# Patient Record
Sex: FEMALE | Race: BLACK OR AFRICAN AMERICAN | NOT HISPANIC OR LATINO | Employment: OTHER | URBAN - METROPOLITAN AREA
[De-identification: names, ages, dates, MRNs, and addresses within clinical notes are randomized per-mention and may not be internally consistent; named-entity substitution may affect disease eponyms.]

---

## 2017-01-01 ENCOUNTER — HOSPITAL ENCOUNTER (OUTPATIENT)
Facility: HOSPITAL | Age: 82
Discharge: HOME | End: 2017-01-03
Attending: EMERGENCY MEDICINE | Admitting: INTERNAL MEDICINE
Payer: MEDICARE

## 2017-01-01 DIAGNOSIS — M79.89 LEG SWELLING: ICD-10-CM

## 2017-01-01 DIAGNOSIS — I73.9 PVD (PERIPHERAL VASCULAR DISEASE): ICD-10-CM

## 2017-01-01 DIAGNOSIS — E11.29 CONTROLLED TYPE 2 DIABETES MELLITUS WITH OTHER DIABETIC KIDNEY COMPLICATION, WITHOUT LONG-TERM CURRENT USE OF INSULIN: ICD-10-CM

## 2017-01-01 DIAGNOSIS — R79.89 ELEVATED TROPONIN: ICD-10-CM

## 2017-01-01 DIAGNOSIS — R07.9 ACUTE CHEST PAIN: Primary | ICD-10-CM

## 2017-01-01 DIAGNOSIS — I10 ESSENTIAL HYPERTENSION: ICD-10-CM

## 2017-01-01 DIAGNOSIS — R07.9 CHEST PAIN: ICD-10-CM

## 2017-01-01 LAB
ALBUMIN SERPL BCP-MCNC: 4 G/DL
ALP SERPL-CCNC: 93 U/L
ALT SERPL W/O P-5'-P-CCNC: 42 U/L
ANION GAP SERPL CALC-SCNC: 13 MMOL/L
AST SERPL-CCNC: 46 U/L
BASOPHILS # BLD AUTO: 0.02 K/UL
BASOPHILS NFR BLD: 0.4 %
BILIRUB SERPL-MCNC: 0.9 MG/DL
BILIRUB UR QL STRIP: NEGATIVE
BNP SERPL-MCNC: 308 PG/ML
BUN SERPL-MCNC: 12 MG/DL
CALCIUM SERPL-MCNC: 9.5 MG/DL
CHLORIDE SERPL-SCNC: 104 MMOL/L
CHOLEST/HDLC SERPL: 2.4 {RATIO}
CK MB SERPL-MCNC: 2.4 NG/ML
CK MB SERPL-MCNC: 2.4 NG/ML
CK MB SERPL-RTO: 1.6 %
CK MB SERPL-RTO: 1.9 %
CK SERPL-CCNC: 127 U/L
CK SERPL-CCNC: 127 U/L
CK SERPL-CCNC: 150 U/L
CK SERPL-CCNC: 150 U/L
CLARITY UR: CLEAR
CO2 SERPL-SCNC: 22 MMOL/L
COLOR UR: ABNORMAL
CREAT SERPL-MCNC: 1 MG/DL
DIFFERENTIAL METHOD: ABNORMAL
EOSINOPHIL # BLD AUTO: 0 K/UL
EOSINOPHIL NFR BLD: 0.8 %
ERYTHROCYTE [DISTWIDTH] IN BLOOD BY AUTOMATED COUNT: 13.3 %
EST. GFR  (AFRICAN AMERICAN): 60 ML/MIN/1.73 M^2
EST. GFR  (NON AFRICAN AMERICAN): 52 ML/MIN/1.73 M^2
GLUCOSE SERPL-MCNC: 121 MG/DL
GLUCOSE UR QL STRIP: NEGATIVE
HCT VFR BLD AUTO: 34.6 %
HDL/CHOLESTEROL RATIO: 41.2 %
HDLC SERPL-MCNC: 170 MG/DL
HDLC SERPL-MCNC: 70 MG/DL
HGB BLD-MCNC: 11.1 G/DL
HGB UR QL STRIP: NEGATIVE
INR PPP: 1.1
KETONES UR QL STRIP: NEGATIVE
LDLC SERPL CALC-MCNC: 92.4 MG/DL
LEUKOCYTE ESTERASE UR QL STRIP: ABNORMAL
LYMPHOCYTES # BLD AUTO: 1.4 K/UL
LYMPHOCYTES NFR BLD: 26.1 %
MAGNESIUM SERPL-MCNC: 1.8 MG/DL
MCH RBC QN AUTO: 27.8 PG
MCHC RBC AUTO-ENTMCNC: 32.1 %
MCV RBC AUTO: 87 FL
MICROSCOPIC COMMENT: NORMAL
MONOCYTES # BLD AUTO: 0.7 K/UL
MONOCYTES NFR BLD: 14.1 %
NEUTROPHILS # BLD AUTO: 3.1 K/UL
NEUTROPHILS NFR BLD: 58.6 %
NITRITE UR QL STRIP: NEGATIVE
NONHDLC SERPL-MCNC: 100 MG/DL
PH UR STRIP: 7 [PH] (ref 5–8)
PLATELET # BLD AUTO: 202 K/UL
PMV BLD AUTO: 11 FL
POTASSIUM SERPL-SCNC: 3.9 MMOL/L
PROT SERPL-MCNC: 7.4 G/DL
PROT UR QL STRIP: NEGATIVE
PROTHROMBIN TIME: 11.3 SEC
RBC # BLD AUTO: 4 M/UL
RBC #/AREA URNS HPF: 2 /HPF (ref 0–4)
SODIUM SERPL-SCNC: 139 MMOL/L
SP GR UR STRIP: 1.01 (ref 1–1.03)
TRIGL SERPL-MCNC: 38 MG/DL
TROPONIN I SERPL DL<=0.01 NG/ML-MCNC: 0.12 NG/ML
TROPONIN I SERPL DL<=0.01 NG/ML-MCNC: 0.15 NG/ML
TSH SERPL DL<=0.005 MIU/L-ACNC: 2.51 UIU/ML
URN SPEC COLLECT METH UR: ABNORMAL
UROBILINOGEN UR STRIP-ACNC: NEGATIVE EU/DL
WBC # BLD AUTO: 5.25 K/UL
WBC #/AREA URNS HPF: 2 /HPF (ref 0–5)

## 2017-01-01 PROCEDURE — 25000003 PHARM REV CODE 250: Performed by: NURSE PRACTITIONER

## 2017-01-01 PROCEDURE — 85610 PROTHROMBIN TIME: CPT

## 2017-01-01 PROCEDURE — G0378 HOSPITAL OBSERVATION PER HR: HCPCS

## 2017-01-01 PROCEDURE — 93306 TTE W/DOPPLER COMPLETE: CPT | Mod: 26,,, | Performed by: INTERNAL MEDICINE

## 2017-01-01 PROCEDURE — 63600175 PHARM REV CODE 636 W HCPCS: Performed by: EMERGENCY MEDICINE

## 2017-01-01 PROCEDURE — 80061 LIPID PANEL: CPT

## 2017-01-01 PROCEDURE — 82553 CREATINE MB FRACTION: CPT | Mod: 91

## 2017-01-01 PROCEDURE — 25000003 PHARM REV CODE 250: Performed by: EMERGENCY MEDICINE

## 2017-01-01 PROCEDURE — 80053 COMPREHEN METABOLIC PANEL: CPT

## 2017-01-01 PROCEDURE — 63600175 PHARM REV CODE 636 W HCPCS: Performed by: NURSE PRACTITIONER

## 2017-01-01 PROCEDURE — 93306 TTE W/DOPPLER COMPLETE: CPT

## 2017-01-01 PROCEDURE — 85025 COMPLETE CBC W/AUTO DIFF WBC: CPT

## 2017-01-01 PROCEDURE — 36415 COLL VENOUS BLD VENIPUNCTURE: CPT

## 2017-01-01 PROCEDURE — 93005 ELECTROCARDIOGRAM TRACING: CPT

## 2017-01-01 PROCEDURE — 99285 EMERGENCY DEPT VISIT HI MDM: CPT | Mod: 25

## 2017-01-01 PROCEDURE — 84484 ASSAY OF TROPONIN QUANT: CPT | Mod: 91

## 2017-01-01 PROCEDURE — 84484 ASSAY OF TROPONIN QUANT: CPT

## 2017-01-01 PROCEDURE — 84443 ASSAY THYROID STIM HORMONE: CPT

## 2017-01-01 PROCEDURE — 82553 CREATINE MB FRACTION: CPT

## 2017-01-01 PROCEDURE — 83880 ASSAY OF NATRIURETIC PEPTIDE: CPT

## 2017-01-01 PROCEDURE — 81000 URINALYSIS NONAUTO W/SCOPE: CPT

## 2017-01-01 PROCEDURE — 83735 ASSAY OF MAGNESIUM: CPT

## 2017-01-01 RX ORDER — ASPIRIN 325 MG
325 TABLET, DELAYED RELEASE (ENTERIC COATED) ORAL
Status: COMPLETED | OUTPATIENT
Start: 2017-01-01 | End: 2017-01-01

## 2017-01-01 RX ORDER — ENOXAPARIN SODIUM 100 MG/ML
40 INJECTION SUBCUTANEOUS EVERY 24 HOURS
Status: DISCONTINUED | OUTPATIENT
Start: 2017-01-01 | End: 2017-01-03 | Stop reason: HOSPADM

## 2017-01-01 RX ORDER — BISACODYL 10 MG
10 SUPPOSITORY, RECTAL RECTAL DAILY PRN
Status: DISCONTINUED | OUTPATIENT
Start: 2017-01-01 | End: 2017-01-03 | Stop reason: HOSPADM

## 2017-01-01 RX ORDER — HYDROCODONE BITARTRATE AND ACETAMINOPHEN 5; 325 MG/1; MG/1
1 TABLET ORAL EVERY 6 HOURS PRN
Status: DISCONTINUED | OUTPATIENT
Start: 2017-01-01 | End: 2017-01-03 | Stop reason: HOSPADM

## 2017-01-01 RX ORDER — DULOXETIN HYDROCHLORIDE 30 MG/1
30 CAPSULE, DELAYED RELEASE ORAL 2 TIMES DAILY
Status: DISCONTINUED | OUTPATIENT
Start: 2017-01-01 | End: 2017-01-03 | Stop reason: HOSPADM

## 2017-01-01 RX ORDER — DULOXETIN HYDROCHLORIDE 30 MG/1
30 CAPSULE, DELAYED RELEASE ORAL 2 TIMES DAILY
COMMUNITY
End: 2017-01-12 | Stop reason: ALTCHOICE

## 2017-01-01 RX ORDER — ONDANSETRON 2 MG/ML
4 INJECTION INTRAMUSCULAR; INTRAVENOUS EVERY 12 HOURS PRN
Status: DISCONTINUED | OUTPATIENT
Start: 2017-01-01 | End: 2017-01-03 | Stop reason: HOSPADM

## 2017-01-01 RX ORDER — DICLOFENAC SODIUM 10 MG/G
GEL TOPICAL DAILY
Status: DISCONTINUED | OUTPATIENT
Start: 2017-01-02 | End: 2017-01-03 | Stop reason: HOSPADM

## 2017-01-01 RX ORDER — RAMELTEON 8 MG/1
8 TABLET ORAL NIGHTLY PRN
Status: DISCONTINUED | OUTPATIENT
Start: 2017-01-01 | End: 2017-01-03 | Stop reason: HOSPADM

## 2017-01-01 RX ORDER — LOSARTAN POTASSIUM 25 MG/1
100 TABLET ORAL DAILY
Status: DISCONTINUED | OUTPATIENT
Start: 2017-01-01 | End: 2017-01-03 | Stop reason: HOSPADM

## 2017-01-01 RX ORDER — FUROSEMIDE 10 MG/ML
20 INJECTION INTRAMUSCULAR; INTRAVENOUS ONCE
Status: COMPLETED | OUTPATIENT
Start: 2017-01-01 | End: 2017-01-01

## 2017-01-01 RX ORDER — FAMOTIDINE 10 MG/ML
20 INJECTION INTRAVENOUS DAILY
Status: DISCONTINUED | OUTPATIENT
Start: 2017-01-01 | End: 2017-01-03 | Stop reason: HOSPADM

## 2017-01-01 RX ORDER — NAPROXEN SODIUM 220 MG/1
81 TABLET, FILM COATED ORAL DAILY
Status: DISCONTINUED | OUTPATIENT
Start: 2017-01-02 | End: 2017-01-03 | Stop reason: HOSPADM

## 2017-01-01 RX ADMIN — DULOXETINE 30 MG: 30 CAPSULE, DELAYED RELEASE ORAL at 08:01

## 2017-01-01 RX ADMIN — LOSARTAN POTASSIUM 100 MG: 25 TABLET, FILM COATED ORAL at 02:01

## 2017-01-01 RX ADMIN — ASPIRIN 325 MG: 325 TABLET, DELAYED RELEASE ORAL at 09:01

## 2017-01-01 RX ADMIN — ENOXAPARIN SODIUM 40 MG: 100 INJECTION SUBCUTANEOUS at 02:01

## 2017-01-01 RX ADMIN — FUROSEMIDE 20 MG: 10 INJECTION, SOLUTION INTRAMUSCULAR; INTRAVENOUS at 06:01

## 2017-01-01 RX ADMIN — FAMOTIDINE 20 MG: 10 INJECTION, SOLUTION INTRAVENOUS at 02:01

## 2017-01-01 RX ADMIN — ONDANSETRON 4 MG: 2 INJECTION INTRAMUSCULAR; INTRAVENOUS at 03:01

## 2017-01-01 RX ADMIN — HYDROCODONE BITARTRATE AND ACETAMINOPHEN 1 TABLET: 5; 325 TABLET ORAL at 03:01

## 2017-01-01 NOTE — ED TRIAGE NOTES
Pt arrives to ED via personal transportation with c/o intermittent midsternal chest pain, SOB, dizziness and generalized weakness x couple days. Also reports swelling to bilateral feet. Denies n/v/d or any other symptoms at this time.

## 2017-01-01 NOTE — H&P
"[facility] Medicine  History & Physical    Patient Name: Magaly Nunes  MRN: [id number]  Admission Date: 1/1/2017  Attending Physician: Dejah Kent MD   Primary Care Provider: Chris Bangura MD         Patient information was obtained from patient and ER records.     Subjective:     Principal Problem:Acute chest pain    Chief Complaint:  Chest Pain/Arthritic Pain     HPI: Magaly Nunes is a 84 y.o. female with a history as below who presented to the ED with a CC of constant non-radiating mid-sternal chest pain 7/10 with epigastric discomfort and nausea.  She describes pain as "achy".  She denies fever, chills, diaphoresis, HA, palpitations, emesis, dysuria, dizziness, recent trauma and illness. She tells me on Thursday she started with all over arthritic pain + chest pain; on Friday seen was seen in Urgent Care and was given an injection "unknown" because her legs were swollen; however symptom were unrelieved. Pertinent FH - mother (d) HTN, DM, PM. She denies cardiac disease.  On Admit, /78. EKG is non-ischemic. Initial troponin level 0.119. . CXR is non-revealing.  Admitted to OBS for ACS rule out.  No previous cardiac issues or recent w/u.     Patient followed briefly by Dr. Bertrand with holter monitor and echo (July 2013).  Holter significant for 1 episode of 7 beats of V-Tach; rare PACs and PVCs; and no AV blocks. Echo shows EF 65% + DD    NST performed and is without evidence of myocardial ischemia (Sept 2015).           Past Medical History   Diagnosis Date    Anxiety disorder     Carpal tunnel syndrome     CHF (congestive heart failure)     Chronic allergic rhinitis     Chronic pain 10/22/2012    Constipation - functional 4/10/2013    Depression     Diabetes mellitus type II     Hot flash not due to menopause     HTN (hypertension)     Hypokalemia 11/19/2012    Insomnia 4/10/2013    Knee pain, bilateral 7/24/2013    Personal history of DVT " (deep vein thrombosis)     Spinal stenosis      Dr. Corrales    Spinal stenosis of lumbar region 2/3/2014    Vertigo        Past Surgical History   Procedure Laterality Date    Hysterectomy      Cataract extraction Bilateral     Eye surgery      Fracture surgery      Orif radius & ulna fractures         Review of patient's allergies indicates:   Allergen Reactions    Ace inhibitors      Other reaction(s): Unknown    Aspirin      Other reaction(s): Unknown    Aciphex  [rabeprazole]      Other reaction(s): Stomach upset    Bextra  [valdecoxib]      Other reaction(s): Unknown    Clonidine      Other reaction(s): dry mouth.lip swelling    Cardizem  [diltiazem hcl]      Other reaction(s): Unknown    Mavik  [trandolapril]      Other reaction(s): Unknown    Nsaids (non-steroidal anti-inflammatory drug)      Other reaction(s): black spots on skin    Phenytoin sodium extended      Other reaction(s): Stomach upset    Tramadol      Other reaction(s): stomach pain       No current facility-administered medications on file prior to encounter.      Current Outpatient Prescriptions on File Prior to Encounter   Medication Sig    hydrocodone-acetaminophen 5-325mg (NORCO) 5-325 mg per tablet Take 1 tablet by mouth every 6 (six) hours as needed for Pain.    losartan (COZAAR) 100 MG tablet Take 1 tablet (100 mg total) by mouth once daily.    meclizine (ANTIVERT) 25 mg tablet     omeprazole (PRILOSEC) 20 MG capsule TAKE 2 CAPSULES BY MOUTH EVERY DAY FOR ACID REFLUX AND STOMACH    potassium chloride SA (K-DUR,KLOR-CON) 20 MEQ tablet TAKE 1 TABLET BY MOUTH DAILY    temazepam (RESTORIL) 30 mg capsule TAKE ONE CAPSULE BY MOUTH NIGHTLY AS NEEDED FOR INSOMNIA.    [DISCONTINUED] ALPHA LIPOIC ACID ORAL Take by mouth.    [DISCONTINUED] calcium citrate (CALCITRATE) 200 mg (950 mg) tablet Take 1 tablet by mouth once daily.    [DISCONTINUED] glucosamine-chondroitin 500-400 mg tablet Take 1 tablet by mouth 3 (three) times  daily.    [DISCONTINUED] meloxicam (MOBIC) 15 MG tablet TK 1 T PO  ONCE D    [DISCONTINUED] duloxetine (CYMBALTA) 60 MG capsule Take 1 capsule (60 mg total) by mouth once daily.    [DISCONTINUED] lorazepam (ATIVAN) 0.5 MG tablet 1- 2 every 6hrs during the day for anxiety and hot flashes     Family History     Problem Relation (Age of Onset)    No Known Problems Mother, Father, Sister, Brother, Maternal Aunt, Maternal Uncle, Paternal Aunt, Paternal Uncle, Maternal Grandmother, Maternal Grandfather, Paternal Grandmother, Paternal Grandfather        Social History Main Topics    Smoking status: Never Smoker    Smokeless tobacco: Not on file    Alcohol use No    Drug use: No    Sexual activity: No     Review of Systems   Constitutional: Negative for chills, diaphoresis and fever.   HENT: Negative for congestion, postnasal drip, sinus pressure and sore throat.    Eyes: Negative for visual disturbance.   Respiratory: Positive for shortness of breath (ROTH). Negative for cough, chest tightness and wheezing.    Cardiovascular: Positive for chest pain (non-radiating mid-sternal). Negative for palpitations and leg swelling.   Gastrointestinal: Positive for nausea. Negative for abdominal distention, abdominal pain, blood in stool, constipation, diarrhea and vomiting.   Endocrine: Negative.    Genitourinary: Negative for dysuria.   Musculoskeletal: Positive for arthralgias, back pain and joint swelling.        Baker's cyst bilat knees   Skin: Negative.    Allergic/Immunologic: Negative.    Neurological: Negative for dizziness, weakness, numbness and headaches.   Hematological: Negative.    Psychiatric/Behavioral: Negative.      Objective:     Vital Signs (Most Recent):  Temp: 98.2 °F (36.8 °C) (01/01/17 1301)  Pulse: 84 (01/01/17 1301)  Resp: 18 (01/01/17 1301)  BP: (!) 195/87 (01/01/17 1301)  SpO2: 98 % (01/01/17 1301) Vital Signs (24h Range):  Temp:  [98.2 °F (36.8 °C)-98.3 °F (36.8 °C)] 98.2 °F (36.8 °C)  Pulse:   [73-87] 84  Resp:  [11-21] 18  BP: (141-195)/(67-87) 195/87     Weight: 82 kg (180 lb 12.4 oz)  Body mass index is 31.03 kg/(m^2).    Physical Exam   Constitutional: She is oriented to person, place, and time. She appears well-developed and well-nourished. She is cooperative.  Non-toxic appearance. No distress.   HENT:   Head: Normocephalic and atraumatic.   Eyes: Conjunctivae and lids are normal. Pupils are equal, round, and reactive to light.   Neck: Trachea normal, normal range of motion and full passive range of motion without pain. Neck supple. Normal carotid pulses and no JVD present. No tracheal deviation present. No thyroid mass and no thyromegaly present.   Cardiovascular: Normal rate, regular rhythm, S1 normal, S2 normal and normal pulses.    Murmur heard.  +1-2 BLE R>L   Pulmonary/Chest: Effort normal and breath sounds normal. No stridor.   Abdominal: Soft. Normal appearance and bowel sounds are normal. There is no tenderness.   Musculoskeletal: Normal range of motion.   Neurological: She is alert and oriented to person, place, and time. She has normal strength. No cranial nerve deficit or sensory deficit.   Skin: Skin is warm, dry and intact. She is not diaphoretic. No cyanosis. Nails show no clubbing.   Psychiatric: She has a normal mood and affect. Her speech is normal and behavior is normal. Judgment and thought content normal. Cognition and memory are normal.        Significant Labs:   A1C:   Recent Labs  Lab 08/17/16  1506 12/13/16  1201   HGBA1C 6.2 6.1     CBC:   Recent Labs  Lab 01/01/17  0844   WBC 5.25   HGB 11.1*   HCT 34.6*        CMP:   Recent Labs  Lab 01/01/17  0844      K 3.9      CO2 22*   *   BUN 12   CREATININE 1.0   CALCIUM 9.5   PROT 7.4   ALBUMIN 4.0   BILITOT 0.9   ALKPHOS 93   AST 46*   ALT 42   ANIONGAP 13   EGFRNONAA 52*       Recent Labs  Lab 01/01/17  0844     150   TROPONINI 0.119*   CPKMB 2.4   MB 1.6      Ref. Range 1/1/2017 08:44   BNP  Latest Ref Range: 0 - 99 pg/mL 308 (H)        Ref. Range 1/7/2016 09:56   Cholesterol Latest Ref Range: 120 - 199 mg/dL 183   HDL Latest Ref Range: 40 - 75 mg/dL 68   LDL Cholesterol Latest Ref Range: 63.0 - 159.0 mg/dL 103.6   Total Cholesterol/HDL Ratio Latest Ref Range: 2.0 - 5.0  2.7   Triglycerides Latest Ref Range: 30 - 150 mg/dL 57     Significant Imaging: I have reviewed all pertinent imaging results/findings within the past 24 hours.   Imaging Results         US Lower Extremity Veins Bilateral (Final result) Result time:  01/01/17 10:48:24    Final result by Malvin Castillo MD (01/01/17 10:48:24)    Impression:     No evidence of DVT in the lower extremities.  Probable bilateral popliteal cysts as above.  Further characterize it could be obtained with MRI as clinically warranted.      Electronically signed by: MALVIN CASTILLO MD  Date:     01/01/17  Time:    10:48     Narrative:    Comparison: 2/5/2016.    Findings: Duplex scan of the bilateral lower extremity was performed using B. mode/grayscale imaging and Doppler spectral analysis and color-flow .  The deep veins of the lower extremities demonstrate normal color flow and compressibility.  There ill-defined hypoechoic collections in the bilateral popliteal fossa was measuring 3.4 x 4.1 x 9.4 cm on the left and 2.7 x 5.0 x 8.1 cm on the right.  No definite internal Doppler blood flow demonstrated.  Findings likely represent popliteal cysts however characterization is limited on ultrasound.  Further evaluation can be obtained with MRI as clinically warranted.            X-Ray Chest AP Portable (Final result) Result time:  01/01/17 09:02:01    Final result by Paul Gaming DO (01/01/17 09:02:01)    Impression:        See Above       Electronically signed by: PAUL GAMING DO  Date:     01/01/17  Time:    09:02     Narrative:    Single portable frontal view of the chest    Comparison: 08/10/2015    Results: Course interstitial opacities throughout the  lungs most pronounced in the lower lobes which likely represents scarring and atelectasis. There is no large pleural effusion or pneumothorax. Atherosclerotic aorta. Cardiomediastinal silhouette otherwise limited by technique                Assessment/Plan:     * Acute chest pain  Initial troponin level elevated; however CK-MB wnl; could be 2/2 supply demand +/- ?new HF . . EKG is on-ischemic. CXR without acute cardiopulmomary processes. Continue cardiac monitoring. Continue to trend serial troponins q6 hours X 2. NTG 0.4 mg SL PRN CP. Echocardiogram. Cardiology consult.           Elevated troponin  See above      HTN (hypertension)  Continue current antihypertensives      Knee pain, bilateral  2/2 arthritic pain. US of LE unremarkable. Continue prn oral pain medications + Voltaren gel. Fall percaution      Spinal stenosis of lumbar region  Stable. Continue home PRN pain medication regimen      Depression, major, recurrent, moderate  Continue home anti-depressant      VTE Risk Mitigation         Ordered     Place PHILL hose  Until discontinued      01/01/17 1508     enoxaparin injection 40 mg  Daily     Route:  Subcutaneous        01/01/17 1243     Medium Risk of VTE  Once      01/01/17 1243        CAMRYN Carranza  Department of Hospital Medicine   [facility]

## 2017-01-01 NOTE — ED NOTES
Prior to telemetry transfer pt stable and on cardiac monitor, automatic blood pressure cuff and pulse oximeter. Pt resting in bed, REU, and NAD noted. Bed locked in lowest position. Bed rails up x2. Call light in reach of pt.

## 2017-01-01 NOTE — ASSESSMENT & PLAN NOTE
2/2 arthritic pain. US of LE unremarkable. Continue prn oral pain medications + Voltaren gel. Fall percaution

## 2017-01-01 NOTE — NURSING
1300- patient arrived to floor at 12:30 pm niece with her. Verified home medications. Updated history. No change on telemetry .     1400- after completing assessment . Awaiting for dr lino or domi nurse practioner.     1654- medicated for neck and back patient patient declined to wear scd pumps but would allow me to place the PHILL stockings on both legs knee ignacia. No change on telemetry.

## 2017-01-01 NOTE — ASSESSMENT & PLAN NOTE
Initial troponin level elevated; however CK-MB wnl; could be 2/2 supply demand +/- ?new HF . . EKG is on-ischemic. CXR without acute cardiopulmomary processes. Continue cardiac monitoring. Continue to trend serial troponins q6 hours X 2. NTG 0.4 mg SL PRN CP. Echocardiogram. Cardiology consult.

## 2017-01-01 NOTE — ED NOTES
Notified telemetry monitor jing Araujo that pt is placed on cardiac portable monitor #[id number], per Gayle, pt's rhythm is visible on telemetry monitor.

## 2017-01-01 NOTE — ED NOTES
Patient on cardiac monitor, automatic blood pressure cuff and pulse oximeter. Pt resting in bed, REU, and NAD noted. Bed locked in lowest position. Bed rails up x2. Call light in reach of pt. Will continue to monitor.

## 2017-01-01 NOTE — ED PROVIDER NOTES
"Encounter Date: 1/1/2017    SCRIBE #1 NOTE: I, Dionte Kent, am scribing for, and in the presence of,  Rsoy Birmingham MD. I have scribed the following portions of the note - Other sections scribed: HPI, ROS.       History     Chief Complaint   Patient presents with    Chest Pain     with dizziness since thursday     Review of patient's allergies indicates:   Allergen Reactions    Ace inhibitors      Other reaction(s): Unknown    Aspirin      Other reaction(s): Unknown    Aciphex  [rabeprazole]      Other reaction(s): Stomach upset    Bextra  [valdecoxib]      Other reaction(s): Unknown    Clonidine      Other reaction(s): dry mouth.lip swelling    Cardizem  [diltiazem hcl]      Other reaction(s): Unknown    Mavik  [trandolapril]      Other reaction(s): Unknown    Nsaids (non-steroidal anti-inflammatory drug)      Other reaction(s): black spots on skin    Phenytoin sodium extended      Other reaction(s): Stomach upset    Tramadol      Other reaction(s): stomach pain     HPI Comments: CC: Chest Pain    HPI: 84 year old female with a history of diabetes presents to the ED complaining of chest pain which she states has been intermittent "for a while but got worse last Thursday or Friday" 2-3 days ago. She states she has chronic knee pain and went to urgent care for a steroid shot on Friday 2 days ago. She states her left knee feels better but her right knee is still painful. She also reports having some dizziness today and thinks it is related to her vertigo. She otherwise denies fever, chills, shortness of breath, abdominal pain, nausea, vomiting, and diarrhea. She rates her current pain as moderate 7/10. No prior treatment.    The history is provided by the patient. No  was used.     Past Medical History   Diagnosis Date    Anxiety disorder     Carpal tunnel syndrome     CHF (congestive heart failure)     Chronic allergic rhinitis     Chronic pain 10/22/2012    " Constipation - functional 4/10/2013    Depression     Diabetes mellitus type II     Hot flash not due to menopause     HTN (hypertension)     Hypokalemia 11/19/2012    Insomnia 4/10/2013    Knee pain, bilateral 7/24/2013    Personal history of DVT (deep vein thrombosis)     Spinal stenosis      Dr. Corrales    Spinal stenosis of lumbar region 2/3/2014    Vertigo      Past Medical History Pertinent Negatives   Diagnosis Date Noted    Amblyopia 8/18/2014    Arthritis 8/18/2014    Cataract 8/18/2014    Diabetic retinopathy 8/18/2014    Glaucoma 8/18/2014    Macular degeneration 8/18/2014    Retinal detachment 8/18/2014    Strabismus 8/18/2014    Uveitis 8/18/2014     Past Surgical History   Procedure Laterality Date    Hysterectomy      Cataract extraction Bilateral      Family History   Problem Relation Age of Onset    Cataracts Mother     No Known Problems Father     No Known Problems Sister     No Known Problems Brother     No Known Problems Maternal Aunt     No Known Problems Maternal Uncle     No Known Problems Paternal Aunt     No Known Problems Paternal Uncle     No Known Problems Maternal Grandmother     No Known Problems Maternal Grandfather     No Known Problems Paternal Grandmother     No Known Problems Paternal Grandfather     Amblyopia Neg Hx     Blindness Neg Hx     Cancer Neg Hx     Diabetes Neg Hx     Glaucoma Neg Hx     Hypertension Neg Hx     Macular degeneration Neg Hx     Retinal detachment Neg Hx     Strabismus Neg Hx     Stroke Neg Hx     Thyroid disease Neg Hx      Social History   Substance Use Topics    Smoking status: Never Smoker    Smokeless tobacco: None    Alcohol use No     Review of Systems   Constitutional: Negative for chills and fever.   HENT: Negative for sore throat.    Eyes: Negative for visual disturbance.   Respiratory: Negative for shortness of breath.    Cardiovascular: Positive for chest pain.   Gastrointestinal: Negative for  abdominal pain, diarrhea, nausea and vomiting.   Genitourinary: Negative for dysuria.   Musculoskeletal: Negative for back pain.   Skin: Negative for rash.   Neurological: Positive for dizziness. Negative for weakness and numbness.       Physical Exam   Initial Vitals   BP Pulse Resp Temp SpO2   01/01/17 0816 01/01/17 0816 01/01/17 0816 01/01/17 0816 01/01/17 0816   173/78 87 17 98.3 °F (36.8 °C) 97 %     Physical Exam    Nursing note and vitals reviewed.  Constitutional: She appears well-developed and well-nourished.   HENT:   Head: Normocephalic and atraumatic.   Eyes: Conjunctivae and EOM are normal. Pupils are equal, round, and reactive to light.   Neck: Normal range of motion. Neck supple.   Cardiovascular: Regular rhythm and normal heart sounds. Exam reveals no gallop and no friction rub.    No murmur heard.  Pulmonary/Chest: Breath sounds normal. No respiratory distress. She has no wheezes. She has no rhonchi. She has no rales.   Abdominal: Soft. Normal appearance and bowel sounds are normal. She exhibits no pulsatile midline mass. There is no tenderness. There is no rebound and no guarding.   Musculoskeletal: Normal range of motion. She exhibits edema (BLE edema R>>L).   2+ pulses present bilaterally.    Neurological: She is alert and oriented to person, place, and time. No cranial nerve deficit.   Skin: Skin is warm and dry. No rash noted.   Psychiatric: She has a normal mood and affect. Her behavior is normal. Judgment and thought content normal.         ED Course   Procedures  Labs Reviewed   CBC W/ AUTO DIFFERENTIAL - Abnormal; Notable for the following:        Result Value    Hemoglobin 11.1 (*)     Hematocrit 34.6 (*)     All other components within normal limits   COMPREHENSIVE METABOLIC PANEL - Abnormal; Notable for the following:     CO2 22 (*)     Glucose 121 (*)     AST 46 (*)     eGFR if non  52 (*)     All other components within normal limits   URINALYSIS - Abnormal; Notable for  the following:     Leukocytes, UA Trace (*)     All other components within normal limits   TROPONIN I - Abnormal; Notable for the following:     Troponin I 0.119 (*)     All other components within normal limits   B-TYPE NATRIURETIC PEPTIDE - Abnormal; Notable for the following:      (*)     All other components within normal limits   TSH   PROTIME-INR   MAGNESIUM   CK   CK-MB   URINALYSIS MICROSCOPIC   LIPID PANEL     EKG Readings: (Independently Interpreted)   Sinus rhythm, rate approximately 83.  No ST elevation or depression.  QTc 428.  No evidence of acute ischemia or malignant arrhythmia.          Medical Decision Making:   History:   Old Medical Records: I decided to obtain old medical records.  Old Records Summarized: records from clinic visits and records from previous admission(s).       <> Summary of Records: History of hypertension, controlled type 2 diabetes.  Negative stress test in September 2015.  84 y.o. female with history of hypertension, diabetes, vertigo, presents to the emergency department complaining of midsternal chest pain intermittent, worse since Thursday.  She reports intermittent mild shortness of breath and associated dizziness.  Pain is nonradiating.  She also notes bilateral leg pain that she believes is associated with her arthritis.  Differential diagnosis includes but not limited to acute MI, PE, CHF, pneumonia, DVT, among others.  On exam lungs are clear.  She has bilateral lower extremity edema present, right worse than left.  Bilateral lower extremity ultrasound ordered and show no evidence of acute DVT. EKG independently interpreted by me, is negative for acute ischemia. Chest x-ray independently interpreted by me as negative for acute infiltrates, pneumothorax, effusion, widening mediastinum, or other acute cardiopulmonary process.  Labs ordered.  Troponin is elevated at 0.119.  BNP is 208.  No leukocytosis.  Hemoglobin and hematocrit are stable.  Creatinine is within  normal limits.  TSH is within normal limits.  Due to the patient's chest pain complaint, and elevated troponin, I decided to consult hospital medicine.  Case discussed with nurse practitioner Ms. Ramon.  The patient will be admitted for serial enzymes, chronic monitoring, cardiology consult, 2-D echo.  Aspirin given in the emergency department, which patient tolerated.  She reports an ALLERGY to aspirin, however she states that the ALLERGY is only that she bruises easily when she takes it.  I will continue aspirin while admitted.  Patient informed of admission and agrees.  She has remained stable while in the emergency department.            Scribe Attestation:   Scribe #1: I performed the above scribed service and the documentation accurately describes the services I performed. I attest to the accuracy of the note.    Attending Attestation:           Physician Attestation for Scribe:  Physician Attestation Statement for Scribe #1: I, Rosy Birmingham MD, reviewed documentation, as scribed by Dionte Kent in my presence, and it is both accurate and complete.                 ED Course     Clinical Impression:   The primary encounter diagnosis was Acute chest pain. A diagnosis of Leg swelling was also pertinent to this visit.          Rosy Birmingham MD  01/01/17 1303

## 2017-01-01 NOTE — IP AVS SNAPSHOT
[place] [address] [address] [place] [address]  Phone: [phone]           Patient Discharge Instructions     Our goal is to set you up for success. This packet includes information on your condition, medications, and your home care. It will help you to care for yourself so you don't get sicker and need to go back to the hospital.     Please ask your nurse if you have any questions.        There are many details to remember when preparing to leave the hospital. Here is what you will need to do:    1. Take your medicine. If you are prescribed medications, review your Medication List in the following pages. You may have new medications to  at the pharmacy and others that you'll need to stop taking. Review the instructions for how and when to take your medications. Talk with your doctor or nurses if you are unsure of what to do.     2. Go to your follow-up appointments. Specific follow-up information is listed in the following pages. Your may be contacted by a transition nurse or clinical provider about future appointments. Be sure we have all of the phone numbers to reach you, if needed. Please contact your provider's office if you are unable to make an appointment.     3. Watch for warning signs. Your doctor or nurse will give you detailed warning signs to watch for and when to call for assistance. These instructions may also include educational information about your condition. If you experience any of warning signs to your health, call your doctor.               [facility]  Unless otherwise directed by your provider, please contact [facility], our nurse care line that is available for 24/7 assistance.     [phone] (toll-free)    Registered nurses in the [facility] provide clinical advisement, health education, appointment booking, and other advisory services.                    ** Verify the list of medication(s) below is accurate and up to date.  Carry this with you in case of emergency. If your medications have changed, please notify your healthcare provider.             Medication List      START taking these medications        Additional Info                      carvedilol 12.5 MG tablet   Commonly known as:  COREG   Quantity:  60 tablet   Refills:  11   Dose:  12.5 mg    Last time this was given:  12.5 mg on 1/3/2017  4:26 PM   Instructions:  Take 1 tablet (12.5 mg total) by mouth 2 (two) times daily.     Begin Date    AM    Noon    PM    Bedtime         CONTINUE taking these medications        Additional Info                      duloxetine 30 MG capsule   Commonly known as:  CYMBALTA   Refills:  0   Dose:  30 mg   Indications:  Anxiety with Depression    Last time this was given:  30 mg on 1/3/2017  2:33 PM   Instructions:  Take 30 mg by mouth 2 (two) times daily.     Begin Date    AM    Noon    PM    Bedtime       hydrocodone-acetaminophen 5-325mg 5-325 mg per tablet   Commonly known as:  NORCO   Quantity:  100 tablet   Refills:  0   Dose:  1 tablet    Last time this was given:  1 tablet on 1/2/2017 11:44 AM   Instructions:  Take 1 tablet by mouth every 6 (six) hours as needed for Pain.     Begin Date    AM    Noon    PM    Bedtime       losartan 100 MG tablet   Commonly known as:  COZAAR   Quantity:  90 tablet   Refills:  1   Dose:  100 mg    Last time this was given:  100 mg on 1/3/2017  2:33 PM   Instructions:  Take 1 tablet (100 mg total) by mouth once daily.     Begin Date    AM    Noon    PM    Bedtime       meclizine 25 mg tablet   Commonly known as:  ANTIVERT   Refills:  0      Begin Date    AM    Noon    PM    Bedtime       omeprazole 20 MG capsule   Commonly known as:  PRILOSEC   Quantity:  180 capsule   Refills:  1    Instructions:  TAKE 2 CAPSULES BY MOUTH EVERY DAY FOR ACID REFLUX AND STOMACH     Begin Date    AM    Noon    PM    Bedtime       potassium chloride SA 20 MEQ tablet   Commonly known as:  K-DUR,KLOR-CON   Quantity:  90 tablet    Refills:  1   Comments:  **Patient requests 90 days supply**    Instructions:  TAKE 1 TABLET BY MOUTH DAILY     Begin Date    AM    Noon    PM    Bedtime       temazepam 30 mg capsule   Commonly known as:  RESTORIL   Quantity:  30 capsule   Refills:  5    Instructions:  TAKE ONE CAPSULE BY MOUTH NIGHTLY AS NEEDED FOR INSOMNIA.     Begin Date    AM    Noon    PM    Bedtime            Where to Get Your Medications      These medications were sent to [email or web address] [address] [place] [address] [place] [address] [place] [address]     Phone:  [phone]     carvedilol 12.5 MG tablet                  Please bring to all follow up appointments:    1. A copy of your discharge instructions.  2. All medicines you are currently taking in their original bottles.  3. Identification and insurance card.    Please arrive 15 minutes ahead of scheduled appointment time.    Please call 24 hours in advance if you must reschedule your appointment and/or time.        Your Scheduled Appointments     Jan 18, 2017  2:40 PM CST   Established Patient Visit with Chris Bangura MD   [facility]    [address] [place] [id number] [phone]            Feb 08, 2017 11:15 AM CST   Established Patient Visit with Brinda Alejandro DPM   [facility]    [address] [place] [address] [phone]              Follow-up Information     Follow up with Chris Bangura MD On 1/18/2017.    Specialty:  Internal Medicine    Why:  Wednesday January 18th at 2:40pm    Contact information:    [address] [place] [address] [place] [address] [phone]          Schedule an appointment as soon as possible for a visit with Hammad Humphrey MD.    Specialty:  Cardiology    Contact information:    [address] [address] [phone]          Discharge Instructions     Future Orders    Activity as tolerated     Diet general   "   Questions:    Total calories:      Fat restriction, if any:      Protein restriction, if any:      Na restriction, if any:      Fluid restriction:      Additional restrictions:  Cardiac (Low Na/Chol)      Discharge References/Attachments     CHEST PAIN, NONCARDIAC  (ENGLISH)    ANXIETY DISORDERS, UNDERSTANDING (ENGLISH)    ANXIETY DISORDERS, TREATING, WITH THERAPY   (ENGLISH)    DEPRESSION (ENGLISH)    DEPRESSION AFFECTS YOUR MIND AND BODY (ENGLISH)    ATHEROSCLEROSIS, TAKING ASPIRIN FOR (ENGLISH)        Primary Diagnosis     Your primary diagnosis was:  Acute Chest Pain      Admission Information     Date & Time Provider Department CSN    1/1/2017  8:17 AM Zoey Roman MD [facility] [id number]      Care Providers     Provider Role Specialty Primary office phone    Zoey Roman MD Attending Provider Hospitalist [phone]    Hammad Humphrey MD Consulting Physician  Cardiology [phone]      Your Vitals Were     BP Pulse Temp Resp Height Weight    144/78 (BP Location: Left arm, Patient Position: Lying, BP Method: Automatic) 72 98.1 °F (36.7 °C) (Oral) 20 5' 4" (1.626 m) 84.6 kg (186 lb 8 oz)    SpO2 BMI             95% 32.01 kg/m2         Recent Lab Values        11/24/2014 3/16/2015 6/13/2015 9/10/2015 1/7/2016 3/30/2016 8/17/2016 12/13/2016      9:58 AM  1:45 PM  9:50 AM 11:30 AM  9:56 AM  4:47 PM  3:06 PM 12:01 PM    A1C 6.5 (H) 6.3 (H) 6.8 (H) 6.4 (H) 6.1 6.3 (H) 6.2 6.1    Comment for A1C at  3:06 PM on 8/17/2016:  According to ADA guidelines, hemoglobin A1C <7.0% represents  optimal control in non-pregnant diabetic patients.  Different  metrics may apply to specific populations.   Standards of Medical Care in Diabetes - 2016.  For the purpose of screening for the presence of diabetes:  <5.7%     Consistent with the absence of diabetes  5.7-6.4%  Consistent with increasing risk for diabetes   (prediabetes)  >or=6.5%  Consistent with diabetes  Currently no consensus exists for " use of hemoglobin A1C  for diagnosis of diabetes for children.      Comment for A1C at 12:01 PM on 12/13/2016:  According to ADA guidelines, hemoglobin A1C <7.0% represents  optimal control in non-pregnant diabetic patients.  Different  metrics may apply to specific populations.   Standards of Medical Care in Diabetes - 2016.  For the purpose of screening for the presence of diabetes:  <5.7%     Consistent with the absence of diabetes  5.7-6.4%  Consistent with increasing risk for diabetes   (prediabetes)  >or=6.5%  Consistent with diabetes  Currently no consensus exists for use of hemoglobin A1C  for diagnosis of diabetes for children.        Allergies as of 1/3/2017        Reactions    Ace Inhibitors     Other reaction(s): Unknown    Aspirin     Other reaction(s): Unknown    Aciphex  [Rabeprazole]     Other reaction(s): Stomach upset    Bextra  [Valdecoxib]     Other reaction(s): Unknown    Clonidine     Other reaction(s): dry mouth.lip swelling    Cardizem  [Diltiazem Hcl]     Other reaction(s): Unknown    Mavik  [Trandolapril]     Other reaction(s): Unknown    Nsaids (Non-steroidal Anti-inflammatory Drug)     Other reaction(s): black spots on skin    Phenytoin Sodium Extended     Other reaction(s): Stomach upset    Tramadol     Other reaction(s): stomach pain      Advance Directives     An advance directive is a document which, in the event you are no longer able to make decisions for yourself, tells your healthcare team what kind of treatment you do or do not want to receive, or who you would like to make those decisions for you.  If you do not currently have an advance directive, [facility] encourages you to create one.  For more information call:  [phone] ([phone]),  or log on to [email or web address].        Language Assistance Services     ATTENTION: Language assistance services are available, free of charge. Please call [phone].      ATENCIÓN: Serina john  tiene a rai disposición servicios gratuitos de asistencia lingüística. Reidneto al [phone].     NAKITA Ý: N?u b?n nói Ti?ng Vi?t, có các d?ch v? h? tr? ngôn ng? mi?n phí dành cho b?n. G?i s? [phone].        Diabetes Discharge Instructions                                    [facility] complies with applicable Federal civil rights laws and does not discriminate on the basis of race, color, national origin, age, disability, or sex.

## 2017-01-01 NOTE — IP AVS SNAPSHOT
[address] [place] [address]  Phone: [phone]           I have received a copy of my After Visit Summary and discharge instructions from [facility].    INSTRUCTIONS RECEIVED AND UNDERSTOOD BY:                     Patient/Patient Representative: ________________________________________________________________     Date/Time: ________________________________________________________________                     Instructions Given By: ________________________________________________________________     Date/Time: ________________________________________________________________

## 2017-01-01 NOTE — SUBJECTIVE & OBJECTIVE
Past Medical History   Diagnosis Date    Anxiety disorder     Carpal tunnel syndrome     CHF (congestive heart failure)     Chronic allergic rhinitis     Chronic pain 10/22/2012    Constipation - functional 4/10/2013    Depression     Diabetes mellitus type II     Hot flash not due to menopause     HTN (hypertension)     Hypokalemia 11/19/2012    Insomnia 4/10/2013    Knee pain, bilateral 7/24/2013    Personal history of DVT (deep vein thrombosis)     Spinal stenosis      Dr. Corrales    Spinal stenosis of lumbar region 2/3/2014    Vertigo        Past Surgical History   Procedure Laterality Date    Hysterectomy      Cataract extraction Bilateral     Eye surgery      Fracture surgery      Orif radius & ulna fractures         Review of patient's allergies indicates:   Allergen Reactions    Ace inhibitors      Other reaction(s): Unknown    Aspirin      Other reaction(s): Unknown    Aciphex  [rabeprazole]      Other reaction(s): Stomach upset    Bextra  [valdecoxib]      Other reaction(s): Unknown    Clonidine      Other reaction(s): dry mouth.lip swelling    Cardizem  [diltiazem hcl]      Other reaction(s): Unknown    Mavik  [trandolapril]      Other reaction(s): Unknown    Nsaids (non-steroidal anti-inflammatory drug)      Other reaction(s): black spots on skin    Phenytoin sodium extended      Other reaction(s): Stomach upset    Tramadol      Other reaction(s): stomach pain       No current facility-administered medications on file prior to encounter.      Current Outpatient Prescriptions on File Prior to Encounter   Medication Sig    hydrocodone-acetaminophen 5-325mg (NORCO) 5-325 mg per tablet Take 1 tablet by mouth every 6 (six) hours as needed for Pain.    losartan (COZAAR) 100 MG tablet Take 1 tablet (100 mg total) by mouth once daily.    meclizine (ANTIVERT) 25 mg tablet     omeprazole (PRILOSEC) 20 MG capsule TAKE 2 CAPSULES BY MOUTH EVERY DAY FOR ACID REFLUX AND STOMACH     potassium chloride SA (K-DUR,KLOR-CON) 20 MEQ tablet TAKE 1 TABLET BY MOUTH DAILY    temazepam (RESTORIL) 30 mg capsule TAKE ONE CAPSULE BY MOUTH NIGHTLY AS NEEDED FOR INSOMNIA.    [DISCONTINUED] ALPHA LIPOIC ACID ORAL Take by mouth.    [DISCONTINUED] calcium citrate (CALCITRATE) 200 mg (950 mg) tablet Take 1 tablet by mouth once daily.    [DISCONTINUED] glucosamine-chondroitin 500-400 mg tablet Take 1 tablet by mouth 3 (three) times daily.    [DISCONTINUED] meloxicam (MOBIC) 15 MG tablet TK 1 T PO  ONCE D    [DISCONTINUED] duloxetine (CYMBALTA) 60 MG capsule Take 1 capsule (60 mg total) by mouth once daily.    [DISCONTINUED] lorazepam (ATIVAN) 0.5 MG tablet 1- 2 every 6hrs during the day for anxiety and hot flashes     Family History     Problem Relation (Age of Onset)    No Known Problems Mother, Father, Sister, Brother, Maternal Aunt, Maternal Uncle, Paternal Aunt, Paternal Uncle, Maternal Grandmother, Maternal Grandfather, Paternal Grandmother, Paternal Grandfather        Social History Main Topics    Smoking status: Never Smoker    Smokeless tobacco: Not on file    Alcohol use No    Drug use: No    Sexual activity: No     Review of Systems   Constitutional: Negative for chills, diaphoresis and fever.   HENT: Negative for congestion, postnasal drip, sinus pressure and sore throat.    Eyes: Negative for visual disturbance.   Respiratory: Positive for shortness of breath (ROTH). Negative for cough, chest tightness and wheezing.    Cardiovascular: Positive for chest pain (non-radiating mid-sternal). Negative for palpitations and leg swelling.   Gastrointestinal: Positive for nausea. Negative for abdominal distention, abdominal pain, blood in stool, constipation, diarrhea and vomiting.   Endocrine: Negative.    Genitourinary: Negative for dysuria.   Musculoskeletal: Positive for arthralgias, back pain and joint swelling.        Baker's cyst bilat knees   Skin: Negative.    Allergic/Immunologic: Negative.     Neurological: Negative for dizziness, weakness, numbness and headaches.   Hematological: Negative.    Psychiatric/Behavioral: Negative.      Objective:     Vital Signs (Most Recent):  Temp: 98.2 °F (36.8 °C) (01/01/17 1301)  Pulse: 84 (01/01/17 1301)  Resp: 18 (01/01/17 1301)  BP: (!) 195/87 (01/01/17 1301)  SpO2: 98 % (01/01/17 1301) Vital Signs (24h Range):  Temp:  [98.2 °F (36.8 °C)-98.3 °F (36.8 °C)] 98.2 °F (36.8 °C)  Pulse:  [73-87] 84  Resp:  [11-21] 18  BP: (141-195)/(67-87) 195/87     Weight: 82 kg (180 lb 12.4 oz)  Body mass index is 31.03 kg/(m^2).    Physical Exam   Constitutional: She is oriented to person, place, and time. She appears well-developed and well-nourished. She is cooperative.  Non-toxic appearance. No distress.   HENT:   Head: Normocephalic and atraumatic.   Eyes: Conjunctivae and lids are normal. Pupils are equal, round, and reactive to light.   Neck: Trachea normal, normal range of motion and full passive range of motion without pain. Neck supple. Normal carotid pulses and no JVD present. No tracheal deviation present. No thyroid mass and no thyromegaly present.   Cardiovascular: Normal rate, regular rhythm, S1 normal, S2 normal and normal pulses.    Murmur heard.  +1-2 BLE R>L   Pulmonary/Chest: Effort normal and breath sounds normal. No stridor.   Abdominal: Soft. Normal appearance and bowel sounds are normal. There is no tenderness.   Musculoskeletal: Normal range of motion.   Neurological: She is alert and oriented to person, place, and time. She has normal strength. No cranial nerve deficit or sensory deficit.   Skin: Skin is warm, dry and intact. She is not diaphoretic. No cyanosis. Nails show no clubbing.   Psychiatric: She has a normal mood and affect. Her speech is normal and behavior is normal. Judgment and thought content normal. Cognition and memory are normal.        Significant Labs:   A1C:   Recent Labs  Lab 08/17/16  1506 12/13/16  1201   HGBA1C 6.2 6.1     CBC:    Recent Labs  Lab 01/01/17  0844   WBC 5.25   HGB 11.1*   HCT 34.6*        CMP:   Recent Labs  Lab 01/01/17  0844      K 3.9      CO2 22*   *   BUN 12   CREATININE 1.0   CALCIUM 9.5   PROT 7.4   ALBUMIN 4.0   BILITOT 0.9   ALKPHOS 93   AST 46*   ALT 42   ANIONGAP 13   EGFRNONAA 52*       Recent Labs  Lab 01/01/17  0844     150   TROPONINI 0.119*   CPKMB 2.4   MB 1.6      Ref. Range 1/1/2017 08:44   BNP Latest Ref Range: 0 - 99 pg/mL 308 (H)        Ref. Range 1/7/2016 09:56   Cholesterol Latest Ref Range: 120 - 199 mg/dL 183   HDL Latest Ref Range: 40 - 75 mg/dL 68   LDL Cholesterol Latest Ref Range: 63.0 - 159.0 mg/dL 103.6   Total Cholesterol/HDL Ratio Latest Ref Range: 2.0 - 5.0  2.7   Triglycerides Latest Ref Range: 30 - 150 mg/dL 57     Significant Imaging: I have reviewed all pertinent imaging results/findings within the past 24 hours.   Imaging Results         US Lower Extremity Veins Bilateral (Final result) Result time:  01/01/17 10:48:24    Final result by Malvin Castillo MD (01/01/17 10:48:24)    Impression:     No evidence of DVT in the lower extremities.  Probable bilateral popliteal cysts as above.  Further characterize it could be obtained with MRI as clinically warranted.      Electronically signed by: MALVIN CASTILLO MD  Date:     01/01/17  Time:    10:48     Narrative:    Comparison: 2/5/2016.    Findings: Duplex scan of the bilateral lower extremity was performed using B. mode/grayscale imaging and Doppler spectral analysis and color-flow .  The deep veins of the lower extremities demonstrate normal color flow and compressibility.  There ill-defined hypoechoic collections in the bilateral popliteal fossa was measuring 3.4 x 4.1 x 9.4 cm on the left and 2.7 x 5.0 x 8.1 cm on the right.  No definite internal Doppler blood flow demonstrated.  Findings likely represent popliteal cysts however characterization is limited on ultrasound.  Further evaluation can be  obtained with MRI as clinically warranted.            X-Ray Chest AP Portable (Final result) Result time:  01/01/17 09:02:01    Final result by Paul Gaming DO (01/01/17 09:02:01)    Impression:        See Above       Electronically signed by: PAUL GAMING DO  Date:     01/01/17  Time:    09:02     Narrative:    Single portable frontal view of the chest    Comparison: 08/10/2015    Results: Course interstitial opacities throughout the lungs most pronounced in the lower lobes which likely represents scarring and atelectasis. There is no large pleural effusion or pneumothorax. Atherosclerotic aorta. Cardiomediastinal silhouette otherwise limited by technique

## 2017-01-02 LAB
ALBUMIN SERPL BCP-MCNC: 3.8 G/DL
ALP SERPL-CCNC: 92 U/L
ALT SERPL W/O P-5'-P-CCNC: 35 U/L
ANION GAP SERPL CALC-SCNC: 10 MMOL/L
AST SERPL-CCNC: 35 U/L
BASOPHILS # BLD AUTO: 0.02 K/UL
BASOPHILS NFR BLD: 0.4 %
BILIRUB SERPL-MCNC: 1 MG/DL
BUN SERPL-MCNC: 12 MG/DL
CALCIUM SERPL-MCNC: 9.5 MG/DL
CHLORIDE SERPL-SCNC: 102 MMOL/L
CK MB SERPL-MCNC: 2.1 NG/ML
CK MB SERPL-RTO: 1.7 %
CK SERPL-CCNC: 123 U/L
CK SERPL-CCNC: 123 U/L
CO2 SERPL-SCNC: 27 MMOL/L
CREAT SERPL-MCNC: 1.1 MG/DL
DIASTOLIC DYSFUNCTION: YES
DIFFERENTIAL METHOD: ABNORMAL
EOSINOPHIL # BLD AUTO: 0.1 K/UL
EOSINOPHIL NFR BLD: 1.1 %
ERYTHROCYTE [DISTWIDTH] IN BLOOD BY AUTOMATED COUNT: 13.2 %
EST. GFR  (AFRICAN AMERICAN): 53 ML/MIN/1.73 M^2
EST. GFR  (NON AFRICAN AMERICAN): 46 ML/MIN/1.73 M^2
ESTIMATED PA SYSTOLIC PRESSURE: 72
GLOBAL PERICARDIAL EFFUSION: ABNORMAL
GLUCOSE SERPL-MCNC: 122 MG/DL
HCT VFR BLD AUTO: 35 %
HGB BLD-MCNC: 11 G/DL
LYMPHOCYTES # BLD AUTO: 1.7 K/UL
LYMPHOCYTES NFR BLD: 37.3 %
MCH RBC QN AUTO: 27 PG
MCHC RBC AUTO-ENTMCNC: 31.4 %
MCV RBC AUTO: 86 FL
MITRAL VALVE REGURGITATION: ABNORMAL
MONOCYTES # BLD AUTO: 0.8 K/UL
MONOCYTES NFR BLD: 17.4 %
NEUTROPHILS # BLD AUTO: 2 K/UL
NEUTROPHILS NFR BLD: 43.6 %
PLATELET # BLD AUTO: 197 K/UL
PMV BLD AUTO: 10.8 FL
POTASSIUM SERPL-SCNC: 4.1 MMOL/L
PROT SERPL-MCNC: 7.1 G/DL
RBC # BLD AUTO: 4.08 M/UL
RETIRED EF AND QEF - SEE NOTES: 45 (ref 55–65)
SODIUM SERPL-SCNC: 139 MMOL/L
TRICUSPID VALVE REGURGITATION: ABNORMAL
TROPONIN I SERPL DL<=0.01 NG/ML-MCNC: 0.13 NG/ML
WBC # BLD AUTO: 4.59 K/UL

## 2017-01-02 PROCEDURE — 25000003 PHARM REV CODE 250: Performed by: EMERGENCY MEDICINE

## 2017-01-02 PROCEDURE — 63600175 PHARM REV CODE 636 W HCPCS: Performed by: NURSE PRACTITIONER

## 2017-01-02 PROCEDURE — 99219 PR INITIAL OBSERVATION CARE,LEVL II: CPT | Mod: ,,, | Performed by: INTERNAL MEDICINE

## 2017-01-02 PROCEDURE — 36415 COLL VENOUS BLD VENIPUNCTURE: CPT

## 2017-01-02 PROCEDURE — 85025 COMPLETE CBC W/AUTO DIFF WBC: CPT

## 2017-01-02 PROCEDURE — G0378 HOSPITAL OBSERVATION PER HR: HCPCS

## 2017-01-02 PROCEDURE — 80053 COMPREHEN METABOLIC PANEL: CPT

## 2017-01-02 PROCEDURE — 63600175 PHARM REV CODE 636 W HCPCS: Performed by: EMERGENCY MEDICINE

## 2017-01-02 PROCEDURE — 25000003 PHARM REV CODE 250: Performed by: NURSE PRACTITIONER

## 2017-01-02 RX ORDER — FUROSEMIDE 10 MG/ML
20 INJECTION INTRAMUSCULAR; INTRAVENOUS ONCE
Status: COMPLETED | OUTPATIENT
Start: 2017-01-02 | End: 2017-01-02

## 2017-01-02 RX ADMIN — DICLOFENAC SODIUM: 10 GEL TOPICAL at 09:01

## 2017-01-02 RX ADMIN — FAMOTIDINE 20 MG: 10 INJECTION, SOLUTION INTRAVENOUS at 09:01

## 2017-01-02 RX ADMIN — ENOXAPARIN SODIUM 40 MG: 100 INJECTION SUBCUTANEOUS at 11:01

## 2017-01-02 RX ADMIN — HYDROCODONE BITARTRATE AND ACETAMINOPHEN 1 TABLET: 5; 325 TABLET ORAL at 11:01

## 2017-01-02 RX ADMIN — LOSARTAN POTASSIUM 100 MG: 25 TABLET, FILM COATED ORAL at 09:01

## 2017-01-02 RX ADMIN — DULOXETINE 30 MG: 30 CAPSULE, DELAYED RELEASE ORAL at 09:01

## 2017-01-02 RX ADMIN — RAMELTEON 8 MG: 8 TABLET, FILM COATED ORAL at 08:01

## 2017-01-02 RX ADMIN — HYDROCODONE BITARTRATE AND ACETAMINOPHEN 1 TABLET: 5; 325 TABLET ORAL at 03:01

## 2017-01-02 RX ADMIN — DULOXETINE 30 MG: 30 CAPSULE, DELAYED RELEASE ORAL at 08:01

## 2017-01-02 RX ADMIN — ASPIRIN 81 MG CHEWABLE TABLET 81 MG: 81 TABLET CHEWABLE at 09:01

## 2017-01-02 RX ADMIN — FUROSEMIDE 20 MG: 10 INJECTION, SOLUTION INTRAMUSCULAR; INTRAVENOUS at 03:01

## 2017-01-02 NOTE — NURSING
0650- rounding report received from jorge spivey rn on patient and updated hand off report sheet given too.     0655- assessment completed. Review plan of care.

## 2017-01-02 NOTE — SUBJECTIVE & OBJECTIVE
Interval History: No acute events overnight. Plan of care discussed with patient. Cardiology following with plans for NST in AM. Endorses symptoms improving.     Review of Systems   Constitutional: Negative for chills, diaphoresis and fever.   HENT: Negative for congestion, postnasal drip, sinus pressure and sore throat.    Eyes: Negative for visual disturbance.   Respiratory: Negative for cough, chest tightness, shortness of breath and wheezing.    Cardiovascular: Negative for chest pain (non-radiating mid-sternal), palpitations and leg swelling.   Gastrointestinal: Negative for abdominal distention, abdominal pain, blood in stool, constipation, diarrhea, nausea and vomiting.   Endocrine: Negative.    Genitourinary: Negative for dysuria.   Musculoskeletal: Positive for arthralgias, back pain and joint swelling.        Baker's cyst bilat knees   Skin: Negative.    Allergic/Immunologic: Negative.    Neurological: Negative for dizziness, weakness, numbness and headaches.   Hematological: Negative.    Psychiatric/Behavioral: Negative.      Objective:     Vital Signs (Most Recent):  Temp: 98.1 °F (36.7 °C) (01/02/17 1219)  Pulse: 65 (01/02/17 1219)  Resp: 20 (01/02/17 1219)  BP: (!) 195/82 (01/02/17 1219)  SpO2: 98 % (01/02/17 1219) Vital Signs (24h Range):  Temp:  [98.1 °F (36.7 °C)-98.3 °F (36.8 °C)] 98.1 °F (36.7 °C)  Pulse:  [65-97] 65  Resp:  [18-20] 20  BP: (149-197)/(71-90) 195/82     Weight: 84.9 kg (187 lb 2.7 oz)  Body mass index is 32.13 kg/(m^2).    Intake/Output Summary (Last 24 hours) at 01/02/17 1338  Last data filed at 01/02/17 0939   Gross per 24 hour   Intake             1145 ml   Output                0 ml   Net             1145 ml      Physical Exam   Constitutional: She is oriented to person, place, and time. She appears well-developed and well-nourished. She is cooperative.  Non-toxic appearance. No distress.   HENT:   Head: Normocephalic and atraumatic.   Eyes: Conjunctivae and lids are normal.  Pupils are equal, round, and reactive to light.   Neck: Trachea normal, normal range of motion and full passive range of motion without pain. Neck supple. Normal carotid pulses and no JVD present. No tracheal deviation present. No thyroid mass and no thyromegaly present.   Cardiovascular: Normal rate, regular rhythm, S1 normal, S2 normal and normal pulses.    Murmur heard.  BLE edema improved with IV diuretics   Pulmonary/Chest: Effort normal and breath sounds normal. No stridor.   Abdominal: Soft. Normal appearance and bowel sounds are normal. There is no tenderness.   Musculoskeletal: Normal range of motion.   Neurological: She is alert and oriented to person, place, and time. She has normal strength. No cranial nerve deficit or sensory deficit.   Skin: Skin is warm, dry and intact. She is not diaphoretic. No cyanosis. Nails show no clubbing.   Psychiatric: She has a normal mood and affect. Her speech is normal and behavior is normal. Judgment and thought content normal. Cognition and memory are normal.       Significant Labs:   CBC:   Recent Labs  Lab 01/01/17  0844 01/02/17  0642   WBC 5.25 4.59   HGB 11.1* 11.0*   HCT 34.6* 35.0*    197     CMP:   Recent Labs  Lab 01/01/17  0844 01/02/17  0642    139   K 3.9 4.1    102   CO2 22* 27   * 122*   BUN 12 12   CREATININE 1.0 1.1   CALCIUM 9.5 9.5   PROT 7.4 7.1   ALBUMIN 4.0 3.8   BILITOT 0.9 1.0   ALKPHOS 93 92   AST 46* 35   ALT 42 35   ANIONGAP 13 10   EGFRNONAA 52* 46*       Recent Labs  Lab 01/01/17  0844 01/01/17  1743 01/01/17  2321     150 127  127 123  123   TROPONINI 0.119* 0.153* 0.126*   CPKMB 2.4 2.4 2.1   MB 1.6 1.9 1.7       Significant Imaging: I have reviewed all pertinent imaging results/findings within the past 24 hours.     Imaging Results         US Lower Extremity Veins Bilateral (Final result) Result time:  01/01/17 10:48:24    Final result by Reji Chavez MD (01/01/17 10:48:24)    Impression:     No  evidence of DVT in the lower extremities.  Probable bilateral popliteal cysts as above.  Further characterize it could be obtained with MRI as clinically warranted.      Electronically signed by: MALVIN CASTILLO MD  Date:     01/01/17  Time:    10:48     Narrative:    Comparison: 2/5/2016.    Findings: Duplex scan of the bilateral lower extremity was performed using B. mode/grayscale imaging and Doppler spectral analysis and color-flow .  The deep veins of the lower extremities demonstrate normal color flow and compressibility.  There ill-defined hypoechoic collections in the bilateral popliteal fossa was measuring 3.4 x 4.1 x 9.4 cm on the left and 2.7 x 5.0 x 8.1 cm on the right.  No definite internal Doppler blood flow demonstrated.  Findings likely represent popliteal cysts however characterization is limited on ultrasound.  Further evaluation can be obtained with MRI as clinically warranted.            X-Ray Chest AP Portable (Final result) Result time:  01/01/17 09:02:01    Final result by Paul Gaming DO (01/01/17 09:02:01)    Impression:        See Above       Electronically signed by: PAUL GAMING DO  Date:     01/01/17  Time:    09:02     Narrative:    Single portable frontal view of the chest    Comparison: 08/10/2015    Results: Course interstitial opacities throughout the lungs most pronounced in the lower lobes which likely represents scarring and atelectasis. There is no large pleural effusion or pneumothorax. Atherosclerotic aorta. Cardiomediastinal silhouette otherwise limited by technique

## 2017-01-02 NOTE — ASSESSMENT & PLAN NOTE
Initial troponin level elevated; however CK-MB wnl; could be 2/2 supply demand +/- ?new HF . . EKG is on-ischemic. CXR without acute cardiopulmomary processes. Continue cardiac monitoring. Continue to trend serial troponins q6 hours X 2. NTG 0.4 mg SL PRN CP. Echocardiogram completed and shows EF 45% + DD, elevated PA pressures. Cardiology following with plans for NST in AM. NPO post midnight.

## 2017-01-02 NOTE — ASSESSMENT & PLAN NOTE
Stable. Continue home PRN pain medication regimen. Added Voltaren gel to regimen. Reports pain to bilat knees improved

## 2017-01-02 NOTE — NURSING
1855- rounding report given to trisha spivey rn on patients progress this evening. Awaiting lab work to result for cardiac enzymes and informed patient admit with chest pain. She is on oxygen at 2 liters. Tolerating food well. teds remain on no acute changes on telemetry. No chest pain or epigastric pain  since admit.

## 2017-01-02 NOTE — CONSULTS
"[facility]  Cardiology  Consult Note    Patient Name: Magaly Nunes  MRN: [id number]  Admission Date: 1/1/2017  Hospital Length of Stay: 0 days  Code Status: Full Code   Attending Provider: Dejah Kent MD   Consulting Provider: Hammad Humphrey MD  Primary Care Physician: Chris Bangura MD  Principal Problem:Acute chest pain    Patient information was obtained from patient, past medical records and ER records.     Consults chest pain  Subjective:     Chief Complaint:  cp     HPI: 84 y.o. female with a history as below who presented to the ED with a CC of constant non-radiating mid-sternal chest pain 7/10 with epigastric discomfort and nausea. She describes pain as "achy". She denies fever, chills, diaphoresis, HA, palpitations, emesis, dysuria, dizziness, recent trauma and illness. She tells me on Thursday she started with all over arthritic pain + chest pain; on Friday seen was seen in Urgent Care and was given an injection "unknown" because her legs were swollen; however symptom were unrelieved. Pertinent FH - mother (d) HTN, DM, PM. She denies cardiac disease. On Admit, /78. EKG is non-ischemic. Initial troponin level 0.119. . CXR is non-revealing. Admitted to OBS for ACS rule out. No previous cardiac issues or recent w/u.     Remotely seen by Dr. Bertrand in clinic '13 but hasn't followed.  Had NST ordered by PCP 9-15 wnl.   Echo done yesterday showed change sin EF and pulm htn.      Past Medical History   Diagnosis Date    Anxiety disorder     Carpal tunnel syndrome     CHF (congestive heart failure)     Chronic allergic rhinitis     Chronic pain 10/22/2012    Constipation - functional 4/10/2013    Depression     Diabetes mellitus type II     Hot flash not due to menopause     HTN (hypertension)     Hypokalemia 11/19/2012    Insomnia 4/10/2013    Knee pain, bilateral 7/24/2013    Personal history of DVT (deep vein thrombosis)     Spinal stenosis      Dr." Antoni    Spinal stenosis of lumbar region 2/3/2014    Vertigo        Past Surgical History   Procedure Laterality Date    Hysterectomy      Cataract extraction Bilateral     Eye surgery      Fracture surgery      Orif radius & ulna fractures         Review of patient's allergies indicates:   Allergen Reactions    Ace inhibitors      Other reaction(s): Unknown    Aspirin      Other reaction(s): Unknown    Aciphex  [rabeprazole]      Other reaction(s): Stomach upset    Bextra  [valdecoxib]      Other reaction(s): Unknown    Clonidine      Other reaction(s): dry mouth.lip swelling    Cardizem  [diltiazem hcl]      Other reaction(s): Unknown    Mavik  [trandolapril]      Other reaction(s): Unknown    Nsaids (non-steroidal anti-inflammatory drug)      Other reaction(s): black spots on skin    Phenytoin sodium extended      Other reaction(s): Stomach upset    Tramadol      Other reaction(s): stomach pain       No current facility-administered medications on file prior to encounter.      Current Outpatient Prescriptions on File Prior to Encounter   Medication Sig    hydrocodone-acetaminophen 5-325mg (NORCO) 5-325 mg per tablet Take 1 tablet by mouth every 6 (six) hours as needed for Pain.    losartan (COZAAR) 100 MG tablet Take 1 tablet (100 mg total) by mouth once daily.    meclizine (ANTIVERT) 25 mg tablet     omeprazole (PRILOSEC) 20 MG capsule TAKE 2 CAPSULES BY MOUTH EVERY DAY FOR ACID REFLUX AND STOMACH    potassium chloride SA (K-DUR,KLOR-CON) 20 MEQ tablet TAKE 1 TABLET BY MOUTH DAILY    temazepam (RESTORIL) 30 mg capsule TAKE ONE CAPSULE BY MOUTH NIGHTLY AS NEEDED FOR INSOMNIA.     Family History     Problem Relation (Age of Onset)    No Known Problems Mother, Father, Sister, Brother, Maternal Aunt, Maternal Uncle, Paternal Aunt, Paternal Uncle, Maternal Grandmother, Maternal Grandfather, Paternal Grandmother, Paternal Grandfather        Social History Main Topics    Smoking status: Never  Smoker    Smokeless tobacco: Not on file    Alcohol use No    Drug use: No    Sexual activity: No     Review of Systems   Constitutional: Negative.    HENT: Negative.    Eyes: Negative.    Respiratory: Positive for chest tightness and shortness of breath.    Cardiovascular: Positive for chest pain and leg swelling.   Gastrointestinal: Negative.    Endocrine: Negative.    Genitourinary: Negative.    Musculoskeletal: Negative.    Skin: Negative.    Neurological: Negative.    Psychiatric/Behavioral: Negative.      Objective:     Vital Signs (Most Recent):  Temp: 98.1 °F (36.7 °C) (01/02/17 1219)  Pulse: 65 (01/02/17 1219)  Resp: 20 (01/02/17 1219)  BP: (!) 195/82 (01/02/17 1219)  SpO2: 98 % (01/02/17 1219) Vital Signs (24h Range):  Temp:  [98.1 °F (36.7 °C)-98.3 °F (36.8 °C)] 98.1 °F (36.7 °C)  Pulse:  [65-97] 65  Resp:  [18-20] 20  BP: (149-197)/(71-90) 195/82     Weight: 84.9 kg (187 lb 2.7 oz)  Body mass index is 32.13 kg/(m^2).    SpO2: 98 %  O2 Device (Oxygen Therapy): nasal cannula      Intake/Output Summary (Last 24 hours) at 01/02/17 1231  Last data filed at 01/02/17 0939   Gross per 24 hour   Intake             1145 ml   Output                0 ml   Net             1145 ml       Lines/Drains/Airways     Peripheral Intravenous Line                 Peripheral IV - Single Lumen 01/01/17 0833 Left Antecubital 1 day                Physical Exam   Constitutional: She is oriented to person, place, and time. She appears well-developed and well-nourished. No distress.   HENT:   Head: Normocephalic and atraumatic.   Mouth/Throat: Oropharynx is clear and moist.   Eyes: Conjunctivae and EOM are normal. Pupils are equal, round, and reactive to light.   Neck: Normal range of motion. No JVD present.   Cardiovascular: Normal rate, regular rhythm and normal heart sounds.  Exam reveals no gallop and no friction rub.    No murmur heard.  Pulmonary/Chest: Effort normal. No respiratory distress. She has no wheezes. She has no  rales.   Abdominal: Soft. Bowel sounds are normal. She exhibits no distension. There is no tenderness.   Musculoskeletal: Normal range of motion. She exhibits edema.   Neurological: She is alert and oriented to person, place, and time. She has normal reflexes. No cranial nerve deficit.   Skin: Skin is warm and dry. No rash noted. She is not diaphoretic. No erythema.   Psychiatric: She has a normal mood and affect. Her behavior is normal.       Significant Labs:   CMP   Sodium (mmol/L)   Date/Time Value Status   01/02/2017 06:42  Final   01/01/2017 08:44  Final   08/17/2016 03:06  Final     Potassium (mmol/L)   Date/Time Value Status   01/02/2017 06:42 AM 4.1 Final   01/01/2017 08:44 AM 3.9 Final   08/17/2016 03:06 PM 4.7 Final     Chloride (mmol/L)   Date/Time Value Status   01/02/2017 06:42  Final   01/01/2017 08:44  Final   08/17/2016 03:06  Final     CO2 (mmol/L)   Date/Time Value Status   01/02/2017 06:42 AM 27 Final   01/01/2017 08:44 AM 22 (L) Final   08/17/2016 03:06 PM 26 Final     Glucose (mg/dL)   Date/Time Value Status   01/02/2017 06:42  (H) Final   01/01/2017 08:44  (H) Final   08/17/2016 03:06 PM 78 Final     BUN, Bld (mg/dL)   Date/Time Value Status   01/02/2017 06:42 AM 12 Final   01/01/2017 08:44 AM 12 Final   08/17/2016 03:06 PM 12 Final     Creatinine (mg/dL)   Date/Time Value Status   01/02/2017 06:42 AM 1.1 Final   01/01/2017 08:44 AM 1.0 Final   08/17/2016 03:06 PM 0.9 Final     Calcium (mg/dL)   Date/Time Value Status   01/02/2017 06:42 AM 9.5 Final   01/01/2017 08:44 AM 9.5 Final   08/17/2016 03:06 PM 10.1 Final     Total Protein (g/dL)   Date/Time Value Status   01/02/2017 06:42 AM 7.1 Final   01/01/2017 08:44 AM 7.4 Final   08/17/2016 03:06 PM 7.5 Final     Albumin (g/dL)   Date/Time Value Status   01/02/2017 06:42 AM 3.8 Final   01/01/2017 08:44 AM 4.0 Final   08/17/2016 03:06 PM 4.0 Final     Total Bilirubin (mg/dL)   Date/Time Value Status    01/02/2017 06:42 AM 1.0 Final   01/01/2017 08:44 AM 0.9 Final   08/17/2016 03:06 PM 0.4 Final     Alkaline Phosphatase (U/L)   Date/Time Value Status   01/02/2017 06:42 AM 92 Final   01/01/2017 08:44 AM 93 Final   08/17/2016 03:06 PM 87 Final     AST (U/L)   Date/Time Value Status   01/02/2017 06:42 AM 35 Final   01/01/2017 08:44 AM 46 (H) Final   08/17/2016 03:06 PM 30 Final     ALT (U/L)   Date/Time Value Status   01/02/2017 06:42 AM 35 Final   01/01/2017 08:44 AM 42 Final   08/17/2016 03:06 PM 21 Final     Anion Gap (mmol/L)   Date/Time Value Status   01/02/2017 06:42 AM 10 Final   01/01/2017 08:44 AM 13 Final   08/17/2016 03:06 PM 8 Final     eGFR if African American (mL/min/1.73 m^2)   Date/Time Value Status   01/02/2017 06:42 AM 53 (A) Final   01/01/2017 08:44 AM 60 Final   08/17/2016 03:06 PM >60.0 Final     eGFR if non African American (mL/min/1.73 m^2)   Date/Time Value Status   01/02/2017 06:42 AM 46 (A) Final   01/01/2017 08:44 AM 52 (A) Final   08/17/2016 03:06 PM 59.3 (A) Final   , CBC   WBC (K/uL)   Date/Time Value Status   01/02/2017 06:42 AM 4.59 Final   01/01/2017 08:44 AM 5.25 Final   03/30/2016 04:47 PM 5.29 Final     Hemoglobin (g/dL)   Date/Time Value Status   01/02/2017 06:42 AM 11.0 (L) Final   01/01/2017 08:44 AM 11.1 (L) Final   03/30/2016 04:47 PM 13.2 Final     Hematocrit (%)   Date/Time Value Status   01/02/2017 06:42 AM 35.0 (L) Final     Platelets (K/uL)   Date/Time Value Status   01/02/2017 06:42  Final   01/01/2017 08:44  Final   03/30/2016 04:47  Final   , INR   Coumadin Monitoring INR (no units)   Date/Time Value Status   06/08/2007 10:33 AM 4.3 (H) Final   05/29/2007 10:29 AM 3.6 (H) Final   04/30/2007 08:37 AM 3.0 (H) Final     INR (no units)   Date/Time Value Status   01/01/2017 08:44 AM 1.1 Final   07/24/2015 09:00 AM 1.0 Final   12/31/2012 01:05 PM 1.0 Final   , Lipid Panel   Cholesterol (mg/dL)   Date/Time Value Status   01/01/2017 05:43  Final    01/07/2016 09:56  Final   11/17/2014 11:25  Final     HDL (mg/dL)   Date/Time Value Status   01/01/2017 05:43 PM 70 Final   01/07/2016 09:56 AM 68 Final   11/17/2014 11:25 AM 69 Final     LDL Cholesterol (mg/dL)   Date/Time Value Status   01/01/2017 05:43 PM 92.4 Final   01/07/2016 09:56 .6 Final   11/17/2014 11:25 .8 Final     Triglycerides (mg/dL)   Date/Time Value Status   01/01/2017 05:43 PM 38 Final   01/07/2016 09:56 AM 57 Final   11/17/2014 11:25 AM 86 Final     HDL/Chol Ratio (%)   Date/Time Value Status   01/01/2017 05:43 PM 41.2 Final   01/07/2016 09:56 AM 37.2 Final   11/17/2014 11:25 AM 36.5 Final    and Troponin   Troponin I (ng/mL)   Date/Time Value Status   01/01/2017 11:21 PM 0.126 (H) Final   01/01/2017 05:43 PM 0.153 (H) Final   01/01/2017 08:44 AM 0.119 (H) Final   07/16/2009 02:30 PM <0.012 Final   06/26/2009 11:59 PM <0.012 Final   06/26/2009 03:45 PM <0.012 Final       Significant Imaging: Echocardiogram:   2D echo with color flow doppler:   Results for orders placed or performed during the hospital encounter of 01/01/17   2D echo with color flow doppler   Result Value Ref Range    EF 45 55 - 65    Mitral Valve Regurgitation MILD TO MODERATE     Diastolic Dysfunction Yes (A)     Est. PA Systolic Pressure 72 (A)     Pericardial Effusion NONE     Tricuspid Valve Regurgitation MODERATE TO SEVERE (A)      Assessment and Plan:     Active Diagnoses:    Diagnosis Date Noted POA    PRINCIPAL PROBLEM:  Acute chest pain [R07.9] 01/01/2017 Yes    Elevated troponin [R79.89] 01/01/2017 Yes    Depression, major, recurrent, moderate [F33.1] 09/08/2016 Yes    Spinal stenosis of lumbar region [M48.06] 02/03/2014 Yes    Knee pain, bilateral [M25.561, M25.562] 07/24/2013 Yes    HTN (hypertension) [I10] 11/19/2012 Yes      Problems Resolved During this Admission:    Diagnosis Date Noted Date Resolved POA       VTE Risk Mitigation         Ordered     Place PHILL hose  Until  discontinued      01/01/17 1508     enoxaparin injection 40 mg  Daily     Route:  Subcutaneous        01/01/17 1243     Medium Risk of VTE  Once      01/01/17 1243        #cp/sob-  #acute on chronic systolic and diastolic hf- newly changed.  Plan for repeat ischemic w/u in am  #htn urgency  #pulm htn  #spinal stenosis  #depression    Thank you for your consult. I will follow-up with patient. Please contact us if you have any additional questions.    Hammad Humphrey MD  Cardiology   [facility]

## 2017-01-02 NOTE — ASSESSMENT & PLAN NOTE
2/2 arthritic pain. US of LE unremarkable. Continue prn oral pain medications + Voltaren gel. Fall precautions.

## 2017-01-02 NOTE — PROGRESS NOTES
"[facility] [place]  Progress Note    Patient Name: Magaly Nunes  MRN: [id number]  Patient Class: OP- Observation   Admission Date: 1/1/2017  Length of Stay: 0 days  Expected Discharge Date: 1/2/2017  Attending Physician: Dejah Kent MD  Primary Care Provider: Chris Bangura MD        Subjective:     Principal Problem:Acute chest pain    HPI:  Magaly Nunes is a 84 y.o. female with a history as below who presented to the ED with a CC of constant non-radiating mid-sternal chest pain 7/10 with epigastric discomfort and nausea.  She describes pain as "achy".  She denies fever, chills, diaphoresis, HA, palpitations, emesis, dysuria, dizziness, recent trauma and illness. She tells me on Thursday she started with all over arthritic pain + chest pain; on Friday seen was seen in Urgent Care and was given an injection "unknown" because her legs were swollen; however symptom were unrelieved. Pertinent FH - mother (d) HTN, DM, PM. She denies cardiac disease.  On Admit, /78. EKG is non-ischemic. Initial troponin level 0.119. . CXR is non-revealing.  Admitted to OBS for ACS rule out.  No previous cardiac issues or recent w/u.     Patient followed briefly by Dr. Bertrand with holter monitor and echo (July 2013).  Holter significant for 1 episode of 7 beats of V-Tach; rare PACs and PVCs; and no AV blocks. Echo shows EF 65% + DD    NST performed and is without evidence of myocardial ischemia (Sept 2015).           Hospital Course:  Repeat echocardiogram shows reduced EF 45%, significantly changed from previous echocardiogram (July 2013). Cardiology consulted and evaluated with plans for repeat ischemic work-up.     Interval History: No acute events overnight. Plan of care discussed with patient. Cardiology following with plans for NST in AM. Endorses symptoms improving.     Review of Systems   Constitutional: Negative for chills, diaphoresis and fever.   HENT: Negative for " congestion, postnasal drip, sinus pressure and sore throat.    Eyes: Negative for visual disturbance.   Respiratory: Negative for cough, chest tightness, shortness of breath and wheezing.    Cardiovascular: Negative for chest pain (non-radiating mid-sternal), palpitations and leg swelling.   Gastrointestinal: Negative for abdominal distention, abdominal pain, blood in stool, constipation, diarrhea, nausea and vomiting.   Endocrine: Negative.    Genitourinary: Negative for dysuria.   Musculoskeletal: Positive for arthralgias, back pain and joint swelling.        Baker's cyst bilat knees   Skin: Negative.    Allergic/Immunologic: Negative.    Neurological: Negative for dizziness, weakness, numbness and headaches.   Hematological: Negative.    Psychiatric/Behavioral: Negative.      Objective:     Vital Signs (Most Recent):  Temp: 98.1 °F (36.7 °C) (01/02/17 1219)  Pulse: 65 (01/02/17 1219)  Resp: 20 (01/02/17 1219)  BP: (!) 195/82 (01/02/17 1219)  SpO2: 98 % (01/02/17 1219) Vital Signs (24h Range):  Temp:  [98.1 °F (36.7 °C)-98.3 °F (36.8 °C)] 98.1 °F (36.7 °C)  Pulse:  [65-97] 65  Resp:  [18-20] 20  BP: (149-197)/(71-90) 195/82     Weight: 84.9 kg (187 lb 2.7 oz)  Body mass index is 32.13 kg/(m^2).    Intake/Output Summary (Last 24 hours) at 01/02/17 1338  Last data filed at 01/02/17 0939   Gross per 24 hour   Intake             1145 ml   Output                0 ml   Net             1145 ml      Physical Exam   Constitutional: She is oriented to person, place, and time. She appears well-developed and well-nourished. She is cooperative.  Non-toxic appearance. No distress.   HENT:   Head: Normocephalic and atraumatic.   Eyes: Conjunctivae and lids are normal. Pupils are equal, round, and reactive to light.   Neck: Trachea normal, normal range of motion and full passive range of motion without pain. Neck supple. Normal carotid pulses and no JVD present. No tracheal deviation present. No thyroid mass and no thyromegaly  present.   Cardiovascular: Normal rate, regular rhythm, S1 normal, S2 normal and normal pulses.    Murmur heard.  BLE edema improved with IV diuretics   Pulmonary/Chest: Effort normal and breath sounds normal. No stridor.   Abdominal: Soft. Normal appearance and bowel sounds are normal. There is no tenderness.   Musculoskeletal: Normal range of motion.   Neurological: She is alert and oriented to person, place, and time. She has normal strength. No cranial nerve deficit or sensory deficit.   Skin: Skin is warm, dry and intact. She is not diaphoretic. No cyanosis. Nails show no clubbing.   Psychiatric: She has a normal mood and affect. Her speech is normal and behavior is normal. Judgment and thought content normal. Cognition and memory are normal.       Significant Labs:   CBC:   Recent Labs  Lab 01/01/17  0844 01/02/17  0642   WBC 5.25 4.59   HGB 11.1* 11.0*   HCT 34.6* 35.0*    197     CMP:   Recent Labs  Lab 01/01/17  0844 01/02/17  0642    139   K 3.9 4.1    102   CO2 22* 27   * 122*   BUN 12 12   CREATININE 1.0 1.1   CALCIUM 9.5 9.5   PROT 7.4 7.1   ALBUMIN 4.0 3.8   BILITOT 0.9 1.0   ALKPHOS 93 92   AST 46* 35   ALT 42 35   ANIONGAP 13 10   EGFRNONAA 52* 46*       Recent Labs  Lab 01/01/17  0844 01/01/17  1743 01/01/17  2321     150 127  127 123  123   TROPONINI 0.119* 0.153* 0.126*   CPKMB 2.4 2.4 2.1   MB 1.6 1.9 1.7       Significant Imaging: I have reviewed all pertinent imaging results/findings within the past 24 hours.     Imaging Results         US Lower Extremity Veins Bilateral (Final result) Result time:  01/01/17 10:48:24    Final result by Malvin Castillo MD (01/01/17 10:48:24)    Impression:     No evidence of DVT in the lower extremities.  Probable bilateral popliteal cysts as above.  Further characterize it could be obtained with MRI as clinically warranted.      Electronically signed by: MALVIN CASTILLO MD  Date:     01/01/17  Time:    10:48      Narrative:    Comparison: 2/5/2016.    Findings: Duplex scan of the bilateral lower extremity was performed using B. mode/grayscale imaging and Doppler spectral analysis and color-flow .  The deep veins of the lower extremities demonstrate normal color flow and compressibility.  There ill-defined hypoechoic collections in the bilateral popliteal fossa was measuring 3.4 x 4.1 x 9.4 cm on the left and 2.7 x 5.0 x 8.1 cm on the right.  No definite internal Doppler blood flow demonstrated.  Findings likely represent popliteal cysts however characterization is limited on ultrasound.  Further evaluation can be obtained with MRI as clinically warranted.            X-Ray Chest AP Portable (Final result) Result time:  01/01/17 09:02:01    Final result by Paul Gaming DO (01/01/17 09:02:01)    Impression:        See Above       Electronically signed by: PAUL GAMING DO  Date:     01/01/17  Time:    09:02     Narrative:    Single portable frontal view of the chest    Comparison: 08/10/2015    Results: Course interstitial opacities throughout the lungs most pronounced in the lower lobes which likely represents scarring and atelectasis. There is no large pleural effusion or pneumothorax. Atherosclerotic aorta. Cardiomediastinal silhouette otherwise limited by technique                Assessment/Plan:      * Acute chest pain  Initial troponin level elevated; however CK-MB wnl; could be 2/2 supply demand +/- ?new HF . . EKG is on-ischemic. CXR without acute cardiopulmomary processes. Continue cardiac monitoring. Continue to trend serial troponins q6 hours X 2. NTG 0.4 mg SL PRN CP. Echocardiogram completed and shows EF 45% + DD, elevated PA pressures. Cardiology following with plans for NST in AM. NPO post midnight.        Elevated troponin  See above      HTN (hypertension)  Continue current antihypertensives      Knee pain, bilateral  2/2 arthritic pain. US of LE unremarkable. Continue prn oral pain medications +  Voltaren gel. Fall precautions.       Spinal stenosis of lumbar region  Stable. Continue home PRN pain medication regimen.     Depression, major, recurrent, moderate  Continue home anti-depressant      VTE Risk Mitigation         Ordered     Place PHILL hose  Until discontinued      01/01/17 1508     enoxaparin injection 40 mg  Daily     Route:  Subcutaneous        01/01/17 1243     Medium Risk of VTE  Once      01/01/17 1243          CAMRYN Carranza  Department of Hospital Medicine   [facility]

## 2017-01-02 NOTE — PLAN OF CARE
01/02/17 1208   Discharge Assessment   Assessment Type Discharge Planning Assessment   Confirmed/corrected address and phone number on facesheet? Yes   Assessment information obtained from? Patient   Communicated expected length of stay with patient/caregiver no   Type of Healthcare Directive Received Durable power of  for health care   If Healthcare Directive is received, is it scanned into Epic? no (comment)   Prior to hospitilization cognitive status: Alert/Oriented   Prior to hospitalization functional status: Independent   Current cognitive status: Alert/Oriented   Current Functional Status: Independent   Arrived From home or self-care   Lives With alone   Able to Return to Prior Arrangements yes   Is patient able to care for self after discharge? Yes   How many people do you have in your home that can help with your care after discharge? 1   Who are your caregiver(s) and their phone number(s)? Deborah Cedenork-relative [phone]   Patient's perception of discharge disposition home or selfcare   Readmission Within The Last 30 Days no previous admission in last 30 days   Patient currently being followed by outpatient case management? No   Patient currently receives home health services? No   Does the patient currently use HME? Yes   Name and contact number for HME provider: Unknown   Patient currently receives private duty nursing? No   Patient currently receives any other outside agency services? No   Equipment Currently Used at Home cane, straight   Do you have any problems affording any of your prescribed medications? No   Is the patient taking medications as prescribed? yes   Do you have any financial concerns preventing you from receiving the healthcare you need? No   Does the patient have transportation to healthcare appointments? Yes   Transportation Available family or friend will provide   On Dialysis? No   Does the patient receive services at the Coumadin Clinic? No   Are there any open cases? No    Discharge Plan A Home with family   Discharge Plan B Other  (TBD)   Patient/Family In Agreement With Plan yes     [address]  [place] [address]   [phone]

## 2017-01-03 VITALS
HEIGHT: 64 IN | TEMPERATURE: 98 F | OXYGEN SATURATION: 95 % | DIASTOLIC BLOOD PRESSURE: 78 MMHG | WEIGHT: 186.5 LBS | HEART RATE: 72 BPM | BODY MASS INDEX: 31.84 KG/M2 | SYSTOLIC BLOOD PRESSURE: 144 MMHG | RESPIRATION RATE: 20 BRPM

## 2017-01-03 PROBLEM — R07.9 ACUTE CHEST PAIN: Status: RESOLVED | Noted: 2017-01-01 | Resolved: 2017-01-03

## 2017-01-03 LAB
ALBUMIN SERPL BCP-MCNC: 3.5 G/DL
ALP SERPL-CCNC: 87 U/L
ALT SERPL W/O P-5'-P-CCNC: 30 U/L
ANION GAP SERPL CALC-SCNC: 7 MMOL/L
AST SERPL-CCNC: 27 U/L
BASOPHILS # BLD AUTO: 0.02 K/UL
BASOPHILS NFR BLD: 0.4 %
BILIRUB SERPL-MCNC: 0.6 MG/DL
BNP SERPL-MCNC: 264 PG/ML
BUN SERPL-MCNC: 13 MG/DL
CALCIUM SERPL-MCNC: 9.4 MG/DL
CHLORIDE SERPL-SCNC: 104 MMOL/L
CO2 SERPL-SCNC: 30 MMOL/L
CREAT SERPL-MCNC: 1 MG/DL
DIASTOLIC DYSFUNCTION: NO
DIFFERENTIAL METHOD: ABNORMAL
EOSINOPHIL # BLD AUTO: 0 K/UL
EOSINOPHIL NFR BLD: 0.9 %
ERYTHROCYTE [DISTWIDTH] IN BLOOD BY AUTOMATED COUNT: 12.9 %
EST. GFR  (AFRICAN AMERICAN): 60 ML/MIN/1.73 M^2
EST. GFR  (NON AFRICAN AMERICAN): 52 ML/MIN/1.73 M^2
GLUCOSE SERPL-MCNC: 114 MG/DL
HCT VFR BLD AUTO: 34 %
HGB BLD-MCNC: 10.8 G/DL
LYMPHOCYTES # BLD AUTO: 1.6 K/UL
LYMPHOCYTES NFR BLD: 34.6 %
MCH RBC QN AUTO: 27.2 PG
MCHC RBC AUTO-ENTMCNC: 31.8 %
MCV RBC AUTO: 86 FL
MONOCYTES # BLD AUTO: 0.8 K/UL
MONOCYTES NFR BLD: 16.5 %
NEUTROPHILS # BLD AUTO: 2.2 K/UL
NEUTROPHILS NFR BLD: 47.6 %
PLATELET # BLD AUTO: 213 K/UL
PMV BLD AUTO: 11 FL
POTASSIUM SERPL-SCNC: 3.9 MMOL/L
PROT SERPL-MCNC: 6.6 G/DL
RBC # BLD AUTO: 3.97 M/UL
SODIUM SERPL-SCNC: 141 MMOL/L
WBC # BLD AUTO: 4.68 K/UL

## 2017-01-03 PROCEDURE — 85025 COMPLETE CBC W/AUTO DIFF WBC: CPT

## 2017-01-03 PROCEDURE — 78452 HT MUSCLE IMAGE SPECT MULT: CPT | Mod: 26,,, | Performed by: INTERNAL MEDICINE

## 2017-01-03 PROCEDURE — 63600175 PHARM REV CODE 636 W HCPCS

## 2017-01-03 PROCEDURE — 93016 CV STRESS TEST SUPVJ ONLY: CPT | Mod: ,,, | Performed by: INTERNAL MEDICINE

## 2017-01-03 PROCEDURE — 63600175 PHARM REV CODE 636 W HCPCS: Performed by: EMERGENCY MEDICINE

## 2017-01-03 PROCEDURE — 99213 OFFICE O/P EST LOW 20 MIN: CPT | Mod: ,,, | Performed by: INTERNAL MEDICINE

## 2017-01-03 PROCEDURE — 94761 N-INVAS EAR/PLS OXIMETRY MLT: CPT

## 2017-01-03 PROCEDURE — 80053 COMPREHEN METABOLIC PANEL: CPT

## 2017-01-03 PROCEDURE — 93017 CV STRESS TEST TRACING ONLY: CPT

## 2017-01-03 PROCEDURE — 83880 ASSAY OF NATRIURETIC PEPTIDE: CPT

## 2017-01-03 PROCEDURE — G0378 HOSPITAL OBSERVATION PER HR: HCPCS

## 2017-01-03 PROCEDURE — 25000003 PHARM REV CODE 250: Performed by: NURSE PRACTITIONER

## 2017-01-03 PROCEDURE — 25000003 PHARM REV CODE 250: Performed by: EMERGENCY MEDICINE

## 2017-01-03 PROCEDURE — 63600175 PHARM REV CODE 636 W HCPCS: Performed by: INTERNAL MEDICINE

## 2017-01-03 PROCEDURE — 25000003 PHARM REV CODE 250: Performed by: INTERNAL MEDICINE

## 2017-01-03 PROCEDURE — 93018 CV STRESS TEST I&R ONLY: CPT | Mod: ,,, | Performed by: INTERNAL MEDICINE

## 2017-01-03 RX ORDER — CARVEDILOL 12.5 MG/1
12.5 TABLET ORAL 2 TIMES DAILY
Qty: 60 TABLET | Refills: 11 | Status: SHIPPED | OUTPATIENT
Start: 2017-01-03 | End: 2017-12-28

## 2017-01-03 RX ORDER — HALOPERIDOL 5 MG/ML
2 INJECTION INTRAMUSCULAR ONCE
Status: COMPLETED | OUTPATIENT
Start: 2017-01-03 | End: 2017-01-03

## 2017-01-03 RX ORDER — REGADENOSON 0.08 MG/ML
INJECTION, SOLUTION INTRAVENOUS
Status: DISCONTINUED
Start: 2017-01-03 | End: 2017-01-03 | Stop reason: HOSPADM

## 2017-01-03 RX ORDER — CARVEDILOL 6.25 MG/1
12.5 TABLET ORAL 2 TIMES DAILY
Status: DISCONTINUED | OUTPATIENT
Start: 2017-01-03 | End: 2017-01-03 | Stop reason: HOSPADM

## 2017-01-03 RX ADMIN — DULOXETINE 30 MG: 30 CAPSULE, DELAYED RELEASE ORAL at 02:01

## 2017-01-03 RX ADMIN — ASPIRIN 81 MG CHEWABLE TABLET 81 MG: 81 TABLET CHEWABLE at 08:01

## 2017-01-03 RX ADMIN — DICLOFENAC SODIUM: 10 GEL TOPICAL at 08:01

## 2017-01-03 RX ADMIN — ENOXAPARIN SODIUM 40 MG: 100 INJECTION SUBCUTANEOUS at 02:01

## 2017-01-03 RX ADMIN — LOSARTAN POTASSIUM 100 MG: 25 TABLET, FILM COATED ORAL at 02:01

## 2017-01-03 RX ADMIN — CARVEDILOL 12.5 MG: 6.25 TABLET, FILM COATED ORAL at 04:01

## 2017-01-03 RX ADMIN — FAMOTIDINE 20 MG: 10 INJECTION, SOLUTION INTRAVENOUS at 08:01

## 2017-01-03 RX ADMIN — HALOPERIDOL LACTATE 2 MG: 5 INJECTION, SOLUTION INTRAMUSCULAR at 03:01

## 2017-01-03 NOTE — NURSING
0900- patient ate all of breakfast meal . She is eating and drinking well. No change on monitor. Continue to monitor.     1130- patient visiting with family and watching tv show too. Denies pain or needs when asked no change on monitor.     1215- patient eating lunch at this time. No acute changes.

## 2017-01-03 NOTE — NURSING
1756- patient reports new iv site no sore or causing discomfort. Denies chest pain or any discomfort. Medicated once thus far for general discomfort with good relief effective. Appetite good. Continue to monitor. No tele changes.

## 2017-01-03 NOTE — DISCHARGE SUMMARY
"[facility]  Discharge Summary      Patient Name: Magaly Nunes  MRN: [id number]  Admission Date: 1/1/2017  Hospital Length of Stay: 0 days  Discharge Date and Time: 1/3/2017 5:49 PM  Attending Physician: Zoey Roman MD   Discharging Provider: Viki Wade PA-C  Primary Care Provider: Chris Bangura MD      HPI:   Magaly Nuens is a 84 y.o. female with a history as below who presented to the ED with a CC of constant non-radiating mid-sternal chest pain 7/10 with epigastric discomfort and nausea.  She describes pain as "achy".  She denies fever, chills, diaphoresis, HA, palpitations, emesis, dysuria, dizziness, recent trauma and illness. She tells me on Thursday she started with all over arthritic pain + chest pain; on Friday seen was seen in Urgent Care and was given an injection "unknown" because her legs were swollen; however symptom were unrelieved. Pertinent FH - mother (d) HTN, DM, PM. She denies cardiac disease.  On Admit, /78. EKG is non-ischemic. Initial troponin level 0.119. . CXR is non-revealing.  Admitted to OBS for ACS rule out.  No previous cardiac issues or recent w/u.     Patient followed briefly by Dr. Bertrand with holter monitor and echo (July 2013).  Holter significant for 1 episode of 7 beats of V-Tach; rare PACs and PVCs; and no AV blocks. Echo shows EF 65% + DD    NST performed and is without evidence of myocardial ischemia (Sept 2015).           * No surgery found *      Indwelling Lines/Drains at time of discharge:   Lines/Drains/Airways          No matching active lines, drains, or airways        Hospital Course:   EKG is non-ischemic. Initial troponin level 0.119. . CXR is non-revealing.  Admitted to OBS for ACS rule out. Nitro/supplemental O2 PRN. Blood pressure elevated on admit and plan to monitor and adjust pending echo. Repeat echocardiogram showed reduced EF 45%, significantly changed from previous " echocardiogram (July 2013). BB added to med rx. Cardiology consulted with recs for NST. Patient had NST without issue revealing normal myocardial perfusion. She remained stable throughout stay and no further episodes of CP. Discussed findings and plan with patient. She will discharge to home with prescriptions for new medication and follow up with cardiology in a few weeks.      Consults:   Consults         Status Ordering Provider     Inpatient consult to Cardiology  Once     Provider:  Hammad Humphrey MD    Completed CARRIERCOURTNEY          Significant Diagnostic Studies: Labs:   CMP   Recent Labs  Lab 01/02/17  0642 01/03/17  0647    141   K 4.1 3.9    104   CO2 27 30*   * 114*   BUN 12 13   CREATININE 1.1 1.0   CALCIUM 9.5 9.4   PROT 7.1 6.6   ALBUMIN 3.8 3.5   BILITOT 1.0 0.6   ALKPHOS 92 87   AST 35 27   ALT 35 30   ANIONGAP 10 7*   ESTGFRAFRICA 53* 60   EGFRNONAA 46* 52*   , CBC   Recent Labs  Lab 01/02/17  0642 01/03/17  0647   WBC 4.59 4.68   HGB 11.0* 10.8*   HCT 35.0* 34.0*    213    and Troponin   Recent Labs  Lab 01/01/17  2321   TROPONINI 0.126*       Cardiac Graphics: Echocardiogram:   2D echo with color flow doppler:   Results for orders placed or performed during the hospital encounter of 01/01/17   2D echo with color flow doppler   Result Value Ref Range    EF 45 55 - 65    Mitral Valve Regurgitation MILD TO MODERATE     Diastolic Dysfunction Yes (A)     Est. PA Systolic Pressure 72 (A)     Pericardial Effusion NONE     Tricuspid Valve Regurgitation MODERATE TO SEVERE (A)        Pending Diagnostic Studies:     Procedure Component Value Units Date/Time    NM Myocardial Perfusion Spect Multi Pharmacologic [[id number]] Resulted:  01/03/17 0952    Order Status:  Sent Lab Status:  In process Updated:  01/03/17 1242        Final Active Diagnoses:    Diagnosis Date Noted POA    Elevated troponin [R79.89] 01/01/2017 Yes    Depression, major, recurrent, moderate [F33.1]  09/08/2016 Yes    Spinal stenosis of lumbar region [M48.06] 02/03/2014 Yes    Knee pain, bilateral [M25.561, M25.562] 07/24/2013 Yes    HTN (hypertension) [I10] 11/19/2012 Yes      Problems Resolved During this Admission:    Diagnosis Date Noted Date Resolved POA    PRINCIPAL PROBLEM:  Acute chest pain [R07.9] 01/01/2017 01/03/2017 Yes      NM Pharm Stress: Impression: NORMAL MYOCARDIAL PERFUSION  1. The perfusion scan is free of evidence for myocardial ischemia or injury.   2. Resting wall motion is physiologic.   3. There is resting LV dysfunction with a reduced ejection fraction of 38 %.   4. The ventricular volumes are normal at rest and stress.   5. The extracardiac distribution of radioactivity is normal.   6. When compared to the previous study from 09/08/2015, no significant defects but LV function now decreased with TID now present.     Discharged Condition: stable    Disposition: Home or Self Care    Follow Up:  Follow-up Information     Follow up with Chris Bangura MD On 1/18/2017.    Specialty:  Internal Medicine    Why:  Wednesday January 18th at 2:40pm    Contact information:    [address] [facility] [address] [phone]          Schedule an appointment as soon as possible for a visit with Hammad Humphrey MD.    Specialty:  Cardiology    Contact information:    [address] [place] [address] [phone]          Patient Instructions:     Diet general   Order Specific Question Answer Comments   Additional restrictions: Cardiac (Low Na/Chol)      Activity as tolerated       Medications:  Reconciled Home Medications:   Current Discharge Medication List      START taking these medications    Details   carvedilol (COREG) 12.5 MG tablet Take 1 tablet (12.5 mg total) by mouth 2 (two) times daily.  Qty: 60 tablet, Refills: 11         CONTINUE these medications which have NOT CHANGED    Details   duloxetine (CYMBALTA) 30 MG capsule Take 30 mg by mouth 2 (two) times daily.        hydrocodone-acetaminophen 5-325mg (NORCO) 5-325 mg per tablet Take 1 tablet by mouth every 6 (six) hours as needed for Pain.  Qty: 100 tablet, Refills: 0      losartan (COZAAR) 100 MG tablet Take 1 tablet (100 mg total) by mouth once daily.  Qty: 90 tablet, Refills: 1    Associated Diagnoses: Essential hypertension      meclizine (ANTIVERT) 25 mg tablet     Associated Diagnoses: BPPV (benign paroxysmal positional vertigo), unspecified laterality      omeprazole (PRILOSEC) 20 MG capsule TAKE 2 CAPSULES BY MOUTH EVERY DAY FOR ACID REFLUX AND STOMACH  Qty: 180 capsule, Refills: 1      potassium chloride SA (K-DUR,KLOR-CON) 20 MEQ tablet TAKE 1 TABLET BY MOUTH DAILY  Qty: 90 tablet, Refills: 1    Comments: **Patient requests 90 days supply**      temazepam (RESTORIL) 30 mg capsule TAKE ONE CAPSULE BY MOUTH NIGHTLY AS NEEDED FOR INSOMNIA.  Qty: 30 capsule, Refills: 5           Time spent on the discharge of patient: less than thirty minutes    Viki Wade PA-C  Department of Hospital Medicine  [facility]

## 2017-01-03 NOTE — NURSING
1430- no acute changes patient reports the voltaren gel really works well on her knees. No change on tele.     1645- patient denies needs or discomforts when asked no acute changes continue to monitor on tele.

## 2017-01-03 NOTE — PROGRESS NOTES
Attempted to meet with pt to complete d/c planning assessment not able to do so at this time pt off unit for testing.  TN to follow up at a later time.

## 2017-01-03 NOTE — PROGRESS NOTES
[facility]  Cardiology  Progress Note    Patient Name: Magaly Nunes  MRN: [id number]  Admission Date: 1/1/2017  Hospital Length of Stay: 0 days  Code Status: Full Code   Attending Physician: Zoey Roman MD   Primary Care Physician: Chris Bangura MD  Expected Discharge Date: 1/2/2017  Principal Problem:Acute chest pain    Subjective:     Hospital Course: CHF    Interval History: SOb resolved and no cp    Review of Systems   All other systems reviewed and are negative.    Objective:     Vital Signs (Most Recent):  Temp: 98 °F (36.7 °C) (01/03/17 0800)  Pulse: 65 (Simultaneous filing. User may not have seen previous data.) (01/03/17 0800)  Resp: 18 (01/03/17 0800)  BP: (!) 179/82 (01/03/17 0800)  SpO2: 95 % (01/03/17 0800) Vital Signs (24h Range):  Temp:  [98 °F (36.7 °C)-98.2 °F (36.8 °C)] 98 °F (36.7 °C)  Pulse:  [65-75] 65  Resp:  [18] 18  BP: (145-191)/(65-88) 179/82     Weight: 84.6 kg (186 lb 8 oz)  Body mass index is 32.01 kg/(m^2).    SpO2: 95 %  O2 Device (Oxygen Therapy): room air      Intake/Output Summary (Last 24 hours) at 01/03/17 1607  Last data filed at 01/03/17 0400   Gross per 24 hour   Intake              966 ml   Output              800 ml   Net              166 ml       Lines/Drains/Airways     Peripheral Intravenous Line                 Peripheral IV - Single Lumen 01/02/17 1545 Left;Anterior Forearm 1 day                Physical Exam   Constitutional: She appears well-developed and well-nourished.   Cardiovascular: Normal rate and regular rhythm.    Pulmonary/Chest: Effort normal and breath sounds normal. No respiratory distress.   Musculoskeletal: She exhibits no edema.       Significant Labs:   BMP:   Glucose (mg/dL)   Date/Time Value Status   01/03/2017 06:47  (H) Final   01/02/2017 06:42  (H) Final   01/01/2017 08:44  (H) Final     Sodium (mmol/L)   Date/Time Value Status   01/03/2017 06:47  Final   01/02/2017 06:42  Final    01/01/2017 08:44  Final     Potassium (mmol/L)   Date/Time Value Status   01/03/2017 06:47 AM 3.9 Final   01/02/2017 06:42 AM 4.1 Final   01/01/2017 08:44 AM 3.9 Final     Chloride (mmol/L)   Date/Time Value Status   01/03/2017 06:47  Final   01/02/2017 06:42  Final   01/01/2017 08:44  Final     CO2 (mmol/L)   Date/Time Value Status   01/03/2017 06:47 AM 30 (H) Final   01/02/2017 06:42 AM 27 Final   01/01/2017 08:44 AM 22 (L) Final     BUN, Bld (mg/dL)   Date/Time Value Status   01/03/2017 06:47 AM 13 Final   01/02/2017 06:42 AM 12 Final   01/01/2017 08:44 AM 12 Final     Creatinine (mg/dL)   Date/Time Value Status   01/03/2017 06:47 AM 1.0 Final   01/02/2017 06:42 AM 1.1 Final   01/01/2017 08:44 AM 1.0 Final     Calcium (mg/dL)   Date/Time Value Status   01/03/2017 06:47 AM 9.4 Final   01/02/2017 06:42 AM 9.5 Final   01/01/2017 08:44 AM 9.5 Final     Magnesium (mg/dL)   Date/Time Value Status   01/01/2017 08:44 AM 1.8 Final   08/06/2015 07:40 AM 1.9 Final   07/24/2015 09:00 AM 1.8 Final    and CBC   WBC (K/uL)   Date/Time Value Status   01/03/2017 06:47 AM 4.68 Final   01/02/2017 06:42 AM 4.59 Final   01/01/2017 08:44 AM 5.25 Final     Hemoglobin (g/dL)   Date/Time Value Status   01/03/2017 06:47 AM 10.8 (L) Final   01/02/2017 06:42 AM 11.0 (L) Final   01/01/2017 08:44 AM 11.1 (L) Final     Hematocrit (%)   Date/Time Value Status   01/03/2017 06:47 AM 34.0 (L) Final     Platelets (K/uL)   Date/Time Value Status   01/03/2017 06:47  Final   01/02/2017 06:42  Final   01/01/2017 08:44  Final       Significant Imaging: Echocardiogram:   2D echo with color flow doppler:   Results for orders placed or performed during the hospital encounter of 01/01/17   2D echo with color flow doppler   Result Value Ref Range    EF 45 55 - 65    Mitral Valve Regurgitation MILD TO MODERATE     Diastolic Dysfunction Yes (A)     Est. PA Systolic Pressure 72 (A)     Pericardial Effusion NONE      Tricuspid Valve Regurgitation MODERATE TO SEVERE (A)     and Stress Test:   Impression: NORMAL MYOCARDIAL PERFUSION  1. The perfusion scan is free of evidence for myocardial ischemia or injury.   2. Resting wall motion is physiologic.   3. There is resting LV dysfunction with a reduced ejection fraction of 38 %.   4. The ventricular volumes are normal at rest and stress.   5. The extracardiac distribution of radioactivity is normal.   6. When compared to the previous study from 09/08/2015, no significant defects but LV function now decreased with TID now present.      Assessment and Plan:     Brief HPI: 84 y.o. female with a history as below who presented to the ED with a CC of constant non-radiating mid-sternal chest pain     Active Diagnoses:    Diagnosis Date Noted POA    PRINCIPAL PROBLEM:  Acute chest pain [R07.9] 01/01/2017 Yes    Elevated troponin [R79.89] 01/01/2017 Yes    Depression, major, recurrent, moderate [F33.1] 09/08/2016 Yes    Spinal stenosis of lumbar region [M48.06] 02/03/2014 Yes    Knee pain, bilateral [M25.561, M25.562] 07/24/2013 Yes    HTN (hypertension) [I10] 11/19/2012 Yes      Problems Resolved During this Admission:    Diagnosis Date Noted Date Resolved POA       VTE Risk Mitigation         Ordered     Place PHILL hose  Until discontinued      01/01/17 1508     enoxaparin injection 40 mg  Daily     Route:  Subcutaneous        01/01/17 1243     Medium Risk of VTE  Once      01/01/17 1243        #cp/sob-  #acute on chronic systolic and diastolic hf- newly changed.   #htn urgency  #pulm htn  #spinal stenosis  #depression    Discussed with primary likely interval NICM development --> add coreg     Anticipated Disposition: Home or Self Care    Discharge Needs: Med Availability    Hammad Humphrey MD  Cardiology  [facility] [place]

## 2017-01-03 NOTE — PROGRESS NOTES
Call placed to [facility] to schedule PCP, spoke to Dhiraj to schedule post hospital discharge follow up appointment.  Appointment scheduled for January 18th at 2:40pm.  Information entered into follow up tab to print on AVS at time of discharge.

## 2017-01-03 NOTE — NURSING
Pt sitting up in the chair AAOx3 talking on the telephone.  Pt Denies pain/sob/  Plan of care discussed with pt.  Advised pt she will be NPO after midnight for a stress test in the morning.  Advised pt to call for assistance.  Call light in reach.  No distress noted.

## 2017-01-03 NOTE — NURSING
1855- report given to ely on patients progress and updated hand off report sheet given. No acute changes patient is aware she cannot eat after midnight.

## 2017-01-03 NOTE — PLAN OF CARE
Problem: Fall Risk (Adult)  Goal: Absence of Falls  Patient will demonstrate the desired outcomes by discharge/transition of care.   Outcome: Ongoing (interventions implemented as appropriate)    01/02/17 2245   Fall Risk (Adult)   Absence of Falls making progress toward outcome         Problem: Patient Care Overview  Goal: Plan of Care Review  Outcome: Ongoing (interventions implemented as appropriate)    01/02/17 2245   Coping/Psychosocial   Plan Of Care Reviewed With patient         Problem: Pain, Acute (Adult)  Goal: Acceptable Pain Control/Comfort Level  Patient will demonstrate the desired outcomes by discharge/transition of care.  Outcome: Ongoing (interventions implemented as appropriate)    01/02/17 2245   Pain, Acute (Adult)   Acceptable Pain Control/Comfort Level making progress toward outcome

## 2017-01-09 RX ORDER — OMEPRAZOLE 20 MG/1
CAPSULE, DELAYED RELEASE ORAL
Qty: 180 CAPSULE | Refills: 0 | OUTPATIENT
Start: 2017-01-09

## 2017-01-10 RX ORDER — OMEPRAZOLE 20 MG/1
CAPSULE, DELAYED RELEASE ORAL
Qty: 180 CAPSULE | Refills: 1 | Status: SHIPPED | OUTPATIENT
Start: 2017-01-10 | End: 2017-08-10 | Stop reason: SDUPTHER

## 2017-01-12 ENCOUNTER — OFFICE VISIT (OUTPATIENT)
Dept: CARDIOLOGY | Facility: CLINIC | Age: 82
End: 2017-01-12
Payer: MEDICARE

## 2017-01-12 ENCOUNTER — OFFICE VISIT (OUTPATIENT)
Dept: FAMILY MEDICINE | Facility: CLINIC | Age: 82
End: 2017-01-12
Payer: MEDICARE

## 2017-01-12 ENCOUNTER — LAB VISIT (OUTPATIENT)
Dept: LAB | Facility: HOSPITAL | Age: 82
End: 2017-01-12
Payer: MEDICARE

## 2017-01-12 VITALS
DIASTOLIC BLOOD PRESSURE: 76 MMHG | HEART RATE: 80 BPM | HEIGHT: 64 IN | TEMPERATURE: 98 F | BODY MASS INDEX: 32.5 KG/M2 | SYSTOLIC BLOOD PRESSURE: 128 MMHG | OXYGEN SATURATION: 96 % | WEIGHT: 190.38 LBS

## 2017-01-12 VITALS
DIASTOLIC BLOOD PRESSURE: 65 MMHG | HEIGHT: 64 IN | SYSTOLIC BLOOD PRESSURE: 144 MMHG | OXYGEN SATURATION: 97 % | HEART RATE: 66 BPM | BODY MASS INDEX: 32.44 KG/M2 | WEIGHT: 190 LBS

## 2017-01-12 DIAGNOSIS — R06.02 SOB (SHORTNESS OF BREATH): Primary | ICD-10-CM

## 2017-01-12 DIAGNOSIS — R06.02 SOB (SHORTNESS OF BREATH): ICD-10-CM

## 2017-01-12 DIAGNOSIS — I70.0 AORTIC ATHEROSCLEROSIS: ICD-10-CM

## 2017-01-12 DIAGNOSIS — E78.5 DYSLIPIDEMIA: ICD-10-CM

## 2017-01-12 DIAGNOSIS — I10 ESSENTIAL HYPERTENSION: ICD-10-CM

## 2017-01-12 DIAGNOSIS — I50.42 SYSTOLIC AND DIASTOLIC CHF, CHRONIC: Primary | ICD-10-CM

## 2017-01-12 DIAGNOSIS — I73.9 PVD (PERIPHERAL VASCULAR DISEASE): ICD-10-CM

## 2017-01-12 LAB — BNP SERPL-MCNC: 339 PG/ML

## 2017-01-12 PROCEDURE — 99499 UNLISTED E&M SERVICE: CPT | Mod: S$GLB,,, | Performed by: NURSE PRACTITIONER

## 2017-01-12 PROCEDURE — 3078F DIAST BP <80 MM HG: CPT | Mod: S$GLB,,, | Performed by: INTERNAL MEDICINE

## 2017-01-12 PROCEDURE — 99214 OFFICE O/P EST MOD 30 MIN: CPT | Mod: S$GLB,,, | Performed by: NURSE PRACTITIONER

## 2017-01-12 PROCEDURE — 99499 UNLISTED E&M SERVICE: CPT | Mod: S$GLB,,, | Performed by: INTERNAL MEDICINE

## 2017-01-12 PROCEDURE — 1157F ADVNC CARE PLAN IN RCRD: CPT | Mod: S$GLB,,, | Performed by: NURSE PRACTITIONER

## 2017-01-12 PROCEDURE — 3074F SYST BP LT 130 MM HG: CPT | Mod: S$GLB,,, | Performed by: INTERNAL MEDICINE

## 2017-01-12 PROCEDURE — 93005 ELECTROCARDIOGRAM TRACING: CPT | Mod: S$GLB,,, | Performed by: NURSE PRACTITIONER

## 2017-01-12 PROCEDURE — 1160F RVW MEDS BY RX/DR IN RCRD: CPT | Mod: S$GLB,,, | Performed by: NURSE PRACTITIONER

## 2017-01-12 PROCEDURE — 99214 OFFICE O/P EST MOD 30 MIN: CPT | Mod: S$GLB,,, | Performed by: INTERNAL MEDICINE

## 2017-01-12 PROCEDURE — 1157F ADVNC CARE PLAN IN RCRD: CPT | Mod: S$GLB,,, | Performed by: INTERNAL MEDICINE

## 2017-01-12 PROCEDURE — 3074F SYST BP LT 130 MM HG: CPT | Mod: S$GLB,,, | Performed by: NURSE PRACTITIONER

## 2017-01-12 PROCEDURE — 99999 PR PBB SHADOW E&M-EST. PATIENT-LVL III: CPT | Mod: PBBFAC,,, | Performed by: INTERNAL MEDICINE

## 2017-01-12 PROCEDURE — 1159F MED LIST DOCD IN RCRD: CPT | Mod: S$GLB,,, | Performed by: NURSE PRACTITIONER

## 2017-01-12 PROCEDURE — 3078F DIAST BP <80 MM HG: CPT | Mod: S$GLB,,, | Performed by: NURSE PRACTITIONER

## 2017-01-12 PROCEDURE — 93010 ELECTROCARDIOGRAM REPORT: CPT | Mod: S$GLB,,, | Performed by: INTERNAL MEDICINE

## 2017-01-12 PROCEDURE — 1126F AMNT PAIN NOTED NONE PRSNT: CPT | Mod: S$GLB,,, | Performed by: INTERNAL MEDICINE

## 2017-01-12 PROCEDURE — 99999 PR PBB SHADOW E&M-EST. PATIENT-LVL V: CPT | Mod: PBBFAC,,, | Performed by: NURSE PRACTITIONER

## 2017-01-12 PROCEDURE — 1159F MED LIST DOCD IN RCRD: CPT | Mod: S$GLB,,, | Performed by: INTERNAL MEDICINE

## 2017-01-12 PROCEDURE — 1160F RVW MEDS BY RX/DR IN RCRD: CPT | Mod: S$GLB,,, | Performed by: INTERNAL MEDICINE

## 2017-01-12 RX ORDER — FUROSEMIDE 40 MG/1
40 TABLET ORAL DAILY
Qty: 30 TABLET | Refills: 11 | Status: SHIPPED | OUTPATIENT
Start: 2017-01-12 | End: 2018-02-26 | Stop reason: SDUPTHER

## 2017-01-12 NOTE — PROGRESS NOTES
Subjective:    Patient ID:  Magaly Nunes is a 84 y.o. female who presents for follow-up of No chief complaint on file.      HPI  Patient was added on to clinic today for follow-up of recent hospitalization for newly diagnosed cardiomyopathy.  She previously in years past and had known documented normal LV systolic function.  She's had long-standing high blood pressure which is been uncontrolled.  She presented with volume overload and was diuresed adequately.  She is a newly mildly reduced ejection fraction and underwent ischemic workup which showed no significant ischemia.  She was placed on carvedilol and blood pressure today is adequate.  On discharge she was not placed on a maintenance diuretic on in her list.  She says she went home and took some salty greens amongst other things.  She says she's noticed progressive shortness of breath similar to her presentation to the hospital again.  She denies any worsening chest pain or palpitations.  She does have PND and orthopnea but no significant lower extremity edema.  She denies any dizziness, presyncope or syncope.    Review of Systems   Constitution: Negative.   HENT: Negative.    Eyes: Negative.    Cardiovascular: Positive for dyspnea on exertion. Negative for chest pain, irregular heartbeat, leg swelling, near-syncope, orthopnea, palpitations, paroxysmal nocturnal dyspnea and syncope.   Respiratory: Positive for shortness of breath.    Skin: Negative.    Musculoskeletal: Negative.    Gastrointestinal: Negative for abdominal pain, constipation and diarrhea.   Genitourinary: Negative for dysuria.   Neurological: Negative.    Psychiatric/Behavioral: Negative.         Objective:    Physical Exam   Constitutional: She is oriented to person, place, and time. She appears well-developed and well-nourished.   HENT:   Head: Normocephalic and atraumatic.   Eyes: Conjunctivae and EOM are normal. Pupils are equal, round, and reactive to light.   Neck: Normal range of  motion. Neck supple. No thyromegaly present.   Cardiovascular: Normal rate and regular rhythm.    No murmur heard.  Pulmonary/Chest: Effort normal and breath sounds normal. No respiratory distress.   Abdominal: Soft. Bowel sounds are normal.   Musculoskeletal: She exhibits no edema.   Neurological: She is alert and oriented to person, place, and time.   Skin: Skin is warm and dry.   Psychiatric: She has a normal mood and affect. Her behavior is normal.       echo:  CONCLUSIONS     1 - Mildly depressed left ventricular systolic function (EF 45-50%).     2 - Severe left atrial enlargement.     3 - Left ventricular diastolic dysfunction.     4 - Normal right ventricular systolic function .     5 - Pulmonary hypertension. The estimated PA systolic pressure is 72 mmHg.     6 - Mild to moderate mitral regurgitation.     7 - Intermediate central venous pressure.     NST:  Impression: NORMAL MYOCARDIAL PERFUSION  1. The perfusion scan is free of evidence for myocardial ischemia or injury.   2. Resting wall motion is physiologic.   3. There is resting LV dysfunction with a reduced ejection fraction of 38 %.   4. The ventricular volumes are normal at rest and stress.   5. The extracardiac distribution of radioactivity is normal.   6. When compared to the previous study from 09/08/2015, no significant defects but LV function now decreased with TID now present.      Assessment:       1. Systolic and diastolic CHF, chronic    2. Essential hypertension    3. Aortic atherosclerosis    4. PVD (peripheral vascular disease)    5. Dyslipidemia         Plan:       -Continue maximize medical therapy for systolic heart failure  -Add maintenance diuretic Lasix 40 daily  -Salt restriction  -Exercise as tolerated    Return to clinic in one month

## 2017-01-12 NOTE — PROGRESS NOTES
Subjective:       Patient ID: Magaly Nunes is a 84 y.o. female.    Chief Complaint: Follow-up and Shortness of Breath    HPI Comments: 84-year-old female presents to the clinic today with complaint of shortness of breath since November 2016.  She states the shortness of breath is getting worse.  She states she gets short of breath walking short distances.  She denies any chest pain but does have some heart palpitations at times.  She also has chronic swelling to both lower extremities.  She was hospitalized at [facility] from 1/1/2017 to 1/3/2017.  She presented to the emergency room with midsternal chest pain 7 out of 10 with epigastric discomfort and nausea.  She describes the pain as achy.  She denies any fever chills diaphoresis, headaches, palpitations, vomiting, dysuria, dizziness, recent trauma or illness.  She states that on Thursday she had pain all over that she describes as arthritic pain and then the chest pain started on Friday.  On admit her EKG is nonischemic.  Her initial troponin level was 0.119.  Her BNP was 308.  Her chest x-ray was nonrevealing.  She was admitted to observation for acute coronary syndrome rule out.  Blood pressure was elevated on admit and plan to monitor and adjust pending echo.  Repeat echocardiogram showed a reduced ejection fraction of 45% significantly changed from previous echocardiogram in July 2013.  Beta blocker was added to medical regimen.  Cardiology consulted with recommendations for NST.  Patient had a NST without issues revealing normal myocardial perfusion.  She remained stable throughout the stay in no further episodes of chest pain.  She was discharged home with Coreg and to follow-up with cardiology.  She has not seen cardiology. She denies any fever or chills. She denies any wheezing.     Past Medical History   Diagnosis Date    Anxiety disorder     Carpal tunnel syndrome     CHF (congestive heart failure)     Chronic allergic  rhinitis     Chronic pain 10/22/2012    Constipation - functional 4/10/2013    Depression     Diabetes mellitus type II     Hot flash not due to menopause     HTN (hypertension)     Hypokalemia 11/19/2012    Insomnia 4/10/2013    Knee pain, bilateral 7/24/2013    Personal history of DVT (deep vein thrombosis)     Spinal stenosis      Dr. Corrales    Spinal stenosis of lumbar region 2/3/2014    Vertigo      Past Surgical History   Procedure Laterality Date    Hysterectomy      Cataract extraction Bilateral     Eye surgery      Fracture surgery      Orif radius & ulna fractures        reports that she has never smoked. She does not have any smokeless tobacco history on file. She reports that she does not drink alcohol or use illicit drugs.  Review of Systems   Constitutional: Negative for chills and fever.   HENT: Negative for congestion, ear discharge, ear pain, postnasal drip, rhinorrhea, sinus pressure, sneezing and sore throat.    Eyes: Negative for pain, discharge and itching.   Respiratory: Positive for shortness of breath. Negative for cough and wheezing.    Cardiovascular: Positive for palpitations and leg swelling. Negative for chest pain.   Gastrointestinal: Negative for abdominal pain, diarrhea, nausea and vomiting.   Musculoskeletal: Negative for gait problem.   Neurological: Negative for dizziness, light-headedness and headaches.       Objective:      Physical Exam   Constitutional: She is oriented to person, place, and time. She appears well-developed and well-nourished. No distress.   HENT:   Head: Normocephalic and atraumatic.   Right Ear: External ear normal.   Left Ear: External ear normal.   Nose: Nose normal.   Mouth/Throat: Oropharynx is clear and moist. No oropharyngeal exudate.   Eyes: Conjunctivae and EOM are normal. Pupils are equal, round, and reactive to light. Right eye exhibits no discharge. Left eye exhibits no discharge. No scleral icterus.   Neck: Normal range of motion.  No JVD present.   Cardiovascular: Normal rate, regular rhythm and normal heart sounds.  Exam reveals no gallop and no friction rub.    No murmur heard.  Pulmonary/Chest: Effort normal and breath sounds normal. No respiratory distress. She has no wheezes. She has no rales.   Abdominal: Soft. Bowel sounds are normal. There is no tenderness. There is no rebound and no guarding.   Musculoskeletal: Normal range of motion. She exhibits edema.   Plus one pedal edema noted    Lymphadenopathy:     She has no cervical adenopathy.   Neurological: She is alert and oriented to person, place, and time.   Skin: Skin is warm and dry. She is not diaphoretic.   Psychiatric: She has a normal mood and affect.       Assessment:       1. SOB (shortness of breath)    2. Aortic atherosclerosis    3. Essential hypertension        Plan:         SOB (shortness of breath)  -     EKG 12-lead  -     X-Ray Chest PA And Lateral; Future; Expected date: 1/12/17  -     Brain natriuretic peptide; Future; Expected date: 1/12/17  -     Cancel: Ambulatory referral to Cardiology  -     Cancel: Ambulatory referral to Cardiology  -     Ambulatory referral to Cardiology  EKG normal sinus rhythm     Aortic atherosclerosis  - continue blood pressure and monitor cholesterol     Essential hypertension  - The current medical regimen is effective;  continue present plan and medications.        To see Cardiology today for further evaluation

## 2017-01-12 NOTE — LETTER
January 12, 2017      Anabela Varela, FNP-C  [address]  [place] [address]           [facility]  [address] [place] [address]  [place] [address]  Phone: [phone]          Patient: Magaly Nunes   MR Number: [id number]   YOB: 1932   Date of Visit: 1/12/2017       Dear Anabela Varela:    Thank you for referring Magaly Nunes to me for evaluation. Attached you will find relevant portions of my assessment and plan of care.    If you have questions, please do not hesitate to call me. I look forward to following Magaly Nunes along with you.    Sincerely,    Hammad Humphrey MD    Enclosure  CC:  No Recipients    If you would like to receive this communication electronically, please contact [email or web address] or [phone] to request more information on [employer] access.    For providers and/or their staff who would like to refer a patient to [facility], please contact us through our one-stop-shop provider referral line, [facility], at [phone].    If you feel you have received this communication in error or would no longer like to receive these types of communications, please e-mail [email or web address]

## 2017-01-12 NOTE — MR AVS SNAPSHOT
[facility]  [address]  [place] [address]  Phone: [phone]                  Magaly Nunes   2017 3:15 PM   Office Visit    Description:  Female : 1932   Provider:  Hammad Humphrey MD   Department:  [facility]           Reason for Visit     Establish Care           Diagnoses this Visit        Comments    Systolic and diastolic CHF, chronic    -  Primary     Essential hypertension         Aortic atherosclerosis         PVD (peripheral vascular disease)         Dyslipidemia                To Do List           Future Appointments        Provider Department Dept Phone    2017 3:30 PM LAB, [facility] [phone]    2017 2:40 PM Chris Bangura MD [facility] [phone]    2017 11:15 AM Brinda Alejandro DPM [facility] Podiatry [phone]      Goals (5 Years of Data)     None      [facility]     [facility] -  Assistance  Registered nurses in the [facility] provide clinical advisement, health education, appointment booking, and other advisory services.  Call for this free service at [phone].             Medications           Message regarding Medications     Verify the changes and/or additions to your medication regime listed below are the same as discussed with your clinician today.  If any of these changes or additions are incorrect, please notify your healthcare provider.             Verify that the below list of medications is an accurate representation of the medications you are currently taking.  If none reported, the list may be blank. If incorrect, please contact your healthcare provider. Carry this list with you in case of emergency.           Current Medications     carvedilol (COREG) 12.5 MG tablet Take 1 tablet (12.5 mg total) by mouth 2 (two) times daily.    losartan (COZAAR) 100 MG tablet Take 1 tablet (100 mg total) by mouth once daily.     "meclizine (ANTIVERT) 25 mg tablet     omeprazole (PRILOSEC) 20 MG capsule TAKE 2 CAPSULES BY MOUTH EVERY DAY FOR ACID REFLUX AND STOMACH    potassium chloride SA (K-DUR,KLOR-CON) 20 MEQ tablet TAKE 1 TABLET BY MOUTH DAILY    temazepam (RESTORIL) 30 mg capsule TAKE ONE CAPSULE BY MOUTH NIGHTLY AS NEEDED FOR INSOMNIA.           Clinical Reference Information           Vital Signs - Last Recorded  Most recent update: 1/12/2017  3:21 PM by Loreta Rossi MA    BP Pulse Ht Wt SpO2 BMI    (!) 144/65 (BP Location: Right arm, Patient Position: Sitting, BP Method: Automatic) 66 5' 4" (1.626 m) 86.2 kg (190 lb) 97% 32.61 kg/m2      Blood Pressure          Most Recent Value    BP  (!)  144/65      Allergies as of 1/12/2017     Ace Inhibitors    Aspirin    Aciphex  [Rabeprazole]    Bextra  [Valdecoxib]    Clonidine    Cardizem  [Diltiazem Hcl]    Mavik  [Trandolapril]    Nsaids (Non-steroidal Anti-inflammatory Drug)    Phenytoin Sodium Extended    Tramadol      Immunizations Administered on Date of Encounter - 1/12/2017     None      "

## 2017-01-12 NOTE — MR AVS SNAPSHOT
[facility]  [address]  [place] [address]  Phone: [phone]  Fax: [phone]                  Magaly Nunes   2017 1:40 PM   Office Visit    Description:  Female : 1932   Provider:  IVETTE Stephenson   Department:  [facility]           Reason for Visit     Follow-up     Shortness of Breath           Diagnoses this Visit        Comments    SOB (shortness of breath)    -  Primary     Aortic atherosclerosis         Essential hypertension                To Do List           Future Appointments        Provider Department Dept Phone    2017 3:15 PM Hammad Humphrey MD [facility] Cardiology [phone]    2017 2:40 PM Chris Bangura MD [facility] [phone]    2017 11:15 AM Brinda Alejandro DPM [facility] Podiatry [phone]      Goals (5 Years of Data)     None      [facility]     [facility] On Call Nurse [facility] - 24/7 Assistance  Registered nurses in the [facility] On Call Center provide clinical advisement, health education, appointment booking, and other advisory services.  Call for this free service at [phone].             Medications           Message regarding Medications     Verify the changes and/or additions to your medication regime listed below are the same as discussed with your clinician today.  If any of these changes or additions are incorrect, please notify your healthcare provider.        STOP taking these medications     duloxetine (CYMBALTA) 30 MG capsule Take 30 mg by mouth 2 (two) times daily.     hydrocodone-acetaminophen 5-325mg (NORCO) 5-325 mg per tablet Take 1 tablet by mouth every 6 (six) hours as needed for Pain.           Verify that the below list of medications is an accurate representation of the medications you are currently taking.  If none reported, the list may be blank. If incorrect, please contact your healthcare provider. Carry this list with you in case of  "emergency.           Current Medications     carvedilol (COREG) 12.5 MG tablet Take 1 tablet (12.5 mg total) by mouth 2 (two) times daily.    losartan (COZAAR) 100 MG tablet Take 1 tablet (100 mg total) by mouth once daily.    meclizine (ANTIVERT) 25 mg tablet     omeprazole (PRILOSEC) 20 MG capsule TAKE 2 CAPSULES BY MOUTH EVERY DAY FOR ACID REFLUX AND STOMACH    potassium chloride SA (K-DUR,KLOR-CON) 20 MEQ tablet TAKE 1 TABLET BY MOUTH DAILY    temazepam (RESTORIL) 30 mg capsule TAKE ONE CAPSULE BY MOUTH NIGHTLY AS NEEDED FOR INSOMNIA.           Clinical Reference Information           Vital Signs - Last Recorded  Most recent update: 1/12/2017  2:10 PM by Donaldo Lang MA    BP Pulse Temp Ht Wt SpO2    128/76 80 98.3 °F (36.8 °C) (Oral) 5' 4" (1.626 m) 86.4 kg (190 lb 5.9 oz) 96%    BMI                32.68 kg/m2          Blood Pressure          Most Recent Value    BP  128/76      Allergies as of 1/12/2017     Ace Inhibitors    Aspirin    Aciphex  [Rabeprazole]    Bextra  [Valdecoxib]    Clonidine    Cardizem  [Diltiazem Hcl]    Mavik  [Trandolapril]    Nsaids (Non-steroidal Anti-inflammatory Drug)    Phenytoin Sodium Extended    Tramadol      Immunizations Administered on Date of Encounter - 1/12/2017     None      Orders Placed During Today's Visit      Normal Orders This Visit    Ambulatory referral to Cardiology     EKG 12-lead     Future Labs/Procedures Expected by Expires    Brain natriuretic peptide  1/12/2017 3/13/2018    X-Ray Chest PA And Lateral  1/12/2017 1/12/2018      "

## 2017-01-13 ENCOUNTER — TELEPHONE (OUTPATIENT)
Dept: FAMILY MEDICINE | Facility: CLINIC | Age: 82
End: 2017-01-13

## 2017-01-13 NOTE — TELEPHONE ENCOUNTER
I spoke with the patient and explained that her BNP was slightly elevated and the Lasix that Dr. Humphrey prescribed her yesterday should help. Patient verbalized understanding of above..

## 2017-02-01 ENCOUNTER — OFFICE VISIT (OUTPATIENT)
Dept: FAMILY MEDICINE | Facility: CLINIC | Age: 82
End: 2017-02-01
Payer: MEDICARE

## 2017-02-01 VITALS
WEIGHT: 196.19 LBS | TEMPERATURE: 98 F | BODY MASS INDEX: 34.76 KG/M2 | DIASTOLIC BLOOD PRESSURE: 60 MMHG | OXYGEN SATURATION: 96 % | HEART RATE: 65 BPM | SYSTOLIC BLOOD PRESSURE: 128 MMHG | HEIGHT: 63 IN

## 2017-02-01 DIAGNOSIS — R06.02 SOB (SHORTNESS OF BREATH): ICD-10-CM

## 2017-02-01 DIAGNOSIS — F33.1 DEPRESSION, MAJOR, RECURRENT, MODERATE: ICD-10-CM

## 2017-02-01 DIAGNOSIS — I50.42 SYSTOLIC AND DIASTOLIC CHF, CHRONIC: Primary | ICD-10-CM

## 2017-02-01 DIAGNOSIS — G47.00 INSOMNIA, UNSPECIFIED TYPE: ICD-10-CM

## 2017-02-01 DIAGNOSIS — M79.89 LEG SWELLING: ICD-10-CM

## 2017-02-01 DIAGNOSIS — L30.9 DERMATITIS: ICD-10-CM

## 2017-02-01 DIAGNOSIS — I27.20 PULMONARY HYPERTENSION: ICD-10-CM

## 2017-02-01 DIAGNOSIS — E11.29 CONTROLLED TYPE 2 DIABETES MELLITUS WITH OTHER DIABETIC KIDNEY COMPLICATION, UNSPECIFIED LONG TERM INSULIN USE STATUS: ICD-10-CM

## 2017-02-01 PROCEDURE — 3078F DIAST BP <80 MM HG: CPT | Mod: S$GLB,,, | Performed by: INTERNAL MEDICINE

## 2017-02-01 PROCEDURE — 99215 OFFICE O/P EST HI 40 MIN: CPT | Mod: S$GLB,,, | Performed by: INTERNAL MEDICINE

## 2017-02-01 PROCEDURE — 99999 PR PBB SHADOW E&M-EST. PATIENT-LVL III: CPT | Mod: PBBFAC,,, | Performed by: INTERNAL MEDICINE

## 2017-02-01 PROCEDURE — 1159F MED LIST DOCD IN RCRD: CPT | Mod: S$GLB,,, | Performed by: INTERNAL MEDICINE

## 2017-02-01 PROCEDURE — 3074F SYST BP LT 130 MM HG: CPT | Mod: S$GLB,,, | Performed by: INTERNAL MEDICINE

## 2017-02-01 PROCEDURE — 1125F AMNT PAIN NOTED PAIN PRSNT: CPT | Mod: S$GLB,,, | Performed by: INTERNAL MEDICINE

## 2017-02-01 PROCEDURE — 1160F RVW MEDS BY RX/DR IN RCRD: CPT | Mod: S$GLB,,, | Performed by: INTERNAL MEDICINE

## 2017-02-01 PROCEDURE — 99499 UNLISTED E&M SERVICE: CPT | Mod: S$GLB,,, | Performed by: INTERNAL MEDICINE

## 2017-02-01 PROCEDURE — 1157F ADVNC CARE PLAN IN RCRD: CPT | Mod: S$GLB,,, | Performed by: INTERNAL MEDICINE

## 2017-02-01 RX ORDER — TRAZODONE HYDROCHLORIDE 100 MG/1
TABLET ORAL
Qty: 180 TABLET | Refills: 11 | Status: SHIPPED | OUTPATIENT
Start: 2017-02-01 | End: 2017-02-24

## 2017-02-01 RX ORDER — DICLOFENAC SODIUM 75 MG/1
TABLET, DELAYED RELEASE ORAL
Refills: 2 | COMMUNITY
Start: 2017-01-24 | End: 2017-06-08

## 2017-02-01 RX ORDER — TRAZODONE HYDROCHLORIDE 100 MG/1
TABLET ORAL
Qty: 60 TABLET | Refills: 11 | Status: SHIPPED | OUTPATIENT
Start: 2017-02-01 | End: 2017-02-01 | Stop reason: SDUPTHER

## 2017-02-01 RX ORDER — NYSTATIN AND TRIAMCINOLONE ACETONIDE 100000; 1 [USP'U]/G; MG/G
CREAM TOPICAL 4 TIMES DAILY
Qty: 60 G | Refills: 12 | Status: SHIPPED | OUTPATIENT
Start: 2017-02-01 | End: 2017-03-13 | Stop reason: ALTCHOICE

## 2017-02-01 RX ORDER — TRAMADOL HYDROCHLORIDE 50 MG/1
TABLET ORAL
Refills: 0 | COMMUNITY
Start: 2017-01-24 | End: 2017-02-24

## 2017-02-01 NOTE — PROGRESS NOTES
Chief complaint swelling in the legs, shortness of breath, insomnia    84-year-old black female with multiple medical problems.  She says she still feels terrible and is very discouraged about her symptoms which a been ongoing.  She verbalizes that she never seems to get better.  Her most recent complaint has been continued edema to both lower extremities which is tight.  She recently did have an atrial fibrillation and a slightly reduced ejection fraction of 45-50%.  Her nuclear stress test was negative but it did show an ejection fraction of 38%.  She has pulmonary hypertension at 72.  She is always had dyspnea on exertion and actually 10 years ago she had a workup revealing pulmonary hypertension as the possible cause of her dyspnea.  She does have a history of pulmonary embolus years ago after trauma.  She was placed on Lasix 40 mg on January 12 and she has been elevating.  It is occasionally better.  Her other major complaint is continued difficulty sleeping despite the Restoril.  She has been obviously depressed but she always has some subjective intolerance to all the antidepressants we have given her.  Every time she presents she has discontinued medication.  We will give her trazodone 100 mg and titrate up to 200 mg of necessary she can continue the Restoril.  She states her family always ask her what happened at that visit and she always seems discouraged as if nothing has been done.  I wrote out all the issues today for her so that she can see on paper all the things we are doing.  We'll need to reassess her kidney function prior to being able to increase the Lasix for edema.  I did state that her dyspnea and her edema may well be secondary to the pulmonary hypertension that she may well need to see a pulmonary hypertension cardiac specialist since there are medications that can target pulmonary hypertension.  I also wrote that we will be adding trazodone to the Restoril to target her insomnia and I think  she will definitely feel better if she gets more sleep.  I reviewed her interval recent records and her A1c was 6.1 in December.  She was counseled at length regarding all these issues.Total time over 45 minutes with over 50% counseling.    Patient also states she has a tender rash under the folds of the abdomen which appear to be fungal.  She also has a little bit of breakdown which she has had currently over one of her abdominal incision scars    Review of systems: Dyspnea on exertion but no orthopnea or PND, no cough or infectious symptoms    PAST MEDICAL HISTORY:                                                        1. Hypertension.                                                             2. Dyspnea, possibly secondary to pulmonary hypertension. Sleep study             negative. Has seen cardiology. Ultrasound and CT of chest are negative.            PFTs 2002 were essentially normal.                                           3. History of sinus bradycardia.                                             4. Angioedema on Mavik.                                                      5. Vertigo.                                                                  6. spinal stenosis, by history, 3 epidural injections in the past        per neurosurgery, Dr. Garcia.  Now seeing Dr Leon.                                                                    7. Allergic rhinitis.                                                                                                        8. IBS.                                                                      9. Carpal tunnel syndrome, status post surgery.                              10. Status post cholecystectomy, total hysterectomy, and left ovary                                                         11. History of iron deficiency anemia, normal EGD in 2002 and in 2000.       12. GERD, with hiatal hernia.                                                13. Osteoarthritis of  the knees.                                             14. Severe hot flashes despite all forms of therapy and eval by GYN.         15. History of leg pain due to varicosities or spinal stenosis.              16. History of positive JAVID, mild.                                           17. Trigger fingers, with history of injections.                             18. Varicose veins with reflux on ultrasound .                           19. Borderline abdominal aneurysm at 2.5 cm on ultrasound .              20. Positive DSE, EKG portion, but indeterminate overall,. Neg nuclear stress ' And negative nuclear stress test  and            21. Mild stenosis of the renal arteries by ultrasound.                       22. Diabetes  23. History of multitrauma , with fracture of the right arm, subsequent  DVT, and has been on Coumadin. She was discharged from the hospital around   2006. She did develop anemia, and has had several transfusions during   that stay. She also had pulmonary embolus associated with that DVT.    24. History of polyarthralgia with positive JAVID. She has been evaluated by [facility]. Seeing Dr Leon now  25. ANXIETY -suspected partial cause of hot flashes  27. DEPRESSION -    28. Vit D def  29.  Mild peripheral vascular disease buy testing but not felt significant to pursue further  30.  Neuropathy, seen by neurology and put on Cymbalta     ?colonoscopy    SOCIAL HISTORY:  She is  and lives with her spouse who has prostate   cancer.  She does not smoke cigarettes or drink alcohol. 2 sons .      Vitals, as above  Gen: no distress  EYES: conjunctiva clear, non-icteric, PERRL  ENT: nose clear,, oropharynx normal, teeth good  NECK:supple, thyroid non-palpable, no cervical lymph nodes  RESP: effort is good, lungs clear  CV: heart RRR w/o murmur, gallops or rubs; no carotid bruits, diffuse +1 tight edema of the lower extremities from the  knees down  GI: abdomen soft, non-distended, non-tender  MS: gait normal, no clubbing or cyanosis of the digits  SKIN: She does have a fungal rash underneath the lower abdominal fold.  There is some minor opening of the skin over her prior abdominal scar centrally., warm to touch,       Magaly was seen today for hospital follow up, shortness of breath, foot swelling and fall.    Diagnoses and all orders for this visit:    Systolic and diastolic CHF, chronic, patient with some reduced function but apparently not ischemic.  I think her edema might be coming from the pulmonary hypertension.  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  There is notation in the cardiology records that when she went home she started eating salty food and this may be contributing as well    Depression, major, recurrent, moderate, definitely contributing to her interpretation of symptoms and she has declined continuing antidepressants in the past    Pulmonary hypertension, may be worse at this point contributing to symptoms and may well need to see CHF clinic    SOB (shortness of breath)    Insomnia, unspecified type, add trazodone    Dermatitis, fungal, add medication    Other orders  -     trazodone (DESYREL) 100 MG tablet; 1 to 1.3 to 2 pills HS for sleep  -     nystatin-triamcinolone (MYCOLOG II) cream; Apply topically 4 (four) times daily.

## 2017-02-13 ENCOUNTER — TELEPHONE (OUTPATIENT)
Dept: FAMILY MEDICINE | Facility: CLINIC | Age: 82
End: 2017-02-13

## 2017-02-13 NOTE — TELEPHONE ENCOUNTER
Stated, her legs are swelling (from feet to below knee). Asking for her Lasix to be increased. Instructed patient to contact Dr Humphrey's office. No other symptoms reported.

## 2017-02-13 NOTE — TELEPHONE ENCOUNTER
----- Message from Yamila Ramon sent at 2/13/2017 12:14 PM CST -----  Patient is requesting to speak with nurse. She states her left leg has started swelling and its painful. She doesn't see the Cardiologist until the 22nd. Please call at [phone] Thank you!

## 2017-02-14 ENCOUNTER — HOSPITAL ENCOUNTER (INPATIENT)
Facility: HOSPITAL | Age: 82
LOS: 1 days | Discharge: HOME HEALTH | DRG: 291 | End: 2017-02-16
Attending: EMERGENCY MEDICINE | Admitting: HOSPITALIST
Payer: MEDICARE

## 2017-02-14 DIAGNOSIS — I50.23 ACUTE ON CHRONIC SYSTOLIC CONGESTIVE HEART FAILURE: Primary | ICD-10-CM

## 2017-02-14 DIAGNOSIS — R79.89 ELEVATED BRAIN NATRIURETIC PEPTIDE (BNP) LEVEL: ICD-10-CM

## 2017-02-14 DIAGNOSIS — G89.29 CHRONIC PAIN OF LEFT KNEE: ICD-10-CM

## 2017-02-14 DIAGNOSIS — M25.562 CHRONIC PAIN OF LEFT KNEE: ICD-10-CM

## 2017-02-14 DIAGNOSIS — R06.02 SOB (SHORTNESS OF BREATH): ICD-10-CM

## 2017-02-14 DIAGNOSIS — M79.89 LEG SWELLING: ICD-10-CM

## 2017-02-14 PROBLEM — N18.30 ANEMIA OF CHRONIC RENAL FAILURE, STAGE 3 (MODERATE): Status: ACTIVE | Noted: 2017-02-14

## 2017-02-14 PROBLEM — D63.1 ANEMIA OF CHRONIC RENAL FAILURE, STAGE 3 (MODERATE): Status: ACTIVE | Noted: 2017-02-14

## 2017-02-14 PROBLEM — I07.1 SEVERE TRICUSPID REGURGITATION: Status: ACTIVE | Noted: 2017-02-14

## 2017-02-14 PROBLEM — N18.30 CKD STAGE 3 DUE TO TYPE 2 DIABETES MELLITUS: Status: ACTIVE | Noted: 2017-02-14

## 2017-02-14 PROBLEM — E11.22 CKD STAGE 3 DUE TO TYPE 2 DIABETES MELLITUS: Status: ACTIVE | Noted: 2017-02-14

## 2017-02-14 PROBLEM — I50.43 ACUTE ON CHRONIC COMBINED SYSTOLIC AND DIASTOLIC CHF (CONGESTIVE HEART FAILURE): Status: ACTIVE | Noted: 2017-01-12

## 2017-02-14 PROBLEM — N17.9 AKI (ACUTE KIDNEY INJURY): Status: ACTIVE | Noted: 2017-02-14

## 2017-02-14 PROBLEM — I15.0 RENOVASCULAR HYPERTENSION: Status: ACTIVE | Noted: 2017-02-14

## 2017-02-14 PROBLEM — M71.22 SYNOVIAL CYST OF POPLITEAL SPACE (BAKER), LEFT KNEE: Status: ACTIVE | Noted: 2017-02-14

## 2017-02-14 LAB
ALBUMIN SERPL BCP-MCNC: 3.4 G/DL
ALBUMIN SERPL BCP-MCNC: 3.6 G/DL
ALP SERPL-CCNC: 121 U/L
ALP SERPL-CCNC: 126 U/L
ALT SERPL W/O P-5'-P-CCNC: 13 U/L
ALT SERPL W/O P-5'-P-CCNC: 13 U/L
ANION GAP SERPL CALC-SCNC: 12 MMOL/L
ANION GAP SERPL CALC-SCNC: 7 MMOL/L
APPEARANCE FLD: NORMAL
AST SERPL-CCNC: 24 U/L
AST SERPL-CCNC: 28 U/L
BASOPHILS # BLD AUTO: 0.01 K/UL
BASOPHILS # BLD AUTO: 0.02 K/UL
BASOPHILS NFR BLD: 0.2 %
BASOPHILS NFR BLD: 0.2 %
BILIRUB SERPL-MCNC: 1.6 MG/DL
BILIRUB SERPL-MCNC: 1.8 MG/DL
BNP SERPL-MCNC: 622 PG/ML
BODY FLD TYPE: ABNORMAL
BODY FLD TYPE: NORMAL
BUN SERPL-MCNC: 14 MG/DL
BUN SERPL-MCNC: 16 MG/DL
CALCIUM SERPL-MCNC: 9.3 MG/DL
CALCIUM SERPL-MCNC: 9.4 MG/DL
CHLORIDE SERPL-SCNC: 101 MMOL/L
CHLORIDE SERPL-SCNC: 99 MMOL/L
CO2 SERPL-SCNC: 22 MMOL/L
CO2 SERPL-SCNC: 28 MMOL/L
COLOR FLD: NORMAL
CREAT SERPL-MCNC: 1.1 MG/DL
CREAT SERPL-MCNC: 1.2 MG/DL
CRP SERPL-MCNC: 100.8 MG/L
CRYSTALS FLD MICRO: POSITIVE
DIFFERENTIAL METHOD: ABNORMAL
DIFFERENTIAL METHOD: ABNORMAL
EOSINOPHIL # BLD AUTO: 0 K/UL
EOSINOPHIL # BLD AUTO: 0 K/UL
EOSINOPHIL NFR BLD: 0.1 %
EOSINOPHIL NFR BLD: 0.3 %
ERYTHROCYTE [DISTWIDTH] IN BLOOD BY AUTOMATED COUNT: 13.9 %
ERYTHROCYTE [DISTWIDTH] IN BLOOD BY AUTOMATED COUNT: 14 %
ERYTHROCYTE [SEDIMENTATION RATE] IN BLOOD BY WESTERGREN METHOD: 57 MM/HR
EST. GFR  (AFRICAN AMERICAN): 48 ML/MIN/1.73 M^2
EST. GFR  (AFRICAN AMERICAN): 53.3 ML/MIN/1.73 M^2
EST. GFR  (NON AFRICAN AMERICAN): 41.6 ML/MIN/1.73 M^2
EST. GFR  (NON AFRICAN AMERICAN): 46.2 ML/MIN/1.73 M^2
GLUCOSE SERPL-MCNC: 143 MG/DL
GLUCOSE SERPL-MCNC: 159 MG/DL
GRAM STN SPEC: NORMAL
GRAM STN SPEC: NORMAL
HCT VFR BLD AUTO: 32.3 %
HCT VFR BLD AUTO: 32.9 %
HGB BLD-MCNC: 10.2 G/DL
HGB BLD-MCNC: 10.5 G/DL
INR PPP: 1.3
LYMPHOCYTES # BLD AUTO: 0.6 K/UL
LYMPHOCYTES # BLD AUTO: 1.2 K/UL
LYMPHOCYTES NFR BLD: 12.6 %
LYMPHOCYTES NFR BLD: 9 %
LYMPHOCYTES NFR FLD MANUAL: 5 %
MAGNESIUM SERPL-MCNC: 1.7 MG/DL
MCH RBC QN AUTO: 26.4 PG
MCH RBC QN AUTO: 26.9 PG
MCHC RBC AUTO-ENTMCNC: 31.6 %
MCHC RBC AUTO-ENTMCNC: 31.9 %
MCV RBC AUTO: 84 FL
MCV RBC AUTO: 84 FL
MONOCYTES # BLD AUTO: 0.6 K/UL
MONOCYTES # BLD AUTO: 1.4 K/UL
MONOCYTES NFR BLD: 14.2 %
MONOCYTES NFR BLD: 9.5 %
MONOS+MACROS NFR FLD MANUAL: 24 %
NEUTROPHILS # BLD AUTO: 5.3 K/UL
NEUTROPHILS # BLD AUTO: 7.1 K/UL
NEUTROPHILS NFR BLD: 72.6 %
NEUTROPHILS NFR BLD: 80.8 %
NEUTROPHILS NFR FLD MANUAL: 71 %
PHOSPHATE SERPL-MCNC: 2.4 MG/DL
PLATELET # BLD AUTO: 123 K/UL
PLATELET # BLD AUTO: 131 K/UL
PMV BLD AUTO: 10.2 FL
PMV BLD AUTO: 10.7 FL
POTASSIUM SERPL-SCNC: 3.7 MMOL/L
POTASSIUM SERPL-SCNC: 4.4 MMOL/L
PROT SERPL-MCNC: 7.4 G/DL
PROT SERPL-MCNC: 7.6 G/DL
PROTHROMBIN TIME: 13.1 SEC
RBC # BLD AUTO: 3.87 M/UL
RBC # BLD AUTO: 3.9 M/UL
SODIUM SERPL-SCNC: 134 MMOL/L
SODIUM SERPL-SCNC: 135 MMOL/L
TROPONIN I SERPL DL<=0.01 NG/ML-MCNC: 0.05 NG/ML
TROPONIN I SERPL DL<=0.01 NG/ML-MCNC: 0.07 NG/ML
URATE SERPL-MCNC: 5.6 MG/DL
WBC # BLD AUTO: 6.54 K/UL
WBC # BLD AUTO: 9.75 K/UL
WBC # FLD: NORMAL /CU MM

## 2017-02-14 PROCEDURE — 94761 N-INVAS EAR/PLS OXIMETRY MLT: CPT

## 2017-02-14 PROCEDURE — 63600175 PHARM REV CODE 636 W HCPCS: Performed by: EMERGENCY MEDICINE

## 2017-02-14 PROCEDURE — 84484 ASSAY OF TROPONIN QUANT: CPT | Mod: 91

## 2017-02-14 PROCEDURE — 25000003 PHARM REV CODE 250: Performed by: HOSPITALIST

## 2017-02-14 PROCEDURE — 85610 PROTHROMBIN TIME: CPT

## 2017-02-14 PROCEDURE — 87116 MYCOBACTERIA CULTURE: CPT

## 2017-02-14 PROCEDURE — G0378 HOSPITAL OBSERVATION PER HR: HCPCS

## 2017-02-14 PROCEDURE — 99285 EMERGENCY DEPT VISIT HI MDM: CPT | Mod: ,,, | Performed by: EMERGENCY MEDICINE

## 2017-02-14 PROCEDURE — 96374 THER/PROPH/DIAG INJ IV PUSH: CPT

## 2017-02-14 PROCEDURE — 89051 BODY FLUID CELL COUNT: CPT

## 2017-02-14 PROCEDURE — 87205 SMEAR GRAM STAIN: CPT

## 2017-02-14 PROCEDURE — 99220 PR INITIAL OBSERVATION CARE,LEVL III: CPT | Mod: ,,, | Performed by: HOSPITALIST

## 2017-02-14 PROCEDURE — 80053 COMPREHEN METABOLIC PANEL: CPT

## 2017-02-14 PROCEDURE — 27000221 HC OXYGEN, UP TO 24 HOURS

## 2017-02-14 PROCEDURE — 93005 ELECTROCARDIOGRAM TRACING: CPT

## 2017-02-14 PROCEDURE — 85025 COMPLETE CBC W/AUTO DIFF WBC: CPT

## 2017-02-14 PROCEDURE — 84484 ASSAY OF TROPONIN QUANT: CPT

## 2017-02-14 PROCEDURE — 51702 INSERT TEMP BLADDER CATH: CPT

## 2017-02-14 PROCEDURE — 87102 FUNGUS ISOLATION CULTURE: CPT

## 2017-02-14 PROCEDURE — 87075 CULTR BACTERIA EXCEPT BLOOD: CPT

## 2017-02-14 PROCEDURE — 80053 COMPREHEN METABOLIC PANEL: CPT | Mod: 91

## 2017-02-14 PROCEDURE — 87070 CULTURE OTHR SPECIMN AEROBIC: CPT

## 2017-02-14 PROCEDURE — 63600175 PHARM REV CODE 636 W HCPCS: Performed by: HOSPITALIST

## 2017-02-14 PROCEDURE — 86140 C-REACTIVE PROTEIN: CPT

## 2017-02-14 PROCEDURE — 99285 EMERGENCY DEPT VISIT HI MDM: CPT | Mod: 25

## 2017-02-14 PROCEDURE — 83880 ASSAY OF NATRIURETIC PEPTIDE: CPT

## 2017-02-14 PROCEDURE — 84550 ASSAY OF BLOOD/URIC ACID: CPT

## 2017-02-14 PROCEDURE — 84100 ASSAY OF PHOSPHORUS: CPT

## 2017-02-14 PROCEDURE — 89060 EXAM SYNOVIAL FLUID CRYSTALS: CPT

## 2017-02-14 PROCEDURE — 93010 ELECTROCARDIOGRAM REPORT: CPT | Mod: ,,, | Performed by: INTERNAL MEDICINE

## 2017-02-14 PROCEDURE — 85651 RBC SED RATE NONAUTOMATED: CPT

## 2017-02-14 PROCEDURE — 83735 ASSAY OF MAGNESIUM: CPT

## 2017-02-14 PROCEDURE — 36415 COLL VENOUS BLD VENIPUNCTURE: CPT

## 2017-02-14 RX ORDER — IBUPROFEN 200 MG
24 TABLET ORAL
Status: DISCONTINUED | OUTPATIENT
Start: 2017-02-14 | End: 2017-02-15

## 2017-02-14 RX ORDER — FUROSEMIDE 10 MG/ML
60 INJECTION INTRAMUSCULAR; INTRAVENOUS
Status: COMPLETED | OUTPATIENT
Start: 2017-02-14 | End: 2017-02-14

## 2017-02-14 RX ORDER — IBUPROFEN 200 MG
16 TABLET ORAL
Status: DISCONTINUED | OUTPATIENT
Start: 2017-02-14 | End: 2017-02-15

## 2017-02-14 RX ORDER — ENOXAPARIN SODIUM 100 MG/ML
40 INJECTION SUBCUTANEOUS EVERY 24 HOURS
Status: DISCONTINUED | OUTPATIENT
Start: 2017-02-14 | End: 2017-02-16 | Stop reason: HOSPADM

## 2017-02-14 RX ORDER — RAMELTEON 8 MG/1
8 TABLET ORAL NIGHTLY PRN
Status: DISCONTINUED | OUTPATIENT
Start: 2017-02-14 | End: 2017-02-16 | Stop reason: HOSPADM

## 2017-02-14 RX ORDER — LOSARTAN POTASSIUM 50 MG/1
100 TABLET ORAL DAILY
Status: DISCONTINUED | OUTPATIENT
Start: 2017-02-14 | End: 2017-02-14

## 2017-02-14 RX ORDER — GLUCAGON 1 MG
1 KIT INJECTION
Status: DISCONTINUED | OUTPATIENT
Start: 2017-02-14 | End: 2017-02-15

## 2017-02-14 RX ORDER — ONDANSETRON 2 MG/ML
4 INJECTION INTRAMUSCULAR; INTRAVENOUS EVERY 8 HOURS PRN
Status: DISCONTINUED | OUTPATIENT
Start: 2017-02-14 | End: 2017-02-16 | Stop reason: HOSPADM

## 2017-02-14 RX ORDER — POTASSIUM CHLORIDE 20 MEQ/1
20 TABLET, EXTENDED RELEASE ORAL DAILY
Status: DISCONTINUED | OUTPATIENT
Start: 2017-02-14 | End: 2017-02-16 | Stop reason: HOSPADM

## 2017-02-14 RX ORDER — DEXAMETHASONE SODIUM PHOSPHATE 4 MG/ML
8 INJECTION, SOLUTION INTRA-ARTICULAR; INTRALESIONAL; INTRAMUSCULAR; INTRAVENOUS; SOFT TISSUE ONCE
Status: COMPLETED | OUTPATIENT
Start: 2017-02-14 | End: 2017-02-14

## 2017-02-14 RX ORDER — PANTOPRAZOLE SODIUM 40 MG/1
40 TABLET, DELAYED RELEASE ORAL DAILY
Status: DISCONTINUED | OUTPATIENT
Start: 2017-02-14 | End: 2017-02-16 | Stop reason: HOSPADM

## 2017-02-14 RX ORDER — HYDROCODONE BITARTRATE AND ACETAMINOPHEN 5; 325 MG/1; MG/1
1 TABLET ORAL EVERY 4 HOURS PRN
Status: DISCONTINUED | OUTPATIENT
Start: 2017-02-14 | End: 2017-02-16 | Stop reason: HOSPADM

## 2017-02-14 RX ORDER — CARVEDILOL 12.5 MG/1
12.5 TABLET ORAL 2 TIMES DAILY
Status: DISCONTINUED | OUTPATIENT
Start: 2017-02-14 | End: 2017-02-16 | Stop reason: HOSPADM

## 2017-02-14 RX ORDER — ACETAMINOPHEN 325 MG/1
650 TABLET ORAL EVERY 6 HOURS PRN
Status: DISCONTINUED | OUTPATIENT
Start: 2017-02-14 | End: 2017-02-15

## 2017-02-14 RX ORDER — FUROSEMIDE 10 MG/ML
40 INJECTION INTRAMUSCULAR; INTRAVENOUS 2 TIMES DAILY
Status: DISCONTINUED | OUTPATIENT
Start: 2017-02-14 | End: 2017-02-15

## 2017-02-14 RX ORDER — IPRATROPIUM BROMIDE AND ALBUTEROL SULFATE 2.5; .5 MG/3ML; MG/3ML
3 SOLUTION RESPIRATORY (INHALATION) EVERY 4 HOURS PRN
Status: DISCONTINUED | OUTPATIENT
Start: 2017-02-14 | End: 2017-02-16 | Stop reason: HOSPADM

## 2017-02-14 RX ADMIN — CARVEDILOL 12.5 MG: 12.5 TABLET, FILM COATED ORAL at 08:02

## 2017-02-14 RX ADMIN — FUROSEMIDE 40 MG: 10 INJECTION, SOLUTION INTRAMUSCULAR; INTRAVENOUS at 06:02

## 2017-02-14 RX ADMIN — LOSARTAN POTASSIUM 100 MG: 50 TABLET, FILM COATED ORAL at 08:02

## 2017-02-14 RX ADMIN — FUROSEMIDE 40 MG: 10 INJECTION, SOLUTION INTRAMUSCULAR; INTRAVENOUS at 08:02

## 2017-02-14 RX ADMIN — RAMELTEON 8 MG: 8 TABLET, FILM COATED ORAL at 11:02

## 2017-02-14 RX ADMIN — POTASSIUM CHLORIDE 20 MEQ: 1500 TABLET, EXTENDED RELEASE ORAL at 08:02

## 2017-02-14 RX ADMIN — PANTOPRAZOLE SODIUM 40 MG: 40 TABLET, DELAYED RELEASE ORAL at 08:02

## 2017-02-14 RX ADMIN — FUROSEMIDE 60 MG: 10 INJECTION, SOLUTION INTRAMUSCULAR; INTRAVENOUS at 02:02

## 2017-02-14 RX ADMIN — DEXAMETHASONE SODIUM PHOSPHATE 8 MG: 4 INJECTION, SOLUTION INTRAMUSCULAR; INTRAVENOUS at 08:02

## 2017-02-14 RX ADMIN — ENOXAPARIN SODIUM 40 MG: 100 INJECTION SUBCUTANEOUS at 12:02

## 2017-02-14 NOTE — ED TRIAGE NOTES
"Pt reports increased leg swelling. Pt reports swelling "comes and goes." pt reports not able to walk due to the pain and swelling. Pt denies CP. Pt reports SOB with exertion. Pt denies fever, cough.   "

## 2017-02-14 NOTE — H&P
[facility] Hospital Medicine  History & Physical    Patient Name: Magaly Nunes  MRN: [id number]  Admission Date: 2/14/2017  Attending Physician: Natacha June MD   Primary Care Provider: Chris Bangura MD    Hospital Medicine Team: [facility] MED S Rosalio Martin DO     Patient information was obtained from patient and ER records.     Subjective:     Principal Problem:<principal problem not specified>    Chief Complaint:   Chief Complaint   Patient presents with    Leg Swelling     Leg swelling x1 week.         HPI: 84 F with h/o HTN, chronic systolic CHF ( EF 38 %) on recent ECHO ( 01/03/17), pHTN,  Left Eaton's cyst presents to ED with swelling of bilateral legs over last 2-3 days. Reports swelling is so bad that it is difficult for her to walk due to leg heaviness. At baseline she ambulates with walker and denies any recent fall. Also notes pain behind her left knee with difficulty in bending her knee which she attributes to Baker's cyst. Upon asking she endorses not taking her lasix over the weekend because she thought she does not need lasix anymore since leg swelling much improved and it makes her to go to bathroom to urinate frequently. She denies chest pain, SOB, orthopnea or PND. Her niece at bedside drove patient to ED for further evaluation of leg swelling.     She was recently admitted to [facility] on [facility] year with SOB and leg swelling. She had elevated troponin of 0/125 at that time. [facility] pharm cardiac stress test was negative for ischemia and reveal EF 38%. US doppler BL legs last month was negative for DVT. She was treated with IV lasix while in patient and discharged home on lasix 40 mg daily.     She denies fever, chills, increased fluid or salt intake. She received lasix 60 mg IVP X 1 in ED with 1200 cc urine output.       Past Medical History   Diagnosis Date    Anxiety disorder     Carpal tunnel syndrome     CHF (congestive heart failure)     Chronic allergic rhinitis      Chronic pain 10/22/2012    Constipation - functional 4/10/2013    Depression     Diabetes mellitus type II     Hot flash not due to menopause     HTN (hypertension)     Hypokalemia 11/19/2012    Insomnia 4/10/2013    Knee pain, bilateral 7/24/2013    Personal history of DVT (deep vein thrombosis)     Spinal stenosis      Dr. Corrales    Spinal stenosis of lumbar region 2/3/2014    Vertigo        Past Surgical History   Procedure Laterality Date    Hysterectomy      Cataract extraction Bilateral     Eye surgery      Fracture surgery      Orif radius & ulna fractures         Review of patient's allergies indicates:   Allergen Reactions    Ace inhibitors      Other reaction(s): Unknown    Aspirin      Other reaction(s): Unknown    Aciphex  [rabeprazole]      Other reaction(s): Stomach upset    Bextra  [valdecoxib]      Other reaction(s): Unknown    Clonidine      Other reaction(s): dry mouth.lip swelling    Cardizem  [diltiazem hcl]      Other reaction(s): Unknown    Mavik  [trandolapril]      Other reaction(s): Unknown    Nsaids (non-steroidal anti-inflammatory drug)      Other reaction(s): black spots on skin    Phenytoin sodium extended      Other reaction(s): Stomach upset    Tramadol      Other reaction(s): stomach pain       No current facility-administered medications on file prior to encounter.      Current Outpatient Prescriptions on File Prior to Encounter   Medication Sig    carvedilol (COREG) 12.5 MG tablet Take 1 tablet (12.5 mg total) by mouth 2 (two) times daily.    diclofenac (VOLTAREN) 75 MG EC tablet     furosemide (LASIX) 40 MG tablet Take 1 tablet (40 mg total) by mouth once daily.    losartan (COZAAR) 100 MG tablet Take 1 tablet (100 mg total) by mouth once daily.    meclizine (ANTIVERT) 25 mg tablet     nystatin-triamcinolone (MYCOLOG II) cream Apply topically 4 (four) times daily.    omeprazole (PRILOSEC) 20 MG capsule TAKE 2 CAPSULES BY MOUTH EVERY DAY FOR  ACID REFLUX AND STOMACH    potassium chloride SA (K-DUR,KLOR-CON) 20 MEQ tablet TAKE 1 TABLET BY MOUTH DAILY    temazepam (RESTORIL) 30 mg capsule TAKE ONE CAPSULE BY MOUTH NIGHTLY AS NEEDED FOR INSOMNIA.    tramadol (ULTRAM) 50 mg tablet TK 1 T PO  Q 6 H PRN    trazodone (DESYREL) 100 MG tablet TAKE 1 TO 2 TABLETS BY MOUTH AT BEDTIME FOR SLEEP     Family History     Problem Relation (Age of Onset)    No Known Problems Mother, Father, Sister, Brother, Maternal Aunt, Maternal Uncle, Paternal Aunt, Paternal Uncle, Maternal Grandmother, Maternal Grandfather, Paternal Grandmother, Paternal Grandfather        Social History Main Topics    Smoking status: Never Smoker    Smokeless tobacco: Not on file    Alcohol use No    Drug use: No    Sexual activity: No     Review of Systems   Constitutional: Positive for activity change. Negative for appetite change, chills, diaphoresis, fatigue and fever.   HENT: Negative for congestion, dental problem, drooling, ear discharge, ear pain, facial swelling, hearing loss, mouth sores, nosebleeds, postnasal drip, rhinorrhea, sinus pressure, sneezing, sore throat, tinnitus, trouble swallowing and voice change.    Eyes: Negative for photophobia, pain, discharge, redness, itching and visual disturbance.   Respiratory: Negative for apnea, cough, choking, chest tightness, shortness of breath, wheezing and stridor.    Cardiovascular: Positive for leg swelling. Negative for chest pain and palpitations.   Gastrointestinal: Negative for abdominal distention, abdominal pain, anal bleeding, blood in stool, constipation, diarrhea, nausea, rectal pain and vomiting.   Endocrine: Negative for cold intolerance, heat intolerance, polydipsia, polyphagia and polyuria.   Genitourinary: Negative for decreased urine volume, difficulty urinating, dyspareunia, dysuria, enuresis, flank pain, frequency, genital sores, hematuria, menstrual problem, pelvic pain, urgency, vaginal bleeding, vaginal discharge  and vaginal pain.   Musculoskeletal: Positive for arthralgias (Left Knee pain with h/o OA and Baker's cyst ) and gait problem (Unsteady gait and ambulates with cane ). Negative for back pain, joint swelling, myalgias, neck pain and neck stiffness.   Skin: Negative for color change, pallor, rash and wound.   Allergic/Immunologic: Negative for environmental allergies, food allergies and immunocompromised state.   Neurological: Negative for dizziness, tremors, seizures, syncope, facial asymmetry, speech difficulty, weakness, light-headedness, numbness and headaches.   Hematological: Negative for adenopathy. Does not bruise/bleed easily.   Psychiatric/Behavioral: Negative for agitation, behavioral problems, confusion, decreased concentration, dysphoric mood, hallucinations, self-injury, sleep disturbance and suicidal ideas. The patient is not nervous/anxious and is not hyperactive.      Objective:     Vital Signs (Most Recent):  Temp: 99.4 °F (37.4 °C) (02/14/17 0045)  Pulse: 74 (02/14/17 0500)  Resp: (!) 28 (02/14/17 0206)  BP: (!) 165/75 (02/14/17 0439)  SpO2: 97 % (02/14/17 0439) Vital Signs (24h Range):  Temp:  [99.4 °F (37.4 °C)] 99.4 °F (37.4 °C)  Pulse:  [74-84] 74  Resp:  [18-28] 28  SpO2:  [96 %-98 %] 97 %  BP: (128-166)/() 165/75     Weight: 83.9 kg (185 lb)  Body mass index is 31.76 kg/(m^2).    Physical Exam   Constitutional: She is oriented to person, place, and time. She appears well-developed and well-nourished. No distress.   HENT:   Head: Normocephalic and atraumatic.   Right Ear: External ear normal.   Left Ear: External ear normal.   Nose: Nose normal.   Mouth/Throat: Oropharynx is clear and moist. No oropharyngeal exudate.   Eyes: Conjunctivae and EOM are normal. Pupils are equal, round, and reactive to light. Right eye exhibits no discharge. Left eye exhibits no discharge. No scleral icterus.   Neck: Normal range of motion. Neck supple. No JVD present. No thyromegaly present.   Cardiovascular:  Normal rate, regular rhythm, normal heart sounds and intact distal pulses.  Exam reveals no gallop and no friction rub.    No murmur heard.  Pulmonary/Chest: Effort normal and breath sounds normal. No stridor. No respiratory distress. She has no wheezes. She has no rales. She exhibits no tenderness.   Abdominal: Soft. Bowel sounds are normal. She exhibits no distension and no mass. There is no tenderness. There is no rebound and no guarding. No hernia.   Genitourinary: Rectal exam shows guaiac negative stool. No vaginal discharge found.   Musculoskeletal: She exhibits edema (2+ pitting edema BL Leg upto proximal thigh ). She exhibits no tenderness or deformity.   Limited flexion of Left knee due to pain and swelling of left knee    Lymphadenopathy:     She has no cervical adenopathy.   Neurological: She is alert and oriented to person, place, and time. She has normal reflexes. She displays normal reflexes. No cranial nerve deficit. She exhibits normal muscle tone. Coordination normal.   Skin: Skin is warm and dry. No rash noted. She is not diaphoretic. No erythema. No pallor.   Psychiatric: She has a normal mood and affect. Her behavior is normal. Judgment and thought content normal.        Significant Labs:   Admission on 02/14/2017   Component Date Value Ref Range Status    WBC 02/14/2017 9.75  3.90 - 12.70 K/uL Final    RBC 02/14/2017 3.90* 4.00 - 5.40 M/uL Final    Hemoglobin 02/14/2017 10.5* 12.0 - 16.0 g/dL Final    Hematocrit 02/14/2017 32.9* 37.0 - 48.5 % Final    MCV 02/14/2017 84  82 - 98 fL Final    MCH 02/14/2017 26.9* 27.0 - 31.0 pg Final    MCHC 02/14/2017 31.9* 32.0 - 36.0 % Final    RDW 02/14/2017 13.9  11.5 - 14.5 % Final    Platelets 02/14/2017 123* 150 - 350 K/uL Final    MPV 02/14/2017 10.2  9.2 - 12.9 fL Final    Gran # 02/14/2017 7.1  1.8 - 7.7 K/uL Final    Lymph # 02/14/2017 1.2  1.0 - 4.8 K/uL Final    Mono # 02/14/2017 1.4* 0.3 - 1.0 K/uL Final    Eos # 02/14/2017 0.0  0.0 -  0.5 K/uL Final    Baso # 02/14/2017 0.02  0.00 - 0.20 K/uL Final    Gran% 02/14/2017 72.6  38.0 - 73.0 % Final    Lymph% 02/14/2017 12.6* 18.0 - 48.0 % Final    Mono% 02/14/2017 14.2  4.0 - 15.0 % Final    Eosinophil% 02/14/2017 0.1  0.0 - 8.0 % Final    Basophil% 02/14/2017 0.2  0.0 - 1.9 % Final    Differential Method 02/14/2017 Automated   Final    Sodium 02/14/2017 135* 136 - 145 mmol/L Final    Potassium 02/14/2017 4.4  3.5 - 5.1 mmol/L Final    Chloride 02/14/2017 101  95 - 110 mmol/L Final    CO2 02/14/2017 22* 23 - 29 mmol/L Final    Glucose 02/14/2017 143* 70 - 110 mg/dL Final    BUN, Bld 02/14/2017 14  8 - 23 mg/dL Final    Creatinine 02/14/2017 1.2  0.5 - 1.4 mg/dL Final    Calcium 02/14/2017 9.4  8.7 - 10.5 mg/dL Final    Total Protein 02/14/2017 7.6  6.0 - 8.4 g/dL Final    Albumin 02/14/2017 3.6  3.5 - 5.2 g/dL Final    Total Bilirubin 02/14/2017 1.8* 0.1 - 1.0 mg/dL Final    Alkaline Phosphatase 02/14/2017 126  55 - 135 U/L Final    AST 02/14/2017 28  10 - 40 U/L Final    ALT 02/14/2017 13  10 - 44 U/L Final    Anion Gap 02/14/2017 12  8 - 16 mmol/L Final    eGFR if  02/14/2017 48.0* >60 mL/min/1.73 m^2 Final    eGFR if non  02/14/2017 41.6* >60 mL/min/1.73 m^2 Final    Troponin I 02/14/2017 0.046* 0.000 - 0.026 ng/mL Final    BNP 02/14/2017 622* 0 - 99 pg/mL Final    Prothrombin Time 02/14/2017 13.1* 9.0 - 12.5 sec Final    INR 02/14/2017 1.3* 0.8 - 1.2 Final         Significant Imaging: I have reviewed and interpreted all pertinent imaging results/findings within the past 24 hours.    EKG : NSR 81 bpm with occasional PACs, w/o acute ischemic pattern     CXR : NAPD    [facility] Pharm cardiac stress test : (01/03/17 ) :    Impression: NORMAL MYOCARDIAL PERFUSION  1. The perfusion scan is free of evidence for myocardial ischemia or injury.   2. Resting wall motion is physiologic.   3. There is resting LV dysfunction with a reduced ejection fraction  of 38 %.   4. The ventricular volumes are normal at rest and stress.   5. The extracardiac distribution of radioactivity is normal.   6. When compared to the previous study from 09/08/2015, no significant defects but LV function now decreased with TID now present.                Assessment/Plan:     Acute on chronic systolic congestive heart failure  - Due to stop taking lasix for last 3 days   - Reports improved leg swelling while she was taking lasix 40 mg daily   - Denies chest pain, SOB and  BP/HR stable   - Troponin mildly elevated 0.046 ( it was 0.125 during recent admission )  - EKG w/o acute ischemic pattern   - Doesn't suspect acute coronary ischemia   - Trend troponin  - Diuresis with lasix 40 mg IV bid while in hospital   - Check daily weight, strict I/Os, low salt diet   - Counseled about the importance to taking lasix as prescribed        Pulmonary hypertension  - Continue diuresis with lasix       Synovial cyst of popliteal space (Baker), left knee  - Given presence of symptoms ( left knee pain and swelling ) will given decadron 8 mg IV X 1  - Orthopedic surgery consult for further evaluation and possible arthrocentesis, intra articular glucocorticoid injection   - Patient with h/o OA of left knee   - Pain control with norco prn       VTE Risk Mitigation         Ordered     enoxaparin injection 40 mg  Daily     Route:  Subcutaneous        02/14/17 0547     Medium Risk of VTE  Once      02/14/17 0547     Place sequential compression device  Until discontinued      02/14/17 0337     Place PHILL hose  Until discontinued      02/14/17 0337        Rosalio Martin DO  Department of Hospital Medicine   [facility]

## 2017-02-14 NOTE — SUBJECTIVE & OBJECTIVE
Past Medical History   Diagnosis Date    Anxiety disorder     Carpal tunnel syndrome     CHF (congestive heart failure)     Chronic allergic rhinitis     Chronic pain 10/22/2012    Constipation - functional 4/10/2013    Depression     Diabetes mellitus type II     Hot flash not due to menopause     HTN (hypertension)     Hypokalemia 11/19/2012    Insomnia 4/10/2013    Knee pain, bilateral 7/24/2013    Personal history of DVT (deep vein thrombosis)     Spinal stenosis      Dr. Corrales    Spinal stenosis of lumbar region 2/3/2014    Vertigo        Past Surgical History   Procedure Laterality Date    Hysterectomy      Cataract extraction Bilateral     Eye surgery      Fracture surgery      Orif radius & ulna fractures         Review of patient's allergies indicates:   Allergen Reactions    Ace inhibitors      Other reaction(s): Unknown    Aspirin      Other reaction(s): Unknown    Aciphex  [rabeprazole]      Other reaction(s): Stomach upset    Bextra  [valdecoxib]      Other reaction(s): Unknown    Clonidine      Other reaction(s): dry mouth.lip swelling    Cardizem  [diltiazem hcl]      Other reaction(s): Unknown    Mavik  [trandolapril]      Other reaction(s): Unknown    Nsaids (non-steroidal anti-inflammatory drug)      Other reaction(s): black spots on skin    Phenytoin sodium extended      Other reaction(s): Stomach upset    Tramadol      Other reaction(s): stomach pain       No current facility-administered medications on file prior to encounter.      Current Outpatient Prescriptions on File Prior to Encounter   Medication Sig    carvedilol (COREG) 12.5 MG tablet Take 1 tablet (12.5 mg total) by mouth 2 (two) times daily.    diclofenac (VOLTAREN) 75 MG EC tablet     furosemide (LASIX) 40 MG tablet Take 1 tablet (40 mg total) by mouth once daily.    losartan (COZAAR) 100 MG tablet Take 1 tablet (100 mg total) by mouth once daily.    meclizine (ANTIVERT) 25 mg tablet      nystatin-triamcinolone (MYCOLOG II) cream Apply topically 4 (four) times daily.    omeprazole (PRILOSEC) 20 MG capsule TAKE 2 CAPSULES BY MOUTH EVERY DAY FOR ACID REFLUX AND STOMACH    potassium chloride SA (K-DUR,KLOR-CON) 20 MEQ tablet TAKE 1 TABLET BY MOUTH DAILY    temazepam (RESTORIL) 30 mg capsule TAKE ONE CAPSULE BY MOUTH NIGHTLY AS NEEDED FOR INSOMNIA.    tramadol (ULTRAM) 50 mg tablet TK 1 T PO  Q 6 H PRN    trazodone (DESYREL) 100 MG tablet TAKE 1 TO 2 TABLETS BY MOUTH AT BEDTIME FOR SLEEP     Family History     Problem Relation (Age of Onset)    No Known Problems Mother, Father, Sister, Brother, Maternal Aunt, Maternal Uncle, Paternal Aunt, Paternal Uncle, Maternal Grandmother, Maternal Grandfather, Paternal Grandmother, Paternal Grandfather        Social History Main Topics    Smoking status: Never Smoker    Smokeless tobacco: Not on file    Alcohol use No    Drug use: No    Sexual activity: No     Review of Systems   Constitutional: Positive for activity change. Negative for appetite change, chills, diaphoresis, fatigue and fever.   HENT: Negative for congestion, dental problem, drooling, ear discharge, ear pain, facial swelling, hearing loss, mouth sores, nosebleeds, postnasal drip, rhinorrhea, sinus pressure, sneezing, sore throat, tinnitus, trouble swallowing and voice change.    Eyes: Negative for photophobia, pain, discharge, redness, itching and visual disturbance.   Respiratory: Negative for apnea, cough, choking, chest tightness, shortness of breath, wheezing and stridor.    Cardiovascular: Positive for leg swelling. Negative for chest pain and palpitations.   Gastrointestinal: Negative for abdominal distention, abdominal pain, anal bleeding, blood in stool, constipation, diarrhea, nausea, rectal pain and vomiting.   Endocrine: Negative for cold intolerance, heat intolerance, polydipsia, polyphagia and polyuria.   Genitourinary: Negative for decreased urine volume, difficulty  urinating, dyspareunia, dysuria, enuresis, flank pain, frequency, genital sores, hematuria, menstrual problem, pelvic pain, urgency, vaginal bleeding, vaginal discharge and vaginal pain.   Musculoskeletal: Positive for arthralgias (Left Knee pain with h/o OA and Baker's cyst ) and gait problem (Unsteady gait and ambulates with cane ). Negative for back pain, joint swelling, myalgias, neck pain and neck stiffness.   Skin: Negative for color change, pallor, rash and wound.   Allergic/Immunologic: Negative for environmental allergies, food allergies and immunocompromised state.   Neurological: Negative for dizziness, tremors, seizures, syncope, facial asymmetry, speech difficulty, weakness, light-headedness, numbness and headaches.   Hematological: Negative for adenopathy. Does not bruise/bleed easily.   Psychiatric/Behavioral: Negative for agitation, behavioral problems, confusion, decreased concentration, dysphoric mood, hallucinations, self-injury, sleep disturbance and suicidal ideas. The patient is not nervous/anxious and is not hyperactive.      Objective:     Vital Signs (Most Recent):  Temp: 99.4 °F (37.4 °C) (02/14/17 0045)  Pulse: 74 (02/14/17 0500)  Resp: (!) 28 (02/14/17 0206)  BP: (!) 165/75 (02/14/17 0439)  SpO2: 97 % (02/14/17 0439) Vital Signs (24h Range):  Temp:  [99.4 °F (37.4 °C)] 99.4 °F (37.4 °C)  Pulse:  [74-84] 74  Resp:  [18-28] 28  SpO2:  [96 %-98 %] 97 %  BP: (128-166)/() 165/75     Weight: 83.9 kg (185 lb)  Body mass index is 31.76 kg/(m^2).    Physical Exam   Constitutional: She is oriented to person, place, and time. She appears well-developed and well-nourished. No distress.   HENT:   Head: Normocephalic and atraumatic.   Right Ear: External ear normal.   Left Ear: External ear normal.   Nose: Nose normal.   Mouth/Throat: Oropharynx is clear and moist. No oropharyngeal exudate.   Eyes: Conjunctivae and EOM are normal. Pupils are equal, round, and reactive to light. Right eye exhibits  no discharge. Left eye exhibits no discharge. No scleral icterus.   Neck: Normal range of motion. Neck supple. No JVD present. No thyromegaly present.   Cardiovascular: Normal rate, regular rhythm, normal heart sounds and intact distal pulses.  Exam reveals no gallop and no friction rub.    No murmur heard.  Pulmonary/Chest: Effort normal and breath sounds normal. No stridor. No respiratory distress. She has no wheezes. She has no rales. She exhibits no tenderness.   Abdominal: Soft. Bowel sounds are normal. She exhibits no distension and no mass. There is no tenderness. There is no rebound and no guarding. No hernia.   Genitourinary: Rectal exam shows guaiac negative stool. No vaginal discharge found.   Musculoskeletal: She exhibits edema (2+ pitting edema BL Leg upto proximal thigh ). She exhibits no tenderness or deformity.   Limited flexion of Left knee due to pain and swelling of left knee    Lymphadenopathy:     She has no cervical adenopathy.   Neurological: She is alert and oriented to person, place, and time. She has normal reflexes. She displays normal reflexes. No cranial nerve deficit. She exhibits normal muscle tone. Coordination normal.   Skin: Skin is warm and dry. No rash noted. She is not diaphoretic. No erythema. No pallor.   Psychiatric: She has a normal mood and affect. Her behavior is normal. Judgment and thought content normal.        Significant Labs:   Admission on 02/14/2017   Component Date Value Ref Range Status    WBC 02/14/2017 9.75  3.90 - 12.70 K/uL Final    RBC 02/14/2017 3.90* 4.00 - 5.40 M/uL Final    Hemoglobin 02/14/2017 10.5* 12.0 - 16.0 g/dL Final    Hematocrit 02/14/2017 32.9* 37.0 - 48.5 % Final    MCV 02/14/2017 84  82 - 98 fL Final    MCH 02/14/2017 26.9* 27.0 - 31.0 pg Final    MCHC 02/14/2017 31.9* 32.0 - 36.0 % Final    RDW 02/14/2017 13.9  11.5 - 14.5 % Final    Platelets 02/14/2017 123* 150 - 350 K/uL Final    MPV 02/14/2017 10.2  9.2 - 12.9 fL Final    Gran  # 02/14/2017 7.1  1.8 - 7.7 K/uL Final    Lymph # 02/14/2017 1.2  1.0 - 4.8 K/uL Final    Mono # 02/14/2017 1.4* 0.3 - 1.0 K/uL Final    Eos # 02/14/2017 0.0  0.0 - 0.5 K/uL Final    Baso # 02/14/2017 0.02  0.00 - 0.20 K/uL Final    Gran% 02/14/2017 72.6  38.0 - 73.0 % Final    Lymph% 02/14/2017 12.6* 18.0 - 48.0 % Final    Mono% 02/14/2017 14.2  4.0 - 15.0 % Final    Eosinophil% 02/14/2017 0.1  0.0 - 8.0 % Final    Basophil% 02/14/2017 0.2  0.0 - 1.9 % Final    Differential Method 02/14/2017 Automated   Final    Sodium 02/14/2017 135* 136 - 145 mmol/L Final    Potassium 02/14/2017 4.4  3.5 - 5.1 mmol/L Final    Chloride 02/14/2017 101  95 - 110 mmol/L Final    CO2 02/14/2017 22* 23 - 29 mmol/L Final    Glucose 02/14/2017 143* 70 - 110 mg/dL Final    BUN, Bld 02/14/2017 14  8 - 23 mg/dL Final    Creatinine 02/14/2017 1.2  0.5 - 1.4 mg/dL Final    Calcium 02/14/2017 9.4  8.7 - 10.5 mg/dL Final    Total Protein 02/14/2017 7.6  6.0 - 8.4 g/dL Final    Albumin 02/14/2017 3.6  3.5 - 5.2 g/dL Final    Total Bilirubin 02/14/2017 1.8* 0.1 - 1.0 mg/dL Final    Alkaline Phosphatase 02/14/2017 126  55 - 135 U/L Final    AST 02/14/2017 28  10 - 40 U/L Final    ALT 02/14/2017 13  10 - 44 U/L Final    Anion Gap 02/14/2017 12  8 - 16 mmol/L Final    eGFR if  02/14/2017 48.0* >60 mL/min/1.73 m^2 Final    eGFR if non  02/14/2017 41.6* >60 mL/min/1.73 m^2 Final    Troponin I 02/14/2017 0.046* 0.000 - 0.026 ng/mL Final    BNP 02/14/2017 622* 0 - 99 pg/mL Final    Prothrombin Time 02/14/2017 13.1* 9.0 - 12.5 sec Final    INR 02/14/2017 1.3* 0.8 - 1.2 Final         Significant Imaging: I have reviewed and interpreted all pertinent imaging results/findings within the past 24 hours.

## 2017-02-14 NOTE — ASSESSMENT & PLAN NOTE
- Given presence of symptoms ( left knee pain and swelling ) will given decadron 8 mg IV X 1  - Orthopedic surgery consult for further evaluation and possible arthrocentesis, intra articular glucocorticoid injection   - Patient with h/o OA of left knee   - Pain control with norco prn

## 2017-02-14 NOTE — CONSULTS
Orthopaedic Surgery  Consult Note    Magaly Gamboa Des  02/14/2017    HPI:    CC: L knee pain    Patient is a 84 y.o. female with PMHx significant for CHF (EF 45 1/1/17), DM2, CKD3, HTN, gout, B knee osteoarthritis presented to ED with left knee pain of 3 days duration.  Pt was admitted 1/1/17 with CHF exacerbation and BLE edema and started on lasix.  Pt states edema resolved, so she stopped taking lasix.  Edema returned recently along with L knee pain.  Denies fevers, chills, SOB, CP, history of joint infection, recent infection.    Past Medical History   Diagnosis Date    Anxiety disorder     Carpal tunnel syndrome     CHF (congestive heart failure)     Chronic allergic rhinitis     Chronic pain 10/22/2012    CKD stage 3 due to type 2 diabetes mellitus 2/14/2017    Constipation - functional 4/10/2013    Depression     Diabetes mellitus type II     Hot flash not due to menopause     HTN (hypertension)     Hypokalemia 11/19/2012    Insomnia 4/10/2013    Knee pain, bilateral 7/24/2013    Personal history of DVT (deep vein thrombosis)     Renovascular hypertension 2/14/2017    Severe tricuspid regurgitation 2/14/2017    Spinal stenosis      Dr. Corrales    Spinal stenosis of lumbar region 2/3/2014    Vertigo      Past Surgical History   Procedure Laterality Date    Hysterectomy      Cataract extraction Bilateral     Eye surgery      Fracture surgery      Orif radius & ulna fractures       Family History   Problem Relation Age of Onset    No Known Problems Mother     No Known Problems Father     No Known Problems Sister     No Known Problems Brother     No Known Problems Maternal Aunt     No Known Problems Maternal Uncle     No Known Problems Paternal Aunt     No Known Problems Paternal Uncle     No Known Problems Maternal Grandmother     No Known Problems Maternal Grandfather     No Known Problems Paternal Grandmother     No Known Problems Paternal Grandfather     Amblyopia Neg Hx      Blindness Neg Hx     Cancer Neg Hx     Diabetes Neg Hx     Glaucoma Neg Hx     Hypertension Neg Hx     Macular degeneration Neg Hx     Retinal detachment Neg Hx     Strabismus Neg Hx     Stroke Neg Hx     Thyroid disease Neg Hx      Social History     Social History    Marital status:      Spouse name: N/A    Number of children: N/A    Years of education: N/A     Occupational History    Not on file.     Social History Main Topics    Smoking status: Never Smoker    Smokeless tobacco: Not on file    Alcohol use No    Drug use: No    Sexual activity: No     Other Topics Concern    Not on file     Social History Narrative    .  Lives alone.   and two sons have .  Active.       No current facility-administered medications on file prior to encounter.      Current Outpatient Prescriptions on File Prior to Encounter   Medication Sig    carvedilol (COREG) 12.5 MG tablet Take 1 tablet (12.5 mg total) by mouth 2 (two) times daily.    diclofenac (VOLTAREN) 75 MG EC tablet     furosemide (LASIX) 40 MG tablet Take 1 tablet (40 mg total) by mouth once daily.    losartan (COZAAR) 100 MG tablet Take 1 tablet (100 mg total) by mouth once daily.    meclizine (ANTIVERT) 25 mg tablet     nystatin-triamcinolone (MYCOLOG II) cream Apply topically 4 (four) times daily.    omeprazole (PRILOSEC) 20 MG capsule TAKE 2 CAPSULES BY MOUTH EVERY DAY FOR ACID REFLUX AND STOMACH    potassium chloride SA (K-DUR,KLOR-CON) 20 MEQ tablet TAKE 1 TABLET BY MOUTH DAILY    temazepam (RESTORIL) 30 mg capsule TAKE ONE CAPSULE BY MOUTH NIGHTLY AS NEEDED FOR INSOMNIA.    tramadol (ULTRAM) 50 mg tablet TK 1 T PO  Q 6 H PRN    trazodone (DESYREL) 100 MG tablet TAKE 1 TO 2 TABLETS BY MOUTH AT BEDTIME FOR SLEEP       Review of Systems:    Patient denies constitutional symptoms, cardiac symptoms, respiratory symptoms, GI symptoms, psychiatric symptoms, endocrine related symptoms.  The remainder of the  musculoskeletal ROS is included in the HPI.    Physical Exam:    Temp:  [98 °F (36.7 °C)-99.4 °F (37.4 °C)] 98 °F (36.7 °C)  Pulse:  [72-84] 75  Resp:  [18-28] 18  SpO2:  [96 %-99 %] 99 %  BP: (128-174)/() 142/65    PE:    AA&O x 4.  NAD  HEENT:  NCAT, sclera nonicteric  Lungs:  Respirations are equal and unlabored.  CV:  2+ bilateral upper and lower extremity pulses.  Skin:  Intact throughout.    MSK:  LLE: Skin intact, No ecchymoses, BLE pitting edema hips to toes; TTP popliteal fossa and proximal calf; SILT throughout; grossly motor intact T/DP/SP; brisk cap refill, warm well perfused, compartments soft and compressible, pain with passive knee ROM, soft tissue edema, no joint effusion    Diagnostic Results:  , ESR 57, uric acid 5.6, WBC 6.54    XR L knee shows joint space narrowing, osteophytes    L knee joint fluid aspirate: WBC 47640, crystals positive calcium pyrrophosphate, gram stain negative, cultures pending    A/P:  84 y.o. female with L knee pseudogout  - recommend medical treatment  - call with questions    PROCEDURE NOTE:  After verbal consent was obtained, patient's knee prepped with chlorhexidine.  An 18 gauge needle was used to aspirate the L knee joint using a lateral approach.  Approximately 15 mL of blood tinged, cloudy, yellow joint fluid was aspirated.  Fluid was sent to the lab for analysis.  Area was cleansed and dressed.  Patient tolerated procedure with no complications and minimal blood loss.

## 2017-02-14 NOTE — ASSESSMENT & PLAN NOTE
- Due to stop taking lasix for last 3 days   - Reports improved leg swelling while she was taking lasix 40 mg daily   - Denies chest pain, SOB and  BP/HR stable   - Troponin mildly elevated 0.046 ( it was 0.125 during recent admission )  - EKG w/o acute ischemic pattern   - Doesn't suspect acute coronary ischemia   - Trend troponin  - Diuresis with lasix 40 mg IV bid while in hospital   - Check daily weight, strict I/Os, low salt diet   - Counseled about the importance to taking lasix as prescribed

## 2017-02-14 NOTE — ED NOTES
LOC: The patient is awake, alert, and oriented to place, time, situation. Affect is appropriate. Speech is appropriate and clear.       APPEARANCE: Patient resting comfortably in no acute distress. Patient is clean and well groomed.     SKIN: The skin is warm and dry; color consistent with ethnicity. Patient has normal skin turgor and moist mucus membranes. Skin intact; no breakdown or bruising noted.      MUSCULOSKELETAL: Patient moving upper and lower extremities without difficulty. Reports difficulty ambulating due to BLE pain and swelling.     RESPIRATORY: Airway is open and patent. Respirations spontaneous, even, easy, and non-labored. Patient has a normal effort and rate. No accessory muscle use noted. Denies cough. Reports SOB with exertion.      CARDIAC: Normal rhythm and rate noted. BLE +2 edema. No complaints of chest pain.       ABDOMEN: Soft and non tender to palpation. No distention noted.       NEUROLOGIC: Eyes open spontaneously. Behavior appropriate to situation. Follows commands; facial expression symmetrical. Purposeful motor response noted; normal sensation in all extremities.

## 2017-02-14 NOTE — ED PROVIDER NOTES
Encounter Date: 2/14/2017       History     Chief Complaint   Patient presents with    Leg Swelling     Leg swelling x1 week.      Review of patient's allergies indicates:   Allergen Reactions    Ace inhibitors      Other reaction(s): Unknown    Aspirin      Other reaction(s): Unknown    Aciphex  [rabeprazole]      Other reaction(s): Stomach upset    Bextra  [valdecoxib]      Other reaction(s): Unknown    Clonidine      Other reaction(s): dry mouth.lip swelling    Cardizem  [diltiazem hcl]      Other reaction(s): Unknown    Mavik  [trandolapril]      Other reaction(s): Unknown    Nsaids (non-steroidal anti-inflammatory drug)      Other reaction(s): black spots on skin    Phenytoin sodium extended      Other reaction(s): Stomach upset    Tramadol      Other reaction(s): stomach pain     HPI Comments: 85 yo female with h/o CHF (EF 35% as of 01/2016), HTN, borderline diabetes (not on medication), h/o DVT and PE 2006 (not on anticoagulation therapy) presenting to ED c/o exertional SOB and bilateral lower extremity swelling for past few months, progressively worsening despite taking 40mg Lasix once daily for past month. Pt was recently hospitalized in 1/2017 for chest pain, Echo showed decreased EF to 35-40% with negative stress test. Pt was discharged, then started c/o worsening LE swelling. She has seen her PCP twice since that admission, and Lasix was added in mid January 40 mg once daily. Pt has been compliant with medications but reports her swelling has been getting worse. She states her left leg is slightly more swollen than her right, but swelling has been persistent for several months and is bilateral. Pt was diagnosed with DVT and PE in 2006 related to poly trauma, however pt doesn't recall being on coumadin long term and is not on anticoagulation therapy currently. Has not had reoccurence of PE or DVT since. Denies SOB at rest. Most recent DVT study 1 month ago was negative.     The history is  provided by the patient and medical records.     Past Medical History   Diagnosis Date    Anxiety disorder     Carpal tunnel syndrome     CHF (congestive heart failure)     Chronic allergic rhinitis     Chronic pain 10/22/2012    Constipation - functional 4/10/2013    Depression     Diabetes mellitus type II     Hot flash not due to menopause     HTN (hypertension)     Hypokalemia 11/19/2012    Insomnia 4/10/2013    Knee pain, bilateral 7/24/2013    Personal history of DVT (deep vein thrombosis)     Spinal stenosis      Dr. Corrales    Spinal stenosis of lumbar region 2/3/2014    Vertigo      Past Medical History Pertinent Negatives   Diagnosis Date Noted    Amblyopia 8/18/2014    Anticoagulant long-term use 1/1/2017    Arthritis 8/18/2014    Asthma 1/1/2017    Cancer 1/1/2017    Cataract 8/18/2014    Diabetic retinopathy 8/18/2014    Difficult intubation 1/1/2017    Encounter for blood transfusion 1/1/2017    General anesthetics causing adverse effect in therapeutic use 1/1/2017    Glaucoma 8/18/2014    Hypotension, iatrogenic 1/1/2017    Macular degeneration 8/18/2014    Malignant hyperthermia 1/1/2017    Respiratory distress 1/1/2017    Retinal detachment 8/18/2014    Seizures 1/1/2017    Strabismus 8/18/2014    Stroke 1/1/2017    Thyroid disease 1/1/2017    Transfusion reaction 1/1/2017    Uveitis 8/18/2014     Past Surgical History   Procedure Laterality Date    Hysterectomy      Cataract extraction Bilateral     Eye surgery      Fracture surgery      Orif radius & ulna fractures       Family History   Problem Relation Age of Onset    No Known Problems Mother     No Known Problems Father     No Known Problems Sister     No Known Problems Brother     No Known Problems Maternal Aunt     No Known Problems Maternal Uncle     No Known Problems Paternal Aunt     No Known Problems Paternal Uncle     No Known Problems Maternal Grandmother     No Known Problems  Maternal Grandfather     No Known Problems Paternal Grandmother     No Known Problems Paternal Grandfather     Amblyopia Neg Hx     Blindness Neg Hx     Cancer Neg Hx     Diabetes Neg Hx     Glaucoma Neg Hx     Hypertension Neg Hx     Macular degeneration Neg Hx     Retinal detachment Neg Hx     Strabismus Neg Hx     Stroke Neg Hx     Thyroid disease Neg Hx      Social History   Substance Use Topics    Smoking status: Never Smoker    Smokeless tobacco: None    Alcohol use No     Review of Systems   Constitutional: Negative for chills and fever.   HENT: Negative for congestion and sore throat.    Respiratory: Positive for shortness of breath. Negative for cough, wheezing and stridor.    Cardiovascular: Positive for leg swelling. Negative for chest pain and palpitations.   Gastrointestinal: Negative for nausea.   Genitourinary: Negative for dysuria.   Musculoskeletal: Negative for back pain.   Skin: Negative for rash.   Neurological: Negative for dizziness, tremors, seizures, syncope, speech difficulty, weakness and headaches.   Hematological: Does not bruise/bleed easily.       Physical Exam   Initial Vitals   BP Pulse Resp Temp SpO2   02/14/17 0124 02/14/17 0045 02/14/17 0045 02/14/17 0045 02/14/17 0045   128/60 84 18 99.4 °F (37.4 °C) 98 %     Physical Exam    Nursing note and vitals reviewed.  Constitutional: She appears well-developed and well-nourished. She is not diaphoretic. No distress.   Elderly female in NAD     HENT:   Head: Normocephalic and atraumatic.   Mouth/Throat: Oropharynx is clear and moist.   Eyes: Conjunctivae and EOM are normal. Pupils are equal, round, and reactive to light.   Neck: Normal range of motion. Neck supple. No thyromegaly present.   Cardiovascular: Normal rate, regular rhythm, normal heart sounds and intact distal pulses.   No murmur heard.  Pulmonary/Chest: Breath sounds normal. No respiratory distress. She has no wheezes. She has no rhonchi. She has no rales.    Musculoskeletal: She exhibits edema (+2 pitting edema bilaterally) and tenderness (mild bilateral calf tenderness).   Lymphadenopathy:     She has no cervical adenopathy.   Neurological: She is alert and oriented to person, place, and time.   Skin: Skin is warm and dry. No rash noted. No erythema.   Psychiatric: She has a normal mood and affect.         ED Course   Procedures  Labs Reviewed   CBC W/ AUTO DIFFERENTIAL - Abnormal; Notable for the following:        Result Value    RBC 3.90 (*)     Hemoglobin 10.5 (*)     Hematocrit 32.9 (*)     MCH 26.9 (*)     MCHC 31.9 (*)     Platelets 123 (*)     Mono # 1.4 (*)     Lymph% 12.6 (*)     All other components within normal limits   COMPREHENSIVE METABOLIC PANEL - Abnormal; Notable for the following:     Sodium 135 (*)     CO2 22 (*)     Glucose 143 (*)     Total Bilirubin 1.8 (*)     eGFR if  48.0 (*)     eGFR if non  41.6 (*)     All other components within normal limits   TROPONIN I - Abnormal; Notable for the following:     Troponin I 0.046 (*)     All other components within normal limits   B-TYPE NATRIURETIC PEPTIDE - Abnormal; Notable for the following:      (*)     All other components within normal limits   PROTIME-INR - Abnormal; Notable for the following:     Prothrombin Time 13.1 (*)     INR 1.3 (*)     All other components within normal limits             Medical Decision Making:   History:   Old Medical Records: I decided to obtain old medical records.  Independently Interpreted Test(s):   I have ordered and independently interpreted X-rays - see summary below.       <> Summary of X-Ray Reading(s): CXR: nad  I have ordered and independently interpreted EKG Reading(s) - see summary below       <> Summary of EKG Reading(s): EKG: nsr, no st elevation or depression  Clinical Tests:   Lab Tests: Ordered and Reviewed  Radiological Study: Ordered  Medical Tests: Ordered  Other:   I have discussed this case with another  health care provider.       <> Summary of the Discussion: IM       APC / Resident Notes:   PGY-3 MDM A/P:  Diff dx: CHF exacerbation, PE/DVT, ACS, chronic venous stasis, renal failure  Do not suspect  PE/DVT given chronicity, and CHF is much more likely cause for edema. Additionally she  Has a known popliteal cyst in her left>Right  leg which is likely cause for greater swelling in left leg.   Pt is well appearing in NAD, lungs clear to auscultation bilaterally, vitals WNL except for mild /69.   No respiratory distress.  +2 pitting edema.   Plan: CBC, CMP, BNP, troponin, EKG, CXR, lasix 60mg, re-assess.  Dispo pending but anticipate discharge if vitals WNL, will likely increase lasix.     Margo Mckeon MD  [facility] PGY-3  2:10 AM    PGY-3 MDM Update:  BNP doubled from one month ago from 300 to 600, troponin also mildly  Elevated 0.046. EKG shows NSR without T wave inversions or ST elevations, not acutely changed from baseline.  CXR without pulmonary edema. Pt states she does not feel at baseline enough to go home and can't walk 2/2 lower extremity swelling would like to be admitted for diuresis.   Pt will be admitted to Medicine, IM S, Dr. June for continued diuresis and trending of troponin/EKG.     Margo Mckeon MD PGY-3  3:25 AM           Attending Attestation:   Physician Attestation Statement for Resident:  As the supervising MD   Physician Attestation Statement: I have personally seen and examined this patient.   I agree with the above history. -: Sob, leg swelling   As the supervising MD I agree with the above PE.    As the supervising MD I agree with the above treatment, course, plan, and disposition.                    ED Course     Clinical Impression:   The primary encounter diagnosis was Acute on chronic systolic congestive heart failure. Diagnoses of Elevated brain natriuretic peptide (BNP) level, SOB (shortness of breath), and Leg swelling were also pertinent to this  visit.          Margo Mckeon MD  Resident  02/14/17 0337       Margo Mckeon MD  Resident  02/14/17 0338       Pepe Montez III, MD  02/14/17 5888

## 2017-02-14 NOTE — PLAN OF CARE
Chris Bangura MD   [phone]       [facility] [id number] - [place] [address] [facility] [address]  [address]  [place] [address]  Phone: [phone] Fax: [phone]    [facility] - [place], [place] - [address]  [address]  [facility] [address]  Phone: [phone] Fax: [phone]    Extended Emergency Contact Information  Primary Emergency Contact: Deborah Bustos   [place]  Mobile Phone: [phone]  Relation: Relative     Payor: [employer] MANAGED MEDICARE / Plan: [employer] MEDICARE HMO / Product Type: Capitation /         02/14/17 1648   Discharge Assessment   Assessment Type Discharge Planning Assessment   Confirmed/corrected address and phone number on facesheet? Yes   Assessment information obtained from? Patient   Expected Length of Stay (days) 1   Communicated expected length of stay with patient/caregiver yes   Prior to hospitilization cognitive status: Alert/Oriented   Prior to hospitalization functional status: Independent   Current cognitive status: Alert/Oriented   Current Functional Status: Needs Assistance   Arrived From home or self-care   Lives With alone   Able to Return to Prior Arrangements yes   Is patient able to care for self after discharge? Yes   How many people do you have in your home that can help with your care after discharge? 1   Who are your caregiver(s) and their phone number(s)? Kelsey [phone]   Patient's perception of discharge disposition home or selfcare;home health   Readmission Within The Last 30 Days no previous admission in last 30 days   Patient currently being followed by outpatient case management? No   Patient currently receives home health services? No   Does the patient currently use HME? Yes   Patient currently receives private duty nursing? No   Patient currently receives any other outside agency services? No   Equipment Currently Used at Home cane, straight   Do you  have any problems affording any of your prescribed medications? No   Is the patient taking medications as prescribed? yes   Do you have any financial concerns preventing you from receiving the healthcare you need? No   Does the patient have transportation to healthcare appointments? Yes   Transportation Available family or friend will provide   On Dialysis? No   Does the patient receive services at the [facility] Clinic? No   Are there any open cases? No   Discharge Plan A Home with family   Discharge Plan B Home with family;Home Health   Patient/Family In Agreement With Plan yes

## 2017-02-14 NOTE — IP AVS SNAPSHOT
[facility]  [address]  [facility] [address]  Phone: [phone]           Patient Discharge Instructions     Our goal is to set you up for success. This packet includes information on your condition, medications, and your home care. It will help you to care for yourself so you don't get sicker and need to go back to the hospital.     Please ask your nurse if you have any questions.        There are many details to remember when preparing to leave the hospital. Here is what you will need to do:    1. Take your medicine. If you are prescribed medications, review your Medication List in the following pages. You may have new medications to  at the pharmacy and others that you'll need to stop taking. Review the instructions for how and when to take your medications. Talk with your doctor or nurses if you are unsure of what to do.     2. Go to your follow-up appointments. Specific follow-up information is listed in the following pages. Your may be contacted by a transition nurse or clinical provider about future appointments. Be sure we have all of the phone numbers to reach you, if needed. Please contact your provider's office if you are unable to make an appointment.     3. Watch for warning signs. Your doctor or nurse will give you detailed warning signs to watch for and when to call for assistance. These instructions may also include educational information about your condition. If you experience any of warning signs to your health, call your doctor.               [facility]  Unless otherwise directed by your provider, please contact [facility], our nurse care line that is available for 24/7 assistance.     [phone] (toll-free)    Registered nurses in the [facility] provide clinical advisement, health education, appointment booking, and other advisory services.                    ** Verify the list of medication(s) below is accurate and up  to date. Carry this with you in case of emergency. If your medications have changed, please notify your healthcare provider.             Medication List      CONTINUE taking these medications        Additional Info    Begin Date AM Noon PM Bedtime    carvedilol 12.5 MG tablet   Commonly known as:  COREG   Quantity:  60 tablet   Refills:  11   Dose:  12.5 mg    Last time this was given:  12.5 mg on 2/16/2017  8:57 AM   Instructions:  Take 1 tablet (12.5 mg total) by mouth 2 (two) times daily.                    2/16/17           diclofenac 75 MG EC tablet   Commonly known as:  VOLTAREN   Refills:  2                             furosemide 40 MG tablet   Commonly known as:  LASIX   Quantity:  30 tablet   Refills:  11   Dose:  40 mg    Instructions:  Take 1 tablet (40 mg total) by mouth once daily.            2/17/17                   losartan 100 MG tablet   Commonly known as:  COZAAR   Quantity:  90 tablet   Refills:  1   Dose:  100 mg    Last time this was given:  100 mg on 2/14/2017  8:31 AM   Instructions:  Take 1 tablet (100 mg total) by mouth once daily.            2/17/17                   meclizine 25 mg tablet   Commonly known as:  ANTIVERT   Refills:  0                             nystatin-triamcinolone cream   Commonly known as:  MYCOLOG II   Quantity:  60 g   Refills:  12    Instructions:  Apply topically 4 (four) times daily.                            omeprazole 20 MG capsule   Commonly known as:  PRILOSEC   Quantity:  180 capsule   Refills:  1    Instructions:  TAKE 2 CAPSULES BY MOUTH EVERY DAY FOR ACID REFLUX AND STOMACH            2/17/17                   potassium chloride SA 20 MEQ tablet   Commonly known as:  K-DUR,KLOR-CON   Quantity:  90 tablet   Refills:  1   Comments:  **Patient requests 90 days supply**    Last time this was given:  20 mEq on 2/16/2017  8:57 AM   Instructions:  TAKE 1 TABLET BY MOUTH DAILY            2/17/17                   temazepam 30 mg capsule   Commonly known as:   RESTORIL   Quantity:  30 capsule   Refills:  5    Instructions:  TAKE ONE CAPSULE BY MOUTH NIGHTLY AS NEEDED FOR INSOMNIA.                            tramadol 50 mg tablet   Commonly known as:  ULTRAM   Refills:  0    Instructions:  TK 1 T PO  Q 6 H PRN                            trazodone 100 MG tablet   Commonly known as:  DESYREL   Quantity:  180 tablet   Refills:  11   Comments:  **Patient requests 90 days supply**    Instructions:  TAKE 1 TO 2 TABLETS BY MOUTH AT BEDTIME FOR SLEEP                                       Please bring to all follow up appointments:    1. A copy of your discharge instructions.  2. All medicines you are currently taking in their original bottles.  3. Identification and insurance card.    Please arrive 15 minutes ahead of scheduled appointment time.    Please call 24 hours in advance if you must reschedule your appointment and/or time.        Your Scheduled Appointments     Feb 16, 2017 11:00 AM CST   Consult with NURSE, [facility]   [facility]    [address]  [place] [address] [phone]            Feb 21, 2017  1:30 PM CST   Established Patient Visit with Hammad Humphrey MD   [facility] ([place])    [address] [facility] [address] [phone]            Feb 22, 2017  2:00 PM [facility]   Hospital Follow Up with PHYSICIAN, [facility]   Bud Rawls ([facility])    [address]  [place] [address] [phone]              Follow-up Information     Follow up with [facility] day.    Specialty:  Home Health Services    Contact information:    [facility] [id number] [phone]          Follow up with Bud [facility]ien  [facility].    Specialty:  [facility]    Contact information:    [address] [facility] [address] [phone]    Additional information:    [facility] &  "[facility]        Discharge Instructions     Future Orders    Activity as tolerated     Call MD for:  difficulty breathing or increased cough     Call MD for:  increased confusion or weakness     Call MD for:  persistent dizziness, light-headedness, or visual disturbances     Call MD for:  temperature >100.4     Diet general     Questions:    Total calories:      Fat restriction, if any:      Protein restriction, if any:      Na restriction, if any:  2gNa    Fluid restriction:      Additional restrictions:          Primary Diagnosis     Your primary diagnosis was:  Heart Failure      Admission Information     Date & Time Provider Department CSN    2/14/2017  1:11 AM Natacha June MD [facility] [id number]      Care Providers     Provider Role Specialty Primary office phone    Natacha June MD Attending Provider Hospitalist [phone]    Karely Simons MD Physician  Internal Medicine [phone]    Keerthi Coleman MD Team Attending  Hospitalist [phone]      Important Medicare Message          Most Recent Value    Important Message from Medicare Regarding Discharge Appeal Rights  Given to patient/caregiver, Explained to patient/caregiver, Signed/date by patient/caregiver yes 02/15/2017 0822      Your Vitals Were     BP Pulse Temp Resp Height Weight    168/80 (BP Method: cNIBP) 90 97.9 °F (36.6 °C) (Oral) 20 5' 4" (1.626 m) 82.4 kg (181 lb 10.5 oz)    Last Period SpO2 BMI          (LMP Unknown) 94% 31.18 kg/m2        Recent Lab Values        11/24/2014 3/16/2015 6/13/2015 9/10/2015 1/7/2016 3/30/2016 8/17/2016 12/13/2016      9:58 AM  1:45 PM  9:50 AM 11:30 AM  9:56 AM  4:47 PM  3:06 PM 12:01 PM    A1C 6.5 (H) 6.3 (H) 6.8 (H) 6.4 (H) 6.1 6.3 (H) 6.2 6.1    Comment for A1C at  3:06 PM on 8/17/2016:  According to ADA guidelines, hemoglobin A1C <7.0% represents  optimal control in non-pregnant diabetic patients.  Different  metrics may apply to specific populations. "   Standards of Medical Care in Diabetes - 2016.  For the purpose of screening for the presence of diabetes:  <5.7%     Consistent with the absence of diabetes  5.7-6.4%  Consistent with increasing risk for diabetes   (prediabetes)  >or=6.5%  Consistent with diabetes  Currently no consensus exists for use of hemoglobin A1C  for diagnosis of diabetes for children.      Comment for A1C at 12:01 PM on 12/13/2016:  According to ADA guidelines, hemoglobin A1C <7.0% represents  optimal control in non-pregnant diabetic patients.  Different  metrics may apply to specific populations.   Standards of Medical Care in Diabetes - 2016.  For the purpose of screening for the presence of diabetes:  <5.7%     Consistent with the absence of diabetes  5.7-6.4%  Consistent with increasing risk for diabetes   (prediabetes)  >or=6.5%  Consistent with diabetes  Currently no consensus exists for use of hemoglobin A1C  for diagnosis of diabetes for children.        Pending Labs     Order Current Status    Fungus culture In process    AFB Culture & Smear Preliminary result    Aerobic culture Preliminary result    Culture, Anaerobe Preliminary result      Allergies as of 2/16/2017        Reactions    Ace Inhibitors     Other reaction(s): Unknown    Aspirin     Other reaction(s): Unknown    Aciphex  [Rabeprazole]     Other reaction(s): Stomach upset    Bextra  [Valdecoxib]     Other reaction(s): Unknown    Clonidine     Other reaction(s): dry mouth.lip swelling    Cardizem  [Diltiazem Hcl]     Other reaction(s): Unknown    Mavik  [Trandolapril]     Other reaction(s): Unknown    Nsaids (Non-steroidal Anti-inflammatory Drug)     Other reaction(s): black spots on skin    Phenytoin Sodium Extended     Other reaction(s): Stomach upset    Tramadol     Other reaction(s): stomach pain      Advance Directives     An advance directive is a document which, in the event you are no longer able to make decisions for yourself, tells your healthcare team what  kind of treatment you do or do not want to receive, or who you would like to make those decisions for you.  If you do not currently have an advance directive, [facility] encourages you to create one.  For more information call:  [phone]), [phone] ([phone]),  or log on to [email or web address].        Language Assistance Services     ATTENTION: Language assistance services are available, free of charge. Please call [phone].      ATENCIÓN: Si habla español, tiene a rai disposición servicios gratuitos de asistencia lingüística. Llame al [phone].     CHÚ Ý: N?u b?n nói Ti?ng Vi?t, có các d?ch v? h? tr? ngôn ng? mi?n phí dành cho b?n. G?i s? [phone].        Heart Failure Education       Heart Failure: Being Active  You have a condition called heart failure. Being active doesnt mean that you have to wear yourself out. Even a little movement each day helps to strengthen your heart. If you cant get out to exercise, you can do simple stretching and strengthening exercises at home. These are good ways to keep you well-conditioned and prevent you and your heart from becoming excessively weak.    Ideas to get you started  · Add a little movement to things you do now. Walk to mail letters. Park your car at the far end of the parking lot and walk to the store. Walk up a flight of stairs instead of taking the elevator.  · Choose activities you enjoy. You might walk, swim, or ride an exercise bike. Things like gardening and washing the car count, too. Other possibilities include: washing dishes, walking the dog, walking around the mall, and doing aerobic activities with friends.  · Join a group exercise program at a [facility], a senior center, or a community center. Or look into a hospital cardiac rehabilitation program. Ask your doctor if you qualify.  Tips to keep you going  · Get up and get dressed each day. Go to a coffee shop and read a newspaper or go somewhere that you'll  be in the presence of other active people. Youll feel more like being active.  · Make a plan. Choose one or more activities that you enjoy and that you can easily do. Then plan to do at least one each day. You might write your plan on a calendar.  · Go with a friend or a group if you like company. This can help you feel supported and stay motivated, too.  · Plan social events that you enjoy. This will keep you mentally engaged as well as physically motivated to do things you find pleasure in.  For your safety  · Talk with your healthcare provider before starting an exercise program.  · Exercise indoors when its too hot or too cold outside, or when the air quality is poor. Try walking at a shopping mall.  · Wear socks and sturdy shoes to maintain your balance and prevent falls.  · Start slowly. Do a few minutes several times a day at first. Increase your time and speed little by little.  · Stop and rest whenever you feel tired or get short of breath.  · Dont push yourself on days when you dont feel well.  Date Last Reviewed: 3/20/2016  © 5631-0253 [employer]. [address] [place] [address]. All rights reserved. This information is not intended as a substitute for professional medical care. Always follow your healthcare professional's instructions.              Heart Failure: Evaluating Your Heart  You have a condition called heart failure. To evaluate your condition, your doctor will examine you, ask questions, and do some tests. Along with looking for signs of heart failure, the doctor looks for any other health problems that may have led to heart failure. The results of your evaluation will help your doctor form a treatment plan.  Health history and physical exam  Your visit will start with a health history. Tell the doctor about any symptoms youve noticed and about all medicines you take. Then youll have a physical exam. This includes listening to your heartbeat and breathing.  Youll also be checked for swelling (edema) in your legs and neck. When you have fluid buildup or fluid in the lungs, it may be called congestive heart failure.  Diagnosing heart failure     During an echocardiogram, sound waves bounce off the heart. These are converted into a picture on the screen.   The following may be done to help your doctor form a diagnosis:  · X-rays show the size and shape of your heart. These pictures can also show fluid in your lungs.  · An electrocardiogram (ECG or EKG) shows the pattern of your heartbeat. Small pads (electrodes) are placed on your chest, arms, and legs. Wires connect the pads to the ECG machine, which records your hearts electrical signals. This can give the doctor information about heart function.  · An echocardiogram uses ultrasound waves to show the structure and movement of your heart muscle. This shows how well the heart pumps. It also shows the thickness of the heart walls, and if the heart is enlarged. It is one of the most useful, non-invasive tests as it provides information about the heart's general function. This helps your doctor make treatment decisions.  · Lab tests evaluate small amounts of blood or urine for signs of problems. A BNP lab test can help diagnose and evaluate heart failure. BNP stands for B-type natriuretic peptide. The ventricles secrete more BNP when heart failure worsens. Lab tests can also provide information about metabolic dysfunction or heart dysfunction.  Your treatment plan  Based on the results of your evaluation and tests, your doctor will develop a treatment plan. This plan is designed to relieve some of your heart failure symptoms and help make you more comfortable. Your treatment plan may include:  · Medicine to help your heart work better and improve your quality of life  · Changes in what you eat and drink to help prevent fluid from backing up in your body  · Daily monitoring of your weight and heart failure symptoms to see  how well your treatment plan is working  · Exercise to help you stay healthy  · Help with quitting smoking  · Emotional and psychological support to help adjust to the changes  · Referrals to other specialists to make sure you are being treated comprehensively  Date Last Reviewed: 3/21/2016  © 2187-3466 [employer]. [address] [place] [address]. All rights reserved. This information is not intended as a substitute for professional medical care. Always follow your healthcare professional's instructions.              Heart Failure: Making Changes to Your Diet  You have a condition called heart failure. When you have heart failure, excess fluid is more likely to build up in your body because your heart isn't working well. This makes the heart work harder to pump blood. Fluid buildup causes symptoms such as shortness of breath and swelling (edema). This is often referred to as congestive heart failure or CHF. Controlling the amount of salt (sodium) you eat may help stop fluid from building up. Your doctor may also tell you to reduce the amount of fluid you drink.  Reading food labels    Your healthcare provider will tell you how much sodium you can eat each day. Read food labels to keep track. Keep in mind that certain foods are high in salt. These include canned, frozen, and processed foods. Check the amount of sodium in each serving. Watch out for high-sodium ingredients. These include MSG (monosodium glutamate), baking soda, and sodium phosphate.   Eating less salt  Give yourself time to get used to eating less salt. It may take a little while. Here are some tips to help:  · Take the saltshaker off the table. Replace it with salt-free herb mixes and spices.  · Eat fresh or plain frozen vegetables. These have much less salt than canned vegetables.  · Choose low-sodium snacks like sodium-free pretzels, crackers, or air-popped popcorn.  · Dont add salt to your food when youre cooking.  Instead, season your foods with pepper, lemon, garlic, or onion.  · When you eat out, ask that your food be cooked without added salt.  · Avoid eating fried foods as these often have a great deal of salt.  If youre told to limit fluids  You may need to limit how much fluid you have to help prevent swelling. This includes anything that is liquid at room temperature, such as ice cream and soup. If your doctor tells you to limit fluid, try these tips:  · Measure drinks in a measuring cup before you drink them. This will help you meet daily goals.  · Chill drinks to make them more refreshing.  · Suck on frozen lemon wedges to quench thirst.  · Only drink when youre thirsty.  · Chew sugarless gum or suck on hard candy to keep your mouth moist.  · Weigh yourself daily to know if your body's fluid content is rising.  My sodium goal  Your healthcare provider may give you a sodium goal to meet each day. This includes sodium found in food as well as salt that you add. My goal is to eat no more than ___________ mg of sodium per day.     When to call your doctor  Call your doctor right away if you have any symptoms of worsening heart failure. These can include:  · Sudden weight gain  · Increased swelling of your legs or ankles  · Trouble breathing when youre resting or at night  · Increase in the number of pillows you have to sleep on  · Chest pain, pressure, discomfort, or pain in the jaw, neck, or back   Date Last Reviewed: 3/21/2016  © 3036-0159 [employer]. [address] [place] [address], [place] [address]. All rights reserved. This information is not intended as a substitute for professional medical care. Always follow your healthcare professional's instructions.              Heart Failure: Medicines to Help Your Heart    You have a condition called heart failure (also known as congestive heart failure, or CHF). Your doctor will likely prescribe medicines for heart failure and any underlying health problems you  have. Most heart failure patients take one or more types of medicinen. Your healthcare provider will work to find the combination of medicines that works best for you.  Heart failure medicines  Here are the most common heart failure medicines:  · ACE inhibitors lower blood pressure and decrease strain on the heart. This makes it easier for the heart to pump. Angiotensin receptor blockers have similar effects. These are prescribed for some patients instead of ACE inhibitors.  · Beta-blockers relieve stress on the heart. They also improve symptoms. They may also improve the heart's pumping action over time.  · Diuretics (also called water pills) help rid your body of excess water. This can help rid your body of swelling (edema). Having less fluid to pump means your heart doesnt have to work as hard. Some diuretics make your body lose a mineral called potassium. Your doctor will tell you if you need to take supplements or eat more foods high in potassium.  · Digoxin helps your heart pump with more strength. This helps your heart pump more blood with each beat. So, more oxygen-rich blood travels to the rest of the body.  · Aldosterone antagonists help alter hormones and decrease strain on the heart.  · Hydralazine and nitrates are two separate medicines used together to treat heart failure. They may come in one combination pill. They lower blood pressure and decrease how hard the heart has to pump.  Medicines for related conditions  Controlling other heart problems helps keep heart failure under control, too. Depending on other heart problems you have, medicines may be prescribed to:  · Lower blood pressure (antihypertensives).  · Lower cholesterol levels (statins).  · Prevent blood clots (anticoagulants or aspirin).  · Keep the heartbeat steady (antiarrhythmics).  Date Last Reviewed: 3/5/2016  © 7092-3466 [employer]. [address] [place] [address], [place] [address]. All rights reserved. This information is  not intended as a substitute for professional medical care. Always follow your healthcare professional's instructions.              Heart Failure: Procedures That May Help    The heart is a muscle that pumps oxygen-rich blood to all parts of the body. When you have heart failure, the heart is not able to pump as well as it should. Blood and fluid may back up into the lungs (congestive heart failure), and some parts of the body dont get enough oxygen-rich blood to work normally. These problems lead to the symptoms of heart failure.     Certain procedures may help the heart pump better in some cases of heart failure. Some procedures are done to treat health problems that may have caused the heart failure such as coronary artery disease or heart rhythm problems. For more serious heart failure, other options are available.  Treating artery and valve problems  If you have coronary artery disease or valve disease, procedures may be done to improve blood flow. This helps the heart pump better, which can improve heart failure symptoms. First, your doctor may do a cardiac catheterization to help detect clogged blood vessels or valve damage. During this procedure, a  thin tube (catheter) in inserted into a blood vessel and guided to the heart. There a dye is injected and a special type of X-ray (angiogram) is taken of the blood vessels. Procedures to open a blocked artery or fix damaged valves can also be done using catheterization.  · Angioplasty uses a balloon-tipped instrument at the end of the catheter. The balloon is inflated to widen the narrowed artery. In many cases, a stent is expanded to further support the narrowed artery. A stent is a metal mesh tube.  · Valve surgery repairs or replacement of faulty valves can also be done during catheterization so blood can flow properly through the chambers of the heart.  Bypass surgery is another option to help treat blocked arteries. It uses a healthy blood vessel from  elsewhere in the body. The healthy blood vessel is attached above and below the blocked area so that blood can flow around the blocked artery.  Treating heart rhythm problems  A device may be placed in the chest to help a weak heart maintain a healthy, heartbeat so the heart can pump more effectively:  · Pacemaker. A pacemaker is an implanted device that regulates your heartbeat electronically. It monitors your heart's rhythm and generates a painless electric impulse that helps the heart beat in a regular rhythm. A pacemaker is programmed to meet your specific heart rhythm needs.  · Biventricular pacing/cardiac resynchronization therapy. A type of pacemaker that paces both pumping chambers of the heart at the same time to coordinate contractions and to improve the heart's function. Some people with heart failure are candidates for this therapy.  · Implantable cardioverter defibrillator. A device similar to a pacemaker that senses when the heart is beating too fast and delivers an electrical shock to convert the fast rhythm to a normal rhythm. This can be a life saving device.  In severe cases  In more serious cases of heart failure when other treatments no longer work, other options may include:  · Ventricular assist devices (VADs). These are mechanical devices used to take over the pumping function for one or both of the heart's ventricles, or pumping chambers. A VAD may be necessary when heart failure progresses to the point that medicines and other treatments no longer help. In some cases, a VAD may be used as a bridge to transplant.  · Heart transplant. This is replacing the diseased heart with a healthy one from a donor. This is an option for a few people who are very sick. A heart transplant is very serious and not an option for all patients. Your doctor can tell you more.  Date Last Reviewed: 3/20/2016  © 2857-2125 [employer]. [address] [place] [address], [place] [address]. All rights reserved. This  information is not intended as a substitute for professional medical care. Always follow your healthcare professional's instructions.              Heart Failure: Tracking Your Weight  You have a condition called heart failure. When you have heart failure, a sudden weight gain or a steady rise in weight is a warning sign that your body is retaining too much water and salt. This could mean your heart failure is getting worse. If left untreated, it can cause problems for your lungs and result in shortness of breath. Weighing yourself each day is the best way to know if youre retaining water. If your weight goes up quickly, call your doctor. You will be given instructions on how to get rid of the excess water. You will likely need medicines and to avoid salt. This will help your heart work better.  Call your doctor if you gain more than 2 pounds in 1 day, more than 5 pounds in 1 week, or whatever weight gain you were told to report by your doctor. This is often a sign of worsening heart failure and needs to be evaluated and treated. Your doctor will tell you what to do next.   Tips for weighing yourself    · Weigh yourself at the same time each morning, wearing the same clothes. Weigh yourself after urinating and before eating.  · Use the same scale each day. Make sure the numbers are easy to read. Put the scale on a flat, hard surface -- not on a rug or carpet.  · Do not stop weighing yourself. If you forget one day, weigh again the next morning.  How to use your weight chart  · Keep your weight chart near the scale. Write your weight on the chart as soon as you get off the scale.  · Fill in the month and the start date on the chart. Then write down your weight each day. Your chart will look like this:    · If you miss a day, leave the space blank. Weigh yourself the next day and write your weight in the next space.  · Take your weight chart with you when you go to see your doctor.  Date Last Reviewed: 3/20/2016  ©  9093-6633 [employer]. [address] [place] [address], [place] [address]. All rights reserved. This information is not intended as a substitute for professional medical care. Always follow your healthcare professional's instructions.              Heart Failure: Warning Signs of a Flare-Up  You have a condition called heart failure. Once you have heart failure, flare-ups can happen. Below are signs that can mean your heart failure is getting worse. If you notice any of these warning signs, call your healthcare provider.  Swelling    · Your feet, ankles, or lower legs get puffier.  · You notice skin changes on your lower legs.  · Your shoes feel too tight.  · Your clothes are tighter in the waist.  · You have trouble getting rings on or off your fingers.  Shortness of breath  · You have to breathe harder even when youre doing your normal activities or when youre resting.  · You are short of breath walking up stairs or even short distances.  · You wake up at night short of breath or coughing.  · You need to use more pillows or sit up to sleep.  · You wake up tired or restless.  Other warning signs  · You feel weaker, dizzy, or more tired.  · You have chest pain or changes in your heartbeat.  · You have a cough that wont go away.  · You cant remember things or dont feel like eating.  Tracking your weight  Gaining weight is often the first warning sign that heart failure is getting worse. Gaining even a few pounds can be a sign that your body is retaining excess water and salt. Weighing yourself each day in the morning after you urinate and before you eat, is the best way to know if you're retaining water. Get a scale that is easy to read and make sure you wear the same clothes and use the same scale every time you weigh. Your healthcare provider will show you how to track your weight. Call your doctor if you gain more than 2 pounds in 1 day, 5 pounds in 1 week, or whatever weight gain you were told to report  by your doctor. This is often a sign of worsening heart failure and needs to be evaluated and treated before it compromises your breathing. Your doctor will tell you what to do next.    Date Last Reviewed: 3/15/2016  © 1600-4370 [employer]. [address] [place] [address], [place] [address]. All rights reserved. This information is not intended as a substitute for professional medical care. Always follow your healthcare professional's instructions.              Chronic [place] Disease Education             Diabetes Discharge Instructions                                    [facility] complies with applicable Federal civil rights laws and does not discriminate on the basis of race, color, national origin, age, disability, or sex.

## 2017-02-15 PROBLEM — M11.262 PSEUDOGOUT OF LEFT KNEE: Status: ACTIVE | Noted: 2017-02-15

## 2017-02-15 PROBLEM — I50.23 ACUTE ON CHRONIC SYSTOLIC CONGESTIVE HEART FAILURE: Status: ACTIVE | Noted: 2017-02-15

## 2017-02-15 LAB
ANION GAP SERPL CALC-SCNC: 11 MMOL/L
BUN SERPL-MCNC: 17 MG/DL
CALCIUM SERPL-MCNC: 9.2 MG/DL
CHLORIDE SERPL-SCNC: 98 MMOL/L
CO2 SERPL-SCNC: 28 MMOL/L
CREAT SERPL-MCNC: 1.1 MG/DL
EST. GFR  (AFRICAN AMERICAN): 53.3 ML/MIN/1.73 M^2
EST. GFR  (NON AFRICAN AMERICAN): 46.2 ML/MIN/1.73 M^2
GLUCOSE SERPL-MCNC: 150 MG/DL
PATH INTERP FLD-IMP: NORMAL
POCT GLUCOSE: 150 MG/DL (ref 70–110)
POCT GLUCOSE: 157 MG/DL (ref 70–110)
POTASSIUM SERPL-SCNC: 3.6 MMOL/L
SODIUM SERPL-SCNC: 137 MMOL/L

## 2017-02-15 PROCEDURE — 25000003 PHARM REV CODE 250: Performed by: HOSPITALIST

## 2017-02-15 PROCEDURE — 99233 SBSQ HOSP IP/OBS HIGH 50: CPT | Mod: ,,, | Performed by: HOSPITALIST

## 2017-02-15 PROCEDURE — 97165 OT EVAL LOW COMPLEX 30 MIN: CPT

## 2017-02-15 PROCEDURE — 36415 COLL VENOUS BLD VENIPUNCTURE: CPT

## 2017-02-15 PROCEDURE — 0S9D3ZZ DRAINAGE OF LEFT KNEE JOINT, PERCUTANEOUS APPROACH: ICD-10-PCS | Performed by: ORTHOPAEDIC SURGERY

## 2017-02-15 PROCEDURE — 99204 OFFICE O/P NEW MOD 45 MIN: CPT | Mod: GC,,, | Performed by: INTERNAL MEDICINE

## 2017-02-15 PROCEDURE — 63600175 PHARM REV CODE 636 W HCPCS: Performed by: HOSPITALIST

## 2017-02-15 PROCEDURE — 20600001 HC STEP DOWN PRIVATE ROOM

## 2017-02-15 PROCEDURE — 80048 BASIC METABOLIC PNL TOTAL CA: CPT

## 2017-02-15 PROCEDURE — 97161 PT EVAL LOW COMPLEX 20 MIN: CPT

## 2017-02-15 RX ORDER — GLUCAGON 1 MG
1 KIT INJECTION
Status: DISCONTINUED | OUTPATIENT
Start: 2017-02-15 | End: 2017-02-16 | Stop reason: HOSPADM

## 2017-02-15 RX ORDER — IBUPROFEN 200 MG
24 TABLET ORAL
Status: DISCONTINUED | OUTPATIENT
Start: 2017-02-15 | End: 2017-02-16 | Stop reason: HOSPADM

## 2017-02-15 RX ORDER — IBUPROFEN 200 MG
16 TABLET ORAL
Status: DISCONTINUED | OUTPATIENT
Start: 2017-02-15 | End: 2017-02-16 | Stop reason: HOSPADM

## 2017-02-15 RX ORDER — INSULIN ASPART 100 [IU]/ML
0-5 INJECTION, SOLUTION INTRAVENOUS; SUBCUTANEOUS
Status: DISCONTINUED | OUTPATIENT
Start: 2017-02-15 | End: 2017-02-16 | Stop reason: HOSPADM

## 2017-02-15 RX ORDER — LIDOCAINE 50 MG/G
1 PATCH TOPICAL
Status: DISCONTINUED | OUTPATIENT
Start: 2017-02-15 | End: 2017-02-16 | Stop reason: HOSPADM

## 2017-02-15 RX ORDER — ACETAMINOPHEN 325 MG/1
650 TABLET ORAL EVERY 6 HOURS PRN
Status: DISCONTINUED | OUTPATIENT
Start: 2017-02-15 | End: 2017-02-16 | Stop reason: HOSPADM

## 2017-02-15 RX ORDER — FUROSEMIDE 10 MG/ML
40 INJECTION INTRAMUSCULAR; INTRAVENOUS 2 TIMES DAILY
Status: COMPLETED | OUTPATIENT
Start: 2017-02-15 | End: 2017-02-16

## 2017-02-15 RX ADMIN — FUROSEMIDE 40 MG: 10 INJECTION, SOLUTION INTRAMUSCULAR; INTRAVENOUS at 02:02

## 2017-02-15 RX ADMIN — LIDOCAINE 1 PATCH: 50 PATCH TOPICAL at 02:02

## 2017-02-15 RX ADMIN — POTASSIUM CHLORIDE 20 MEQ: 1500 TABLET, EXTENDED RELEASE ORAL at 09:02

## 2017-02-15 RX ADMIN — PANTOPRAZOLE SODIUM 40 MG: 40 TABLET, DELAYED RELEASE ORAL at 09:02

## 2017-02-15 RX ADMIN — CARVEDILOL 12.5 MG: 12.5 TABLET, FILM COATED ORAL at 08:02

## 2017-02-15 RX ADMIN — CARVEDILOL 12.5 MG: 12.5 TABLET, FILM COATED ORAL at 09:02

## 2017-02-15 RX ADMIN — ACETAMINOPHEN 650 MG: 325 TABLET ORAL at 02:02

## 2017-02-15 RX ADMIN — FUROSEMIDE 40 MG: 10 INJECTION, SOLUTION INTRAMUSCULAR; INTRAVENOUS at 09:02

## 2017-02-15 RX ADMIN — ENOXAPARIN SODIUM 40 MG: 100 INJECTION SUBCUTANEOUS at 12:02

## 2017-02-15 RX ADMIN — RAMELTEON 8 MG: 8 TABLET, FILM COATED ORAL at 10:02

## 2017-02-15 NOTE — PT/OT/SLP EVAL
Physical Therapy   Evaluation    Magaly Nunes   MRN: [id number]     PT Received On: 02/15/17  PT Start Time: 1610  PT Stop Time: 1620  PT Total Time (min): 10 min    Billable Minutes:  Evaluation 10    Diagnosis: Acute on chronic combined systolic and diastolic CHF (congestive heart failure)    Past Medical History   Diagnosis Date    Anxiety disorder     Carpal tunnel syndrome     CHF (congestive heart failure)     Chronic allergic rhinitis     Chronic pain 10/22/2012    CKD stage 3 due to type 2 diabetes mellitus 2/14/2017    Constipation - functional 4/10/2013    Depression     Diabetes mellitus type II     Hot flash not due to menopause     HTN (hypertension)     Hypokalemia 11/19/2012    Insomnia 4/10/2013    Knee pain, bilateral 7/24/2013    Personal history of DVT (deep vein thrombosis)     Renovascular hypertension 2/14/2017    Severe tricuspid regurgitation 2/14/2017    Spinal stenosis      Dr. Corrales    Spinal stenosis of lumbar region 2/3/2014    Vertigo      Past Surgical History   Procedure Laterality Date    Hysterectomy      Cataract extraction Bilateral     Eye surgery      Fracture surgery      Orif radius & ulna fractures         Referring physician: Slade  Date referred to PT: 2/15/2017     General Precautions: Standard, fall  Orthopedic Precautions : N/A  Braces: N/A    Do you have any cultural, spiritual, Caodaism conflicts, given your current situation?: none     Patient History:  Lives With: alone  Living Arrangements: house  Transportation Available: family or friend will provide  Living Environment Comment: Pt reports living alone in a H with 0 VIVEK.  Pt reports that her niece will be staying with her for ~1 week upon discharge.  Pt uses a SPC for mobility, but owns a RW.  Pt reports being (I) with ADLs and ambulation PTA.    Equipment Currently Used at Home: cane, straight, walker, rolling, shower chair, grab bar    Previous Level of Function:  Ambulation  Skills: needs device  Transfer Skills: independent  ADL Skills: independent    Subjective:  Communicated with RN prior to session.    Pt agreeable to PT eval    Chief Complaint: none stated  Patient goals: to return home    Pain Ratin/10 in L foot  Pain Rating Post-Intervention:  (did not rate)    Objective:  Patient found seated up in chair     Patient found with: telemetry, pulse ox (continuous)    Cognitive Exam:  Oriented to: Person, Place, Time and Situation  Follows Commands/attention: Follows multistep  commands  Communication: clear/fluent  Safety awareness/insight to disability: intact    Physical Exam:  Postural examination/scapula alignment: Rounded shoulder    Skin integrity: Visible skin intact  Edema: Mild BLE    Sensation:   Intact    Lower Extremity Range of Motion:  Right Lower Extremity: WFL  Left Lower Extremity: WFL    Lower Extremity Strength:  Right Lower Extremity: WFL  Left Lower Extremity: WFL    Functional Mobility:    Bed Mobility:    Did not occur    Transfers:    Sit <> Stand Assistance: Stand By Assistance  Sit <> Stand Assistive Device: No Assistive Device    Ambulation:    Gait Distance: ~100 feet with no LOB or SOB.  Slight verbal cueing for RW management.    Assistance 1: Stand by Assistance    Balance:   Static Sit: GOOD: Takes MODERATE challenges from all directions  Dynamic Sit:  GOOD: Maintains balance through MODERATE excursions of active trunk movement  Static Stand: FAIR+: Takes MINIMAL challenges from all directions  Dynamic stand: FAIR: Needs CONTACT GUARD during gait    Therapeutic Activities and Exercises:  PT evaluation performed.  White board updated.  Pt educated on role of PT, safety with functional mobility.     Patient left up in chair with all lines intact, call button in reach and RN notified.    AM-PAC 6 CLICK MOBILITY  1 = Unable, Total/Dependent Assistance  2 = A lot, Maximum/Moderate Assistance  3 = A little, Minimum/Contact Guard/Supervision  4 = None,  Modified [place]/Independent    Turning over in bed (including adjusting bedclothes, sheets and blankets)?: 4  Sitting down on and standing up from a chair with arms (e.g., wheelchair, bedside commode, etc.): 3  Moving from lying on back to sitting on the side of the bed?: 3  Moving to and from a bed to a chair (including a wheelchair)?: 3  Need to walk in hospital room?: 3  Climbing 3-5 steps with a railing?: 3  Total Score: 19    AM-PAC Raw Score   CMS G-Code Modifier   Level of Impairment   Assistance     6   CN   100%         Total / Unable   7 - 9   CM   80 - 100%   Maximal Assist     10 - 14   CL   60 - 80%   Moderate Assist     15 - 19   CK   40 - 60%   Moderate Assist     20 - 22   CJ   20 - 40%   Minimal Assist     23   CI   1-20%         SBA / CGA     24 CH   0%   Independent/Modified Independent       Assessment:  Magaly Nunes is a 84 y.o. female with a medical diagnosis of Acute on chronic combined systolic and diastolic CHF (congestive heart failure). She presents with deficits listed below.  Pt tolerated evaluation well and is pleasant and motivated.      Rehab identified problem list/impairments: weakness, impaired endurance, impaired self care skills, impaired functional mobilty, gait instability, decreased lower extremity function, impaired balance, impaired cardiopulmonary response to activity, pain    Rehab potential is good.    Activity tolerance: Good    Discharge Facility/Level Of Care Needs: home with home health    Equipment Needed After Discharge: none    GOALS:   Physical Therapy Goals        Problem: Physical Therapy Goal    Goal Priority Disciplines Outcome Goal Variances Interventions   Physical Therapy Goal     PT/OT, PT Ongoing (interventions implemented as appropriate)     Description:  Goals to be met by: 17     Patient will increase functional independence with mobility by performin. Supine to sit with Modified [place]  2. Sit to supine with Modified  [place]  3. Sit to stand transfer with Supervision  4. Gait  x 150 feet with Stand-by Assistance using Rolling Walker.                 PLAN:    Patient to be seen 3 x/week to address the above listed problems via gait training, therapeutic activities, therapeutic exercises, neuromuscular re-education  Plan of Care Expires: 03/17/17  Plan of Care reviewed with: patient    Dev Dumont, PT, DPT  2/15/2017   [phone]

## 2017-02-15 NOTE — PLAN OF CARE
Problem: Occupational Therapy Goal  Goal: Occupational Therapy Goal  Eval initiated and completed. Pt demonstrating no OT needs performing self care  tasks and functional mobility with no need of OT intervention. D/C Pt from IPOT  secondary to no demonstrated OT needs.  Continue OT POC    Comments:   Refugio Clark OTR/L  2/15/2017

## 2017-02-15 NOTE — PLAN OF CARE
Problem: Patient Care Overview  Goal: Plan of Care Review  Outcome: Ongoing (interventions implemented as appropriate)  No significant events noted throughout shift. Free of falls/trauma/inury. VSS. Denies any CP, SOB, palpitations, or dizziness. General skin remains intact with no new breakdown. Turning/repositioning independently in bed. Lasix IVP, monitoring strict I&O's. No c/o pain or any other discomforts this shift. Plan of care reviewed and updated, verbalizes understanding. No acute distress noted. Will continue to monitor.

## 2017-02-15 NOTE — PLAN OF CARE
Future Appointments  Date Time Provider Department Center   2/21/2017 1:30 PM Hammad Humphrey MD [facility] CARDIO [place]   2/22/2017 2:00 PM PHYSICIAN[facility] Bud CONKLIN MD requests PCC  Spoke with pt and daughter. Pt wants after 1300 on Wed of next week.   Pt wants O HH so referral initiated in r care. Pt has [facility] PCP. Sade called wanting dc date  Notified Sade possible dc tomorrow

## 2017-02-15 NOTE — PT/OT/SLP EVAL
Occupational Therapy  Evaluation    Magaly Nunes   MRN: [id number]   Admitting Diagnosis: Acute on chronic combined systolic and diastolic CHF (congestive heart failure)    OT Date of Treatment: 02/15/17   OT Start Time: 1610  OT Stop Time: 1620  OT Total Time (min): 10 min    Billable Minutes:  Evaluation 10 minutes    Diagnosis: Acute on chronic combined systolic and diastolic CHF (congestive heart failure)       Past Medical History   Diagnosis Date    Anxiety disorder     Carpal tunnel syndrome     CHF (congestive heart failure)     Chronic allergic rhinitis     Chronic pain 10/22/2012    CKD stage 3 due to type 2 diabetes mellitus 2/14/2017    Constipation - functional 4/10/2013    Depression     Diabetes mellitus type II     Hot flash not due to menopause     HTN (hypertension)     Hypokalemia 11/19/2012    Insomnia 4/10/2013    Knee pain, bilateral 7/24/2013    Personal history of DVT (deep vein thrombosis)     Renovascular hypertension 2/14/2017    Severe tricuspid regurgitation 2/14/2017    Spinal stenosis      Dr. Corrales    Spinal stenosis of lumbar region 2/3/2014    Vertigo       Past Surgical History   Procedure Laterality Date    Hysterectomy      Cataract extraction Bilateral     Eye surgery      Fracture surgery      Orif radius & ulna fractures         Referring physician: ELIESER June  Date referred to OT: 2/15/2017    General Precautions: Standard, fall  Orthopedic Precautions: N/A  Braces: N/A        Patient History:  Living Environment  Lives With: alone  Living Arrangements: house  Transportation Available: family or friend will provide  Living Environment Comment:  (Pt reopts living in a h alone and no steps to enter. Pt bathes in tub w./ a shower chair and uses a spc for mobiility. Pt states her sister can help the first few days upon her return.)  Equipment Currently Used at Home: cane, quad, walker, rolling, shower chair, grab bar    Prior level of function:   Bed  Mobility/Transfers: needs device (spc)  Grooming: independent  Bathing: needs device (shower chair)  Upper Body Dressing: independent  Lower Body Dressing: independent  Toileting: independent  Home Management Skills: independent        Dominant hand: right    Subjective:  Communicated with nurse prior to session.  Pt agreeable to skilled OT services.  Chief Complaint: L foot pain  Patient/Family stated goals: return home    Pain Ratin/10  Location - Side: Left  Location - Orientation: generalized  Location: foot  Pain Addressed: Distraction  Pain Rating Post-Intervention: 0/10    Objective:  Patient found with: telemetry, pulse ox (continuous)    Cognitive Exam:  Oriented to: Person, Place, Time and Situation  Follows Commands/attention: Follows multistep  commands  Communication: clear/fluent  Memory:  No Deficits noted  Safety awareness/insight to disability: intact  Coping skills/emotional control: Appropriate to situation    Visual/perceptual:  Intact    Physical Exam:  Postural examination/scapula alignment: No postural abnormalities identified  Skin integrity: Visible skin intact  Edema: None noted     Sensation:   Intact    Upper Extremity Range of Motion:  Right Upper Extremity: WFL  Left Upper Extremity: WFL    Upper Extremity Strength:  Right Upper Extremity: WFL  Left Upper Extremity: WFL   Strength: WFL    Fine motor coordination:   Intact    Gross motor coordination: WFL    Functional Mobility:  Bed Mobility:       Transfers:  Sit <> Stand Assistance: Stand By Assistance  Sit <> Stand Assistive Device: Straight Cane    Functional Ambulation: Pt ambulated >100ft at sba w/ 2ww.    Activities of Daily Living:  LE Dressing Level of Assistance: Supervision (donned socks )      Balance:   Static Sit: GOOD: Takes MODERATE challenges from all directions  Dynamic Sit: GOOD: Maintains balance through MODERATE excursions of active trunk movement  Static Stand: GOOD-: Takes MODERATE challenges from all  "directions inconsistently  Dynamic stand: GOOD: Needs SUPERVISION only during gait and able to self right with moderate     Therapeutic Activities and Exercises:  Pt educated on POC.  Pt educated on D/C planning.    AM-PAC 6 CLICK ADL  How much help from another person does this patient currently need?  1 = Unable, Total/Dependent Assistance  2 = A lot, Maximum/Moderate Assistance  3 = A little, Minimum/Contact Guard/Supervision  4 = None, Modified [place]/Independent    Putting on and taking off regular lower body clothing? : 4  Bathing (including washing, rinsing, drying)?: 3  Toileting, which includes using toilet, bedpan, or urinal? : 3  Putting on and taking off regular upper body clothing?: 4  Taking care of personal grooming such as brushing teeth?: 4  Eating meals?: 4  Total Score: 22    AM-PAC Raw Score CMS "G-Code Modifier Level of Impairment Assistance   6 % Total / Unable   7 - 9 CM 80 - 100% Maximal Assist   10 - 14 CL 60 - 80% Moderate Assist   15 - 19 CK 40 - 60% Moderate Assist   20 - 22 CJ 20 - 40% Minimal Assist   23 CI 1-20% SBA / CGA   24 CH 0% Independent/ Mod I       Patient left up in chair with call button in reach    Assessment:  Magaly Nunes is a 84 y.o. female with a medical diagnosis of Acute on chronic combined systolic and diastolic CHF (congestive heart failure) and presents with impairments listed below. Pt not currently displaying a need for acute OT services. D/C acute OT services. Recommending H w/ HH.    Rehab identified problem list/impairments: Rehab identified problem list/impairments: weakness, impaired endurance, impaired self care skills, impaired functional mobilty, gait instability, impaired balance    Rehab potential is good.    Activity tolerance: Good    Discharge recommendations: Discharge Facility/Level Of Care Needs: home with home health     Barriers to discharge: Barriers to Discharge: Decreased caregiver support    Equipment recommendations: bath " bench     GOALS:   Occupational Therapy Goals        Problem: Occupational Therapy Goal    Goal Priority Disciplines Outcome Interventions   Occupational Therapy Goal     OT, PT/OT     Description:  Eval initiated and completed. Pt demonstrating no OT needs performing self care  tasks and functional mobility with no need of OT intervention. D/C Pt from IPOT  secondary to no demonstrated OT needs.                PLAN:  Patient to be seen  (D/C acute OT services ) to address the above listed problems via    Plan of Care expires:    Plan of Care reviewed with: patient    Refugio Clark, OT  02/15/2017

## 2017-02-15 NOTE — PROGRESS NOTES
"Admit early this morning by Dr Veronica Marr on chronic combined CHF - improving on Lasix IV, continue diuresis for now. O2 sat 98 on 2L NC, decreases to 94-95 on RA. LE edema improving.  Saw Dr. Hammad Humphrey in cardiology clinic 1/12. PET stress on 1/3 revealed LVEF 38% - no ischemia noted, but this was a suboptimal study as she did not reach predicted heart rate.  Cardiology to consider elective cath to evaluate CAD as etiology of new CHF.     Severe L knee pain on admit "all around the knee" (posterior, anterior, lateral, medial)- Doppler U/S negative for DVT but reveals B Eaton's cysts and subQ edema.  XR negative for effusion and Fx.  No signs of systemic infection, afebrile/VSS, WBC 6.5K (but with 81% granulocytes).  This would be a very unusual site for cellulitis (localized around the knee).  Will follow for now especially since she says that the knee pain has now resolved.  ESR and CRP and elevated.  DDx includes OA. If knee pain returns, will consult Rheumatology and Ortho.     Post-acute care - walks with cane, lives alone but chandler Patrick is going to stay with her for a while.  Consider HH.   "

## 2017-02-15 NOTE — PLAN OF CARE
Problem: Physical Therapy Goal  Goal: Physical Therapy Goal  Goals to be met by: 17     Patient will increase functional independence with mobility by performin. Supine to sit with Modified [place]  2. Sit to supine with Modified [place]  3. Sit to stand transfer with Supervision  4. Gait x 150 feet with Stand-by Assistance using Rolling Walker.   Outcome: Ongoing (interventions implemented as appropriate)  PT eval complete and POC initiated.

## 2017-02-15 NOTE — PLAN OF CARE
Problem: Patient Care Overview  Goal: Plan of Care Review  Outcome: Ongoing (interventions implemented as appropriate)  Pt remains free of falls and injury this shift, pt 1 person assist or cane. Vital signs stable, SR on monitor. Plan of care reviewed with patient, verbalized understanding. No signs of acute distress noted. Will continue to monitor.

## 2017-02-15 NOTE — CONSULTS
Consult   Rheumatology     Team: [facility]    PRESENTING HISTORY     Reason for Consult:  L knee Pseudogout    History of Present Illness:  Ms. Magaly Nunes is a 84 y.o. female with history of CHF, prediabetes (last A1c 6.1) , knee arthritis who is admitted for acute on chronic CHF ( EF 45%). Patient presented with bilateral leg swellings and pain more of left knee. L knee aspirate performed by Orthopedic which was positive for pseudogout. Patient received 8 mg of IV dexamethasone in ED. She also received multiple doses of IV lasix. US lower extremities negative for DVT and showed bilateral backer's cysts. Xray of L knee showed degenerative changes and no fractures.   Patient is afebrile , no leukocytosis and report that her swelling is improving and now able to move left knee.     She states that she has history of bilateral knee arthritis and follow with Dr. Curry in [facility] (Orthopedic). She received intraarticular steroids for almost 2 years every 4 months, last injection was on October,2016.     Review of Systems:  Constitutional: no fever or chills  Eyes: no visual changes  ENT: no nasal congestion or sore throat  Respiratory: no cough or shortness of breath  Cardiovascular: no chest pain or palpitations  Gastrointestinal: no nausea or vomiting, no abdominal pain or change in bowel habits  Allergy/Immunology: no rash or hives.   Musculoskeletal: positive for arthralgias  Neurological: no seizures or tremors  Behavioral/Psych: no auditory or visual hallucinations  Endocrine: no heat or cold intolerance    PAST HISTORY:     Past Medical History   Diagnosis Date    Anxiety disorder     Carpal tunnel syndrome     CHF (congestive heart failure)     Chronic allergic rhinitis     Chronic pain 10/22/2012    CKD stage 3 due to type 2 diabetes mellitus 2/14/2017    Constipation - functional 4/10/2013    Depression     Diabetes mellitus type II     Hot flash not due to menopause     HTN  (hypertension)     Hypokalemia 2012    Insomnia 4/10/2013    Knee pain, bilateral 2013    Personal history of DVT (deep vein thrombosis)     Renovascular hypertension 2017    Severe tricuspid regurgitation 2017    Spinal stenosis      Dr. Corrales    Spinal stenosis of lumbar region 2/3/2014    Vertigo        Past Surgical History   Procedure Laterality Date    Hysterectomy      Cataract extraction Bilateral     Eye surgery      Fracture surgery      Orif radius & ulna fractures         Family History   Problem Relation Age of Onset    No Known Problems Mother     No Known Problems Father     No Known Problems Sister     No Known Problems Brother     No Known Problems Maternal Aunt     No Known Problems Maternal Uncle     No Known Problems Paternal Aunt     No Known Problems Paternal Uncle     No Known Problems Maternal Grandmother     No Known Problems Maternal Grandfather     No Known Problems Paternal Grandmother     No Known Problems Paternal Grandfather     Amblyopia Neg Hx     Blindness Neg Hx     Cancer Neg Hx     Diabetes Neg Hx     Glaucoma Neg Hx     Hypertension Neg Hx     Macular degeneration Neg Hx     Retinal detachment Neg Hx     Strabismus Neg Hx     Stroke Neg Hx     Thyroid disease Neg Hx        Social History     Social History    Marital status:      Spouse name: N/A    Number of children: N/A    Years of education: N/A     Social History Main Topics    Smoking status: Never Smoker    Smokeless tobacco: None    Alcohol use No    Drug use: No    Sexual activity: No     Other Topics Concern    None     Social History Narrative    .  Lives alone.   and two sons have .  Active.         MEDICATIONS & ALLERGIES:     No current facility-administered medications on file prior to encounter.      Current Outpatient Prescriptions on File Prior to Encounter   Medication Sig Dispense Refill    carvedilol (COREG) 12.5 MG  tablet Take 1 tablet (12.5 mg total) by mouth 2 (two) times daily. 60 tablet 11    diclofenac (VOLTAREN) 75 MG EC tablet   2    furosemide (LASIX) 40 MG tablet Take 1 tablet (40 mg total) by mouth once daily. 30 tablet 11    losartan (COZAAR) 100 MG tablet Take 1 tablet (100 mg total) by mouth once daily. 90 tablet 1    meclizine (ANTIVERT) 25 mg tablet       nystatin-triamcinolone (MYCOLOG II) cream Apply topically 4 (four) times daily. 60 g 12    omeprazole (PRILOSEC) 20 MG capsule TAKE 2 CAPSULES BY MOUTH EVERY DAY FOR ACID REFLUX AND STOMACH 180 capsule 1    potassium chloride SA (K-DUR,KLOR-CON) 20 MEQ tablet TAKE 1 TABLET BY MOUTH DAILY 90 tablet 1    temazepam (RESTORIL) 30 mg capsule TAKE ONE CAPSULE BY MOUTH NIGHTLY AS NEEDED FOR INSOMNIA. 30 capsule 5    tramadol (ULTRAM) 50 mg tablet TK 1 T PO  Q 6 H PRN  0    trazodone (DESYREL) 100 MG tablet TAKE 1 TO 2 TABLETS BY MOUTH AT BEDTIME FOR SLEEP 180 tablet 11        Review of patient's allergies indicates:   Allergen Reactions    Ace inhibitors      Other reaction(s): Unknown    Aspirin      Other reaction(s): Unknown    Aciphex  [rabeprazole]      Other reaction(s): Stomach upset    Bextra  [valdecoxib]      Other reaction(s): Unknown    Clonidine      Other reaction(s): dry mouth.lip swelling    Cardizem  [diltiazem hcl]      Other reaction(s): Unknown    Mavik  [trandolapril]      Other reaction(s): Unknown    Nsaids (non-steroidal anti-inflammatory drug)      Other reaction(s): black spots on skin    Phenytoin sodium extended      Other reaction(s): Stomach upset    Tramadol      Other reaction(s): stomach pain       OBJECTIVE:     Vital Signs:  Temp:  [98 °F (36.7 °C)-98.3 °F (36.8 °C)] 98.1 °F (36.7 °C)  Pulse:  [65-88] 77  Resp:  [16-20] 16  SpO2:  [97 %-100 %] 97 %  BP: (125-175)/(65-76) 125/67  Body mass index is 31.3 kg/(m^2).     Physical Exam:  General: well developed, well nourished  HENT: Head:normocephalic, atraumatic.  Nose: no epistaxis. Throat: no throat erythema.  Neck: supple, symmetrical, trachea midline, no JVD  Lungs:  clear to auscultation bilaterally and normal respiratory effort  Cardiovascular: Heart: regular rate and rhythm, S1, S2 normal, no murmur, click, rub or gallop. Extremities: no cyanosis or edema, or clubbing. Pulses: 2+ and symmetric.  Abdomen: Abdomen: soft, non-tender non-distented; bowel sounds normal; no masses,  no organomegaly. Skin: Skin color, texture, turgor normal. No rashes or lesions  Musculoskeletal:full range of motion of joints: bilateral  Neurologic: Normal strength and tone. No focal numbness or weakness    Laboratory  Lab Results   Component Value Date    WBC 6.54 02/14/2017    HGB 10.2 (L) 02/14/2017    HCT 32.3 (L) 02/14/2017    MCV 84 02/14/2017     (L) 02/14/2017       Recent Labs  Lab 02/14/17  1105   *   *   K 3.7   CL 99   CO2 28   BUN 16   CREATININE 1.1   CALCIUM 9.3   MG 1.7     Lab Results   Component Value Date    INR 1.3 (H) 02/14/2017    INR 1.1 01/01/2017    INR 1.0 07/24/2015     Lab Results   Component Value Date    HGBA1C 6.1 12/13/2016     No results for input(s): POCTGLUCOSE in the last 72 hours.    Diagnostic Results:  Labs: Reviewed  X-Ray: Reviewed     Results for FALGUNI TELLES (MRN [id number]) as of 2/15/2017 11:21   Ref. Range 2/14/2017 18:04   Fluid Color Unknown Red   Fluid Appearance Unknown Bloody   Body Fluid Type Unknown Joint Fluid, Left...   WBC, Body Fluid Latest Units: /cu mm 75818   Segs, Fluid Latest Units: % 71   Lymphs, Fluid Latest Units: % 5   Monocytes/Macrophages, Fluid Latest Units: % 24   Body Fluid Crystal Latest Ref Range: Negative  Positive (A)   Body Fluid Source, Crystal Exam Unknown Joint Fluid, Left...   Pathologist Review, Body Fluid Unknown REVIEWED       ASSESSMENT & PLAN:     Current Problems List:  Active Hospital Problems    Diagnosis  POA    *Acute on chronic combined systolic and diastolic CHF (congestive  heart failure) [I50.43]  Yes    Pseudogout of left knee [M11.262]  Unknown    Synovial cyst of popliteal space (Baker), left knee [M71.22]  Yes    Renovascular hypertension [I15.0]  Yes    CKD stage 3 due to type 2 diabetes mellitus [E11.22, N18.3]  Yes    MCKENNA (acute kidney injury) [N17.9]  Yes    Anemia of chronic renal failure, stage 3 (moderate) [N18.3, D63.1]  Yes    Severe tricuspid regurgitation [I07.1]  Yes    Pulmonary hypertension [I27.2]  Yes    Depression, major, recurrent, moderate [F33.1]  Yes       Jessa Sutherland, [facility]   Psychiatry - 9/8/16      PVD (peripheral vascular disease) [I73.9]  Yes    DM (diabetes mellitus) type II controlled with renal manifestation [E11.29]  Yes    Left knee pain [M25.562]  Yes      Resolved Hospital Problems    Diagnosis Date Resolved POA   No resolved problems to display.     Ms Nunes is 84 y.o female with history of CHF, prediabetes , arthritis who is admitted for acute on chronic CHF and rheumatology consulted of acute left knee pseudogout.     Problem Assessment & Treatment Plan:      Left knee monoarthritis secondary to pseudogout  - no signs of infection gram stain no organism , culture pending , afebrile , no leukocytosis  - improved with IV 8 mg Dexamethasone, no further management at this time  - if patient develop acute attack of pseudogout , would recommend intra-articular corticosteroids injection. Follow up out patient with Dr. Curry.       Will discuss with Dr. Chávez.     Thank you for consult.   -----------------------------------------------------  Kathryn Ortega MD  , PGY-3  [phone]      Patient seen and examined.  All elements of history, physical exam and medical decision making independently confirmed by me.  Patient reports history of knee arthritis treated by her orthopedic Dr. Curry with injections.  She believes she used topical Voltaren gel without improvement.  Fluid aspirated by ortho shows CPPD.  Steroid injection given IV and  aspiration will treat current episode. X-ray with significant joint space narrowing of the knee.    No pain on exam of knee. Bandage at aspiration site. Slight effusion on left.   No further intervention needed.  Follow with her orthopedic for recurrent episodes.  See note for details.  Thanks for consult. Will sign off.

## 2017-02-16 VITALS
SYSTOLIC BLOOD PRESSURE: 168 MMHG | WEIGHT: 181.69 LBS | HEART RATE: 90 BPM | BODY MASS INDEX: 31.02 KG/M2 | HEIGHT: 64 IN | RESPIRATION RATE: 20 BRPM | DIASTOLIC BLOOD PRESSURE: 80 MMHG | OXYGEN SATURATION: 94 % | TEMPERATURE: 98 F

## 2017-02-16 DIAGNOSIS — I50.23 ACUTE ON CHRONIC SYSTOLIC CONGESTIVE HEART FAILURE: Primary | ICD-10-CM

## 2017-02-16 PROBLEM — J96.01 ACUTE HYPOXEMIC RESPIRATORY FAILURE: Status: ACTIVE | Noted: 2017-02-16

## 2017-02-16 LAB — POCT GLUCOSE: 128 MG/DL (ref 70–110)

## 2017-02-16 PROCEDURE — 25000003 PHARM REV CODE 250: Performed by: HOSPITALIST

## 2017-02-16 PROCEDURE — 99239 HOSP IP/OBS DSCHRG MGMT >30: CPT | Mod: ,,, | Performed by: HOSPITALIST

## 2017-02-16 PROCEDURE — 63600175 PHARM REV CODE 636 W HCPCS: Performed by: HOSPITALIST

## 2017-02-16 RX ADMIN — POTASSIUM CHLORIDE 20 MEQ: 1500 TABLET, EXTENDED RELEASE ORAL at 08:02

## 2017-02-16 RX ADMIN — PANTOPRAZOLE SODIUM 40 MG: 40 TABLET, DELAYED RELEASE ORAL at 08:02

## 2017-02-16 RX ADMIN — FUROSEMIDE 40 MG: 10 INJECTION, SOLUTION INTRAMUSCULAR; INTRAVENOUS at 08:02

## 2017-02-16 RX ADMIN — CARVEDILOL 12.5 MG: 12.5 TABLET, FILM COATED ORAL at 08:02

## 2017-02-16 NOTE — PROGRESS NOTES
Progress Note  Hospital Medicine      Admit Date: 2/14/2017    SUBJECTIVE:     Follow-up For:  Acute on chronic combined systolic and diastolic CHF (congestive heart failure)    HPI and Hospital Course: see H&P    2/15: Doing better, breathing improved, but unsteady when walking and desat to 90% with c/o SOB after minimal exertion in the room.  Also c/o new L ankle pain when ambulating    Interval History: Doing very well, O2 sat after ambulation is 96-98 on RA, d/c home with CHF HH order set and [facility]. L ankle pain resolved.    Review of Systems:  Pain Scale: 0 /10   Constitutional: no fever or chills  Respiratory: no cough or shortness of breath  Cardiovascular: no chest pain or palpitations  Gastrointestinal: no nausea or vomiting, no abdominal pain or change in bowel habits  Genitourinary: no hematuria or dysuria  Integument/Breast: no rash or pruritis  Hematologic/Lymphatic: no easy bruising or lymphadenopathy  Musculoskeletal: no arthralgias, no myalgias  Neurological: no seizures or tremors  Behavioral/Psych: no depression or anxiety    OBJECTIVE:     Vital Signs Range (Last 24H):  Temp:  [97.5 °F (36.4 °C)-98 °F (36.7 °C)]   Pulse:  []   Resp:  [15-23]   BP: (121-168)/(65-80)   SpO2:  [93 %-99 %]     I & O (Last 24H):    Intake/Output Summary (Last 24 hours) at 02/16/17 1007  Last data filed at 02/16/17 0600   Gross per 24 hour   Intake              600 ml   Output             1075 ml   Net             -475 ml       Physical Exam:  General appearance: no distress, sitting in chair, nontoxic  Mental status: Alert and oriented x 3  HEENT:  conjunctivae/corneas clear, PERRL  Neck: supple, thyroid not enlarged  Pulm:   normal respiratory effort, CTA B, no c/w/r  Card: RRR, S1, S2 normal, no murmur, click, rub or gallop  Abd: soft, NT, ND, BS present; no masses, no organomegaly  Ext: trace edema B  Pulses: 2+, symmetric  Skin: color, texture, turgor normal. No rashes or  "lesions  Neuro: CN II-XII grossly intact, no focal numbness or weakness, normal strength and tone     Diagnostic Results:  Labs reviewed    Recent Results (from the past 24 hour(s))   Basic metabolic panel    Collection Time: 02/15/17 10:43 AM   Result Value Ref Range    Sodium 137 136 - 145 mmol/L    Potassium 3.6 3.5 - 5.1 mmol/L    Chloride 98 95 - 110 mmol/L    CO2 28 23 - 29 mmol/L    Glucose 150 (H) 70 - 110 mg/dL    BUN, Bld 17 8 - 23 mg/dL    Creatinine 1.1 0.5 - 1.4 mg/dL    Calcium 9.2 8.7 - 10.5 mg/dL    Anion Gap 11 8 - 16 mmol/L    eGFR if African American 53.3 (A) >60 mL/min/1.73 m^2    eGFR if non  46.2 (A) >60 mL/min/1.73 m^2   POCT glucose    Collection Time: 02/15/17  5:22 PM   Result Value Ref Range    POCT Glucose 150 (H) 70 - 110 mg/dL   POCT glucose    Collection Time: 02/15/17  8:42 PM   Result Value Ref Range    POCT Glucose 157 (H) 70 - 110 mg/dL   POCT glucose    Collection Time: 02/16/17  7:54 AM   Result Value Ref Range    POCT Glucose 128 (H) 70 - 110 mg/dL           ASSESSMENT/PLAN:     Acute on chronic combined CHF and PHTN, severe TR- resolved - improving on Lasix IV, continue diuresis for now. LE edema resolving. Acute hypoxemic resp failure - resolved.   - nebs PRN  - Lasix 40 IV x 2 more doses on 2/15  - home coreg 12.5 BID  - Saw Dr. Hammad Humphrey in cardiology clinic 1/12. PET stress on 1/3 revealed LVEF 38% - no ischemia noted, but this was a suboptimal study as she did not reach predicted heart rate. Cardiology clinic to consider elective cath to evaluate CAD as etiology of new CHF.     L knee pseudogout - resolved after the decadron 8mg IV given in ED (actually given for presumed Baker's cyst pain). Severe L knee pain on admit "all around the knee" (posterior, anterior, lateral, medial)- Doppler U/S negative for DVT but reveals B Eaton's cysts and subQ edema. XR negative for effusion and Fx. No signs of systemic infection, afebrile/VSS, WBC 6.5K (but with 81% " granulocytes). ESR and CRP and elevated.   - consulted Ortho and Rheum, appreciate recs  - f/u with her prior Orthopedic doctor  - no prophylactic meds warranted at this time per Rheum.     Acute L ankle pain - resolved. noted when we ambulated in room together 2/15. Resolved after lidocaine patch and tylenol.  - trial of lidocaine patch and tylenol    MCKENNA on CKD-3 due to DM-2- resolving - monitored    Anemia of CKD-3 - stable - monitored    DM-2 with renal manifestation - SSI (low dose, caution for insulin stacking in CKD)    GERD - PPI     Post-acute care - walks with cane, lives alone but niece Janeen Patrick is going to stay with her for a while. Consider HH.     Prophylaxis- lovenox 40, SCDs; ramelteon PRN    Advance directives- full code    Post-acute care- pd/c home with HH CHF order set and PT/OT. Her niece, Janeen Patrick, plans to stay with her and help her for a while. Janeen provides excellent support. [facility].    Time spent in care of patient: 40 mins    Natacha June MD  Hospital Medicine Staff

## 2017-02-16 NOTE — DISCHARGE SUMMARY
"[facility] Hospital Medicine  Discharge Summary      Patient Name: Magaly Nunes  MRN: [id number]  Admission Date: 2/14/2017  Hospital Length of Stay: 1 days  Discharge Date and Time: 2/16/2017  Attending Physician: Natacha June MD   Discharging Provider: Natacha June MD  Primary Care Provider: Chris Bangura MD    Hospital Medicine Team: [facility] Natacha June MD    HPI: Per Dr Martin's HPI, "84 F with h/o HTN, chronic systolic CHF ( EF 38 %) on recent ECHO ( 01/03/17), pHTN, Left Eaton's cyst presents to ED with swelling of bilateral legs over last 2-3 days. Reports swelling is so bad that it is difficult for her to walk due to leg heaviness. At baseline she ambulates with walker and denies any recent fall. Also notes pain behind her left knee with difficulty in bending her knee which she attributes to Baker's cyst. Upon asking she endorses not taking her lasix over the weekend because she thought she does not need lasix anymore since leg swelling much improved and it makes her to go to bathroom to urinate frequently. She denies chest pain, SOB, orthopnea or PND. Her niece at bedside drove patient to ED for further evaluation of leg swelling. She was recently admitted to [facility] on New year with SOB and leg swelling. She had elevated troponin of 0/125 at that time. [facility] pharm cardiac stress test was negative for ischemia and reveal EF 38%. US doppler BL legs last month was negative for DVT. She was treated with IV lasix while in patient and discharged home on lasix 40 mg daily.   She denies fever, chills, increased fluid or salt intake. She received lasix 60 mg IVP X 1 in ED with 1200 cc urine output."      Hospital Course:   2/14: Seen by Ortho for L knee pain, arthrocentesis done, crystals consistent with pseudogout  2/15: Seen by Rheum for Pseudogout. Doing better, breathing improved, but unsteady when walking and desat to 90% with c/o SOB after minimal exertion in the room. " "Also c/o new L ankle pain when ambulating  2/16: Doing very well, O2 sat after ambulation is 96-98 on RA, d/c home with CHF HH order set and close [facility]. L ankle pain resolved.    Acute on chronic combined CHF and PHTN, severe TR- resolved - improving on Lasix IV, continue diuresis for now. LE edema resolving. Acute hypoxemic resp failure - resolved.   - nebs PRN  - Lasix 40 IV x 2 more doses on 2/15  - home coreg 12.5 BID  - Saw Dr. Hammad Humphrey in cardiology clinic 1/12. PET stress on 1/3 revealed LVEF 38% - no ischemia noted, but this was a suboptimal study as she did not reach predicted heart rate. Cardiology clinic to consider elective cath to evaluate CAD as etiology of new CHF.      L knee pseudogout - resolved after the decadron 8mg IV given in ED (actually given for presumed Baker's cyst pain). Severe L knee pain on admit "all around the knee" (posterior, anterior, lateral, medial)- Doppler U/S negative for DVT but reveals B Eaton's cysts and subQ edema. XR negative for effusion and Fx. No signs of systemic infection, afebrile/VSS, WBC 6.5K (but with 81% granulocytes). ESR and CRP and elevated.   - consulted Ortho and Rheum, appreciate recs  - f/u with her prior Orthopedic doctor  - no prophylactic meds warranted at this time per Rheum.      Acute L ankle pain - resolved. noted when we ambulated in room together 2/15. Resolved after lidocaine patch and tylenol.  - trial of lidocaine patch and tylenol     MCKENNA on CKD-3 due to DM-2- resolving - monitored     Anemia of CKD-3 - stable - monitored     DM-2 with renal manifestation - SSI (low dose, caution for insulin stacking in CKD)     GERD - PPI      Post-acute care - walks with cane, lives alone but niece Janeen Patrick is going to stay with her for a while. Consider HH.      Prophylaxis- lovenox 40, SCDs; ramelteon PRN     Advance directives- full code     Post-acute care- pd/c home with HH CHF order set and PT/OT. Her niece, Janeen " Darnell, plans to stay with her and help her for a while. Janeen provides excellent support. [facility].    Indwelling Lines/Drains at time of discharge:   Lines/Drains/Airways          No matching active lines, drains, or airways            Consults:   Consults         Status Ordering Provider     Inpatient consult to Orthopedic Surgery  Once     Provider:  (Not yet assigned)    Completed JAS KNIGHT     Inpatient consult to Rheumatology  Once     Provider:  (Not yet assigned)    Completed DARCY WALSH          Significant Diagnostic Studies: see above    Pending Diagnostic Studies:     Procedure Component Value Units Date/Time    Hemoglobin A1c [[id number]]     Order Status:  Sent Lab Status:  No result     Specimen:  Blood from Blood         Final Active Diagnoses:    Diagnosis Date Noted POA    PRINCIPAL PROBLEM:  Acute on chronic combined systolic and diastolic CHF (congestive heart failure) [I50.43] 01/12/2017 Yes    Left knee pain [M25.562] 07/24/2013 Yes    CKD stage 3 due to type 2 diabetes mellitus [E11.22, N18.3] 02/14/2017 Yes    MCKENNA (acute kidney injury) [N17.9] 02/14/2017 Yes    Acute hypoxemic respiratory failure [J96.01] 02/16/2017 Yes    Pseudogout of left knee [M11.262] 02/15/2017 Yes    Acute on chronic systolic congestive heart failure [I50.23] 02/15/2017 Yes    Synovial cyst of popliteal space (Baker), left knee [M71.22] 02/14/2017 Yes    Renovascular hypertension [I15.0] 02/14/2017 Yes    Anemia of chronic renal failure, stage 3 (moderate) [N18.3, D63.1] 02/14/2017 Yes    Severe tricuspid regurgitation [I07.1] 02/14/2017 Yes    Pulmonary hypertension [I27.2] 02/01/2017 Yes    Depression, major, recurrent, moderate [F33.1] 09/08/2016 Yes    PVD (peripheral vascular disease) [I73.9] 05/08/2015 Yes    DM (diabetes mellitus) type II controlled with renal manifestation [E11.29] 05/08/2015 Yes      Problems Resolved During this Admission:    Diagnosis Date Noted Date  Resolved POA      Discharged Condition: stable    Disposition: Home or Self Care    Follow Up:  Follow-up Information     Follow up with [facility] [place] In 1 day.    Specialty:  Home Health Services    Contact information:    [address]  [address]  [place] [address] [phone]          Follow up with [facility].    Specialty:  [facility]    Contact information:    [address]  [facility] [address] [phone]    Additional information:    [facility]        Patient Instructions:     Diet general   Order Specific Question Answer Comments   Na restriction, if any: 2gNa      Activity as tolerated     Call MD for:  temperature >100.4     Call MD for:  difficulty breathing or increased cough     Call MD for:  persistent dizziness, light-headedness, or visual disturbances     Call MD for:  increased confusion or weakness       Medications:  Reconciled Home Medications:   Current Discharge Medication List      CONTINUE these medications which have NOT CHANGED    Details   carvedilol (COREG) 12.5 MG tablet Take 1 tablet (12.5 mg total) by mouth 2 (two) times daily.  Qty: 60 tablet, Refills: 11      diclofenac (VOLTAREN) 75 MG EC tablet Refills: 2      furosemide (LASIX) 40 MG tablet Take 1 tablet (40 mg total) by mouth once daily.  Qty: 30 tablet, Refills: 11      losartan (COZAAR) 100 MG tablet Take 1 tablet (100 mg total) by mouth once daily.  Qty: 90 tablet, Refills: 1    Associated Diagnoses: Essential hypertension      meclizine (ANTIVERT) 25 mg tablet     Associated Diagnoses: BPPV (benign paroxysmal positional vertigo), unspecified laterality      nystatin-triamcinolone (MYCOLOG II) cream Apply topically 4 (four) times daily.  Qty: 60 g, Refills: 12      omeprazole (PRILOSEC) 20 MG capsule TAKE 2 CAPSULES BY MOUTH EVERY DAY FOR ACID REFLUX AND STOMACH  Qty: 180 capsule, Refills: 1      potassium chloride  SA (K-DUR,KLOR-CON) 20 MEQ tablet TAKE 1 TABLET BY MOUTH DAILY  Qty: 90 tablet, Refills: 1    Comments: **Patient requests 90 days supply**      temazepam (RESTORIL) 30 mg capsule TAKE ONE CAPSULE BY MOUTH NIGHTLY AS NEEDED FOR INSOMNIA.  Qty: 30 capsule, Refills: 5      tramadol (ULTRAM) 50 mg tablet TK 1 T PO  Q 6 H PRN  Refills: 0      trazodone (DESYREL) 100 MG tablet TAKE 1 TO 2 TABLETS BY MOUTH AT BEDTIME FOR SLEEP  Qty: 180 tablet, Refills: 11    Comments: **Patient requests 90 days supply**           Time spent on the discharge of patient: 40 minutes    Natacha June MD  Department of Hospital Medicine  [facility]

## 2017-02-16 NOTE — PLAN OF CARE
Problem: Patient Care Overview  Goal: Plan of Care Review  Outcome: Ongoing (interventions implemented as appropriate)  No significant events noted throughout shift. Free of falls/trauma/inury. VSS. Denies any CP, SOB, palpitations, or dizziness. General skin remains intact with no new breakdown. Turning/repositioning independently in bed. No c/o pain or any other discomforts this shift. Pt diuresing well with lasix IVP. Plan for possible d/c tomorrow. Plan of care reviewed and updated, verbalizes understanding. No acute distress noted. Will continue to monitor.

## 2017-02-16 NOTE — NURSING
Patient discharged per MD order. Peripheral IV and telemetry removed. Patient states understanding of discharge instructions. Patient is waiting for family transportation home. Will continue to monitor patient.

## 2017-02-16 NOTE — PROGRESS NOTES
Progress Note  Hospital Medicine      Admit Date: 2/14/2017    SUBJECTIVE:     Follow-up For:  Acute on chronic combined systolic and diastolic CHF (congestive heart failure)    HPI and Hospital Course: see H&P    Interval History: Doing better, breathing improved, but unsteady when walking and desat to 90% with c/o SOB after minimal exertion in the room.  Also c/o new L ankle pain when ambulating    Review of Systems:  Pain Scale: 3 /10 - LEFT ANKLE  Constitutional: no fever or chills  Respiratory: no cough or shortness of breath  Cardiovascular: no chest pain or palpitations  Gastrointestinal: no nausea or vomiting, no abdominal pain or change in bowel habits  Genitourinary: no hematuria or dysuria  Integument/Breast: no rash or pruritis  Hematologic/Lymphatic: no easy bruising or lymphadenopathy  Musculoskeletal: l ankle pain, no myalgias  Neurological: no seizures or tremors  Behavioral/Psych: no depression or anxiety    OBJECTIVE:     Vital Signs Range (Last 24H):  Temp:  [98 °F (36.7 °C)-98.1 °F (36.7 °C)]   Pulse:  [60-77]   Resp:  [15-18]   BP: (124-150)/(66-76)   SpO2:  [93 %-100 %]     I & O (Last 24H):  Intake/Output Summary (Last 24 hours) at 02/15/17 2004  Last data filed at 02/15/17 1400   Gross per 24 hour   Intake              525 ml   Output             1400 ml   Net             -875 ml       Physical Exam:  General appearance: no distress, sitting in chair, nontoxic  Mental status: Alert and oriented x 3  HEENT:  conjunctivae/corneas clear, PERRL  Neck: supple, thyroid not enlarged  Pulm:   normal respiratory effort, CTA B, no c/w/r  Card: RRR, S1, S2 normal, no murmur, click, rub or gallop  Abd: soft, NT, ND, BS present; no masses, no organomegaly  Ext: trace edema B  Pulses: 2+, symmetric  Skin: color, texture, turgor normal. No rashes or lesions  Neuro: CN II-XII grossly intact, no focal numbness or weakness, normal strength and tone     Diagnostic Results:  Labs reviewed    Recent Results (from  "the past 24 hour(s))   Basic metabolic panel    Collection Time: 02/15/17 10:43 AM   Result Value Ref Range    Sodium 137 136 - 145 mmol/L    Potassium 3.6 3.5 - 5.1 mmol/L    Chloride 98 95 - 110 mmol/L    CO2 28 23 - 29 mmol/L    Glucose 150 (H) 70 - 110 mg/dL    BUN, Bld 17 8 - 23 mg/dL    Creatinine 1.1 0.5 - 1.4 mg/dL    Calcium 9.2 8.7 - 10.5 mg/dL    Anion Gap 11 8 - 16 mmol/L    eGFR if African American 53.3 (A) >60 mL/min/1.73 m^2    eGFR if non  46.2 (A) >60 mL/min/1.73 m^2   POCT glucose    Collection Time: 02/15/17  5:22 PM   Result Value Ref Range    POCT Glucose 150 (H) 70 - 110 mg/dL           ASSESSMENT/PLAN:     Acute on chronic combined CHF and PHTN, severe TR- improving on Lasix IV, continue diuresis for now. LE edema resolving.   - nebs PRN  - Lasix 40 IV x 2 more doses  - check O2 sat on RA tomorrow at rest and with exertion  - home coreg 12.5 BID  - Saw Dr. Hammad Humphrey in cardiology clinic 1/12. PET stress on 1/3 revealed LVEF 38% - no ischemia noted, but this was a suboptimal study as she did not reach predicted heart rate. Cardiology clinic to consider elective cath to evaluate CAD as etiology of new CHF.     L knee pseudogout - resolved after the decadron 8mg IV given in ED (actually given for presumed Baker's cyst pain). Severe L knee pain on admit "all around the knee" (posterior, anterior, lateral, medial)- Doppler U/S negative for DVT but reveals B Eaton's cysts and subQ edema. XR negative for effusion and Fx. No signs of systemic infection, afebrile/VSS, WBC 6.5K (but with 81% granulocytes). ESR and CRP and elevated.   - consulted Ortho and Rheum, appreciate recs  - f/u with her prior Orthopedic doctor  - no prophylactic meds warranted at this time per Rheum.     Acute L ankle pain - noted when we ambulated in room together today   - trial of lidocaine patch and tylenol  - re-consult Rheum tomorrow if warranted    MCKENNA on CKD-3 due to DM-2- resolving - monitor    Anemia " of CKD-3 - stable - monitor    DM-2 with renal manifestation - SSI (low dose, caution for insulin stacking in CKD)    GERD - PPI     Post-acute care - walks with cane, lives alone but niece Janeen Patrick is going to stay with her for a while. Consider HH.     Prophylaxis- lovenox 40, SCDs; ramelteon PRN    Advance directives- full code    Post-acute care- probable d/c home with HH CHF order set and PT/OT tomorrow. Her niece, Janeen Patrick, plans to stay with her and help her for a while. Janeen at bedside today and excellent support.     Time spent in care of patient: 40 mins    Natacha June MD  Hospital Medicine Staff    Addendum to PE:  Wheeze in all lung fields

## 2017-02-16 NOTE — PLAN OF CARE
[facility]    [facility] ORDERS  FACE TO FACE ENCOUNTER    Patient Name: Magaly Nunes  YOB: 1932    PCP: Chris Bangura MD   PCP Address: [address] / [place] [address]  PCP Phone Number: [phone]  PCP Fax: [phone]    Encounter Date: 02/16/2017    Admit to Home Health    Diagnoses:  Active Hospital Problems    Diagnosis  POA    *Acute on chronic combined systolic and diastolic CHF (congestive heart failure) [I50.43]  Yes     Priority: 1 - High    Left knee pain [M25.562]  Yes     Priority: 1 - High    CKD stage 3 due to type 2 diabetes mellitus [E11.22, N18.3]  Yes     Priority: 2     MCKENNA (acute kidney injury) [N17.9]  Yes     Priority: 2     Acute hypoxemic respiratory failure [J96.01]  Yes    Pseudogout of left knee [M11.262]  Yes    Acute on chronic systolic congestive heart failure [I50.23]  Yes    Synovial cyst of popliteal space (Baker), left knee [M71.22]  Yes    Renovascular hypertension [I15.0]  Yes    Anemia of chronic renal failure, stage 3 (moderate) [N18.3, D63.1]  Yes    Severe tricuspid regurgitation [I07.1]  Yes    Pulmonary hypertension [I27.2]  Yes    Depression, major, recurrent, moderate [F33.1]  Yes       Jessa Sutherland LCSW   Psychiatry - 9/8/16      PVD (peripheral vascular disease) [I73.9]  Yes    DM (diabetes mellitus) type II controlled with renal manifestation [E11.29]  Yes      Resolved Hospital Problems    Diagnosis Date Resolved POA   No resolved problems to display.       Future Appointments  Date Time Provider Department Center   2/21/2017 1:30 PM Hammad Humphrey MD [facility] CARDIO [place]   2/22/2017 2:00 PM PHYSICIAN, [facility] CLINIC [facility] [place]     Follow-up Information     Follow up with [facility] [place] In 1 day.    Specialty:  Home Health Services    Contact information:    [facility]  [address]  [place] [id number] [phone]          Follow up with [facility].    Specialty:  [facility]    Contact information:    [address]  [facility] [address] [phone]    Additional information:    [facility]            I have seen and examined this patient face to face today. My clinical findings that support the need for the home health skilled services and home bound status are the following:  Weakness/numbness causing balance and gait disturbance due to Heart Failure making it taxing to leave home.    Allergies:  Review of patient's allergies indicates:   Allergen Reactions    Ace inhibitors      Other reaction(s): Unknown    Aspirin      Other reaction(s): Unknown    Aciphex  [rabeprazole]      Other reaction(s): Stomach upset    Bextra  [valdecoxib]      Other reaction(s): Unknown    Clonidine      Other reaction(s): dry mouth.lip swelling    Cardizem  [diltiazem hcl]      Other reaction(s): Unknown    Mavik  [trandolapril]      Other reaction(s): Unknown    Nsaids (non-steroidal anti-inflammatory drug)      Other reaction(s): black spots on skin    Phenytoin sodium extended      Other reaction(s): Stomach upset    Tramadol      Other reaction(s): stomach pain       Diet: 2 gram sodium diet    Activities: activity as tolerated    Nursing:   SN to complete comprehensive assessment including routine vital signs. Instruct on disease process and s/s of complications to report to MD. Review/verify medication list sent home with the patient at time of discharge  and instruct patient/caregiver as needed. Frequency may be adjusted depending on start of care date.    Notify MD if SBP > 160 or < 90; DBP > 90 or < 50; HR > 120 or < 50; Temp > 101      CONSULTS:    Physical Therapy to evaluate and treat. Evaluate for home safety and equipment needs; Establish/upgrade home exercise program. Perform / instruct on therapeutic exercises, gait training, transfer training, and Range of  Motion.  Occupational Therapy to evaluate and treat. Evaluate home environment for safety and equipment needs. Perform/Instruct on transfers, ADL training, ROM, and therapeutic exercises.  Aide to provide assistance with personal care, ADLs, and vital signs.    MISCELLANEOUS CARE:  Heart Failure:      SN to instruct on the following:    Instruct on the definition of CHF.   Instruct on the signs/sympoms of CHF to be reported.   Instruct on and monitor daily weights.   Instruct on factors that cause exacerbation.   Instruct on action, dose, schedule, and side effects of medications.   Instruct on diet as prescribed.   Instruct on activity allowed.   Instruct on life-style modifications for life long management of CHF   SN to assess compliance with daily weights, diet, medications, fluid retention,    safety precautions, activities permitted and life-style modifications.   Additional 1-2 SN visits per week as needed for signs and symptoms     of CHF exacerbation.      For Weight Gain > 2-3 lbs in 1 day or 4-6 lbs over 1 week notify PCP:  Increase Lasix (oral diuretic) dose to 80 for 5 days temporarily  Obtain BMP lab test in 3 days  If weight does not decrease by 3 lbs after 5 days of increased diuretic usage: Notify PCP.    WOUND CARE ORDERS  n/a      Medications: Review discharge medications with patient and family and provide education.      Current Discharge Medication List      CONTINUE these medications which have NOT CHANGED    Details   carvedilol (COREG) 12.5 MG tablet Take 1 tablet (12.5 mg total) by mouth 2 (two) times daily.  Qty: 60 tablet, Refills: 11      diclofenac (VOLTAREN) 75 MG EC tablet Refills: 2      furosemide (LASIX) 40 MG tablet Take 1 tablet (40 mg total) by mouth once daily.  Qty: 30 tablet, Refills: 11      losartan (COZAAR) 100 MG tablet Take 1 tablet (100 mg total) by mouth once daily.  Qty: 90 tablet, Refills: 1    Associated Diagnoses: Essential hypertension      meclizine (ANTIVERT) 25  mg tablet     Associated Diagnoses: BPPV (benign paroxysmal positional vertigo), unspecified laterality      nystatin-triamcinolone (MYCOLOG II) cream Apply topically 4 (four) times daily.  Qty: 60 g, Refills: 12      omeprazole (PRILOSEC) 20 MG capsule TAKE 2 CAPSULES BY MOUTH EVERY DAY FOR ACID REFLUX AND STOMACH  Qty: 180 capsule, Refills: 1      potassium chloride SA (K-DUR,KLOR-CON) 20 MEQ tablet TAKE 1 TABLET BY MOUTH DAILY  Qty: 90 tablet, Refills: 1    Comments: **Patient requests 90 days supply**      temazepam (RESTORIL) 30 mg capsule TAKE ONE CAPSULE BY MOUTH NIGHTLY AS NEEDED FOR INSOMNIA.  Qty: 30 capsule, Refills: 5      tramadol (ULTRAM) 50 mg tablet TK 1 T PO  Q 6 H PRN  Refills: 0      trazodone (DESYREL) 100 MG tablet TAKE 1 TO 2 TABLETS BY MOUTH AT BEDTIME FOR SLEEP  Qty: 180 tablet, Refills: 11    Comments: **Patient requests 90 days supply**             I certify that this patient is confined to her home and needs intermittent skilled nursing care, physical therapy and occupational therapy.

## 2017-02-16 NOTE — PROGRESS NOTES
Digital Medicine Heart Failure Program consult complete. Pt alone in room preparing for discharge. Very pleasant, talkative throughout assessment.  Explained program details including home monitoring expectations, scale + router setup, weekly PharmD calls, and follow up appointment with labs. Pt denied questions or concerns at this time. Reviewed blue educational folder (Welcome letter, PharmD contact number, Heart Failure Zones, and program booklet.) Pt verbalized understanding of all discussed and gave consent to enroll in 30-day monitoring program.    H&P on admit: 84 F with h/o HTN, chronic systolic CHF ( EF 38 %) on recent ECHO ( 01/03/17), pHTN, Left Eaton's cyst presents to ED with swelling of bilateral legs over last 2-3 days. Reports swelling is so bad that it is difficult for her to walk due to leg heaviness. At baseline she ambulates with walker and denies any recent fall. Also notes pain behind her left knee with difficulty in bending her knee which she attributes to Baker's cyst. Upon asking she endorses not taking her lasix over the weekend because she thought she does not need lasix anymore since leg swelling much improved and it makes her to go to bathroom to urinate frequently. She denies chest pain, SOB, orthopnea or PND. Her niece at bedside drove patient to ED for further evaluation of leg swelling.      She was recently admitted to [place] [facility] on New year with SOB and leg swelling. She had elevated troponin of 0/125 at that time. NM pharm cardiac stress test was negative for ischemia and reveal EF 38%. US doppler BL legs last month was negative for DVT. She was treated with IV lasix while in patient and discharged home on lasix 40 mg daily.      She denies fever, chills, increased fluid or salt intake. She received lasix 60 mg IVP X 1 in ED with 1200 cc urine output.     Issues: insurance coverage + prescription affordability, depression/anxiety and poor dietary compliance (reviewed low sodium diet  "(1,500 mg/day) + fluid restriction (50 oz))    Pt lives alone in [place], [place]. Does not drive herself to appointments, but relies on her niece or other transportation to take her to appointments. Generally follows with Dr. Humphrey at [facility] and will be seeing him for program follow up.  Denies any medication compliance issues, but does have concerns about new medications being too expensive. She states that in the past, she "has had new medicines that were over $100 and she couldn't afford them."  Does have anxiety but has not been on medication for it in some time.  She does however, take Restoril 30mg at bedtime to help her sleep.    Pt does not do much cooking on her own, but did have family members & friends bringing her food for a while. She reports that she was telling them about her salt restriction but she is going to need to make some changes in her diet.  Has had some loss of appetite recently, but tries to eat regularly.  Pt reports that she regularly eats wheat bread, Corn Flakes, unsalted crackers, potato salad, turkey deli meat, almond milk, cheese, yogurt, eggs, fish or shrimp, smart balance butter.  Pt denies need for home O2 or CPAP.    Contact information:  [phone] (home)   [phone] (mobile)    Extended Emergency Contact Information  Primary Emergency Contact: Deborah Bustos   [place]  Mobile Phone: [phone]  Relation: Relative    Consults placed: Dietary and PPAT  Readmission Risk: 20%  CHF Kit Name: [facility] R218 linked to pt with most recent inpatient dry weight (181 lb 10.5 oz.).  Follow-up appointment scheduled on 2/22/17 with labs at 910a + HF Clinic at 940a (Dr. Humphrey).   "

## 2017-02-16 NOTE — PLAN OF CARE
Future Appointments  Date Time Provider Department Center   2/21/2017 1:30 PM Hammad Humphrey MD [facility] CARDIO [place]   2/22/2017 2:00 PM PHYSICIAN, PRIORITY CLINIC [facility] IMPRSTEPHANIE [place]     mscathy ybarra r care fax  [facility]  per pt choice. meryl w [facility]  notified via phone that orders are written.     02/16/17 1119   Final Note   Assessment Type Final Discharge Note   Discharge Disposition Home-Health   Discharge planning education complete? Yes   Hospital Follow Up  Appt(s) scheduled? Yes   Discharge plans and expectations educations in teach back method with documentation complete? Yes   Offered [facility] -- Bedside Delivery? n/a   Discharge[facility]Hospital [facility] P[facility] n/a   Referral to Outpatient Case Management complete? n/a   Referral to / orders for Home Health Complete? n/a   30 day supply of medicines given at discharge, if documented non-compliance / non-adherence? n/a   Any social issues identified prior to discharge? n/a   Did you assess the readiness or willingness of the family or caregiver to support self management of care? Yes   Right Care Referral Info   Post Acute Recommendation Home-care

## 2017-02-17 ENCOUNTER — TELEPHONE (OUTPATIENT)
Dept: OTHER | Facility: OTHER | Age: 82
End: 2017-02-17

## 2017-02-17 LAB — BACTERIA SPEC AEROBE CULT: NO GROWTH

## 2017-02-17 NOTE — TELEPHONE ENCOUNTER
H&P on admit: 84 F with h/o HTN, chronic systolic CHF ( EF 38 %) on recent ECHO ( 01/03/17), pHTN, Left Eaton's cyst presents to ED with swelling of bilateral legs over last 2-3 days. Reports swelling is so bad that it is difficult for her to walk due to leg heaviness. At baseline she ambulates with walker and denies any recent fall. Also notes pain behind her left knee with difficulty in bending her knee which she attributes to Baker's cyst. Upon asking she endorses not taking her lasix over the weekend because she thought she does not need lasix anymore since leg swelling much improved and it makes her to go to bathroom to urinate frequently. She denies chest pain, SOB, orthopnea or PND. Her niece at bedside drove patient to ED for further evaluation of leg swelling.       She was recently admitted to [facility] on New year with SOB and leg swelling. She had elevated troponin of 0/125 at that time. NM pharm cardiac stress test was negative for ischemia and reveal EF 38%. US doppler BL legs last month was negative for DVT. She was treated with IV lasix while in patient and discharged home on lasix 40 mg daily.       She denies fever, chills, increased fluid or salt intake. She received lasix 60 mg IVP X 1 in ED with 1200 cc urine output.      Issues: insurance coverage + prescription affordability, depression/anxiety and poor dietary compliance (reviewed low sodium diet (1,500 mg/day) + fluid restriction (50 oz))    Hospital Course:   2/14: Seen by Ortho for L knee pain, arthrocentesis done, crystals consistent with pseudogout  2/15: Seen by Rheum for Pseudogout. Doing better, breathing improved, but unsteady when walking and desat to 90% with c/o SOB after minimal exertion in the room. Also c/o new L ankle pain when ambulating  2/16: Doing very well, O2 sat after ambulation is 96-98 on RA, d/c home with CHF HH order set and close f/u in [facility]. L ankle pain resolved.     Acute on chronic combined CHF and  "PHTN, severe TR- resolved - improving on Lasix IV, continue diuresis for now. LE edema resolving. Acute hypoxemic resp failure - resolved.   - nebs PRN  - Lasix 40 IV x 2 more doses on 2/15  - home coreg 12.5 BID  - Saw Dr. Hammad Humphrey in cardiology clinic 1/12. PET stress on 1/3 revealed LVEF 38% - no ischemia noted, but this was a suboptimal study as she did not reach predicted heart rate. Cardiology clinic to consider elective cath to evaluate CAD as etiology of new CHF.       Pt lives alone in [place], [place]. Does not drive herself to appointments, but relies on her niece or other transportation to take her to appointments. Generally follows with Dr. Humphrey at [facility] and will be seeing him for program follow up. Denies any medication compliance issues, but does have concerns about new medications being too expensive. She states that in the past, she "has had new medicines that were over $100 and she couldn't afford them." Does have anxiety but has not been on medication for it in some time. She does however, take Restoril 30mg at bedtime to help her sleep.   Pt does not do much cooking on her own, but did have family members & friends bringing her food for a while. She reports that she was telling them about her salt restriction but she is going to need to make some changes in her diet. Has had some loss of appetite recently, but tries to eat regularly. Pt reports that she regularly eats wheat bread, Corn Flakes, unsalted crackers, potato salad, turkey deli meat, almond milk, cheese, yogurt, eggs, fish or shrimp, smart balance butter. Pt denies need for home O2 or CPAP.       Mrs Nunes has been enrolled into our CHF monitoring program.  She has not submitted a weight that I am aware of today.  I have made 2 attempts to call her at home this morning to discuss our program.  No VM at the listed home number to leave information.  I will attempt her and her ER contact tomorrow if she does not call back today.    "

## 2017-02-18 ENCOUNTER — TELEPHONE (OUTPATIENT)
Dept: OTHER | Facility: OTHER | Age: 82
End: 2017-02-18

## 2017-02-18 NOTE — TELEPHONE ENCOUNTER
Mrs Nunes has not answered at home for 2 days.  I have contacted her ER contact, Deborah, who states that Mrs Vernon will be going over to stay with Mrs Nunes as she is not feeling well today.  I have asked Deborah to give her our contact info and call us back today if she has a moment.

## 2017-02-19 NOTE — TELEPHONE ENCOUNTER
Ms. Nunes has answered this morning. She was just waking up, but spoke to me briefly. She has not set up the scale yet because she needs assistance doing so. She will have someone set it up for her today if anyone stops by. She did weigh on her scale yesterday and weight was stable based on discharge weight. She has not weighed yet this morning but will do so once she gets out of bed. She said her AMRIK is much improved, but there is still some residual swelling. She denied any SOB. She is complaining of feeling tired. She also feels she has an UTI. She said she asked for medication for this while inpatient and nothing has been sent to her pharmacy. She is taking OTC meds (AZO) for the burning while urinating, but doesn't get complete relief. Will route this note to her PCP and to her cardiologist since she is seeing him Tuesday. Ms. Nunes did confirm she is taking Lasix 40 mg QD. She was not feeling up to a complete review of medications as she was tired. She was thankful for our call to check on her. Asked that she call back with any concerns today and we will check in again tomorrow.     Weight: 81.6 kg (180 lb) (2/18/2017  9:45 AM)

## 2017-02-20 ENCOUNTER — PATIENT OUTREACH (OUTPATIENT)
Dept: ADMINISTRATIVE | Facility: CLINIC | Age: 82
End: 2017-02-20
Payer: MEDICARE

## 2017-02-20 ENCOUNTER — TELEPHONE (OUTPATIENT)
Dept: OTHER | Facility: OTHER | Age: 82
End: 2017-02-20

## 2017-02-20 ENCOUNTER — LAB VISIT (OUTPATIENT)
Dept: LAB | Facility: HOSPITAL | Age: 82
End: 2017-02-20
Attending: HOSPITALIST
Payer: MEDICARE

## 2017-02-20 DIAGNOSIS — I10 ESSENTIAL HYPERTENSION: ICD-10-CM

## 2017-02-20 DIAGNOSIS — I50.23 ACUTE ON CHRONIC SYSTOLIC HEART FAILURE: Primary | ICD-10-CM

## 2017-02-20 LAB
ANION GAP SERPL CALC-SCNC: 10 MMOL/L
BUN SERPL-MCNC: 17 MG/DL
CALCIUM SERPL-MCNC: 9.7 MG/DL
CHLORIDE SERPL-SCNC: 104 MMOL/L
CO2 SERPL-SCNC: 23 MMOL/L
CREAT SERPL-MCNC: 1 MG/DL
EST. GFR  (AFRICAN AMERICAN): 59.8 ML/MIN/1.73 M^2
EST. GFR  (NON AFRICAN AMERICAN): 51.9 ML/MIN/1.73 M^2
GLUCOSE SERPL-MCNC: 94 MG/DL
POTASSIUM SERPL-SCNC: 4.9 MMOL/L
SODIUM SERPL-SCNC: 137 MMOL/L

## 2017-02-20 PROCEDURE — 80048 BASIC METABOLIC PNL TOTAL CA: CPT

## 2017-02-20 RX ORDER — NITROFURANTOIN 25; 75 MG/1; MG/1
100 CAPSULE ORAL 2 TIMES DAILY
Qty: 10 CAPSULE | Refills: 0 | Status: SHIPPED | OUTPATIENT
Start: 2017-02-20 | End: 2017-02-25

## 2017-02-20 RX ORDER — LOSARTAN POTASSIUM 100 MG/1
TABLET ORAL
Qty: 90 TABLET | Refills: 0 | Status: SHIPPED | OUTPATIENT
Start: 2017-02-20 | End: 2017-05-22 | Stop reason: SDUPTHER

## 2017-02-20 NOTE — PATIENT INSTRUCTIONS

## 2017-02-20 NOTE — TELEPHONE ENCOUNTER
Informed patient of information below. Verbalized understanding. Stated, having frequent urination only.

## 2017-02-20 NOTE — TELEPHONE ENCOUNTER
"Mrs. Nunes has not submitted a weight again today.  She says she weighed on her home scale and states it was 175lb this morning.  She says her HH nurse noticed increased LLE which Mrs. Nunes confirms but she says she has "no one to set up her equipment".  Her niece was home while we were speaking (I heard her in the background) and when I asked if I could speak with her to get the equipment set up, she refused to give her the phone saying "she was just about ready to leave".  I assured her that I would take less than 5 min of her time but Mrs. Nunes still refused.  I reminded her of her appointment tomorrow and she said that with her leg swelling, she may not be able to make it.  I reiterated that this is exactly why we need her equipment set up and she still wouldn't allow me access to speak to her niece.  She did receive the phone call from Dr. Bangura's office regarding her abx and she has someone picking it up now.  She hurried to end the call and said she would have someone call me back, either her niece or her nephew, later today to do scale set up.   "

## 2017-02-21 ENCOUNTER — TELEPHONE (OUTPATIENT)
Dept: PHARMACY | Facility: CLINIC | Age: 82
End: 2017-02-21

## 2017-02-21 LAB — BACTERIA SPEC ANAEROBE CULT: NORMAL

## 2017-02-22 ENCOUNTER — TELEPHONE (OUTPATIENT)
Dept: OTHER | Facility: OTHER | Age: 82
End: 2017-02-22

## 2017-02-22 NOTE — TELEPHONE ENCOUNTER
Ms. Nunes says that she still has not had anyone to set up her scale. She said that the people who have tried were not able to. I tried to explain the simplicity of setting it up and she started to say that she doesn't have a phone line for it and the gentleman that checks on her went to buy a new cord for it. Explained that it only needs to be plugged into the electrical outlet and not into any phone line. She said she will let him know if he comes over today that he only needs to plug it into the electrical outlet. She said she still feels weak and is stressed out about the scale not being set up because we keep calling her. She is weighing daily on her personal scale. She said today's weight was 172 or 173, but it is down from 175 on yesterday or Monday. She said she is confused on the day of week because she's so tired. Reviewed proper weighing technique for the BLIP scale in the event she gets it set up. She denies LE/ABD swelling and SOB/ROTH. She confirmed she started her antibiotic for UTI. She did not make clinic appointments yesterday because she wasn't feeling well and had diarrhea. She said she no longer has diarrhea but still doesn't feel well today. She did mention that she completed all exercises with her therapist this morning. She said she will call back if she has questions or issues setting up the scale.

## 2017-02-23 ENCOUNTER — TELEPHONE (OUTPATIENT)
Dept: FAMILY MEDICINE | Facility: CLINIC | Age: 82
End: 2017-02-23

## 2017-02-23 NOTE — TELEPHONE ENCOUNTER
----- Message from Bruna Lee sent at 2/23/2017  3:40 PM CST -----  Contact: [facility]  Pt was admitted to Home Health on 2/17. [facility] can be reached @ [phone].

## 2017-02-24 ENCOUNTER — OFFICE VISIT (OUTPATIENT)
Dept: PRIMARY CARE CLINIC | Facility: CLINIC | Age: 82
End: 2017-02-24
Payer: MEDICARE

## 2017-02-24 VITALS
DIASTOLIC BLOOD PRESSURE: 66 MMHG | TEMPERATURE: 99 F | OXYGEN SATURATION: 97 % | WEIGHT: 177.25 LBS | HEART RATE: 70 BPM | SYSTOLIC BLOOD PRESSURE: 146 MMHG | BODY MASS INDEX: 30.42 KG/M2

## 2017-02-24 DIAGNOSIS — I10 ESSENTIAL HYPERTENSION: Primary | ICD-10-CM

## 2017-02-24 DIAGNOSIS — M11.262 PSEUDOGOUT OF LEFT KNEE: ICD-10-CM

## 2017-02-24 DIAGNOSIS — E11.29 CONTROLLED TYPE 2 DIABETES MELLITUS WITH OTHER DIABETIC KIDNEY COMPLICATION, UNSPECIFIED LONG TERM INSULIN USE STATUS: ICD-10-CM

## 2017-02-24 DIAGNOSIS — I50.43 ACUTE ON CHRONIC COMBINED SYSTOLIC AND DIASTOLIC CHF (CONGESTIVE HEART FAILURE): ICD-10-CM

## 2017-02-24 PROCEDURE — 99499 UNLISTED E&M SERVICE: CPT | Mod: S$GLB,,, | Performed by: PHYSICIAN ASSISTANT

## 2017-02-24 PROCEDURE — 1159F MED LIST DOCD IN RCRD: CPT | Mod: S$GLB,,, | Performed by: HOSPITALIST

## 2017-02-24 PROCEDURE — 99999 PR PBB SHADOW E&M-EST. PATIENT-LVL III: CPT | Mod: PBBFAC,,,

## 2017-02-24 PROCEDURE — 3078F DIAST BP <80 MM HG: CPT | Mod: S$GLB,,, | Performed by: HOSPITALIST

## 2017-02-24 PROCEDURE — 1160F RVW MEDS BY RX/DR IN RCRD: CPT | Mod: S$GLB,,, | Performed by: HOSPITALIST

## 2017-02-24 PROCEDURE — 1157F ADVNC CARE PLAN IN RCRD: CPT | Mod: S$GLB,,, | Performed by: HOSPITALIST

## 2017-02-24 PROCEDURE — 1125F AMNT PAIN NOTED PAIN PRSNT: CPT | Mod: S$GLB,,, | Performed by: HOSPITALIST

## 2017-02-24 PROCEDURE — 3077F SYST BP >= 140 MM HG: CPT | Mod: S$GLB,,, | Performed by: HOSPITALIST

## 2017-02-24 PROCEDURE — 99213 OFFICE O/P EST LOW 20 MIN: CPT | Mod: S$GLB,,, | Performed by: HOSPITALIST

## 2017-02-24 RX ORDER — HYDROCODONE BITARTRATE AND ACETAMINOPHEN 10; 325 MG/1; MG/1
1 TABLET ORAL
COMMUNITY
End: 2017-07-06

## 2017-02-24 NOTE — PATIENT INSTRUCTIONS
You have an appointment to see Dr. Rolando Estevez on  Wednesday March 1 at 2:45 PM at  [address] [place] [address]  Phone # [phone]  I spoke with Jaylene.

## 2017-02-24 NOTE — PROGRESS NOTES
[facility]  New Visit Progress Note   Recent Hospital Discharge     PRESENTING HISTORY     Chief Complaint/Reason for Visit:  Follow up Hospital Discharge   No chief complaint on file.    PCP: Chris Bangura MD    History of Present Illness:  Ms. Magaly Nunes is a 84 y.o. female with h/o HTN, chronic systolic CHF ( EF 38 %) on recent ECHO ( 01/03/17), pHTN, Left Eaton's cyst presents to ED with swelling of bilateral legs over last 2-3 days. Reports swelling is so bad that it is difficult for her to walk due to leg heaviness. At baseline she ambulates with walker and denies any recent fall. Also notes pain behind her left knee with difficulty in bending her knee which she attributes to Baker's cyst. Upon asking she endorses not taking her lasix over the weekend because she thought she does not need lasix anymore since leg swelling much improved and it makes her to go to bathroom to urinate frequently. She denies chest pain, SOB, orthopnea or PND. Her niece at bedside drove patient to ED for further evaluation of leg swelling. She was recently admitted to [facility] on New year with SOB and leg swelling. She had elevated troponin of 0/125 at that time. [facility] pharm cardiac stress test was negative for ischemia and reveal EF 38%. US doppler BL legs last month was negative for DVT. She was treated with IV lasix while in patient and discharged home on lasix 40 mg daily. She received lasix 60 mg IVP X 1 in ED with 1200 cc urine output.  Hospital Course:   2/14: Seen by Ortho for L knee pain, arthrocentesis done, crystals consistent with pseudogout  2/15: Seen by Rheum for Pseudogout. Doing better, breathing improved, but unsteady when walking and desat to 90% with c/o SOB after minimal exertion in the room. Also c/o new L ankle pain when ambulating  2/16: Doing very well, O2 sat after ambulation is 96-98 on RA, d/c home with CHF HH order set and [facility]. L ankle pain resolved.    Admit  Date: 2/14/17  Discharge Date: 2/16/17  ___________________________________________________________________    Today: Patient reports feeling better overall since discharge. She states she has been breathing better and able to get around more easily. She has been taking lasix 40mg daily and elevated her legs if ankles swell after being on her feet for extended time. Has been working with PT/OT through . She was supposed to be checking daily weight but scale obtained for patient was not calibrated so working on correcting this at this time and using her own home scale for now. She reports she has been a bit weak but attributes this to decreased PO intake--she did not know what foods were allowed for fluid overload until she received a low sodium diet instruction form today in the mail--she will begin following this and urged not to skip meals. She states she mostly eat steamed vegetables and chicken. Her DM is currently controlled with diet only. She reports some continued LE arthralgias for which she [facility] and would like to follow up.       Review of Systems:  Review of Systems   Constitutional: Negative for chills and fever.   HENT: Negative for congestion and sore throat.    Respiratory: Negative for cough and shortness of breath.    Cardiovascular: Positive for leg swelling. Negative for chest pain and palpitations.   Gastrointestinal: Negative for abdominal pain and nausea.   Endocrine: Negative for cold intolerance and heat intolerance.   Genitourinary: Negative for dysuria and frequency.   Musculoskeletal: Positive for arthralgias. Negative for myalgias.   Skin: Negative for color change and rash.   Neurological: Negative for dizziness and headaches.   Psychiatric/Behavioral: Negative for behavioral problems and confusion.         PAST HISTORY:     Past Medical History:   Diagnosis Date    Anxiety disorder     Carpal tunnel syndrome     CHF (congestive heart  failure)     Chronic allergic rhinitis     Chronic pain 10/22/2012    CKD stage 3 due to type 2 diabetes mellitus 2/14/2017    Constipation - functional 4/10/2013    Depression     Diabetes mellitus type II     Hot flash not due to menopause     HTN (hypertension)     Hypokalemia 11/19/2012    Insomnia 4/10/2013    Knee pain, bilateral 7/24/2013    Personal history of DVT (deep vein thrombosis)     Renovascular hypertension 2/14/2017    Severe tricuspid regurgitation 2/14/2017    Spinal stenosis     Dr. Corrales    Spinal stenosis of lumbar region 2/3/2014    Vertigo        Past Surgical History:   Procedure Laterality Date    CATARACT EXTRACTION Bilateral     EYE SURGERY      FRACTURE SURGERY      HYSTERECTOMY      ORIF RADIUS & ULNA FRACTURES         Family History   Problem Relation Age of Onset    No Known Problems Mother     No Known Problems Father     No Known Problems Sister     No Known Problems Brother     No Known Problems Maternal Aunt     No Known Problems Maternal Uncle     No Known Problems Paternal Aunt     No Known Problems Paternal Uncle     No Known Problems Maternal Grandmother     No Known Problems Maternal Grandfather     No Known Problems Paternal Grandmother     No Known Problems Paternal Grandfather     Amblyopia Neg Hx     Blindness Neg Hx     Cancer Neg Hx     Diabetes Neg Hx     Glaucoma Neg Hx     Hypertension Neg Hx     Macular degeneration Neg Hx     Retinal detachment Neg Hx     Strabismus Neg Hx     Stroke Neg Hx     Thyroid disease Neg Hx        Social History     Social History    Marital status:      Spouse name: N/A    Number of children: N/A    Years of education: N/A     Social History Main Topics    Smoking status: Never Smoker    Smokeless tobacco: Not on file    Alcohol use No    Drug use: No    Sexual activity: No     Other Topics Concern    Not on file     Social History Narrative    .  Lives alone.    and two sons have .  Active.         MEDICATIONS & ALLERGIES:     Current Outpatient Prescriptions on File Prior to Visit   Medication Sig Dispense Refill    carvedilol (COREG) 12.5 MG tablet Take 1 tablet (12.5 mg total) by mouth 2 (two) times daily. 60 tablet 11    diclofenac (VOLTAREN) 75 MG EC tablet   2    furosemide (LASIX) 40 MG tablet Take 1 tablet (40 mg total) by mouth once daily. 30 tablet 11    losartan (COZAAR) 100 MG tablet TAKE ONE TABLET BY MOUTH ONCE DAILY 90 tablet 0    meclizine (ANTIVERT) 25 mg tablet       nitrofurantoin, macrocrystal-monohydrate, (MACROBID) 100 MG capsule Take 1 capsule (100 mg total) by mouth 2 (two) times daily. 10 capsule 0    nystatin-triamcinolone (MYCOLOG II) cream Apply topically 4 (four) times daily. 60 g 12    omeprazole (PRILOSEC) 20 MG capsule TAKE 2 CAPSULES BY MOUTH EVERY DAY FOR ACID REFLUX AND STOMACH 180 capsule 1    potassium chloride SA (K-DUR,KLOR-CON) 20 MEQ tablet TAKE 1 TABLET BY MOUTH DAILY 90 tablet 1    temazepam (RESTORIL) 30 mg capsule TAKE ONE CAPSULE BY MOUTH NIGHTLY AS NEEDED FOR INSOMNIA. 30 capsule 5    tramadol (ULTRAM) 50 mg tablet TK 1 T PO  Q 6 H PRN  0    trazodone (DESYREL) 100 MG tablet TAKE 1 TO 2 TABLETS BY MOUTH AT BEDTIME FOR SLEEP 180 tablet 11     No current facility-administered medications on file prior to visit.         Review of patient's allergies indicates:   Allergen Reactions    Ace inhibitors      Other reaction(s): Unknown    Aspirin      Other reaction(s): Unknown    Aciphex  [rabeprazole]      Other reaction(s): Stomach upset    Bextra  [valdecoxib]      Other reaction(s): Unknown    Clonidine      Other reaction(s): dry mouth.lip swelling    Cardizem  [diltiazem hcl]      Other reaction(s): Unknown    Mavik  [trandolapril]      Other reaction(s): Unknown    Nsaids (non-steroidal anti-inflammatory drug)      Other reaction(s): black spots on skin    Phenytoin sodium extended      Other  reaction(s): Stomach upset    Tramadol      Other reaction(s): stomach pain       OBJECTIVE:     Vital Signs:  Vitals:    02/24/17 1305   BP: (!) 146/66   Pulse: 70   Temp: 98.6 °F (37 °C)     Wt Readings from Last 1 Encounters:   02/24/17 1305 80.4 kg (177 lb 4 oz)     Body mass index is 30.42 kg/(m^2).     Physical Exam   Constitutional: She is oriented to person, place, and time. She appears well-developed and well-nourished. No distress.   HENT:   Head: Normocephalic and atraumatic.   Eyes: EOM are normal. Pupils are equal, round, and reactive to light.   Neck: Normal range of motion. Neck supple.   Cardiovascular: Normal rate and regular rhythm.    No murmur heard.  Mild bilateral LE edema    Pulmonary/Chest: Effort normal and breath sounds normal.   Abdominal: Soft. Bowel sounds are normal. There is no tenderness.   Musculoskeletal: Normal range of motion.   Neurological: She is alert and oriented to person, place, and time.   Skin: Skin is warm and dry. No rash noted.   Psychiatric: She has a normal mood and affect. Her behavior is normal.         Laboratory  Lab Results   Component Value Date    WBC 6.54 02/14/2017    HGB 10.2 (L) 02/14/2017    HCT 32.3 (L) 02/14/2017    MCV 84 02/14/2017     (L) 02/14/2017     BMP  Lab Results   Component Value Date     02/20/2017    K 4.9 02/20/2017     02/20/2017    CO2 23 02/20/2017    BUN 17 02/20/2017    CREATININE 1.0 02/20/2017    CALCIUM 9.7 02/20/2017    ANIONGAP 10 02/20/2017    ESTGFRAFRICA 59.8 (A) 02/20/2017    EGFRNONAA 51.9 (A) 02/20/2017     Lab Results   Component Value Date    ALT 13 02/14/2017    AST 24 02/14/2017    ALKPHOS 121 02/14/2017    BILITOT 1.6 (H) 02/14/2017     Lab Results   Component Value Date    INR 1.3 (H) 02/14/2017    INR 1.1 01/01/2017    INR 1.0 07/24/2015     Lab Results   Component Value Date    HGBA1C 6.1 12/13/2016     No results for input(s): POCTGLUCOSE in the last 72 hours.    Diagnostic  Results:  none      TRANSITION OF CARE:     [facility] Contact Note: 2/16/17    Family and/or Caretaker present at visit?  No.  Diagnostic tests reviewed/disposition: No diagnosic tests pending after this hospitalization.  Disease/illness education: CHF  Home health/community services discussion/referrals: Patient has home health established at [facility].   Establishment or re-establishment of referral orders for community resources: No other necessary community resources.   Discussion with other health care providers: No discussion with other health care providers necessary.     Medications Reconciliation:   I have reconciled the patient's home medications and discharge medications with the patient/family. I have updated all changes.  Refer to After-Visit Medication List.    ASSESSMENT & PLAN:     HIGH RISK CONDITION(S):  none    Essential hypertension  Fair control today in clinic and reports also at home, continue home anti hypertensive regimen.     Acute on chronic combined systolic and diastolic CHF (congestive heart failure)  Stable, no signs further FVO. Doing well since discharge and compliant to lasix but did not take this am as patient was planning to be out and about and did not want frequent urination--she will take today's dose when she gets home. Continue lasix, BB, ARB.     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      Pseudogout of left knee  No further issue since discharge and no need for prophylactic medications at this time per Rheumatology. Wishes to follow up with [facility] whom she previously saw for arthritis.       Instructions for the patient:      Scheduled Follow-up :  Future Appointments  Date Time Provider Department Center   3/1/2017 11:00 AM Joe Burch NP [facility]   3/8/2017 2:00 PM LAB, [facility] LAB  [facility]   3/8/2017 3:00 PM Hammad Humphrey MD [facility] CARDIO [facility]       After Visit Medication List :     Medication List          This list is accurate as of: 2/24/17  1:04 PM.  Always use your most recent med list.                     carvedilol 12.5 MG tablet   Commonly known as:  COREG   Take 1 tablet (12.5 mg total) by mouth 2 (two) times daily.       diclofenac 75 MG EC tablet   Commonly known as:  VOLTAREN       furosemide 40 MG tablet   Commonly known as:  LASIX   Take 1 tablet (40 mg total) by mouth once daily.       losartan 100 MG tablet   Commonly known as:  COZAAR   TAKE ONE TABLET BY MOUTH ONCE DAILY       meclizine 25 mg tablet   Commonly known as:  ANTIVERT       nitrofurantoin (macrocrystal-monohydrate) 100 MG capsule   Commonly known as:  MACROBID   Take 1 capsule (100 mg total) by mouth 2 (two) times daily.       nystatin-triamcinolone cream   Commonly known as:  MYCOLOG II   Apply topically 4 (four) times daily.       omeprazole 20 MG capsule   Commonly known as:  PRILOSEC   TAKE 2 CAPSULES BY MOUTH EVERY DAY FOR ACID REFLUX AND STOMACH       potassium chloride SA 20 MEQ tablet   Commonly known as:  K-DUR,KLOR-CON   TAKE 1 TABLET BY MOUTH DAILY       temazepam 30 mg capsule   Commonly known as:  RESTORIL   TAKE ONE CAPSULE BY MOUTH NIGHTLY AS NEEDED FOR INSOMNIA.       tramadol 50 mg tablet   Commonly known as:  ULTRAM       trazodone 100 MG tablet   Commonly known as:  DESYREL   TAKE 1 TO 2 TABLETS BY MOUTH AT BEDTIME FOR SLEEP               Viki Wade PA-C  Hospitalist-Department of Hospital Medicine  [place] [address] [address] [place] [address]  Office [phone] Pager [phone]

## 2017-02-24 NOTE — MR AVS SNAPSHOT
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, unspecified long term insulin use status         Pseudogout of left knee                To Do List           Future Appointments        Provider Department Dept Phone    3/8/2017 2:00 PM LAB, [facility] [phone]    3/8/2017 3:00 PM Hammad Humphrey MD [facility] [phone]    3/22/2017 1:00 PM Joe Bucrh NP [facility] [phone]      Goals (5 Years of Data)     None      [facility]     [facility] Nurse Care Line -  Assistance  Registered nurses in the [facility] provide clinical advisement, health education, appointment booking, and other advisory services.  Call for this free service at [phone].             Medications           Message regarding Medications     Verify the changes and/or additions to your medication regime listed below are the same as discussed with your clinician today.  If any of these changes or additions are incorrect, please notify your healthcare provider.        STOP taking these medications     tramadol (ULTRAM) 50 mg tablet TK 1 T PO  Q 6 H PRN    trazodone (DESYREL) 100 MG tablet TAKE 1 TO 2 TABLETS BY MOUTH AT BEDTIME FOR SLEEP           Verify that the below list of medications is an accurate representation of the medications you are currently taking.  If none reported, the list may be blank. If incorrect, please contact your  healthcare provider. Carry this list with you in case of emergency.           Current Medications     carvedilol (COREG) 12.5 MG tablet Take 1 tablet (12.5 mg total) by mouth 2 (two) times daily.    diclofenac (VOLTAREN) 75 MG EC tablet     furosemide (LASIX) 40 MG tablet Take 1 tablet (40 mg total) by mouth once daily.    hydrocodone-acetaminophen 10-325mg (NORCO)  mg Tab Take 1 tablet by mouth every 24 hours as needed for Pain (pt takes at night for arthritic pain).    losartan (COZAAR) 100 MG tablet TAKE ONE TABLET BY MOUTH ONCE DAILY    nitrofurantoin, macrocrystal-monohydrate, (MACROBID) 100 MG capsule Take 1 capsule (100 mg total) by mouth 2 (two) times daily.    nystatin-triamcinolone (MYCOLOG II) cream Apply topically 4 (four) times daily.    omeprazole (PRILOSEC) 20 MG capsule TAKE 2 CAPSULES BY MOUTH EVERY DAY FOR ACID REFLUX AND STOMACH    potassium chloride SA (K-DUR,KLOR-CON) 20 MEQ tablet TAKE 1 TABLET BY MOUTH DAILY    temazepam (RESTORIL) 30 mg capsule TAKE ONE CAPSULE BY MOUTH NIGHTLY AS NEEDED FOR INSOMNIA.    meclizine (ANTIVERT) 25 mg tablet            Clinical Reference Information           Your Vitals Were     BP                   146/66 (BP Location: Right arm, Patient Position: Sitting, BP Method: Manual)           Blood Pressure          Most Recent Value    BP  (!)  146/66      Allergies as of 2/24/2017     Ace Inhibitors    Aspirin    Aciphex  [Rabeprazole]    Bextra  [Valdecoxib]    Clonidine    Cardizem  [Diltiazem Hcl]    Mavik  [Trandolapril]    Nsaids (Non-steroidal Anti-inflammatory Drug)    Phenytoin Sodium Extended    Tramadol      Immunizations Administered on Date of Encounter - 2/24/2017     None      Instructions    You have an appointment to see Dr. Rolando Estevez on  Wednesday March 1 at 2:45 PM at  [address] [place] [address]  Phone # [phone]  I spoke with Jaylene.       Language Assistance Services     ATTENTION: Language assistance services  are available, free of charge. Please call [phone].      ATENCIÓN: Si habla español, tiene a rai disposición servicios gratuitos de asistencia lingüística. Llame al [phone].     CHÚ Ý: N?u b?n nói Ti?ng Vi?t, có các d?ch v? h? tr? ngôn ng? mi?n phí dành cho b?n. G?i s? [phone].         [facility] complies with applicable Federal civil rights laws and does not discriminate on the basis of race, color, national origin, age, disability, or sex.

## 2017-02-25 ENCOUNTER — TELEPHONE (OUTPATIENT)
Dept: OTHER | Facility: OTHER | Age: 82
End: 2017-02-25

## 2017-02-25 NOTE — TELEPHONE ENCOUNTER
"[facility] Appt 2/24:  Today: Patient reports feeling better overall since discharge. She states she has been breathing better and able to get around more easily. She has been taking lasix 40mg daily and elevated her legs if ankles swell after being on her feet for extended time. Has been working with PT/OT through . She was supposed to be checking daily weight but scale obtained for patient was not calibrated so working on correcting this at this time and using her own home scale for now. She reports she has been a bit weak but attributes this to decreased PO intake--she did not know what foods were allowed for fluid overload until she received a low sodium diet instruction form today in the mail--she will begin following this and urged not to skip meals. She states she mostly eat steamed vegetables and chicken. Her DM is currently controlled with diet only. She reports some continued LE arthralgias for which she [facility] and would like to follow up.     Acute on chronic combined systolic and diastolic CHF (congestive heart failure)  Stable, no signs further FVO. Doing well since discharge and compliant to lasix but did not take this am as patient was planning to be out and about and did not want frequent urination--she will take today's dose when she gets home. Continue lasix, BB, ARB.     Ms. Nunes saw PC yesterday. They feel she is stable from CHF standpoint. Ms. Nunes said she is constipated this morning and she has taken a laxative. She has still not set up her BLIP scale, but has been weighing on her home scale. She has not weighed on her scale today. She said "they have been trying to set up the [facility] scale, but no one can do it." Explained once again that it is very simple and if they really want to set it up and need assistance they can call us to walk them through it. Explained that in order to help her, we really need to get her weights. It is unclear at " this point if she is really trying to get the scale set up because she tells us the same thing each time we attempt to help. If she does not set up scale, explained that we will call 1-2 weekly to check in. She has cardiology and labs scheduled for 3/8.

## 2017-02-27 ENCOUNTER — TELEPHONE (OUTPATIENT)
Dept: FAMILY MEDICINE | Facility: CLINIC | Age: 82
End: 2017-02-27

## 2017-02-27 RX ORDER — POTASSIUM CHLORIDE 20 MEQ/1
20 TABLET, EXTENDED RELEASE ORAL DAILY
Qty: 90 TABLET | Refills: 1 | Status: SHIPPED | OUTPATIENT
Start: 2017-02-27 | End: 2017-08-10 | Stop reason: SDUPTHER

## 2017-02-27 RX ORDER — POLYETHYLENE GLYCOL 3350 17 G/17G
17 POWDER, FOR SOLUTION ORAL 3 TIMES DAILY
Qty: 119 G | Refills: 0 | Status: SHIPPED | OUTPATIENT
Start: 2017-02-27 | End: 2017-06-08

## 2017-02-27 NOTE — TELEPHONE ENCOUNTER
----- Message from Jeffrey Aguilar sent at 2/25/2017  8:13 AM CST -----  Contact: Self/[phone]  Patient states that she's having trouble with her bowels. She would like to speak to staff in regards to this matter. Thank you.

## 2017-02-27 NOTE — TELEPHONE ENCOUNTER
Patient has constipation . She has been taking dulcolax but has not helped. Asked if she is taking the hydrocodone which she states she has not taken this as I reminded this can cause constipation. Other wish send in a prescription or advise.

## 2017-03-01 ENCOUNTER — TELEPHONE (OUTPATIENT)
Dept: OTHER | Facility: OTHER | Age: 82
End: 2017-03-01

## 2017-03-01 NOTE — TELEPHONE ENCOUNTER
Ms. Nunes has not submitted any weights to the CHF Digital Medicine program as of day 13. Called today to assess HF s/s and discuss her participation in the program. There was no answer. Will try to reach her again at the end of the week.

## 2017-03-02 RX ORDER — TEMAZEPAM 30 MG/1
CAPSULE ORAL
Qty: 30 CAPSULE | Refills: 5 | Status: SHIPPED | OUTPATIENT
Start: 2017-03-02 | End: 2017-09-07 | Stop reason: SDUPTHER

## 2017-03-03 ENCOUNTER — TELEPHONE (OUTPATIENT)
Dept: OTHER | Facility: OTHER | Age: 82
End: 2017-03-03

## 2017-03-03 DIAGNOSIS — I50.43 ACUTE ON CHRONIC COMBINED SYSTOLIC AND DIASTOLIC HEART FAILURE: Primary | ICD-10-CM

## 2017-03-03 NOTE — TELEPHONE ENCOUNTER
"Ms. Nunes has still not submitted any weights to the [facility] Digital Medicine program as day 15. She said that she weighs herself on her home scale and reports a weight of 169 lbs today. She said her initial weight was 180 lbs 15 days ago. She said HH comes to check on her but she doesn't have the contact information available. Based [facility] note, it appears she has [facility]. She has not had labs done since being discharged home. Cr was 1.0 and BUN was 17 at discharge. She denies any LE/ABD swelling. She denies SOB/ROTH. She denies LH/dizziness. Her only complaint is pain in her foot instep. She said she has to see her podiatrist and she is upset that has no pain medication because the pain keeps her up at night. Explained my concern for her reported fluid loss, and her response was that she would stop taking Lasix. Explained that I can't advise her to do that because I don't have any labs or confirmed weights from our scale to determine if she should be off of Lasix or reduced Lasix dose. Ms. Nunes was upset and hung up the phone. She called her HH nurse to let her know that I called and was concerned about her weight loss. Her HH nurse, Maritza called to discuss Ms. Nunes. Explained our role and the purpose of our program. Maritza will assist Ms. Nunes with setting up the scale at her next visit on Monday. She will also repeat labs (BMP/BNP) at this visit. Maritza feels Ms. Nunes needed to lose this much fluid weight as she had "4+ edema" when she was discharged. She said her swelling is now resolved and she has no complaints of LH/dizziness. She said her weight was stable around 174-175 lbs for a week, but has dropped off recently to 169. Asked that she call us if she has any concerns. She will remind Ms. Nunes of her cardiology appointment on Wednesday. She said she will also call Ms. Nunes back to ensure she is no longer upset and feels better that she and I have spoken. We will check in " with Maritza again on Monday.

## 2017-03-03 NOTE — TELEPHONE ENCOUNTER
No answer at patient's home address. Spoke with PAUL, states that the patient has pain in her lower extremities. She's rating her pain a 6/10 and she's having trouble walking. Requesting a refill for pain medication. Please advise.

## 2017-03-03 NOTE — TELEPHONE ENCOUNTER
----- Message from Anatoliyarpitsilvio Jeff sent at 3/2/2017  2:54 PM CST -----  Contact: Antonio/[facility] /[phone]  Antonio states that the patient has pain in her lower extremities. She's rating her pain a 6/10 and she's having trouble walking. She would like to speak to the staff regarding this matter. Thank you.

## 2017-03-06 ENCOUNTER — LAB VISIT (OUTPATIENT)
Dept: LAB | Facility: HOSPITAL | Age: 82
End: 2017-03-06
Attending: INTERNAL MEDICINE
Payer: MEDICARE

## 2017-03-06 ENCOUNTER — TELEPHONE (OUTPATIENT)
Dept: OTHER | Facility: OTHER | Age: 82
End: 2017-03-06

## 2017-03-06 DIAGNOSIS — I50.23 ACUTE ON CHRONIC SYSTOLIC HEART FAILURE: Primary | ICD-10-CM

## 2017-03-06 LAB
ANION GAP SERPL CALC-SCNC: 10 MMOL/L
BNP SERPL-MCNC: 372 PG/ML
BUN SERPL-MCNC: 13 MG/DL
CALCIUM SERPL-MCNC: 10.1 MG/DL
CHLORIDE SERPL-SCNC: 104 MMOL/L
CO2 SERPL-SCNC: 26 MMOL/L
CREAT SERPL-MCNC: 1.1 MG/DL
EST. GFR  (AFRICAN AMERICAN): 53.3 ML/MIN/1.73 M^2
EST. GFR  (NON AFRICAN AMERICAN): 46.2 ML/MIN/1.73 M^2
GLUCOSE SERPL-MCNC: 101 MG/DL
POTASSIUM SERPL-SCNC: 4.4 MMOL/L
SODIUM SERPL-SCNC: 140 MMOL/L

## 2017-03-06 PROCEDURE — 83880 ASSAY OF NATRIURETIC PEPTIDE: CPT

## 2017-03-06 PROCEDURE — 80048 BASIC METABOLIC PNL TOTAL CA: CPT

## 2017-03-06 NOTE — TELEPHONE ENCOUNTER
Ms. Nunes has called in this morning to confirm we received her weight. Her home health nurse, Maritza, set it up for her and weight transmitted sucessfully. Reviewed proper weighing technique so that tomorrow's weight will be dry weight. She denies any AMRIK and has no complaints of SOB/ROTH. She is getting good U/O. She had a few questions about food options and whether or not it was okay to eat certain things. Reviewed this with her and advised against all cured meats even if it's turkey. She also said she no longer eats instant oatmeal and grits due to salt content. She now eats the ones that require her to cook them on the stove. Explained that we will call back if labs are concerning or if weights are concerning. If labs and weights remain stable, we will check in Thursday after cardiology clinic notes are reviewed.     Weight: 77.7 kg (171 lb 6.4 oz) (3/6/2017 10:40 AM)

## 2017-03-07 ENCOUNTER — TELEPHONE (OUTPATIENT)
Dept: OTHER | Facility: OTHER | Age: 82
End: 2017-03-07

## 2017-03-07 RX ORDER — HYDROCODONE BITARTRATE AND ACETAMINOPHEN 10; 325 MG/1; MG/1
1 TABLET ORAL
OUTPATIENT
Start: 2017-03-07

## 2017-03-07 NOTE — TELEPHONE ENCOUNTER
Mrs Nunes reports that she could not get our scale to power on.  She did weigh on her scale and reports a stable, albeit, lower weight.  Difficult to compare since we are using her home scale which may vary.  She reports having a cold this morning with a cough, no HA or fever.  She states that her legs and knees are hurting.  She says that she has reached out to her PCP for pain medicine.  She will eat breakfast in a few minutes and will rest most of the day.  We discussed leg elevation and fluid intake today.  She feels that her AMRIK and breathing are stable.  She no longer reports ROTH when walking from her kitchen to the bath.  She reports that she will attend her cardiology follow up tomorrow.  We will review notes and contact her after her visit. May need to reach out to  if scale does not function for her.  Weight: 76.7 kg (169 lb) (3/7/2017  9:45 AM)

## 2017-03-08 ENCOUNTER — OFFICE VISIT (OUTPATIENT)
Dept: CARDIOLOGY | Facility: CLINIC | Age: 82
End: 2017-03-08
Payer: MEDICARE

## 2017-03-08 VITALS
WEIGHT: 169.88 LBS | HEART RATE: 72 BPM | OXYGEN SATURATION: 97 % | DIASTOLIC BLOOD PRESSURE: 71 MMHG | SYSTOLIC BLOOD PRESSURE: 167 MMHG | BODY MASS INDEX: 29 KG/M2 | HEIGHT: 64 IN

## 2017-03-08 DIAGNOSIS — I73.9 PVD (PERIPHERAL VASCULAR DISEASE): ICD-10-CM

## 2017-03-08 DIAGNOSIS — I10 ESSENTIAL HYPERTENSION: ICD-10-CM

## 2017-03-08 DIAGNOSIS — I50.23 ACUTE ON CHRONIC SYSTOLIC CONGESTIVE HEART FAILURE: Primary | ICD-10-CM

## 2017-03-08 DIAGNOSIS — I70.0 AORTIC ATHEROSCLEROSIS: ICD-10-CM

## 2017-03-08 DIAGNOSIS — E78.5 DYSLIPIDEMIA: ICD-10-CM

## 2017-03-08 PROCEDURE — 99214 OFFICE O/P EST MOD 30 MIN: CPT | Mod: S$GLB,,, | Performed by: INTERNAL MEDICINE

## 2017-03-08 PROCEDURE — 99499 UNLISTED E&M SERVICE: CPT | Mod: S$GLB,,, | Performed by: INTERNAL MEDICINE

## 2017-03-08 PROCEDURE — 3077F SYST BP >= 140 MM HG: CPT | Mod: S$GLB,,, | Performed by: INTERNAL MEDICINE

## 2017-03-08 PROCEDURE — 1126F AMNT PAIN NOTED NONE PRSNT: CPT | Mod: S$GLB,,, | Performed by: INTERNAL MEDICINE

## 2017-03-08 PROCEDURE — 99999 PR PBB SHADOW E&M-EST. PATIENT-LVL III: CPT | Mod: PBBFAC,,, | Performed by: INTERNAL MEDICINE

## 2017-03-08 PROCEDURE — 3078F DIAST BP <80 MM HG: CPT | Mod: S$GLB,,, | Performed by: INTERNAL MEDICINE

## 2017-03-08 PROCEDURE — 1157F ADVNC CARE PLAN IN RCRD: CPT | Mod: S$GLB,,, | Performed by: INTERNAL MEDICINE

## 2017-03-08 PROCEDURE — 1159F MED LIST DOCD IN RCRD: CPT | Mod: S$GLB,,, | Performed by: INTERNAL MEDICINE

## 2017-03-08 PROCEDURE — 1160F RVW MEDS BY RX/DR IN RCRD: CPT | Mod: S$GLB,,, | Performed by: INTERNAL MEDICINE

## 2017-03-08 NOTE — MR AVS SNAPSHOT
[facility] - Cardiology  [address] [place] [address]  Phone: [phone]                  Magaly Nunes   3/8/2017 3:00 PM   Office Visit    Description:  Female : [id number]   Provider:  Hammad Humphrey MD   Department:  [facility] - Cardiology           Reason for Visit     Follow-up                To Do List           Future Appointments        Provider Department Dept Phone    3/22/2017 1:00 PM Joe Burch NP [facility] [phone]      Goals (5 Years of Data)     None      [facility]     [facility] On [facility] Nurse Care Line -  Assistance  Registered nurses in the [facility] provide clinical advisement, health education, appointment booking, and other advisory services.  Call for this free service at [phone].             Medications           Message regarding Medications     Verify the changes and/or additions to your medication regime listed below are the same as discussed with your clinician today.  If any of these changes or additions are incorrect, please notify your healthcare provider.             Verify that the below list of medications is an accurate representation of the medications you are currently taking.  If none reported, the list may be blank. If incorrect, please contact your healthcare provider. Carry this list with you in case of emergency.           Current Medications     carvedilol (COREG) 12.5 MG tablet Take 1 tablet (12.5 mg total) by mouth 2 (two) times daily.    diclofenac (VOLTAREN) 75 MG EC tablet     furosemide (LASIX) 40 MG tablet Take 1 tablet (40 mg total) by mouth once daily.    hydrocodone-acetaminophen 10-325mg (NORCO)  mg Tab Take 1 tablet by mouth every 24 hours as needed for Pain (pt takes at night for arthritic pain).    losartan (COZAAR) 100 MG tablet TAKE ONE TABLET BY MOUTH ONCE DAILY    meclizine (ANTIVERT) 25 mg tablet     nystatin-triamcinolone (MYCOLOG II) cream Apply topically 4 (four)  "times daily.    omeprazole (PRILOSEC) 20 MG capsule TAKE 2 CAPSULES BY MOUTH EVERY DAY FOR ACID REFLUX AND STOMACH    polyethylene glycol (GLYCOLAX) 17 gram/dose powder Take 17 g by mouth 3 (three) times daily.    potassium chloride SA (K-DUR,KLOR-CON) 20 MEQ tablet Take 1 tablet (20 mEq total) by mouth once daily.    temazepam (RESTORIL) 30 mg capsule TAKE ONE CAPSULE BY MOUTH EVERY NIGHT AT BEDTIME AS NEEDED FOR INSOMNIA           Clinical Reference Information           Your Vitals Were     BP Pulse Height Weight Last Period SpO2    167/71 (BP Location: Right arm, Patient Position: Sitting, BP Method: Automatic) 72 5' 4" (1.626 m) 77.1 kg (169 lb 14.4 oz) (LMP Unknown) 97%    BMI                29.16 kg/m2          Blood Pressure          Most Recent Value    BP  (!)  167/71      Allergies as of 3/8/2017     Ace Inhibitors    Aspirin    Aciphex  [Rabeprazole]    Bextra  [Valdecoxib]    Clonidine    Cardizem  [Diltiazem Hcl]    Mavik  [Trandolapril]    Nsaids (Non-steroidal Anti-inflammatory Drug)    Phenytoin Sodium Extended    Tramadol      Immunizations Administered on Date of Encounter - 3/8/2017     None      Language Assistance Services     ATTENTION: Language assistance services are available, free of charge. Please call [phone].      ATENCIÓN: Si luciela alvaro, tiene a rai disposición servicios gratuitos de asistencia lingüística. Llame al [phone].     [facility] Ý: N?u b?n nói Ti?ng Vi?t, có các d?ch v? h? tr? ngôn ng? mi?n phí dành cho b?n. G?i s? [phone].         [facility] complies with applicable Federal civil rights laws and does not discriminate on the basis of race, color, national origin, age, disability, or sex.        "

## 2017-03-08 NOTE — PROGRESS NOTES
Subjective:    Patient ID:  Magaly Nunes is a 84 y.o. female who presents for follow-up of Follow-up      HPI   previous history:  Patient was added on to clinic today for follow-up of recent hospitalization for newly diagnosed cardiomyopathy.  She previously in years past and had known documented normal LV systolic function.  She's had long-standing high blood pressure which is been uncontrolled.  She presented with volume overload and was diuresed adequately.  She is a newly mildly reduced ejection fraction and underwent ischemic workup which showed no significant ischemia.  She was placed on carvedilol and blood pressure today is adequate.  On discharge she was not placed on a maintenance diuretic on in her list.  She says she went home and took some salty greens amongst other things.  She says she's noticed progressive shortness of breath similar to her presentation to the hospital again.  She denies any worsening chest pain or palpitations.  She does have PND and orthopnea but no significant lower extremity edema.  She denies any dizziness, presyncope or syncope.    Today:  Here for follow-up of systolic heart failure.  She's been doing well.  She's been taking all her medications.  She denies any issues with fluid.  She's expands no chest pain, shortness of breath or palpitations.  She denies any PND, orthopnea or lower edema.  She's not expressing dizziness, presyncope or syncope.  She's interested in trying to do exercises.  She says she's had no problems with her diuretic but does not like urinating so much.    Review of Systems   Constitution: Negative.   HENT: Negative.    Eyes: Negative.    Cardiovascular: Positive for dyspnea on exertion. Negative for chest pain, irregular heartbeat, leg swelling, near-syncope, orthopnea, palpitations, paroxysmal nocturnal dyspnea and syncope.   Respiratory: Positive for shortness of breath.    Skin: Negative.    Musculoskeletal: Negative.    Gastrointestinal:  Negative for abdominal pain, constipation and diarrhea.   Genitourinary: Negative for dysuria.   Neurological: Negative.    Psychiatric/Behavioral: Negative.         Objective:    Physical Exam   Constitutional: She is oriented to person, place, and time. She appears well-developed and well-nourished.   HENT:   Head: Normocephalic and atraumatic.   Eyes: Conjunctivae and EOM are normal. Pupils are equal, round, and reactive to light.   Neck: Normal range of motion. Neck supple. No thyromegaly present.   Cardiovascular: Normal rate and regular rhythm.    No murmur heard.  Pulmonary/Chest: Effort normal and breath sounds normal. No respiratory distress.   Abdominal: Soft. Bowel sounds are normal.   Musculoskeletal: She exhibits no edema.   Neurological: She is alert and oriented to person, place, and time.   Skin: Skin is warm and dry.   Psychiatric: She has a normal mood and affect. Her behavior is normal.       echo:  CONCLUSIONS     1 - Mildly depressed left ventricular systolic function (EF 45-50%).     2 - Severe left atrial enlargement.     3 - Left ventricular diastolic dysfunction.     4 - Normal right ventricular systolic function .     5 - Pulmonary hypertension. The estimated PA systolic pressure is 72 mmHg.     6 - Mild to moderate mitral regurgitation.     7 - Intermediate central venous pressure.     NST:  Impression: NORMAL MYOCARDIAL PERFUSION  1. The perfusion scan is free of evidence for myocardial ischemia or injury.   2. Resting wall motion is physiologic.   3. There is resting LV dysfunction with a reduced ejection fraction of 38 %.   4. The ventricular volumes are normal at rest and stress.   5. The extracardiac distribution of radioactivity is normal.   6. When compared to the previous study from 09/08/2015, no significant defects but LV function now decreased with TID now present.      Assessment:       1. Acute on chronic systolic congestive heart failure    2. Essential hypertension    3.  Aortic atherosclerosis    4. PVD (peripheral vascular disease)    5. Dyslipidemia         Plan:       -Continue maximize medical therapy for systolic heart failure  -Stable on maintenance Lasix, try to wean at next visit  -Salt restriction  -Exercise as tolerated    Return to clinic in 3 month

## 2017-03-09 ENCOUNTER — TELEPHONE (OUTPATIENT)
Dept: OTHER | Facility: OTHER | Age: 82
End: 2017-03-09

## 2017-03-13 ENCOUNTER — TELEPHONE (OUTPATIENT)
Dept: OTHER | Facility: OTHER | Age: 82
End: 2017-03-13

## 2017-03-13 ENCOUNTER — OFFICE VISIT (OUTPATIENT)
Dept: FAMILY MEDICINE | Facility: CLINIC | Age: 82
End: 2017-03-13
Payer: MEDICARE

## 2017-03-13 ENCOUNTER — LAB VISIT (OUTPATIENT)
Dept: LAB | Facility: HOSPITAL | Age: 82
End: 2017-03-13
Attending: NURSE PRACTITIONER
Payer: MEDICARE

## 2017-03-13 VITALS
SYSTOLIC BLOOD PRESSURE: 130 MMHG | BODY MASS INDEX: 29.95 KG/M2 | WEIGHT: 169.06 LBS | HEART RATE: 69 BPM | DIASTOLIC BLOOD PRESSURE: 70 MMHG | TEMPERATURE: 98 F | HEIGHT: 63 IN | OXYGEN SATURATION: 97 %

## 2017-03-13 DIAGNOSIS — E11.29 CONTROLLED TYPE 2 DIABETES MELLITUS WITH OTHER DIABETIC KIDNEY COMPLICATION, UNSPECIFIED LONG TERM INSULIN USE STATUS: ICD-10-CM

## 2017-03-13 DIAGNOSIS — E11.29 CONTROLLED TYPE 2 DIABETES MELLITUS WITH OTHER DIABETIC KIDNEY COMPLICATION, UNSPECIFIED LONG TERM INSULIN USE STATUS: Primary | ICD-10-CM

## 2017-03-13 LAB
ANION GAP SERPL CALC-SCNC: 11 MMOL/L
BUN SERPL-MCNC: 13 MG/DL
CALCIUM SERPL-MCNC: 10.1 MG/DL
CHLORIDE SERPL-SCNC: 103 MMOL/L
CO2 SERPL-SCNC: 25 MMOL/L
CREAT SERPL-MCNC: 1.1 MG/DL
EST. GFR  (AFRICAN AMERICAN): 53.3 ML/MIN/1.73 M^2
EST. GFR  (NON AFRICAN AMERICAN): 46.2 ML/MIN/1.73 M^2
FUNGUS SPEC CULT: NORMAL
GLUCOSE SERPL-MCNC: 105 MG/DL
POTASSIUM SERPL-SCNC: 4.5 MMOL/L
SODIUM SERPL-SCNC: 139 MMOL/L

## 2017-03-13 PROCEDURE — 99499 UNLISTED E&M SERVICE: CPT | Mod: S$GLB,,, | Performed by: NURSE PRACTITIONER

## 2017-03-13 PROCEDURE — 1160F RVW MEDS BY RX/DR IN RCRD: CPT | Mod: S$GLB,,, | Performed by: NURSE PRACTITIONER

## 2017-03-13 PROCEDURE — 83036 HEMOGLOBIN GLYCOSYLATED A1C: CPT

## 2017-03-13 PROCEDURE — 99999 PR PBB SHADOW E&M-EST. PATIENT-LVL III: CPT | Mod: PBBFAC,,, | Performed by: NURSE PRACTITIONER

## 2017-03-13 PROCEDURE — 80048 BASIC METABOLIC PNL TOTAL CA: CPT

## 2017-03-13 PROCEDURE — 1159F MED LIST DOCD IN RCRD: CPT | Mod: S$GLB,,, | Performed by: NURSE PRACTITIONER

## 2017-03-13 PROCEDURE — 99213 OFFICE O/P EST LOW 20 MIN: CPT | Mod: S$GLB,,, | Performed by: NURSE PRACTITIONER

## 2017-03-13 PROCEDURE — 1157F ADVNC CARE PLAN IN RCRD: CPT | Mod: S$GLB,,, | Performed by: NURSE PRACTITIONER

## 2017-03-13 PROCEDURE — 3075F SYST BP GE 130 - 139MM HG: CPT | Mod: S$GLB,,, | Performed by: NURSE PRACTITIONER

## 2017-03-13 PROCEDURE — 3078F DIAST BP <80 MM HG: CPT | Mod: S$GLB,,, | Performed by: NURSE PRACTITIONER

## 2017-03-13 PROCEDURE — 36415 COLL VENOUS BLD VENIPUNCTURE: CPT | Mod: PO

## 2017-03-13 RX ORDER — GABAPENTIN 100 MG/1
100 CAPSULE ORAL 3 TIMES DAILY
Qty: 90 CAPSULE | Refills: 11 | Status: SHIPPED | OUTPATIENT
Start: 2017-03-13 | End: 2017-06-08

## 2017-03-13 RX ORDER — KETOCONAZOLE 20 MG/G
CREAM TOPICAL DAILY
Qty: 30 G | Refills: 0 | Status: SHIPPED | OUTPATIENT
Start: 2017-03-13 | End: 2017-06-08

## 2017-03-13 NOTE — TELEPHONE ENCOUNTER
Ms. Nunes reports stable weight of 169 lbs all weekend. She has no complaints of LE/ABD swelling or SOB/ROTH. She is seeing her PCP this morning for a possible sinus infection. She is not complaining of any SOB/ROTH. She is also still experiencing leg pain that keeps her up at night. She will be seeing another MD for this problem today and she is hoping to get more pain medications. Her  nurse is due for a visit on Thursday. Asked Ms. Nunes to continue weighing herself daily and call with a 2 lb change in her weight up or down. We will continue to check in periodically through her last day on Saturday.     Weight: 76.7 kg (169 lb) (3/9/2017 10:49 AM)

## 2017-03-13 NOTE — PROGRESS NOTES
This dictation has been generated using Dragon Dictation some phonetic errors may occur.     Magaly was seen today for leg pain.    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  -     ketoconazole (NIZORAL) 2 % cream; Apply topically once daily. Right foot      Diabetes update labs as above.  Reevaluate renal status.  Some downward trend noted. Make sure this has stabilized. If not change lasix.   Pain patient requests gabapentin.  Titrate upward and discussed risk of somnolence.  Rash left lower extremity - foot.  Ketoconazole as above.     Return if symptoms worsen or fail to improve.    Prior studies at the hospital ultrasound negative for DVT.  Cardiac labs noted.   Nuclear perfusion test notes EF 38%.  ________________________________________________________________  ________________________________________________________________        Chief Complaint   Patient presents with    Leg Pain     left side     History of present illness  This 84 y.o. presents today for complaint of left leg swelling.  Patient went to the emergency room in February for this issue.  She was noted to have some heart failure symptoms and 60 mg of Lasix IV was given.  She was discharged home on 40 mg of Lasix.  It was also recommended at the hospital that she follow-up with the orthopedist.  She has a follow-up appointment with Dr. Curry at 3 PM today.  Leg swelling has improved however she still has some swelling in the leg and some pain.  She's also concerned about dry skin on the dorsal aspect of the foot.  She notes pain.  She requests gabapentin.  Other pain meds have not been helpful.  Indicates she is trying to avoid  salt.  Review of systems  No fever or chills  No chest pain or shortness of breath  Nausea vomiting or diarrhea  Past medical and social history reviewed    Past Medical History:   Diagnosis Date    Anxiety disorder     Carpal tunnel syndrome     CHF (congestive heart failure)     Chronic allergic rhinitis     Chronic pain 10/22/2012    CKD stage 3 due to type 2 diabetes mellitus 2/14/2017    Constipation - functional 4/10/2013    Depression     Diabetes mellitus type II     Hot flash not due to menopause     HTN (hypertension)     Hypokalemia 11/19/2012    Insomnia 4/10/2013    Knee pain, bilateral 7/24/2013    Personal history of DVT (deep vein thrombosis)     Renovascular hypertension 2/14/2017    Severe tricuspid regurgitation 2/14/2017    Spinal stenosis     Dr. Corrales    Spinal stenosis of lumbar region 2/3/2014    Vertigo        Past Surgical History:   Procedure Laterality Date    CATARACT EXTRACTION Bilateral     EYE SURGERY      FRACTURE SURGERY      HYSTERECTOMY      ORIF RADIUS & ULNA FRACTURES         Family History   Problem Relation Age of Onset    No Known Problems Mother     No Known Problems Father     No Known Problems Sister     No Known Problems Brother     No Known Problems Maternal Aunt     No Known Problems Maternal Uncle     No Known Problems Paternal Aunt     No Known Problems Paternal Uncle     No Known Problems Maternal Grandmother     No Known Problems Maternal Grandfather     No Known Problems Paternal Grandmother     No Known Problems Paternal Grandfather     Amblyopia Neg Hx     Blindness Neg Hx     Cancer Neg Hx     Diabetes Neg Hx     Glaucoma Neg Hx     Hypertension Neg Hx     Macular degeneration Neg Hx     Retinal detachment Neg Hx     Strabismus Neg Hx     Stroke Neg Hx     Thyroid disease Neg Hx        Social History     Social History    Marital status:      Spouse name: N/A    Number of children: N/A    Years of  education: N/A     Social History Main Topics    Smoking status: Never Smoker    Smokeless tobacco: None    Alcohol use No    Drug use: No    Sexual activity: No     Other Topics Concern    None     Social History Narrative    .  Lives alone.   and two sons have .  Active.         Current Outpatient Prescriptions   Medication Sig Dispense Refill    carvedilol (COREG) 12.5 MG tablet Take 1 tablet (12.5 mg total) by mouth 2 (two) times daily. 60 tablet 11    diclofenac (VOLTAREN) 75 MG EC tablet   2    furosemide (LASIX) 40 MG tablet Take 1 tablet (40 mg total) by mouth once daily. 30 tablet 11    hydrocodone-acetaminophen 10-325mg (NORCO)  mg Tab Take 1 tablet by mouth every 24 hours as needed for Pain (pt takes at night for arthritic pain).      losartan (COZAAR) 100 MG tablet TAKE ONE TABLET BY MOUTH ONCE DAILY 90 tablet 0    meclizine (ANTIVERT) 25 mg tablet       omeprazole (PRILOSEC) 20 MG capsule TAKE 2 CAPSULES BY MOUTH EVERY DAY FOR ACID REFLUX AND STOMACH 180 capsule 1    polyethylene glycol (GLYCOLAX) 17 gram/dose powder Take 17 g by mouth 3 (three) times daily. 119 g 0    potassium chloride SA (K-DUR,KLOR-CON) 20 MEQ tablet Take 1 tablet (20 mEq total) by mouth once daily. 90 tablet 1    temazepam (RESTORIL) 30 mg capsule TAKE ONE CAPSULE BY MOUTH EVERY NIGHT AT BEDTIME AS NEEDED FOR INSOMNIA 30 capsule 5    gabapentin (NEURONTIN) 100 MG capsule Take 1 capsule (100 mg total) by mouth 3 (three) times daily. 90 capsule 11    ketoconazole (NIZORAL) 2 % cream Apply topically once daily. Right foot 30 g 0     No current facility-administered medications for this visit.        Review of patient's allergies indicates:   Allergen Reactions    Ace inhibitors      Other reaction(s): Unknown    Aspirin      Other reaction(s): Unknown    Aciphex  [rabeprazole]      Other reaction(s): Stomach upset    Bextra  [valdecoxib]      Other reaction(s): Unknown    Clonidine       Other reaction(s): dry mouth.lip swelling    Cardizem  [diltiazem hcl]      Other reaction(s): Unknown    Mavik  [trandolapril]      Other reaction(s): Unknown    Nsaids (non-steroidal anti-inflammatory drug)      Other reaction(s): black spots on skin    Phenytoin sodium extended      Other reaction(s): Stomach upset    Tramadol      Other reaction(s): stomach pain       Physical examination  Vitals Reviewed  Gen. Well-dressed well-nourished no apparent distress  Skin warm dry and intact.  No rashes noted.  Neck is supple without adenopathy  Chest.  Respirations are even unlabored.  Lungs are clear to auscultation.  Cardiac regular rate and rhythm.  No chest wall adenopathy noted.  Neuro. Awake alert oriented x4.  Normal judgment and cognition noted.  Extremities no clubbing or cyanosis.  Edema noted bilateral lower extremities greater on the left.     Call or return to clinic prn if these symptoms worsen or fail to improve as anticipated.

## 2017-03-14 ENCOUNTER — TELEPHONE (OUTPATIENT)
Dept: FAMILY MEDICINE | Facility: CLINIC | Age: 82
End: 2017-03-14

## 2017-03-14 LAB
ESTIMATED AVG GLUCOSE: 140 MG/DL
HBA1C MFR BLD HPLC: 6.5 %

## 2017-03-14 NOTE — TELEPHONE ENCOUNTER
Called patient about her test results. She verbally understood. Asked if she was still suppose to take her Lasix Camilo Burch NP said yes her kidneys look  Good.

## 2017-03-16 ENCOUNTER — TELEPHONE (OUTPATIENT)
Dept: OTHER | Facility: OTHER | Age: 82
End: 2017-03-16

## 2017-03-16 NOTE — TELEPHONE ENCOUNTER
Ms. Nunes reports a stable weight this morning. She had labs done and kidney function is stable, so she remains on Lasix 40 mg QD. She denies any AMRIK or ABD bloating. She denies any SOB/ROTH. She has no LH/dizziness. Asked that she call us over the next 2 days with a 2 lb change in her weight up or down. Her final day in the monitoring program is Saturday. She was given gabapentin to help with her leg/foot pain and feels things are starting to improve. She does see the podiatrist at the end of the month. She will continue to monitor her weight on her home scale and will call her cardiologist with a weight gain of 3 lbs/day or 5 lbs/week. She will return the BLIP scale next week to the [facility] information desk. Exit interview complete.     Weight: 76.7 kg (169 lb) (3/9/2017 10:49 AM)

## 2017-03-21 ENCOUNTER — TELEPHONE (OUTPATIENT)
Dept: FAMILY MEDICINE | Facility: CLINIC | Age: 82
End: 2017-03-21

## 2017-03-21 NOTE — TELEPHONE ENCOUNTER
Only one anti-inflammatory should be taken and the diclofenac and the Celebrex are both anti-inflammatories and patient needs to be aware of that.  If she wants to take the Celebrex, she will need to put the diclofenac 75 mg on hold    Otherwise, Celebrex okay to take with other medicines

## 2017-03-21 NOTE — TELEPHONE ENCOUNTER
[facility] reported drug (Celebrex) has an interaction with other medications; (Cozaar), (Lasix), and (Voltaren). Need approval to continue (Celebrex). Please advise.

## 2017-03-21 NOTE — TELEPHONE ENCOUNTER
Informed patient of information below. Verbalized understanding. Patient will discontinue (Celebrex)

## 2017-03-21 NOTE — TELEPHONE ENCOUNTER
----- Message from Daxa Rodriguez sent at 3/21/2017  8:54 AM CDT -----  [facility]  [facility] called concerning the script for CELEBREX 200 mg capsule. Please advise. [phone]

## 2017-03-29 ENCOUNTER — OFFICE VISIT (OUTPATIENT)
Dept: PODIATRY | Facility: CLINIC | Age: 82
End: 2017-03-29
Payer: MEDICARE

## 2017-03-29 VITALS — SYSTOLIC BLOOD PRESSURE: 120 MMHG | DIASTOLIC BLOOD PRESSURE: 60 MMHG

## 2017-03-29 DIAGNOSIS — M20.10 HAV (HALLUX ABDUCTO VALGUS), UNSPECIFIED LATERALITY: ICD-10-CM

## 2017-03-29 DIAGNOSIS — L84 CORN OR CALLUS: ICD-10-CM

## 2017-03-29 DIAGNOSIS — R60.0 EDEMA OF LOWER EXTREMITY, UNSPECIFIED LATERALITY: ICD-10-CM

## 2017-03-29 DIAGNOSIS — B35.1 ONYCHOMYCOSIS DUE TO DERMATOPHYTE: ICD-10-CM

## 2017-03-29 DIAGNOSIS — M79.675 TOE PAIN, BILATERAL: ICD-10-CM

## 2017-03-29 DIAGNOSIS — M79.674 TOE PAIN, BILATERAL: ICD-10-CM

## 2017-03-29 DIAGNOSIS — E11.49 TYPE II DIABETES MELLITUS WITH NEUROLOGICAL MANIFESTATIONS: Primary | ICD-10-CM

## 2017-03-29 PROCEDURE — 99499 UNLISTED E&M SERVICE: CPT | Mod: S$GLB,,, | Performed by: PODIATRIST

## 2017-03-29 PROCEDURE — 11056 PARNG/CUTG B9 HYPRKR LES 2-4: CPT | Mod: Q9,S$GLB,, | Performed by: PODIATRIST

## 2017-03-29 PROCEDURE — 11721 DEBRIDE NAIL 6 OR MORE: CPT | Mod: 59,Q9,S$GLB, | Performed by: PODIATRIST

## 2017-03-29 PROCEDURE — 99999 PR PBB SHADOW E&M-EST. PATIENT-LVL I: CPT | Mod: PBBFAC,,, | Performed by: PODIATRIST

## 2017-03-29 NOTE — MR AVS SNAPSHOT
[facility]  [address] [facility]  [place] [address]  Phone: [phone]                  Magaly Nunes   3/29/2017 8:30 AM   Office Visit    Description:  Female : 1932   Provider:  Brinda Alejandro DPM   Department:  [facility]           Diagnoses this Visit        Comments    Type II diabetes mellitus with neurological manifestations    -  Primary     Edema of lower extremity, unspecified laterality         Onychomycosis due to dermatophyte         Toe pain, bilateral         HAV (hallux abducto valgus), unspecified laterality                To Do List           Future Appointments        Provider Department Dept Phone    2017 11:00 AM Hammad Humphrey MD [facility] [phone]      Goals (5 Years of Data)     None      [facility]     [facility] -  Assistance  Registered nurses in the [facility] provide clinical advisement, health education, appointment booking, and other advisory services.  Call for this free service at [phone].             Medications           Message regarding Medications     Verify the changes and/or additions to your medication regime listed below are the same as discussed with your clinician today.  If any of these changes or additions are incorrect, please notify your healthcare provider.             Verify that the below list of medications is an accurate representation of the medications you are currently taking.  If none reported, the list may be blank. If incorrect, please contact your healthcare provider. Carry this list with you in case of emergency.           Current Medications     carvedilol (COREG) 12.5 MG tablet Take 1 tablet (12.5 mg total) by mouth 2 (two) times daily.    diclofenac (VOLTAREN) 75 MG EC tablet     furosemide (LASIX) 40 MG tablet Take 1 tablet (40 mg total) by mouth once daily.    gabapentin (NEURONTIN) 100 MG capsule Take 1 capsule (100 mg total) by mouth 3  (three) times daily.    hydrocodone-acetaminophen 10-325mg (NORCO)  mg Tab Take 1 tablet by mouth every 24 hours as needed for Pain (pt takes at night for arthritic pain).    ketoconazole (NIZORAL) 2 % cream Apply topically once daily. Right foot    losartan (COZAAR) 100 MG tablet TAKE ONE TABLET BY MOUTH ONCE DAILY    meclizine (ANTIVERT) 25 mg tablet     omeprazole (PRILOSEC) 20 MG capsule TAKE 2 CAPSULES BY MOUTH EVERY DAY FOR ACID REFLUX AND STOMACH    polyethylene glycol (GLYCOLAX) 17 gram/dose powder Take 17 g by mouth 3 (three) times daily.    potassium chloride SA (K-DUR,KLOR-CON) 20 MEQ tablet Take 1 tablet (20 mEq total) by mouth once daily.    temazepam (RESTORIL) 30 mg capsule TAKE ONE CAPSULE BY MOUTH EVERY NIGHT AT BEDTIME AS NEEDED FOR INSOMNIA           Clinical Reference Information           Your Vitals Were     BP Last Period                120/60 (LMP Unknown)          Blood Pressure          Most Recent Value    BP  120/60      Allergies as of 3/29/2017     Ace Inhibitors    Aspirin    Aciphex  [Rabeprazole]    Bextra  [Valdecoxib]    Clonidine    Cardizem  [Diltiazem Hcl]    Mavik  [Trandolapril]    Nsaids (Non-steroidal Anti-inflammatory Drug)    Phenytoin Sodium Extended    Tramadol      Immunizations Administered on Date of Encounter - 3/29/2017     None      Orders Placed During Today's Visit      Normal Orders This Visit    COMPRESSION STOCKINGS       Instructions    Recommend lotions: eucerin, aquaphor, A&D ointment, gold bond for diabetics, sween    Shoe recommendations: (try 6pm.Knowlent, zappos.Knowlent , nordAscletis.Knowlent, or shoes.Knowlent for discounted prices) you can visit DSW shoes in [place] as well    Asics (GT 1000 or gel foundations), new balance, saucony (stabil c3),  Rose (transcend), vionic, propet (tennis shoe)    soft brand, clarks, crocs, aerosoles, naturalizers, SAS, ecco, crystal, milton douglas, rockports (dress shoes)    Vionic, volitiles, burkenstocks, fitflops, propet  (sandals)    Nike comfort thong sandals, crocs (house shoes)    Nail Home remedy:  Vicks Vapor rub OR Listerine and apple cider vinegar in a spray bottle to nails    Occasional soaks for 15-20 mins in luke warm water with 1 cup of listerine and 1 cup of apple cider vinegar are ok You may add several drops of oil of oregano or tea tree oil as well      Diabetes: Inspecting Your Feet  Diabetes increases your chances of developing foot problems. So inspect your feet every day. This helps you find small skin irritations before they become serious infections. If you have trouble seeing the bottoms of your feet, use a mirror or ask a family member or friend to help.     Pressure spots on the bottom of the foot are common areas where problems develop.   How to check your feet  Below are tips to help you look for foot problems. Try to check your feet at the same time each day, such as when you get out of bed in the morning:  · Check the top of each foot. The tops of toes, back of the heel, and outer edge of the foot can get a lot of rubbing from poor-fitting shoes.  · Check the bottom of each foot. Daily wear and tear often leads to problems at pressure spots.  · Check the toes and nails. Fungal infections often occur between toes. Toenail problems can also be a sign of fungal infections or lead to breaks in the skin.  · Check your shoes, too. Loose objects inside a shoe can injure the foot. Use your hand to feel inside your shoes for things like valery, loose stitching, or rough areas that could irritate your skin.  Warning signs  Look for any color changes in the foot. Redness with streaks can signal a severe infection, which needs immediate medical attention. Tell your doctor right away if you have any of these problems:  · Swelling, sometimes with color changes, may be a sign of poor blood flow or infection. Symptoms include tenderness and an increase in the size of your foot.  · Warm or hot areas on your feet may be  signs of infection. A foot that is cold may not be getting enough blood.  · Sensations such as burning, tingling, or pins and needles can be signs of a problem. Also check for areas that may be numb.  · Hot spots are caused by friction or pressure. Look for hot spots in areas that get a lot of rubbing. Hot spots can turn into blisters, calluses, or sores.  · Cracks and sores are caused by dry or irritated skin. They are a sign that the skin is breaking down, which can lead to infection.  · Toenail problems to watch for include nails growing into the skin (ingrown toenail) and causing redness or pain. Thick, yellow, or discolored nails can signal a fungal infection.  · Drainage and odor can develop from untreated sores and ulcers. Call your doctor right away if you notice white or yellow drainage, bleeding, or unpleasant odor.   © 9189-0865 [employer]. [address] [place] [address]. All rights reserved. This information is not intended as a substitute for professional medical care. Always follow your healthcare professional's instructions.          Leg Swelling in Both Legs    Swelling of the feet, ankles, and legs is called edema. It is caused by excess fluid that has collected in the tissues. Extra fluid in the body settles in the lowest part because of gravity. This is why the legs and feet are most affected.  Some of the causes for edema include:  · Disease of the heart like congestive heart failure  · Standing or sitting for long periods of time  · Infection of the feet or legs  · Blood pooling in the veins of your legs (venous insufficiency)  · Dilated veins in your lower leg (varicose veins)  · Garters or other clothing that is tight on your legs. This will cause blood to pool in your legs because the clothing limits blood flow.  · Some medicines such as hormones like birth control pills, some blood pressure medicines like calcium channel blockers (amlodipine) and steroids, some  antidepressants like MAO inhibitors and tricyclics  · Menstrual periods that cause you to retain fluids  · Many types of renal disease  · Liver failure or cirrhosis  · Pregnancy, some swelling is normal, but a sudden increase in leg swelling or weight gain can be a sign of a dangerous complication of pregnancy  · Poor nutrition  · Thyroid disease  Medical treatment will depend on what is causing the swelling in your legs. Your healthcare provider may prescribe water pills (diuretics) to get rid of the extra fluid.  Home care  Follow these guidelines when caring for yourself at home:  · Don't wear clothing like garters that is tight on your legs.  · Keep your legs up while lying or sitting.  · If infection, injury, or recent surgery is causing the swelling, stay off your legs as much as possible until symptoms get better.  · If your healthcare provider says that your leg swelling is caused by venous insufficiency or varicose veins, don't sit or  one place for long periods of time. Take breaks and walk about every few hours. Brisk walking is a good exercise. It helps circulate the blood that has collected in your leg. Talk with your provider about using support stockings to stop daytime leg swelling.  · If your provider says that heart disease is causing your leg swelling, follow a low-salt diet to stop extra fluid from staying in your body. You may also need medicine.  Follow-up care  Follow up with your healthcare provider, or as advised.  When to seek medical advice  Call your healthcare provider right away if any of these occur:  · New shortness of breath or chest pain  · Shortness of breath or chest pain that gets worse  · Swelling in both legs or ankles that gets worse  · Swelling of the abdomen  · Redness, warmth, or swelling in one leg  · Fever of 100.4ºF (38ºC) or higher, or as directed by your healthcare provider  · Yellow color to your skin or eyes  · Rapid, unexplained weight gain  · Having to  sleep upright or use an increased number of pillows  Date Last Reviewed: 3/31/2016  © 2945-0915 [employer] [address] [place] [address], [place] [address]. All rights reserved. This information is not intended as a substitute for professional medical care. Always follow your healthcare professional's instructions.             Language Assistance Services     ATTENTION: Language assistance services are available, free of charge. Please call [phone].      ATENCIÓN: Si habla español, tiene a rai disposición servicios gratuitos de asistencia lingüística. Llame al [phone].     CHÚ Ý: N?u b?n nói Ti?ng Vi?t, có các d?ch v? h? tr? ngôn ng? mi?n phí dành cho b?n. G?i s? [phone].         [facility] complies with applicable Federal civil rights laws and does not discriminate on the basis of race, color, national origin, age, disability, or sex.

## 2017-03-29 NOTE — PATIENT INSTRUCTIONS
Recommend lotions: eucerin, aquaphor, A&D ointment, gold bond for diabetics, sween    Shoe recommendations: (try 6pm.com, zappos.com , nordstromrack.com, or shoes.com for discounted prices) you can visit DSW shoes in [place] as well    Asics (GT 1000 or gel foundations), new balance, saucony (stabil c3),  Rose (transcend), vionic, propet (tennis shoe)    soft brand, clarks, crocs, aerosoles, naturalizers, SAS, ecco, crystal, milton douglas, rockports (dress shoes)    Vionic, volitiles, burkenstocks, fitflops, propet (sandals)    Nike comfort thong sandals, crocs (house shoes)    Nail Home remedy:  Vicks Vapor rub OR Listerine and apple cider vinegar in a spray bottle to nails    Occasional soaks for 15-20 mins in luke warm water with 1 cup of listerine and 1 cup of apple cider vinegar are ok You may add several drops of oil of oregano or tea tree oil as well      Diabetes: Inspecting Your Feet  Diabetes increases your chances of developing foot problems. So inspect your feet every day. This helps you find small skin irritations before they become serious infections. If you have trouble seeing the bottoms of your feet, use a mirror or ask a family member or friend to help.     Pressure spots on the bottom of the foot are common areas where problems develop.   How to check your feet  Below are tips to help you look for foot problems. Try to check your feet at the same time each day, such as when you get out of bed in the morning:  · Check the top of each foot. The tops of toes, back of the heel, and outer edge of the foot can get a lot of rubbing from poor-fitting shoes.  · Check the bottom of each foot. Daily wear and tear often leads to problems at pressure spots.  · Check the toes and nails. Fungal infections often occur between toes. Toenail problems can also be a sign of fungal infections or lead to breaks in the skin.  · Check your shoes, too. Loose objects inside a shoe can injure the foot. Use your hand to feel  inside your shoes for things like valery, loose stitching, or rough areas that could irritate your skin.  Warning signs  Look for any color changes in the foot. Redness with streaks can signal a severe infection, which needs immediate medical attention. Tell your doctor right away if you have any of these problems:  · Swelling, sometimes with color changes, may be a sign of poor blood flow or infection. Symptoms include tenderness and an increase in the size of your foot.  · Warm or hot areas on your feet may be signs of infection. A foot that is cold may not be getting enough blood.  · Sensations such as burning, tingling, or pins and needles can be signs of a problem. Also check for areas that may be numb.  · Hot spots are caused by friction or pressure. Look for hot spots in areas that get a lot of rubbing. Hot spots can turn into blisters, calluses, or sores.  · Cracks and sores are caused by dry or irritated skin. They are a sign that the skin is breaking down, which can lead to infection.  · Toenail problems to watch for include nails growing into the skin (ingrown toenail) and causing redness or pain. Thick, yellow, or discolored nails can signal a fungal infection.  · Drainage and odor can develop from untreated sores and ulcers. Call your doctor right away if you notice white or yellow drainage, bleeding, or unpleasant odor.   © 9720-3687 [employer]. [address] [place] [address]. All rights reserved. This information is not intended as a substitute for professional medical care. Always follow your healthcare professional's instructions.          Leg Swelling in Both Legs    Swelling of the feet, ankles, and legs is called edema. It is caused by excess fluid that has collected in the tissues. Extra fluid in the body settles in the lowest part because of gravity. This is why the legs and feet are most affected.  Some of the causes for edema include:  · Disease of the heart like  congestive heart failure  · Standing or sitting for long periods of time  · Infection of the feet or legs  · Blood pooling in the veins of your legs (venous insufficiency)  · Dilated veins in your lower leg (varicose veins)  · Garters or other clothing that is tight on your legs. This will cause blood to pool in your legs because the clothing limits blood flow.  · Some medicines such as hormones like birth control pills, some blood pressure medicines like calcium channel blockers (amlodipine) and steroids, some antidepressants like MAO inhibitors and tricyclics  · Menstrual periods that cause you to retain fluids  · Many types of renal disease  · Liver failure or cirrhosis  · Pregnancy, some swelling is normal, but a sudden increase in leg swelling or weight gain can be a sign of a dangerous complication of pregnancy  · Poor nutrition  · Thyroid disease  Medical treatment will depend on what is causing the swelling in your legs. Your healthcare provider may prescribe water pills (diuretics) to get rid of the extra fluid.  Home care  Follow these guidelines when caring for yourself at home:  · Don't wear clothing like garters that is tight on your legs.  · Keep your legs up while lying or sitting.  · If infection, injury, or recent surgery is causing the swelling, stay off your legs as much as possible until symptoms get better.  · If your healthcare provider says that your leg swelling is caused by venous insufficiency or varicose veins, don't sit or  one place for long periods of time. Take breaks and walk about every few hours. Brisk walking is a good exercise. It helps circulate the blood that has collected in your leg. Talk with your provider about using support stockings to stop daytime leg swelling.  · If your provider says that heart disease is causing your leg swelling, follow a low-salt diet to stop extra fluid from staying in your body. You may also need medicine.  Follow-up care  Follow up with  your healthcare provider, or as advised.  When to seek medical advice  Call your healthcare provider right away if any of these occur:  · New shortness of breath or chest pain  · Shortness of breath or chest pain that gets worse  · Swelling in both legs or ankles that gets worse  · Swelling of the abdomen  · Redness, warmth, or swelling in one leg  · Fever of 100.4ºF (38ºC) or higher, or as directed by your healthcare provider  · Yellow color to your skin or eyes  · Rapid, unexplained weight gain  · Having to sleep upright or use an increased number of pillows  Date Last Reviewed: 3/31/2016  © 8733-6512 [employer] [address] [place] [address], [place] [address]. All rights reserved. This information is not intended as a substitute for professional medical care. Always follow your healthcare professional's instructions.

## 2017-03-29 NOTE — PROGRESS NOTES
Subjective:      Patient ID: Magaly Nunes is a 84 y.o. female.    Chief Complaint: No chief complaint on file.    Magaly is a 84 y.o. female who presents to the clinic for evaluation and treatment of high risk feet. Magaly has a past medical history of Anxiety disorder; Carpal tunnel syndrome; CHF (congestive heart failure); Chronic allergic rhinitis; Chronic pain (10/22/2012); CKD stage 3 due to type 2 diabetes mellitus (2/14/2017); Constipation - functional (4/10/2013); Depression; Diabetes mellitus type II; Hot flash not due to menopause; HTN (hypertension); Hypokalemia (11/19/2012); Insomnia (4/10/2013); Knee pain, bilateral (7/24/2013); Personal history of DVT (deep vein thrombosis); Renovascular hypertension (2/14/2017); Severe tricuspid regurgitation (2/14/2017); Spinal stenosis; Spinal stenosis of lumbar region (2/3/2014); and Vertigo. The patient's chief complaint is DM foot exam. Diet controlled DM. Paresthesias present but much improved now that she has been on Cymbalta. Relates painful thick toenails on both feet. Both great toenails hurt her especially in shoes. No other complaints today.        This patient has documented high risk feet requiring routine maintenance secondary to diabetes mellitis and those secondary complications of diabetes, as mentioned..      PCP: Chris Bangura MD    Date Last Seen by PCP: 3/13/17  No chief complaint on file.    Current shoe gear:  panchito    Hemoglobin A1C   Date Value Ref Range Status   03/13/2017 6.5 (H) 4.5 - 6.2 % Final     Comment:     According to ADA guidelines, hemoglobin A1C <7.0% represents  optimal control in non-pregnant diabetic patients.  Different  metrics may apply to specific populations.   Standards of Medical Care in Diabetes - 2016.  For the purpose of screening for the presence of diabetes:  <5.7%     Consistent with the absence of diabetes  5.7-6.4%  Consistent with increasing risk for diabetes   (prediabetes)  >or=6.5%  Consistent  with diabetes  Currently no consensus exists for use of hemoglobin A1C  for diagnosis of diabetes for children.     12/13/2016 6.1 4.5 - 6.2 % Final     Comment:     According to ADA guidelines, hemoglobin A1C <7.0% represents  optimal control in non-pregnant diabetic patients.  Different  metrics may apply to specific populations.   Standards of Medical Care in Diabetes - 2016.  For the purpose of screening for the presence of diabetes:  <5.7%     Consistent with the absence of diabetes  5.7-6.4%  Consistent with increasing risk for diabetes   (prediabetes)  >or=6.5%  Consistent with diabetes  Currently no consensus exists for use of hemoglobin A1C  for diagnosis of diabetes for children.     08/17/2016 6.2 4.5 - 6.2 % Final     Comment:     According to ADA guidelines, hemoglobin A1C <7.0% represents  optimal control in non-pregnant diabetic patients.  Different  metrics may apply to specific populations.   Standards of Medical Care in Diabetes - 2016.  For the purpose of screening for the presence of diabetes:  <5.7%     Consistent with the absence of diabetes  5.7-6.4%  Consistent with increasing risk for diabetes   (prediabetes)  >or=6.5%  Consistent with diabetes  Currently no consensus exists for use of hemoglobin A1C  for diagnosis of diabetes for children.       Patient Active Problem List   Diagnosis    Depression    Anxiety disorder    Hypokalemia    HTN (hypertension)    Constipation - functional    Left knee pain    Spinal stenosis of lumbar region    PVD (peripheral vascular disease)    Aortic atherosclerosis    DM (diabetes mellitus) type II controlled with renal manifestation    Baker's cyst    Leg swelling    Depression, major, recurrent, moderate    Elevated troponin    Acute on chronic combined systolic and diastolic CHF (congestive heart failure)    Pulmonary hypertension    Synovial cyst of popliteal space (Baker), left knee    Renovascular hypertension    CKD stage 3 due to  type 2 diabetes mellitus    MCKENNA (acute kidney injury)    Anemia of chronic renal failure, stage 3 (moderate)    Severe tricuspid regurgitation    Pseudogout of left knee    Acute on chronic systolic congestive heart failure    Acute hypoxemic respiratory failure     Current Outpatient Prescriptions on File Prior to Visit   Medication Sig Dispense Refill    carvedilol (COREG) 12.5 MG tablet Take 1 tablet (12.5 mg total) by mouth 2 (two) times daily. 60 tablet 11    diclofenac (VOLTAREN) 75 MG EC tablet   2    furosemide (LASIX) 40 MG tablet Take 1 tablet (40 mg total) by mouth once daily. 30 tablet 11    gabapentin (NEURONTIN) 100 MG capsule Take 1 capsule (100 mg total) by mouth 3 (three) times daily. 90 capsule 11    hydrocodone-acetaminophen 10-325mg (NORCO)  mg Tab Take 1 tablet by mouth every 24 hours as needed for Pain (pt takes at night for arthritic pain).      ketoconazole (NIZORAL) 2 % cream Apply topically once daily. Right foot 30 g 0    losartan (COZAAR) 100 MG tablet TAKE ONE TABLET BY MOUTH ONCE DAILY 90 tablet 0    meclizine (ANTIVERT) 25 mg tablet       omeprazole (PRILOSEC) 20 MG capsule TAKE 2 CAPSULES BY MOUTH EVERY DAY FOR ACID REFLUX AND STOMACH 180 capsule 1    polyethylene glycol (GLYCOLAX) 17 gram/dose powder Take 17 g by mouth 3 (three) times daily. 119 g 0    potassium chloride SA (K-DUR,KLOR-CON) 20 MEQ tablet Take 1 tablet (20 mEq total) by mouth once daily. 90 tablet 1    temazepam (RESTORIL) 30 mg capsule TAKE ONE CAPSULE BY MOUTH EVERY NIGHT AT BEDTIME AS NEEDED FOR INSOMNIA 30 capsule 5     No current facility-administered medications on file prior to visit.      Review of patient's allergies indicates:   Allergen Reactions    Ace inhibitors      Other reaction(s): Unknown    Aspirin      Other reaction(s): Unknown    Aciphex  [rabeprazole]      Other reaction(s): Stomach upset    Bextra  [valdecoxib]      Other reaction(s): Unknown    Clonidine      Other  reaction(s): dry mouth.lip swelling    Cardizem  [diltiazem hcl]      Other reaction(s): Unknown    Mavik  [trandolapril]      Other reaction(s): Unknown    Nsaids (non-steroidal anti-inflammatory drug)      Other reaction(s): black spots on skin    Phenytoin sodium extended      Other reaction(s): Stomach upset    Tramadol      Other reaction(s): stomach pain     Past Surgical History:   Procedure Laterality Date    CATARACT EXTRACTION Bilateral     EYE SURGERY      FRACTURE SURGERY      HYSTERECTOMY      ORIF RADIUS & ULNA FRACTURES       Family History   Problem Relation Age of Onset    No Known Problems Mother     No Known Problems Father     No Known Problems Sister     No Known Problems Brother     No Known Problems Maternal Aunt     No Known Problems Maternal Uncle     No Known Problems Paternal Aunt     No Known Problems Paternal Uncle     No Known Problems Maternal Grandmother     No Known Problems Maternal Grandfather     No Known Problems Paternal Grandmother     No Known Problems Paternal Grandfather     Amblyopia Neg Hx     Blindness Neg Hx     Cancer Neg Hx     Diabetes Neg Hx     Glaucoma Neg Hx     Hypertension Neg Hx     Macular degeneration Neg Hx     Retinal detachment Neg Hx     Strabismus Neg Hx     Stroke Neg Hx     Thyroid disease Neg Hx      Social History     Social History    Marital status:      Spouse name: N/A    Number of children: N/A    Years of education: N/A     Occupational History    Not on file.     Social History Main Topics    Smoking status: Never Smoker    Smokeless tobacco: Not on file    Alcohol use No    Drug use: No    Sexual activity: No     Other Topics Concern    Not on file     Social History Narrative    .  Lives alone.   and two sons have .  Active.         Review of Systems   Constitution: Negative for chills, fever and weakness.   Cardiovascular: Positive for leg swelling. Negative for chest pain  and claudication.   Respiratory: Negative for cough and shortness of breath.    Skin: Positive for dry skin and nail changes. Negative for itching and rash.   Musculoskeletal: Positive for arthritis, back pain, joint pain, joint swelling and muscle weakness. Negative for falls.        Walks with cane secondary to back pain and weakness. Toenail pain b/l   Gastrointestinal: Negative for diarrhea, nausea and vomiting.   Neurological: Positive for numbness and paresthesias. Negative for tremors.   Psychiatric/Behavioral: Negative for altered mental status and hallucinations.         Objective:       Vitals:    03/29/17 0847   BP: 120/60       Physical Exam   Constitutional:   General: Pt. is well-developed, well-nourished, appears stated age, in no acute distress, alert and oriented x 3. No evidence of depression, anxiety, or agitation. Calm, cooperative, and communicative. Appropriate interactions and affect.       Cardiovascular:   Pulses:       Dorsalis pedis pulses are 2+ on the right side, and 2+ on the left side.        Posterior tibial pulses are 1+ on the right side, and 1+ on the left side.   There is absent digital hair. Skin is atrophic, shiny Hyperpigmented b/l LEs. Cool to touch distal foot b/l.    Musculoskeletal:        Right foot: There is normal range of motion.        Left foot: There is normal range of motion.   Adequate joint range of motion without pain, limitation, nor crepitation Bilateral feet and ankle joints. Muscle strength is 5/5 in all groups bilaterally.    Cane assisted gait    Patient has hammertoes of digits 2-5 bilateral partially reducible without symptom today.    Visible and palpable bunion without pain at dorsomedial 1st metatarsal head right and left.  Hallux abducted right and left partially reducible, tracks laterally without being track bound.  No ecchymosis, erythema, edema, or cardinal signs infection or signs of trauma same foot.    Fat pad atrophy to heels and met heads  bilateral    Pain to b/l borders of b/l hallux with palpation. Pain to direct palpation and lateral squeeze of left heel hyperkeratotic lesions.    Neurological: A sensory deficit is present.   [place] 5.07 monofilamant testing is diminished John feet. Sharp/dull sensation diminished Bilaterally. Light touch absent Bilaterally.    Paresthesias, and hyperesthesia bilateral feet at toes with no clearly identified trigger or source.         Skin: Skin is warm, dry and intact. No abrasion, no ecchymosis, no lesion and no rash noted. She is not diaphoretic. No cyanosis. No pallor. Nails show no clubbing.   Toenails 1-5 bilaterally thickened with yellow/brown discoloration and subungual debris. B/l borders of b/l hallux moderately incurvated without wound/infection. Pain to touch. No erythema.     Hyperkeratotic lesion x 3 noted to plantar left heel. No signs of deep tissue injury.     Interdigital Spaces clean, dry and without evidence of break in skin integrity   Psychiatric: She has a normal mood and affect. Her speech is normal.   Nursing note and vitals reviewed.      hyperpigmentation to dorsal feet john  Assessment:       Encounter Diagnoses   Name Primary?    Type II diabetes mellitus with neurological manifestations Yes    Edema of lower extremity, unspecified laterality     Onychomycosis due to dermatophyte     Toe pain, bilateral     HAV (hallux abducto valgus), unspecified laterality     Corn or callus          Plan:       Diagnoses and all orders for this visit:    Type II diabetes mellitus with neurological manifestations  -     COMPRESSION STOCKINGS    Edema of lower extremity, unspecified laterality  -     COMPRESSION STOCKINGS    Onychomycosis due to dermatophyte    Toe pain, bilateral    HAV (hallux abducto valgus), unspecified laterality    Corn or callus      I counseled the patient on her conditions, their implications and medical management.     With patient's permission, nails were  aggressively reduced and debrided 1,2,3,4, 5 R and 1,2, 3,4,5 L and filed to their soft tissue attachment mechanically and with electric , removing all offending nail and debris. Utilizing a #15 scalpel, I trimmed the calluses at the above mentioned location (left plantar heel x 3)    Patient tolerated this well and no blood was drawn. Patient reports relief following the procedure.     She will continue to monitor the areas daily, inspect her feet, wear protective shoe gear when ambulatory, moisturizer to maintain skin integrity and follow in this office in approximately 3-4 months, sooner p.r.n.    Long discussion with patient regarding appropriate, supportive and comfortable shoes. Recommended SAS/Easy Spirit style shoe brands with adequate arch supports to alleviate abnormal pressure and improve stability of foot while walking. Avoid flat shoes and barefoot walking as these will exacerbate or worsen symptoms.     Continue Cymbalta per Neurology recommendations.     RTC 3 months call sooner if any problems arise.

## 2017-04-18 LAB
ACID FAST MOD KINY STN SPEC: NORMAL
MYCOBACTERIUM SPEC QL CULT: NORMAL

## 2017-04-25 NOTE — TELEPHONE ENCOUNTER
HHN Stated, patient has really been very depressed. She has a hard time getting her out of bed when she goes over there. It is the anniversary of patient's  and two sons death. In viewing patient's medication list, she has been given something in the past for her anxiety and depression. Patient is currently taking (Cymbalta) for neopathy. Please advise.

## 2017-04-25 NOTE — TELEPHONE ENCOUNTER
----- Message from Claudia Lockhart sent at 4/25/2017 10:24 AM CDT -----  Contact: [facility] calling to discuss pt having depression issues. Please call Maritza at [phone].

## 2017-04-26 RX ORDER — LORAZEPAM 0.5 MG/1
0.5 TABLET ORAL EVERY 8 HOURS PRN
Qty: 60 TABLET | Refills: 3 | Status: SHIPPED | OUTPATIENT
Start: 2017-04-26 | End: 2017-12-11 | Stop reason: SDUPTHER

## 2017-04-26 NOTE — TELEPHONE ENCOUNTER
Please review chart. 60 mg (Cymbalta) for neopathy and 0.5 mg (Ativan). Medication attached. Please advise further.

## 2017-04-26 NOTE — TELEPHONE ENCOUNTER
If indeed out of Ativan, please phone in the Ativan with 3 refills    Have patient continue the Cymbalta 60 mg.

## 2017-05-19 ENCOUNTER — OFFICE VISIT (OUTPATIENT)
Dept: FAMILY MEDICINE | Facility: CLINIC | Age: 82
End: 2017-05-19
Payer: MEDICARE

## 2017-05-19 ENCOUNTER — HOSPITAL ENCOUNTER (OUTPATIENT)
Dept: RADIOLOGY | Facility: HOSPITAL | Age: 82
Discharge: HOME | End: 2017-05-19
Attending: INTERNAL MEDICINE
Payer: MEDICARE

## 2017-05-19 ENCOUNTER — TELEPHONE (OUTPATIENT)
Dept: FAMILY MEDICINE | Facility: CLINIC | Age: 82
End: 2017-05-19

## 2017-05-19 VITALS
OXYGEN SATURATION: 98 % | WEIGHT: 165.56 LBS | BODY MASS INDEX: 28.27 KG/M2 | TEMPERATURE: 98 F | DIASTOLIC BLOOD PRESSURE: 70 MMHG | HEIGHT: 64 IN | SYSTOLIC BLOOD PRESSURE: 130 MMHG | HEART RATE: 58 BPM

## 2017-05-19 DIAGNOSIS — M79.671 FOOT PAIN, BILATERAL: ICD-10-CM

## 2017-05-19 DIAGNOSIS — E11.29 CONTROLLED TYPE 2 DIABETES MELLITUS WITH OTHER DIABETIC KIDNEY COMPLICATION, WITHOUT LONG-TERM CURRENT USE OF INSULIN: ICD-10-CM

## 2017-05-19 DIAGNOSIS — M79.672 FOOT PAIN, BILATERAL: Primary | ICD-10-CM

## 2017-05-19 DIAGNOSIS — I10 ESSENTIAL HYPERTENSION: ICD-10-CM

## 2017-05-19 DIAGNOSIS — E11.29 CONTROLLED TYPE 2 DIABETES MELLITUS WITH OTHER DIABETIC KIDNEY COMPLICATION, UNSPECIFIED LONG TERM INSULIN USE STATUS: ICD-10-CM

## 2017-05-19 DIAGNOSIS — M79.671 FOOT PAIN, BILATERAL: Primary | ICD-10-CM

## 2017-05-19 DIAGNOSIS — M79.672 FOOT PAIN, BILATERAL: ICD-10-CM

## 2017-05-19 PROCEDURE — 99999 PR PBB SHADOW E&M-EST. PATIENT-LVL III: CPT | Mod: PBBFAC,,, | Performed by: INTERNAL MEDICINE

## 2017-05-19 PROCEDURE — 73630 X-RAY EXAM OF FOOT: CPT | Mod: 50,TC,PO

## 2017-05-19 PROCEDURE — 99499 UNLISTED E&M SERVICE: CPT | Mod: S$GLB,,, | Performed by: INTERNAL MEDICINE

## 2017-05-19 PROCEDURE — 1126F AMNT PAIN NOTED NONE PRSNT: CPT | Mod: S$GLB,,, | Performed by: INTERNAL MEDICINE

## 2017-05-19 PROCEDURE — 73630 X-RAY EXAM OF FOOT: CPT | Mod: 26,50,, | Performed by: RADIOLOGY

## 2017-05-19 PROCEDURE — 1160F RVW MEDS BY RX/DR IN RCRD: CPT | Mod: S$GLB,,, | Performed by: INTERNAL MEDICINE

## 2017-05-19 PROCEDURE — 1159F MED LIST DOCD IN RCRD: CPT | Mod: S$GLB,,, | Performed by: INTERNAL MEDICINE

## 2017-05-19 PROCEDURE — 3075F SYST BP GE 130 - 139MM HG: CPT | Mod: S$GLB,,, | Performed by: INTERNAL MEDICINE

## 2017-05-19 PROCEDURE — 99214 OFFICE O/P EST MOD 30 MIN: CPT | Mod: S$GLB,,, | Performed by: INTERNAL MEDICINE

## 2017-05-19 PROCEDURE — 3078F DIAST BP <80 MM HG: CPT | Mod: S$GLB,,, | Performed by: INTERNAL MEDICINE

## 2017-05-19 RX ORDER — CELECOXIB 200 MG/1
CAPSULE ORAL
Refills: 2 | COMMUNITY
Start: 2017-03-13 | End: 2017-06-08

## 2017-05-19 RX ORDER — MELOXICAM 7.5 MG/1
7.5 TABLET ORAL DAILY PRN
Qty: 30 TABLET | Refills: 3 | Status: SHIPPED | OUTPATIENT
Start: 2017-05-19 | End: 2017-12-28

## 2017-05-19 NOTE — TELEPHONE ENCOUNTER
"Please call patient with her foot x-rays    "The foot x-rays only showed mild arthritis which is not unexpected.  This may relate as a cause of your foot pain.  If it is an ongoing  problem despite the anti-inflammatory, the next step would need to see the podiatrist   "

## 2017-05-19 NOTE — PROGRESS NOTES
Chief complaint follow-up from the hospital, pain in the feet    84-year-old black female with multiple medical problems.  I reviewed all the interval events.  Looks like she had edema and pseudogout in the left knee.  She had the knee drained.  The edema has gone away with diuretics.  For the past couple of weeks she's had tenderness on the top of both feet.  In the right foot it's right over the proximal metatarsals over the central foot.  No trauma that she can remember.  On the left foot is in a similar level little bit more lateral.  On the right foot she has some area of hyperpigmentation which she was given some fungal cream for.  She has not had any recurrent swelling.  She has no worsening pain with weightbearing or with any movement of the foot.  His been ongoing about 3 weeks.  She only takes Tylenol.  Tramadol upset her stomach.  She has had hydrocodone without problems in the past.  She did not request any hydrocodone.  She has had Celebrex and diclofenac given to her according to the medication list but she does not have them.  She was on meloxicam in the past and we will try that again along with the Tylenol.  She has been seen by podiatry in the past and has diabetic neuropathy of the symptoms are not consistent with diabetic probably.  I reviewed the last podiatry now but it does not appear she had this pain last time.  I reviewed and I don't see any x-rays of the feet          Hospital Course:   2/14: Seen by Ortho for L knee pain, arthrocentesis done, crystals consistent with pseudogout  2/15: Seen by Rheum for Pseudogout. Doing better, breathing improved, but unsteady when walking and desat to 90% with c/o SOB after minimal exertion in the room. Also c/o new L ankle pain when ambulating  2/16: Doing very well, O2 sat after ambulation is 96-98 on RA, d/c home with CHF HH order set and close f/u [facility]. L ankle pain resolved.     Acute on chronic combined CHF and PHTN, severe TR-  "resolved - improving on Lasix IV, continue diuresis for now. LE edema resolving. Acute hypoxemic resp failure - resolved.   - nebs PRN  - Lasix 40 IV x 2 more doses on 2/15  - home coreg 12.5 BID  - Saw Dr. Hammad Humphrey in cardiology clinic 1/12. PET stress on 1/3 revealed LVEF 38% - no ischemia noted, but this was a suboptimal study as she did not reach predicted heart rate. Cardiology clinic to consider elective cath to evaluate CAD as etiology of new CHF.      L knee pseudogout - resolved after the decadron 8mg IV given in ED (actually given for presumed Baker's cyst pain). Severe L knee pain on admit "all around the knee" (posterior, anterior, lateral, medial)- Doppler U/S negative for DVT but reveals B Eaton's cysts and subQ edema. XR negative for effusion and Fx. No signs of systemic infection, afebrile/VSS, WBC 6.5K (but with 81% granulocytes). ESR and CRP and elevated.   - consulted Ortho and Rheum, appreciate recs  - f/u with her prior Orthopedic doctor  - no prophylactic meds warranted at this time per Rheum.       Acute L ankle pain - resolved. noted when we ambulated in room together 2/15. Resolved after lidocaine patch and tylenol.  - trial of lidocaine patch and tylenol      Review of systems: No further swelling, no other myalgias arthralgias currently that are new, no worsening numbness or tingling in the feet    PAST MEDICAL HISTORY:                                                        1. Hypertension.                                                             2. Dyspnea, possibly secondary to pulmonary hypertension. Sleep study             negative. Has seen cardiology. Ultrasound and CT of chest are negative.            PFTs 2002 were essentially normal.                                           3. History of sinus bradycardia.                                             4. Angioedema on Mavik.                                                      5. Vertigo.                                     "                              6. spinal stenosis, by history, 3 epidural injections in the past        per neurosurgery, Dr. Garcia.  Now seeing Dr eLon.                                                                    7. Allergic rhinitis.                                                                                                        8. IBS.                                                                      9. Carpal tunnel syndrome, status post surgery.                              10. Status post cholecystectomy, total hysterectomy, and left ovary                                                         11. History of iron deficiency anemia, normal EGD in 2002 and in 2000.       12. GERD, with hiatal hernia.                                                13. Osteoarthritis of the knees.                                             14. Severe hot flashes despite all forms of therapy and eval by GYN.         15. History of leg pain due to varicosities or spinal stenosis.              16. History of positive JAVID, mild.                                           17. Trigger fingers, with history of injections.                             18. Varicose veins with reflux on ultrasound 2003.                           19. Borderline abdominal aneurysm at 2.5 cm on ultrasound 2003.              20. Positive DSE, EKG portion, but indeterminate overall,2005. Neg nuclear stress '09 And negative nuclear stress test 2012 and 2017           21. Mild stenosis of the renal arteries by ultrasound.                       22. Diabetes  23. History of multitrauma 2006, with fracture of the right arm, subsequent  DVT, and has been on Coumadin. She was discharged from the hospital around   July 2006. She did develop anemia, and has had several transfusions during   that stay. She also had pulmonary embolus associated with that DVT.    24. History of polyarthralgia with positive JAVID. She has been evaluated by [facility].  Seeing Dr Leon now  25. ANXIETY -suspected partial cause of hot flashes  27. DEPRESSION -    28. Vit D def  29.  Mild peripheral vascular disease buy testing but not felt significant to pursue further  30.  Neuropathy, seen by neurology and put on Cymbalta 2016    ?colonoscopy    SOCIAL HISTORY:  She is  and lives with her spouse who has prostate   cancer.  She does not smoke cigarettes or drink alcohol. 2 sons .      Vitals, as above  Gen: no distress  Musculoskeletal: Both ankles have no visible defects, the joints are stable and strength 5 out of 5.  No ankle pain on flexion and extension.  No pain reproduced around the ankle.  Over the right dorsal foot there is some hyperpigmentation under which there is the source of her pain and a 1 x 1 cm area.  There is no surrounding skin changes warmth or breaks in the skin nor any swelling.  I can squeeze all the aspects of the foot without any bone pain except over the area of tenderness.  On the left foot there is some tenderness over the dorsal foot slightly laterally but no associated skin changes.  Her gait is normal.  She has no edema.  Her pedal pulses are +2    Magaly was seen today for discuss health.    Diagnoses and all orders for this visit:    Foot pain, bilateral, pain appears to be in the same location approximately in both feet suggestive of possible metatarsal inflammation.  Perhaps there was some shoes she was wearing that could have caused compression and soft tissue injury in these areas.  Given the location will obtain x-rays of both these areas to assess for underlying arthritis.  Does not appear to be vascular or neurological in any way.  We'll start anti-inflammatory and Tylenol.  May need to see podiatry if its ongoing  -     Cancel: X-Ray Foot 2 View Bilateral; Future    Controlled type 2 diabetes mellitus with other diabetic kidney complication, without long-term current use of insulin, labs reviewed with recent  A1c 6.5    Controlled type 2 diabetes mellitus with other diabetic kidney complication, unspecified long term insulin use status    Essential hypertension, chronic and stable    Other orders  -     meloxicam (MOBIC) 7.5 MG tablet; Take 1 tablet (7.5 mg total) by mouth daily as needed for Pain.

## 2017-05-22 DIAGNOSIS — I10 ESSENTIAL HYPERTENSION: ICD-10-CM

## 2017-05-22 RX ORDER — LOSARTAN POTASSIUM 100 MG/1
TABLET ORAL
Qty: 90 TABLET | Refills: 0 | Status: SHIPPED | OUTPATIENT
Start: 2017-05-22 | End: 2017-08-19 | Stop reason: SDUPTHER

## 2017-05-22 NOTE — TELEPHONE ENCOUNTER
Informed patient of information below. Verbalized understanding. Use to see Dr Alejandro. Would like a referral to go back. Please put referral in.

## 2017-05-23 ENCOUNTER — TELEPHONE (OUTPATIENT)
Dept: FAMILY MEDICINE | Facility: CLINIC | Age: 82
End: 2017-05-23

## 2017-05-23 NOTE — TELEPHONE ENCOUNTER
----- Message from Jeffrey Aguilar sent at 5/23/2017 10:18 AM CDT -----  Contact: Self/[phone]  Patient would like to speak to the staff regarding a Podiatry appointment. Thank you.

## 2017-05-24 DIAGNOSIS — H81.10 BPPV (BENIGN PAROXYSMAL POSITIONAL VERTIGO), UNSPECIFIED LATERALITY: ICD-10-CM

## 2017-05-24 RX ORDER — MECLIZINE HYDROCHLORIDE 25 MG/1
25 TABLET ORAL 3 TIMES DAILY PRN
Qty: 90 TABLET | Refills: 90 | Status: SHIPPED | OUTPATIENT
Start: 2017-05-24 | End: 2017-12-28

## 2017-05-24 NOTE — TELEPHONE ENCOUNTER
Medication refill. Declined appointment with Dr Alejandro for today. Stated, she was not feeling up to going anywhere today. Has ran out of dizzy medication. Refill forwarded.

## 2017-05-24 NOTE — TELEPHONE ENCOUNTER
----- Message from Francesca Pacheco sent at 5/24/2017  9:27 AM CDT -----  Contact: Self  Pt calling to speak to nurse regarding leg pain. Please call [phone]

## 2017-06-05 ENCOUNTER — TELEPHONE (OUTPATIENT)
Dept: FAMILY MEDICINE | Facility: CLINIC | Age: 82
End: 2017-06-05

## 2017-06-05 DIAGNOSIS — E11.22 CKD STAGE 3 DUE TO TYPE 2 DIABETES MELLITUS: Primary | ICD-10-CM

## 2017-06-05 DIAGNOSIS — N18.30 ANEMIA OF CHRONIC RENAL FAILURE, STAGE 3 (MODERATE): ICD-10-CM

## 2017-06-05 DIAGNOSIS — N18.30 CKD STAGE 3 DUE TO TYPE 2 DIABETES MELLITUS: Primary | ICD-10-CM

## 2017-06-05 DIAGNOSIS — D63.1 ANEMIA OF CHRONIC RENAL FAILURE, STAGE 3 (MODERATE): ICD-10-CM

## 2017-06-05 DIAGNOSIS — E11.29 CONTROLLED TYPE 2 DIABETES MELLITUS WITH OTHER DIABETIC KIDNEY COMPLICATION, WITHOUT LONG-TERM CURRENT USE OF INSULIN: ICD-10-CM

## 2017-06-05 DIAGNOSIS — I10 ESSENTIAL HYPERTENSION: ICD-10-CM

## 2017-06-05 NOTE — TELEPHONE ENCOUNTER
----- Message from Andie Campbell sent at 6/5/2017  9:21 AM CDT -----  Contact: [phone]  Pt wants to know if she is due for blood work now Please call pt at your earliest convenience.  Thanks !

## 2017-06-05 NOTE — TELEPHONE ENCOUNTER
----- Message from Andie Campbell sent at 6/5/2017  9:21 AM CDT -----  Contact: [phone]  Pt wants to know if she is due for blood work now Please call pt at your earliest convenience.  Thanks !

## 2017-06-07 NOTE — TELEPHONE ENCOUNTER
Advise Dr MON had to review the chart      Yes, looks likr zonia for 3 mo diabetic labs and a urine check---prob best to have an appt some time after to review

## 2017-06-07 NOTE — PT/OT/SLP DISCHARGE
Occupational Therapy Discharge Summary    Magaly Nunes  MRN: [id number]   Acute on chronic combined systolic and diastolic CHF (congestive heart failure)   Patient Discharged from acute Occupational Therapy on 2/16/2017.  Please refer to prior OT note dated on 2/15/2017 for functional status.     Assessment:   Patient was discharge unexpectedly.  Information required to complete and accurate discharge summary is unknown.  Refer to therapy initial evaluation and last progress note for initial and most recent functional status and goal achievement.  Recommendations made may be found in medical record.  GOALS:    Occupational Therapy Goals     Not on file          Multidisciplinary Problems (Resolved)        Problem: Occupational Therapy Goal    Goal Priority Disciplines Outcome Interventions   Occupational Therapy Goal   (Resolved)     OT, PT/OT Outcome(s) achieved    Description:  Eval initiated and completed. Pt demonstrating no OT needs performing self care  tasks and functional mobility with no need of OT intervention. D/C Pt from IPOT  secondary to no demonstrated OT needs.                    Reasons for Discontinuation of Therapy Services  Transfer to alternate level of care.      Plan:  Patient Discharged to: Home with Home Health Service.    Refugio Clark OTR/L  6/7/2017

## 2017-06-08 ENCOUNTER — OFFICE VISIT (OUTPATIENT)
Dept: CARDIOLOGY | Facility: CLINIC | Age: 82
End: 2017-06-08
Payer: MEDICARE

## 2017-06-08 VITALS
OXYGEN SATURATION: 96 % | BODY MASS INDEX: 28.17 KG/M2 | HEART RATE: 65 BPM | DIASTOLIC BLOOD PRESSURE: 72 MMHG | WEIGHT: 165 LBS | HEIGHT: 64 IN | SYSTOLIC BLOOD PRESSURE: 144 MMHG

## 2017-06-08 DIAGNOSIS — E78.5 DYSLIPIDEMIA: ICD-10-CM

## 2017-06-08 DIAGNOSIS — I70.0 AORTIC ATHEROSCLEROSIS: ICD-10-CM

## 2017-06-08 DIAGNOSIS — I10 ESSENTIAL HYPERTENSION: ICD-10-CM

## 2017-06-08 DIAGNOSIS — I73.9 PVD (PERIPHERAL VASCULAR DISEASE): ICD-10-CM

## 2017-06-08 DIAGNOSIS — I50.23 ACUTE ON CHRONIC SYSTOLIC CONGESTIVE HEART FAILURE: Primary | ICD-10-CM

## 2017-06-08 PROCEDURE — 99999 PR PBB SHADOW E&M-EST. PATIENT-LVL III: CPT | Mod: PBBFAC,,, | Performed by: INTERNAL MEDICINE

## 2017-06-08 PROCEDURE — 1159F MED LIST DOCD IN RCRD: CPT | Mod: S$GLB,,, | Performed by: INTERNAL MEDICINE

## 2017-06-08 PROCEDURE — 1126F AMNT PAIN NOTED NONE PRSNT: CPT | Mod: S$GLB,,, | Performed by: INTERNAL MEDICINE

## 2017-06-08 PROCEDURE — 99499 UNLISTED E&M SERVICE: CPT | Mod: S$GLB,,, | Performed by: INTERNAL MEDICINE

## 2017-06-08 PROCEDURE — 99214 OFFICE O/P EST MOD 30 MIN: CPT | Mod: S$GLB,,, | Performed by: INTERNAL MEDICINE

## 2017-06-08 NOTE — PROGRESS NOTES
Subjective:    Patient ID:  Magaly Nunes is a 84 y.o. female who presents for follow-up of No chief complaint on file.      HPI   previous history:  Here for follow-up of systolic heart failure.  She's been doing well.  She's been taking all her medications.  She denies any issues with fluid.  She's expands no chest pain, shortness of breath or palpitations.  She denies any PND, orthopnea or lower edema.  She's not expressing dizziness, presyncope or syncope.  She's interested in trying to do exercises.  She says she's had no problems with her diuretic but does not like urinating so much.    Today:  Here for follow-up of systolic heart failure.  She's had worsening lower extremity edema.  She says she's been staying away from salt.  She's compliant with her medicines.  She is just taken a fluid pill once a day.  She says she has not cotton her stockings but is going to get compression fitted tomorrow.  She denies any worsening chest pain or palpitations.  She does have shortness of breath on exertion but relieved with rest.  She denies any PND or orthopnea.  She's not expressing dizziness, presyncope or syncope.  She does have some relief in the swelling when she elevates at night.      Review of Systems   Constitution: Negative.   HENT: Negative.    Eyes: Negative.    Cardiovascular: Positive for dyspnea on exertion and leg swelling. Negative for chest pain, irregular heartbeat, near-syncope, orthopnea, palpitations, paroxysmal nocturnal dyspnea and syncope.   Respiratory: Positive for shortness of breath.    Skin: Negative.    Musculoskeletal: Negative.    Gastrointestinal: Negative for abdominal pain, constipation and diarrhea.   Genitourinary: Negative for dysuria.   Neurological: Negative.    Psychiatric/Behavioral: Negative.         Objective:    Physical Exam   Constitutional: She is oriented to person, place, and time. She appears well-developed and well-nourished.   HENT:   Head: Normocephalic and  atraumatic.   Eyes: Conjunctivae and EOM are normal. Pupils are equal, round, and reactive to light.   Neck: Normal range of motion. Neck supple. No thyromegaly present.   Cardiovascular: Normal rate and regular rhythm.    No murmur heard.  Pulmonary/Chest: Effort normal and breath sounds normal. No respiratory distress.   Abdominal: Soft. Bowel sounds are normal.   Musculoskeletal: She exhibits no edema.   Neurological: She is alert and oriented to person, place, and time.   Skin: Skin is warm and dry.   Psychiatric: She has a normal mood and affect. Her behavior is normal.       echo:  CONCLUSIONS     1 - Mildly depressed left ventricular systolic function (EF 45-50%).     2 - Severe left atrial enlargement.     3 - Left ventricular diastolic dysfunction.     4 - Normal right ventricular systolic function .     5 - Pulmonary hypertension. The estimated PA systolic pressure is 72 mmHg.     6 - Mild to moderate mitral regurgitation.     7 - Intermediate central venous pressure.     NST:  Impression: NORMAL MYOCARDIAL PERFUSION  1. The perfusion scan is free of evidence for myocardial ischemia or injury.   2. Resting wall motion is physiologic.   3. There is resting LV dysfunction with a reduced ejection fraction of 38 %.   4. The ventricular volumes are normal at rest and stress.   5. The extracardiac distribution of radioactivity is normal.   6. When compared to the previous study from 09/08/2015, no significant defects but LV function now decreased with TID now present.      Assessment:       1. Acute on chronic systolic congestive heart failure    2. Essential hypertension    3. Aortic atherosclerosis    4. PVD (peripheral vascular disease)    5. Dyslipidemia         Plan:       -Continue maximize medical therapy for systolic heart failure  -Likely nonischemic but wants conservative therapy in light of age (*mildly abnormal stress test showing TID)  -Stable on maintenance Lasix, increase to twice a day ×5  days  -Compression stockings  -Salt restriction  -Exercise as tolerated    Return to clinic in 1 month

## 2017-06-19 ENCOUNTER — LAB VISIT (OUTPATIENT)
Dept: LAB | Facility: HOSPITAL | Age: 82
End: 2017-06-19
Attending: INTERNAL MEDICINE
Payer: MEDICARE

## 2017-06-19 DIAGNOSIS — I10 ESSENTIAL HYPERTENSION: ICD-10-CM

## 2017-06-19 DIAGNOSIS — N18.30 CKD STAGE 3 DUE TO TYPE 2 DIABETES MELLITUS: ICD-10-CM

## 2017-06-19 DIAGNOSIS — E11.22 CKD STAGE 3 DUE TO TYPE 2 DIABETES MELLITUS: ICD-10-CM

## 2017-06-19 DIAGNOSIS — D63.1 ANEMIA OF CHRONIC RENAL FAILURE, STAGE 3 (MODERATE): ICD-10-CM

## 2017-06-19 DIAGNOSIS — N18.30 ANEMIA OF CHRONIC RENAL FAILURE, STAGE 3 (MODERATE): ICD-10-CM

## 2017-06-19 DIAGNOSIS — E11.29 CONTROLLED TYPE 2 DIABETES MELLITUS WITH OTHER DIABETIC KIDNEY COMPLICATION, WITHOUT LONG-TERM CURRENT USE OF INSULIN: ICD-10-CM

## 2017-06-19 LAB
ALBUMIN SERPL BCP-MCNC: 3.9 G/DL
ALP SERPL-CCNC: 107 U/L
ALT SERPL W/O P-5'-P-CCNC: 14 U/L
ANION GAP SERPL CALC-SCNC: 9 MMOL/L
AST SERPL-CCNC: 27 U/L
BASOPHILS # BLD AUTO: 0.01 K/UL
BASOPHILS NFR BLD: 0.2 %
BILIRUB SERPL-MCNC: 0.8 MG/DL
BUN SERPL-MCNC: 19 MG/DL
CALCIUM SERPL-MCNC: 9.9 MG/DL
CHLORIDE SERPL-SCNC: 103 MMOL/L
CO2 SERPL-SCNC: 27 MMOL/L
CREAT SERPL-MCNC: 1 MG/DL
CRP SERPL-MCNC: 3.1 MG/L
DIFFERENTIAL METHOD: ABNORMAL
EOSINOPHIL # BLD AUTO: 0.1 K/UL
EOSINOPHIL NFR BLD: 1.2 %
ERYTHROCYTE [DISTWIDTH] IN BLOOD BY AUTOMATED COUNT: 14.8 %
ERYTHROCYTE [SEDIMENTATION RATE] IN BLOOD BY WESTERGREN METHOD: 28 MM/HR
EST. GFR  (AFRICAN AMERICAN): 59.8 ML/MIN/1.73 M^2
EST. GFR  (NON AFRICAN AMERICAN): 51.9 ML/MIN/1.73 M^2
FERRITIN SERPL-MCNC: 260 NG/ML
GLUCOSE SERPL-MCNC: 86 MG/DL
HCT VFR BLD AUTO: 37.5 %
HGB BLD-MCNC: 11.7 G/DL
IRON SERPL-MCNC: 58 UG/DL
LYMPHOCYTES # BLD AUTO: 2.2 K/UL
LYMPHOCYTES NFR BLD: 51.7 %
MCH RBC QN AUTO: 26.7 PG
MCHC RBC AUTO-ENTMCNC: 31.2 %
MCV RBC AUTO: 85 FL
MONOCYTES # BLD AUTO: 0.7 K/UL
MONOCYTES NFR BLD: 15.3 %
NEUTROPHILS # BLD AUTO: 1.4 K/UL
NEUTROPHILS NFR BLD: 31.4 %
PLATELET # BLD AUTO: 214 K/UL
PMV BLD AUTO: 10.7 FL
POTASSIUM SERPL-SCNC: 4.4 MMOL/L
PROT SERPL-MCNC: 7.7 G/DL
RBC # BLD AUTO: 4.39 M/UL
SATURATED IRON: 16 %
SODIUM SERPL-SCNC: 139 MMOL/L
TOTAL IRON BINDING CAPACITY: 364 UG/DL
TRANSFERRIN SERPL-MCNC: 246 MG/DL
TSH SERPL DL<=0.005 MIU/L-ACNC: 1.19 UIU/ML
WBC # BLD AUTO: 4.31 K/UL

## 2017-06-19 PROCEDURE — 80053 COMPREHEN METABOLIC PANEL: CPT

## 2017-06-19 PROCEDURE — 83036 HEMOGLOBIN GLYCOSYLATED A1C: CPT

## 2017-06-19 PROCEDURE — 82728 ASSAY OF FERRITIN: CPT

## 2017-06-19 PROCEDURE — 84443 ASSAY THYROID STIM HORMONE: CPT

## 2017-06-19 PROCEDURE — 85025 COMPLETE CBC W/AUTO DIFF WBC: CPT

## 2017-06-19 PROCEDURE — 36415 COLL VENOUS BLD VENIPUNCTURE: CPT | Mod: PO

## 2017-06-19 PROCEDURE — 83540 ASSAY OF IRON: CPT

## 2017-06-19 PROCEDURE — 86140 C-REACTIVE PROTEIN: CPT

## 2017-06-19 PROCEDURE — 85651 RBC SED RATE NONAUTOMATED: CPT

## 2017-06-20 LAB
ESTIMATED AVG GLUCOSE: 126 MG/DL
HBA1C MFR BLD HPLC: 6 %

## 2017-06-26 ENCOUNTER — OFFICE VISIT (OUTPATIENT)
Dept: FAMILY MEDICINE | Facility: CLINIC | Age: 82
End: 2017-06-26
Payer: MEDICARE

## 2017-06-26 VITALS
BODY MASS INDEX: 30.82 KG/M2 | OXYGEN SATURATION: 100 % | DIASTOLIC BLOOD PRESSURE: 78 MMHG | TEMPERATURE: 98 F | SYSTOLIC BLOOD PRESSURE: 128 MMHG | HEIGHT: 63 IN | HEART RATE: 50 BPM | WEIGHT: 173.94 LBS

## 2017-06-26 DIAGNOSIS — M79.89 LEG SWELLING: ICD-10-CM

## 2017-06-26 DIAGNOSIS — E11.22 CONTROLLED TYPE 2 DIABETES MELLITUS WITH STAGE 3 CHRONIC KIDNEY DISEASE, UNSPECIFIED LONG TERM INSULIN USE STATUS: ICD-10-CM

## 2017-06-26 DIAGNOSIS — I10 ESSENTIAL HYPERTENSION: ICD-10-CM

## 2017-06-26 DIAGNOSIS — I50.43 ACUTE ON CHRONIC COMBINED SYSTOLIC AND DIASTOLIC CHF (CONGESTIVE HEART FAILURE): ICD-10-CM

## 2017-06-26 DIAGNOSIS — N18.3 CONTROLLED TYPE 2 DIABETES MELLITUS WITH STAGE 3 CHRONIC KIDNEY DISEASE, UNSPECIFIED LONG TERM INSULIN USE STATUS: ICD-10-CM

## 2017-06-26 DIAGNOSIS — M79.671 RIGHT FOOT PAIN: Primary | ICD-10-CM

## 2017-06-26 DIAGNOSIS — I50.23 ACUTE ON CHRONIC SYSTOLIC CONGESTIVE HEART FAILURE: ICD-10-CM

## 2017-06-26 DIAGNOSIS — I27.20 PULMONARY HYPERTENSION: ICD-10-CM

## 2017-06-26 PROCEDURE — 99999 PR PBB SHADOW E&M-EST. PATIENT-LVL III: CPT | Mod: PBBFAC,,, | Performed by: INTERNAL MEDICINE

## 2017-06-26 PROCEDURE — 1125F AMNT PAIN NOTED PAIN PRSNT: CPT | Mod: S$GLB,,, | Performed by: INTERNAL MEDICINE

## 2017-06-26 PROCEDURE — 1159F MED LIST DOCD IN RCRD: CPT | Mod: S$GLB,,, | Performed by: INTERNAL MEDICINE

## 2017-06-26 PROCEDURE — 99214 OFFICE O/P EST MOD 30 MIN: CPT | Mod: S$GLB,,, | Performed by: INTERNAL MEDICINE

## 2017-06-26 PROCEDURE — 99499 UNLISTED E&M SERVICE: CPT | Mod: S$GLB,,, | Performed by: INTERNAL MEDICINE

## 2017-06-26 NOTE — PROGRESS NOTES
Chief complaint follow-up from the hospital, pain in the feet    84-year-old black female with multiple medical problems.  Still with pain on the top of the right foot.  When last seen she had an area of hyperpigmentation that appeared to be tender.  She still notes pain in this general area.  She is applying cream presumably for a presumed fungal infection and she is concerned because it has failed to resolve but I reassured her it may take weeks if not months for the skin to look normal.  No inflammatory changes but the area on the dorsal foot is .  She denies any symptoms for any obvious diabetic neuropathy as the cause of her pain.  No trauma.  We put her on meloxicam last visit and she is taking it without much change.  She has an appointment with podiatry today but it is this afternoon and she takes a cab to the clinic and so she may need to reschedule.  Her x-ray only showed mild arthritis.  This could explain her pain in this area but since it does relate to the overlying skin change, we will concerned about inflammatory issues, deeper infection.  Her most recent inflammatory markers are negative.        Other labs reviewed with improvement in the A1c of 6.5 down to 6.  Her anemia is better and iron studies are normal.  Kidney  function about the same    Review of systems: No further swelling, no other myalgias arthralgias currently that are new, no worsening numbness or tingling in the feet    PAST MEDICAL HISTORY:                                                        1. Hypertension.                                                             2. Dyspnea, possibly secondary to pulmonary hypertension. Sleep study             negative. Has seen cardiology. Ultrasound and CT of chest are negative.            PFTs 2002 were essentially normal.                                           3. History of sinus bradycardia.                                             4. Angioedema on Mavik.                                                       5. Vertigo.                                                                  6. spinal stenosis, by history, 3 epidural injections in the past        per neurosurgery, Dr. Garcia.  Now seeing Dr Leon.                                                                    7. Allergic rhinitis.                                                                                                        8. IBS.                                                                      9. Carpal tunnel syndrome, status post surgery.                              10. Status post cholecystectomy, total hysterectomy, and left ovary                                                         11. History of iron deficiency anemia, normal EGD in 2002 and in 2000.       12. GERD, with hiatal hernia.                                                13. Osteoarthritis of the knees.                                             14. Severe hot flashes despite all forms of therapy and eval by GYN.         15. History of leg pain due to varicosities or spinal stenosis.              16. History of positive JAVID, mild.                                           17. Trigger fingers, with history of injections.                             18. Varicose veins with reflux on ultrasound 2003.                           19. Borderline abdominal aneurysm at 2.5 cm on ultrasound 2003.              20. Positive DSE, EKG portion, but indeterminate overall,2005. Neg nuclear stress '09 And negative nuclear stress test 2012. MILDLY ABNORMAL NUC 2017 EF 38%          21. Mild stenosis of the renal arteries by ultrasound.                       22. Diabetes  23. History of multitrauma 2006, with fracture of the right arm, subsequent  DVT, and has been on Coumadin. She was discharged from the hospital around   July 2006. She did develop anemia, and has had several transfusions during   that stay. She also had pulmonary embolus associated with  that DVT.    24. History of polyarthralgia with positive JAVID. She has been evaluated by [facility]. Seeing Dr Leon now  25. ANXIETY -suspected partial cause of hot flashes  27. DEPRESSION -    28. Vit D def  29.  Mild peripheral vascular disease buy testing but not felt significant to pursue further  30.  Neuropathy, seen by neurology and put on Cymbalta   31.  Systolic CHF -MILDLY ABNORMAL NUC 2017 EF 38%- Dr Humphrey   32.    pulm HTN on ECHO     ?colonoscopy    SOCIAL HISTORY:  She is  and lives with her spouse who has prostate   cancer.  She does not smoke cigarettes or drink alcohol. 2 sons .      Vitals, as above  Gen: no distress  Musculoskeletal: Both ankles have no visible defects, the joints are stable and strength 5 out of 5.  No ankle pain on flexion and extension.  No pain reproduced around the ankle.  Over the right dorsal foot there is some hyperpigmentation under which there is SOME pain.  There is no surrounding skin changes warmth or breaks in the skin nor any swelling.  I can squeeze all the aspects of the foot with mild distal matatarsal pain but no ankle painat all.  On the left foot there is no pains.  Her gait is normal.  She has no edema.  Her pedal pulses are +2      Magaly was seen today for diabetes and results.    Diagnoses and all orders for this visit:    Right foot pain, unclear etiology, appears to be in the proximal foot underneath some hyperpigmentation which was previously suspicious for an inflammatory issue.  At this point, could be some mild arthritis but probably needs other factors ruled out.  The negative inflammatory markers at this point are reassuring.  We discussed that she may need other imaging modalities.  Reassured her about the overlying skin changes that if indeed they are residual hyperpigmentation they may take quite some time to resolve    Acute on chronic systolic congestive heart failure, edema resolves and  appears to be compensated    Pulmonary hypertension, no significant pulmonary symptoms    Acute on chronic combined systolic and diastolic CHF (congestive heart failure)    Controlled type 2 diabetes mellitus with stage 3 chronic kidney disease, unspecified long term insulin use status, improving    Leg swelling    Essential hypertension, chronic and stable    Other orders  -     Cancel: DXA Bone Density Spine And Hip; Future

## 2017-07-06 ENCOUNTER — OFFICE VISIT (OUTPATIENT)
Dept: CARDIOLOGY | Facility: CLINIC | Age: 82
End: 2017-07-06
Payer: MEDICARE

## 2017-07-06 VITALS
OXYGEN SATURATION: 99 % | HEIGHT: 63 IN | DIASTOLIC BLOOD PRESSURE: 70 MMHG | WEIGHT: 169 LBS | BODY MASS INDEX: 29.95 KG/M2 | SYSTOLIC BLOOD PRESSURE: 156 MMHG | HEART RATE: 61 BPM

## 2017-07-06 DIAGNOSIS — I27.20 PULMONARY HYPERTENSION: ICD-10-CM

## 2017-07-06 DIAGNOSIS — E78.5 DYSLIPIDEMIA: ICD-10-CM

## 2017-07-06 DIAGNOSIS — I70.0 AORTIC ATHEROSCLEROSIS: ICD-10-CM

## 2017-07-06 DIAGNOSIS — I73.9 PVD (PERIPHERAL VASCULAR DISEASE): ICD-10-CM

## 2017-07-06 DIAGNOSIS — I50.23 ACUTE ON CHRONIC SYSTOLIC CONGESTIVE HEART FAILURE: Primary | ICD-10-CM

## 2017-07-06 DIAGNOSIS — I10 ESSENTIAL HYPERTENSION: ICD-10-CM

## 2017-07-06 PROCEDURE — 99214 OFFICE O/P EST MOD 30 MIN: CPT | Mod: S$GLB,,, | Performed by: INTERNAL MEDICINE

## 2017-07-06 PROCEDURE — 99999 PR PBB SHADOW E&M-EST. PATIENT-LVL III: CPT | Mod: PBBFAC,,, | Performed by: INTERNAL MEDICINE

## 2017-07-06 PROCEDURE — 99499 UNLISTED E&M SERVICE: CPT | Mod: S$GLB,,, | Performed by: INTERNAL MEDICINE

## 2017-07-06 PROCEDURE — 1159F MED LIST DOCD IN RCRD: CPT | Mod: S$GLB,,, | Performed by: INTERNAL MEDICINE

## 2017-07-06 PROCEDURE — 1126F AMNT PAIN NOTED NONE PRSNT: CPT | Mod: S$GLB,,, | Performed by: INTERNAL MEDICINE

## 2017-07-06 NOTE — PROGRESS NOTES
Subjective:    Patient ID:  Magaly Nunes is a 84 y.o. female who presents for follow-up of Congestive Heart Failure      Congestive Heart Failure    previous history:  Here for follow-up of systolic heart failure.  She's had worsening lower extremity edema.  She says she's been staying away from salt.  She's compliant with her medicines.  She is just taken a fluid pill once a day.  She says she has not cotton her stockings but is going to get compression fitted tomorrow.  She denies any worsening chest pain or palpitations.  She does have shortness of breath on exertion but relieved with rest.  She denies any PND or orthopnea.  She's not expressing dizziness, presyncope or syncope.  She does have some relief in the swelling when she elevates at night.    Today:  Here for follow-up of combined heart failure.  She feels better than what we last saw her.  She feels like she's gotten rid of the swelling and her shortness of breath with the increased dose of Lasix.  She's currently again on maintenance Lasix.  She doesn't know whether she took any of her other pills besides her fluid pill this morning.  Blood pressures a little elevated in clinic today.  She denies any chest pain, shortness of breath or palpitations.  She's not expressing PND, orthopnea or lower edema.  She now knows again to emphasize sodium restriction.  Otherwise she's better usual state of health.  She denies any dizziness, presyncope or syncope.  She mainly says that she feels a little depressed and lack of energy.  She really doesn't do much exercise.  She mainly just feels down after the death of her  3 years ago.  She mainly otherwise dealing with arthritis that limits her.        Review of Systems   Constitution: Negative.   HENT: Negative.    Eyes: Negative.    Cardiovascular: Negative for dyspnea on exertion, irregular heartbeat, leg swelling, orthopnea, paroxysmal nocturnal dyspnea and syncope.   Skin: Negative.     Musculoskeletal: Positive for arthritis.   Gastrointestinal: Negative for constipation and diarrhea.   Genitourinary: Negative for dysuria.   Neurological: Negative.    Psychiatric/Behavioral: Negative.         Objective:    Physical Exam   Constitutional: She is oriented to person, place, and time. She appears well-developed and well-nourished.   HENT:   Head: Normocephalic and atraumatic.   Eyes: Conjunctivae and EOM are normal. Pupils are equal, round, and reactive to light.   Neck: Normal range of motion. Neck supple. No thyromegaly present.   Cardiovascular: Normal rate and regular rhythm.    No murmur heard.  Pulmonary/Chest: Effort normal and breath sounds normal. No respiratory distress.   Abdominal: Soft. Bowel sounds are normal.   Musculoskeletal: She exhibits no edema.   Neurological: She is alert and oriented to person, place, and time.   Skin: Skin is warm and dry.   Psychiatric: She has a normal mood and affect. Her behavior is normal.       echo:  CONCLUSIONS     1 - Mildly depressed left ventricular systolic function (EF 45-50%).     2 - Severe left atrial enlargement.     3 - Left ventricular diastolic dysfunction.     4 - Normal right ventricular systolic function .     5 - Pulmonary hypertension. The estimated PA systolic pressure is 72 mmHg.     6 - Mild to moderate mitral regurgitation.     7 - Intermediate central venous pressure.     NST:  Impression: NORMAL MYOCARDIAL PERFUSION  1. The perfusion scan is free of evidence for myocardial ischemia or injury.   2. Resting wall motion is physiologic.   3. There is resting LV dysfunction with a reduced ejection fraction of 38 %.   4. The ventricular volumes are normal at rest and stress.   5. The extracardiac distribution of radioactivity is normal.   6. When compared to the previous study from 09/08/2015, no significant defects but LV function now decreased with TID now present.      Assessment:       1. Acute on chronic systolic congestive heart  failure    2. Essential hypertension    3. Aortic atherosclerosis    4. PVD (peripheral vascular disease)    5. Dyslipidemia    6. Pulmonary hypertension         Plan:       -Continue maximize medical therapy for systolic heart failure  -Likely nonischemic but wants conservative therapy in light of age (*mildly abnormal stress test showing TID)  -Stable on maintenance Lasix  -Compression stockings  -Salt restriction  -Exercise as tolerated    Return to clinic in 6 months

## 2017-07-07 ENCOUNTER — OFFICE VISIT (OUTPATIENT)
Dept: PODIATRY | Facility: CLINIC | Age: 82
End: 2017-07-07
Payer: MEDICARE

## 2017-07-07 VITALS
DIASTOLIC BLOOD PRESSURE: 70 MMHG | BODY MASS INDEX: 29.95 KG/M2 | SYSTOLIC BLOOD PRESSURE: 148 MMHG | WEIGHT: 169 LBS | HEIGHT: 63 IN

## 2017-07-07 DIAGNOSIS — E11.49 TYPE II DIABETES MELLITUS WITH NEUROLOGICAL MANIFESTATIONS: Primary | ICD-10-CM

## 2017-07-07 DIAGNOSIS — M19.079 ARTHRITIS OF FOOT: ICD-10-CM

## 2017-07-07 DIAGNOSIS — M20.42 HAMMER TOES OF BOTH FEET: ICD-10-CM

## 2017-07-07 DIAGNOSIS — M20.41 HAMMER TOES OF BOTH FEET: ICD-10-CM

## 2017-07-07 DIAGNOSIS — R60.0 BILATERAL LOWER EXTREMITY EDEMA: ICD-10-CM

## 2017-07-07 DIAGNOSIS — I73.9 PVD (PERIPHERAL VASCULAR DISEASE): ICD-10-CM

## 2017-07-07 DIAGNOSIS — M20.10 HALLUX ABDUCTO VALGUS, UNSPECIFIED LATERALITY: ICD-10-CM

## 2017-07-07 PROCEDURE — 99499 UNLISTED E&M SERVICE: CPT | Mod: S$GLB,,, | Performed by: PODIATRIST

## 2017-07-07 PROCEDURE — 99999 PR PBB SHADOW E&M-EST. PATIENT-LVL III: CPT | Mod: PBBFAC,,, | Performed by: PODIATRIST

## 2017-07-07 PROCEDURE — 11721 DEBRIDE NAIL 6 OR MORE: CPT | Mod: Q9,S$GLB,, | Performed by: PODIATRIST

## 2017-07-07 NOTE — LETTER
July 7, 2017      Chris Bangura MD  [address] [place] [address]           [facility]  [address] [place] [address]  Phone: [phone]          Patient: Magaly Nunes   MR Number: [id number]   YOB: 1932   Date of Visit: 7/7/2017       Dear Dr. Chris Bangura:    Thank you for referring Magaly Nunes to me for evaluation. Attached you will find relevant portions of my assessment and plan of care.    If you have questions, please do not hesitate to call me. I look forward to following Magaly Nunes along with you.    Sincerely,    Cole Subramanian DPM    Enclosure  CC:  No Recipients    If you would like to receive this communication electronically, please contact [email or web address] or [phone] to request more information on [facility] access.    For providers and/or their staff who would like to refer a patient to [facility], please contact us through our one-stop-shop provider referral line, [facility], at [phone].    If you feel you have received this communication in error or would no longer like to receive these types of communications, please e-mail [email or web address]

## 2017-07-07 NOTE — PROGRESS NOTES
Subjective:      Patient ID: Magaly Nunes is a 84 y.o. female.    Chief Complaint: Diabetes Mellitus (Pcp Dr. Bangura 06/26/2017); Diabetic Foot Exam; and Nail Care    Magaly is a 84 y.o. female who presents to the clinic for evaluation and treatment of high risk feet. Magaly has a past medical history of Anxiety disorder; Carpal tunnel syndrome; CHF (congestive heart failure); Chronic allergic rhinitis; Chronic pain (10/22/2012); CKD stage 3 due to type 2 diabetes mellitus (2/14/2017); Constipation - functional (4/10/2013); Depression; Diabetes mellitus type II; Hot flash not due to menopause; HTN (hypertension); Hypokalemia (11/19/2012); Insomnia (4/10/2013); Knee pain, bilateral (7/24/2013); Personal history of DVT (deep vein thrombosis); Renovascular hypertension (2/14/2017); Severe tricuspid regurgitation (2/14/2017); Spinal stenosis; Spinal stenosis of lumbar region (2/3/2014); and Vertigo. The patient's chief complaint is DM foot exam. Diet controlled DM. Paresthesias present but much improved now that she has been on Cymbalta. Relates painful thick toenails on both feet. Both great toenails hurt her especially in shoes. No other complaints today.        This patient has documented high risk feet requiring routine maintenance secondary to diabetes mellitis and those secondary complications of diabetes, as mentioned..      PCP: Chris Bangura MD    Date Last Seen by PCP: 3/13/17  Chief Complaint   Patient presents with    Diabetes Mellitus     Pcp Dr. Bangura 06/26/2017    Diabetic Foot Exam    Nail Care     Current shoe gear:  sandals    Hemoglobin A1C   Date Value Ref Range Status   06/19/2017 6.0 (H) 4.0 - 5.6 % Final     Comment:     According to ADA guidelines, hemoglobin A1c <7.0% represents  optimal control in non-pregnant diabetic patients. Different  metrics may apply to specific patient populations.   Standards of Medical Care in Diabetes-2016.  For the purpose of screening for the  presence of diabetes:  <5.7%     Consistent with the absence of diabetes  5.7-6.4%  Consistent with increasing risk for diabetes   (prediabetes)  >or=6.5%  Consistent with diabetes  Currently, no consensus exists for use of hemoglobin A1c  for diagnosis of diabetes for children.  This Hemoglobin A1c assay has significant interference with fetal   hemoglobin   (HbF). The results are invalid for patients with abnormal amounts of   HbF,   including those with known Hereditary Persistence   of Fetal Hemoglobin. Heterozygous hemoglobin variants (HbAS, HbAC,   HbAD, HbAE, HbA2) do not significantly interfere with this assay;   however, presence of multiple variants in a sample may impact the %   interference.     03/13/2017 6.5 (H) 4.5 - 6.2 % Final     Comment:     According to ADA guidelines, hemoglobin A1C <7.0% represents  optimal control in non-pregnant diabetic patients.  Different  metrics may apply to specific populations.   Standards of Medical Care in Diabetes - 2016.  For the purpose of screening for the presence of diabetes:  <5.7%     Consistent with the absence of diabetes  5.7-6.4%  Consistent with increasing risk for diabetes   (prediabetes)  >or=6.5%  Consistent with diabetes  Currently no consensus exists for use of hemoglobin A1C  for diagnosis of diabetes for children.     12/13/2016 6.1 4.5 - 6.2 % Final     Comment:     According to ADA guidelines, hemoglobin A1C <7.0% represents  optimal control in non-pregnant diabetic patients.  Different  metrics may apply to specific populations.   Standards of Medical Care in Diabetes - 2016.  For the purpose of screening for the presence of diabetes:  <5.7%     Consistent with the absence of diabetes  5.7-6.4%  Consistent with increasing risk for diabetes   (prediabetes)  >or=6.5%  Consistent with diabetes  Currently no consensus exists for use of hemoglobin A1C  for diagnosis of diabetes for children.       Patient Active Problem List   Diagnosis     Depression    Anxiety disorder    Hypokalemia    HTN (hypertension)    Constipation - functional    Left knee pain    Spinal stenosis of lumbar region    PVD (peripheral vascular disease)    Aortic atherosclerosis    DM (diabetes mellitus) type II controlled with renal manifestation    Baker's cyst    Leg swelling    Depression, major, recurrent, moderate    Elevated troponin    Acute on chronic combined systolic and diastolic CHF (congestive heart failure)    Pulmonary hypertension    Synovial cyst of popliteal space (Baker), left knee    Renovascular hypertension    CKD stage 3 due to type 2 diabetes mellitus    Anemia of chronic renal failure, stage 3 (moderate)    Severe tricuspid regurgitation    Pseudogout of left knee    Acute on chronic systolic congestive heart failure     Current Outpatient Prescriptions on File Prior to Visit   Medication Sig Dispense Refill    carvedilol (COREG) 12.5 MG tablet Take 1 tablet (12.5 mg total) by mouth 2 (two) times daily. 60 tablet 11    furosemide (LASIX) 40 MG tablet Take 1 tablet (40 mg total) by mouth once daily. 30 tablet 11    lorazepam (ATIVAN) 0.5 MG tablet Take 1 tablet (0.5 mg total) by mouth every 8 (eight) hours as needed for Anxiety. (caution-may cause sleepiness) 60 tablet 3    losartan (COZAAR) 100 MG tablet TAKE ONE TABLET BY MOUTH ONCE DAILY 90 tablet 0    meclizine (ANTIVERT) 25 mg tablet Take 1 tablet (25 mg total) by mouth 3 (three) times daily as needed. 90 tablet 90    meloxicam (MOBIC) 7.5 MG tablet Take 1 tablet (7.5 mg total) by mouth daily as needed for Pain. 30 tablet 3    omeprazole (PRILOSEC) 20 MG capsule TAKE 2 CAPSULES BY MOUTH EVERY DAY FOR ACID REFLUX AND STOMACH 180 capsule 1    potassium chloride SA (K-DUR,KLOR-CON) 20 MEQ tablet Take 1 tablet (20 mEq total) by mouth once daily. 90 tablet 1    temazepam (RESTORIL) 30 mg capsule TAKE ONE CAPSULE BY MOUTH EVERY NIGHT AT BEDTIME AS NEEDED FOR INSOMNIA 30  capsule 5     No current facility-administered medications on file prior to visit.      Review of patient's allergies indicates:   Allergen Reactions    Ace inhibitors      Other reaction(s): Unknown    Aspirin      Other reaction(s): Unknown    Aciphex  [rabeprazole]      Other reaction(s): Stomach upset    Bextra  [valdecoxib]      Other reaction(s): Unknown    Clonidine      Other reaction(s): dry mouth.lip swelling    Cardizem  [diltiazem hcl]      Other reaction(s): Unknown    Mavik  [trandolapril]      Other reaction(s): Unknown    Nsaids (non-steroidal anti-inflammatory drug)      Other reaction(s): black spots on skin    Phenytoin sodium extended      Other reaction(s): Stomach upset    Tramadol      Other reaction(s): stomach pain     Past Surgical History:   Procedure Laterality Date    CATARACT EXTRACTION Bilateral     EYE SURGERY      FRACTURE SURGERY      HYSTERECTOMY      ORIF RADIUS & ULNA FRACTURES       Family History   Problem Relation Age of Onset    No Known Problems Mother     No Known Problems Father     No Known Problems Sister     No Known Problems Brother     No Known Problems Maternal Aunt     No Known Problems Maternal Uncle     No Known Problems Paternal Aunt     No Known Problems Paternal Uncle     No Known Problems Maternal Grandmother     No Known Problems Maternal Grandfather     No Known Problems Paternal Grandmother     No Known Problems Paternal Grandfather     Amblyopia Neg Hx     Blindness Neg Hx     Cancer Neg Hx     Diabetes Neg Hx     Glaucoma Neg Hx     Hypertension Neg Hx     Macular degeneration Neg Hx     Retinal detachment Neg Hx     Strabismus Neg Hx     Stroke Neg Hx     Thyroid disease Neg Hx      Social History     Social History    Marital status:      Spouse name: N/A    Number of children: N/A    Years of education: N/A     Occupational History    Not on file.     Social History Main Topics    Smoking status: Never  "Smoker    Smokeless tobacco: Not on file    Alcohol use No    Drug use: No    Sexual activity: No     Other Topics Concern    Not on file     Social History Narrative    .  Lives alone.   and two sons have .  Active.         Review of Systems   Constitution: Negative for chills, fever and weakness.   Cardiovascular: Positive for leg swelling. Negative for chest pain and claudication.   Respiratory: Negative for cough and shortness of breath.    Skin: Positive for dry skin and nail changes. Negative for itching and rash.   Musculoskeletal: Positive for arthritis, back pain, joint pain, joint swelling and muscle weakness. Negative for falls.        Walks with cane secondary to back pain and weakness. Toenail pain b/l   Gastrointestinal: Negative for diarrhea, nausea and vomiting.   Neurological: Positive for numbness and paresthesias. Negative for tremors.   Psychiatric/Behavioral: Negative for altered mental status and hallucinations.         Objective:       Vitals:    17 1103   BP: (!) 148/70   Weight: 76.7 kg (169 lb)   Height: 5' 3" (1.6 m)   PainSc:   7   PainLoc: Hip       Physical Exam   Constitutional:   General: Pt. is well-developed, well-nourished, appears stated age, in no acute distress, alert and oriented x 3. No evidence of depression, anxiety, or agitation. Calm, cooperative, and communicative. Appropriate interactions and affect.       Cardiovascular:   Pulses:       Dorsalis pedis pulses are 2+ on the right side, and 2+ on the left side.        Posterior tibial pulses are 1+ on the right side, and 1+ on the left side.   There is absent digital hair. Skin is atrophic, shiny Hyperpigmented b/l LEs. Cool to touch distal foot b/l.    Musculoskeletal:        Right foot: There is normal range of motion.        Left foot: There is normal range of motion.   Adequate joint range of motion without pain, limitation, nor crepitation Bilateral feet and ankle joints. Muscle strength is " 5/5 in all groups bilaterally.    Cane assisted gait    Patient has hammertoes of digits 2-5 bilateral partially reducible without symptom today.    Visible and palpable bunion without pain at dorsomedial 1st metatarsal head right and left.  Hallux abducted right and left partially reducible, tracks laterally without being track bound.  No ecchymosis, erythema, edema, or cardinal signs infection or signs of trauma same foot.    Fat pad atrophy to heels and met heads bilateral    Pain to b/l borders of b/l hallux with palpation. Pain to direct palpation and lateral squeeze of left heel hyperkeratotic lesions.    Neurological: A sensory deficit is present.   Racine-Devon 5.07 monofilamant testing is diminished John feet. Sharp/dull sensation diminished Bilaterally. Light touch absent Bilaterally.    Paresthesias, and hyperesthesia bilateral feet at toes with no clearly identified trigger or source.         Skin: Skin is warm, dry and intact. No abrasion, no ecchymosis, no lesion and no rash noted. She is not diaphoretic. No cyanosis. No pallor. Nails show no clubbing.   Toenails 1-5 bilaterally thickened with yellow/brown discoloration and subungual debris. B/l borders of b/l hallux moderately incurvated without wound/infection. Pain to touch. No erythema.     Hyperkeratotic lesion x 3 noted to plantar left heel. No signs of deep tissue injury.     Interdigital Spaces clean, dry and without evidence of break in skin integrity   Psychiatric: She has a normal mood and affect. Her speech is normal.   Nursing note and vitals reviewed.      hyperpigmentation to dorsal feet john  Assessment:       Encounter Diagnoses   Name Primary?    Type II diabetes mellitus with neurological manifestations Yes    Bilateral lower extremity edema     PVD (peripheral vascular disease)     Hallux abducto valgus, unspecified laterality     Hammer toes of both feet     Arthritis of foot          Plan:       Magaly was seen today for  diabetes mellitus, diabetic foot exam and nail care.    Diagnoses and all orders for this visit:    Type II diabetes mellitus with neurological manifestations    Bilateral lower extremity edema    PVD (peripheral vascular disease)    Hallux abducto valgus, unspecified laterality    Hammer toes of both feet    Arthritis of foot      I counseled the patient on her conditions, their implications and medical management.     With patient's permission, nails were aggressively reduced and debrided 1,2,3,4, 5 R and 1,2, 3,4,5 L and filed to their soft tissue attachment mechanically and with electric , removing all offending nail and debris. Utilizing a #15 scalpel, I trimmed the calluses at the above mentioned location (left plantar heel x 3)    Patient tolerated this well and no blood was drawn. Patient reports relief following the procedure.     She will continue to monitor the areas daily, inspect her feet, wear protective shoe gear when ambulatory, moisturizer to maintain skin integrity and follow in this office in approximately 3-4 months, sooner p.r.n.    Long discussion with patient regarding appropriate, supportive and comfortable shoes. Recommended SAS/Easy Spirit style shoe brands with adequate arch supports to alleviate abnormal pressure and improve stability of foot while walking. Avoid flat shoes and barefoot walking as these will exacerbate or worsen symptoms.     Discussed regular and routine moisturizer to skin of both feet to help improve dry skin. Advised to apply twice daily until resolution of symptoms. Avoid between toes. Recommended Gold Bond DM foot cream daily.     Continue Cymbalta per Neurology recommendations.     RTC 3 months call sooner if any problems arise.

## 2017-08-10 ENCOUNTER — OFFICE VISIT (OUTPATIENT)
Dept: FAMILY MEDICINE | Facility: CLINIC | Age: 82
End: 2017-08-10
Payer: MEDICARE

## 2017-08-10 VITALS
BODY MASS INDEX: 28.52 KG/M2 | OXYGEN SATURATION: 98 % | DIASTOLIC BLOOD PRESSURE: 68 MMHG | HEIGHT: 63 IN | SYSTOLIC BLOOD PRESSURE: 122 MMHG | TEMPERATURE: 98 F | WEIGHT: 160.94 LBS | HEART RATE: 95 BPM

## 2017-08-10 DIAGNOSIS — L74.519 EXCESSIVE SWEATING, LOCAL: ICD-10-CM

## 2017-08-10 DIAGNOSIS — L30.9 DERMATITIS: Primary | ICD-10-CM

## 2017-08-10 DIAGNOSIS — N18.3 CONTROLLED TYPE 2 DIABETES MELLITUS WITH STAGE 3 CHRONIC KIDNEY DISEASE, UNSPECIFIED LONG TERM INSULIN USE STATUS: ICD-10-CM

## 2017-08-10 DIAGNOSIS — F33.1 DEPRESSION, MAJOR, RECURRENT, MODERATE: ICD-10-CM

## 2017-08-10 DIAGNOSIS — F41.9 ANXIETY DISORDER, UNSPECIFIED TYPE: ICD-10-CM

## 2017-08-10 DIAGNOSIS — I10 ESSENTIAL HYPERTENSION: ICD-10-CM

## 2017-08-10 DIAGNOSIS — E11.22 CONTROLLED TYPE 2 DIABETES MELLITUS WITH STAGE 3 CHRONIC KIDNEY DISEASE, UNSPECIFIED LONG TERM INSULIN USE STATUS: ICD-10-CM

## 2017-08-10 PROCEDURE — 99214 OFFICE O/P EST MOD 30 MIN: CPT | Mod: S$GLB,,, | Performed by: INTERNAL MEDICINE

## 2017-08-10 PROCEDURE — 1126F AMNT PAIN NOTED NONE PRSNT: CPT | Mod: S$GLB,,, | Performed by: INTERNAL MEDICINE

## 2017-08-10 PROCEDURE — 1159F MED LIST DOCD IN RCRD: CPT | Mod: S$GLB,,, | Performed by: INTERNAL MEDICINE

## 2017-08-10 PROCEDURE — 99499 UNLISTED E&M SERVICE: CPT | Mod: S$GLB,,, | Performed by: INTERNAL MEDICINE

## 2017-08-10 PROCEDURE — 3074F SYST BP LT 130 MM HG: CPT | Mod: S$GLB,,, | Performed by: INTERNAL MEDICINE

## 2017-08-10 PROCEDURE — 3008F BODY MASS INDEX DOCD: CPT | Mod: S$GLB,,, | Performed by: INTERNAL MEDICINE

## 2017-08-10 PROCEDURE — 99999 PR PBB SHADOW E&M-EST. PATIENT-LVL III: CPT | Mod: PBBFAC,,, | Performed by: INTERNAL MEDICINE

## 2017-08-10 PROCEDURE — 3078F DIAST BP <80 MM HG: CPT | Mod: S$GLB,,, | Performed by: INTERNAL MEDICINE

## 2017-08-10 RX ORDER — DULOXETIN HYDROCHLORIDE 60 MG/1
CAPSULE, DELAYED RELEASE ORAL
COMMUNITY
Start: 2017-08-04 | End: 2017-11-03 | Stop reason: SDUPTHER

## 2017-08-10 RX ORDER — TRAMADOL HYDROCHLORIDE 50 MG/1
TABLET ORAL
COMMUNITY
Start: 2017-07-14 | End: 2017-10-09

## 2017-08-10 RX ORDER — POTASSIUM CHLORIDE 20 MEQ/1
20 TABLET, EXTENDED RELEASE ORAL DAILY
Qty: 90 TABLET | Refills: 1 | Status: SHIPPED | OUTPATIENT
Start: 2017-08-10 | End: 2018-02-26 | Stop reason: SDUPTHER

## 2017-08-10 RX ORDER — OMEPRAZOLE 20 MG/1
CAPSULE, DELAYED RELEASE ORAL
Qty: 180 CAPSULE | Refills: 1 | Status: SHIPPED | OUTPATIENT
Start: 2017-08-10 | End: 2017-10-09 | Stop reason: SDUPTHER

## 2017-08-10 NOTE — PROGRESS NOTES
Chief complaint rash on left arm and sweating    84-year-old black female with multiple medical problems.  5 days ago she was outside work in the [employer] think she was bitten by some mosquitoes on the left upper arm.  She has some red spots on the left trapezius muscle as well as above the elbow on the left.  It itches to a moderate degree.  She is taking Benadryl which does not make her sleepy and that helps somewhat.  She has not noticed development of any new lesions.  She has 2 on the left upper trapezius and 3 on the left lateral upper arm.  She has no radicular symptoms or pain or paresthesias radiating in this area to suggest zoster.  She is also continued to have the facial sweating a lot which makes her very anxious and depressed and she does not want to go out.  This is been a problem for her in the past.  Does not appear to be actual hot flashes.  We have tried clonidine another medication in the past.  It was present prior to the Cymbalta and has not been worse since being on the Cymbalta which we discussed can sometimes be a cause of increased sweating but I will be more profuse.  She has not yet seen dermatology for the sweating.  Are not aware that there may be available medications for facial sweating like there is for axillary sweating but the she could pursue that as an option since it has been a very ongoing problem for her.      Other labs reviewed with improvement in the A1c of 6.5 down to 6.  Her anemia is better and iron studies are normal.  Kidney  function about the same    Review of systems: No further swelling, no other myalgias arthralgias currently that are new, no worsening numbness or tingling in that left arm    PAST MEDICAL HISTORY:                                                        1. Hypertension.                                                             2. Dyspnea, possibly secondary to pulmonary hypertension. Sleep study             negative. Has seen cardiology. Ultrasound  and CT of chest are negative.            PFTs 2002 were essentially normal.                                           3. History of sinus bradycardia.                                             4. Angioedema on Mavik.                                                      5. Vertigo.                                                                  6. spinal stenosis, by history, 3 epidural injections in the past        per neurosurgery, Dr. Garcia.  Now seeing Dr Leon.                                                                    7. Allergic rhinitis.                                                                                                        8. IBS.                                                                      9. Carpal tunnel syndrome, status post surgery.                              10. Status post cholecystectomy, total hysterectomy, and left ovary                                                         11. History of iron deficiency anemia, normal EGD in 2002 and in 2000.       12. GERD, with hiatal hernia.                                                13. Osteoarthritis of the knees.                                             14. Severe hot flashes despite all forms of therapy and eval by GYN.         15. History of leg pain due to varicosities or spinal stenosis.              16. History of positive JAVID, mild.                                           17. Trigger fingers, with history of injections.                             18. Varicose veins with reflux on ultrasound 2003.                           19. Borderline abdominal aneurysm at 2.5 cm on ultrasound 2003.              20. Positive DSE, EKG portion, but indeterminate overall,2005. Neg nuclear stress '09 And negative nuclear stress test 2012. MILDLY ABNORMAL NUC 2017 EF 38%          21. Mild stenosis of the renal arteries by ultrasound.                       22. Diabetes  23. History of multitrauma 2006, with fracture of the  right arm, subsequent  DVT, and has been on Coumadin. She was discharged from the hospital around   2006. She did develop anemia, and has had several transfusions during   that stay. She also had pulmonary embolus associated with that DVT.    24. History of polyarthralgia with positive JAVID. She has been evaluated by [facility]. Seeing Dr Leon now  25. ANXIETY -suspected partial cause of hot flashes  27. DEPRESSION -    28. Vit D def  29.  Mild peripheral vascular disease buy testing but not felt significant to pursue further  30.  Neuropathy, seen by neurology and put on Cymbalta   31.  Systolic CHF -MILDLY ABNORMAL NUC 2017 EF 38%- Dr Humphrey   32.    pulm HTN on ECHO     ?colonoscopy    SOCIAL HISTORY:  She is  and lives with her spouse who has prostate   cancer.  She does not smoke cigarettes or drink alcohol. 2 sons .      Vitals, as above  Gen: no distress  Skin examination reveals a flat area of redness about 1 x 1 cm in a patch 3 on the left lateral arm above the elbow and 2 similar ones on the left upper trapezius muscle where she would've had exposed skin.  There is nothing in between.  Has no vesicle formation or signs of any secondary cellulitis.  She has 2 marks on the right forearm which appear to be more bruised but of similar size.    Magaly was seen today for rash and insect bite.    Diagnoses and all orders for this visit:    Dermatitis, they appear to possibly be insect bites with itching for which she can continue the oral and start topical Benadryl.  Strong consideration was made for early shingles given the distribution of they do not appear to be vesicular.  If she gets new lesions or they become vesicular of the next couple of days, she should inform me and we might give her a seven-day course of Valtrex    Depression, major, recurrent, moderate, worsening stress related to the facial sweating    Anxiety disorder, unspecified  type    Essential hypertension, chronic and stable    Controlled type 2 diabetes mellitus with stage 3 chronic kidney disease, unspecified long term insulin use status    Excessive sweating, local, facial sweating which leads to significant anxiety.  Discussed all these issues at length and tried to reassure her that we may well be able to find a solution.Total time over 25 minutes with over 50% counseling.  -     Ambulatory referral to Dermatology    Other orders  -     Cancel: DXA Bone Density Spine And Hip; Future  -     omeprazole (PRILOSEC) 20 MG capsule; TAKE 2 CAPSULES BY MOUTH EVERY DAY FOR ACID REFLUX AND STOMACH  -     potassium chloride SA (K-DUR,KLOR-CON) 20 MEQ tablet; Take 1 tablet (20 mEq total) by mouth once daily.

## 2017-08-19 DIAGNOSIS — I10 ESSENTIAL HYPERTENSION: ICD-10-CM

## 2017-08-22 RX ORDER — OMEPRAZOLE 20 MG/1
CAPSULE, DELAYED RELEASE ORAL
Qty: 180 CAPSULE | Refills: 0 | Status: SHIPPED | OUTPATIENT
Start: 2017-08-22 | End: 2018-08-09 | Stop reason: ALTCHOICE

## 2017-08-22 RX ORDER — LOSARTAN POTASSIUM 100 MG/1
TABLET ORAL
Qty: 90 TABLET | Refills: 0 | Status: SHIPPED | OUTPATIENT
Start: 2017-08-22 | End: 2018-03-03 | Stop reason: SDUPTHER

## 2017-09-07 RX ORDER — TEMAZEPAM 30 MG/1
CAPSULE ORAL
Qty: 30 CAPSULE | Refills: 5 | Status: ON HOLD | OUTPATIENT
Start: 2017-09-07 | End: 2017-12-30 | Stop reason: HOSPADM

## 2017-09-27 ENCOUNTER — TELEPHONE (OUTPATIENT)
Dept: FAMILY MEDICINE | Facility: CLINIC | Age: 82
End: 2017-09-27

## 2017-09-27 NOTE — TELEPHONE ENCOUNTER
----- Message from Tristen Cisneros sent at 9/26/2017 10:42 AM CDT -----  Contact: CASSIDY  Good morning. Pt requested a call to discuss a knee brace. She stated she gave her information to someone from [facility] over the phone and was told she would receive her knee brace. Please call pt. Thanks.

## 2017-10-02 ENCOUNTER — TELEPHONE (OUTPATIENT)
Dept: FAMILY MEDICINE | Facility: CLINIC | Age: 82
End: 2017-10-02

## 2017-10-03 NOTE — TELEPHONE ENCOUNTER
Patient states that she was notified by [facility] that she qualified for a back and knee brace and that they would be sending her one, she did give her insurance number. I advised her to not give her number to anyone and to never call anything from a TV ad.

## 2017-10-03 NOTE — TELEPHONE ENCOUNTER
Note rev -does NOT look like we discussed a brace at last appt      Perhaps her outside ortho rec a brae which MAY be a good idea but Dr MON is not aware of any procedure for the ins covering a brace     Perhaps ortho can order the RIGHT brace      DO NOT pursue getting a brace off of any add off TV

## 2017-10-09 ENCOUNTER — OFFICE VISIT (OUTPATIENT)
Dept: PODIATRY | Facility: CLINIC | Age: 82
End: 2017-10-09
Payer: MEDICARE

## 2017-10-09 ENCOUNTER — OFFICE VISIT (OUTPATIENT)
Dept: FAMILY MEDICINE | Facility: CLINIC | Age: 82
End: 2017-10-09
Payer: MEDICARE

## 2017-10-09 VITALS
BODY MASS INDEX: 30.62 KG/M2 | DIASTOLIC BLOOD PRESSURE: 70 MMHG | SYSTOLIC BLOOD PRESSURE: 150 MMHG | HEART RATE: 78 BPM | DIASTOLIC BLOOD PRESSURE: 62 MMHG | TEMPERATURE: 98 F | BODY MASS INDEX: 28.35 KG/M2 | HEIGHT: 63 IN | OXYGEN SATURATION: 97 % | SYSTOLIC BLOOD PRESSURE: 114 MMHG | WEIGHT: 160 LBS | WEIGHT: 172.81 LBS | HEIGHT: 63 IN

## 2017-10-09 DIAGNOSIS — I50.43 ACUTE ON CHRONIC COMBINED SYSTOLIC AND DIASTOLIC CHF (CONGESTIVE HEART FAILURE): ICD-10-CM

## 2017-10-09 DIAGNOSIS — I07.1 SEVERE TRICUSPID REGURGITATION: ICD-10-CM

## 2017-10-09 DIAGNOSIS — R60.0 BILATERAL LOWER EXTREMITY EDEMA: ICD-10-CM

## 2017-10-09 DIAGNOSIS — Z23 NEED FOR PNEUMOCOCCAL VACCINE: ICD-10-CM

## 2017-10-09 DIAGNOSIS — I27.20 PULMONARY HYPERTENSION: ICD-10-CM

## 2017-10-09 DIAGNOSIS — E11.22 CONTROLLED TYPE 2 DIABETES MELLITUS WITH STAGE 3 CHRONIC KIDNEY DISEASE, UNSPECIFIED LONG TERM INSULIN USE STATUS: ICD-10-CM

## 2017-10-09 DIAGNOSIS — E11.22 CKD STAGE 3 DUE TO TYPE 2 DIABETES MELLITUS: ICD-10-CM

## 2017-10-09 DIAGNOSIS — M20.41 HAMMER TOES OF BOTH FEET: ICD-10-CM

## 2017-10-09 DIAGNOSIS — F41.9 ANXIETY DISORDER, UNSPECIFIED TYPE: ICD-10-CM

## 2017-10-09 DIAGNOSIS — D63.1 ANEMIA OF CHRONIC RENAL FAILURE, STAGE 3 (MODERATE): ICD-10-CM

## 2017-10-09 DIAGNOSIS — I10 ESSENTIAL HYPERTENSION: ICD-10-CM

## 2017-10-09 DIAGNOSIS — I50.23 ACUTE ON CHRONIC SYSTOLIC CONGESTIVE HEART FAILURE: ICD-10-CM

## 2017-10-09 DIAGNOSIS — I70.0 AORTIC ATHEROSCLEROSIS: ICD-10-CM

## 2017-10-09 DIAGNOSIS — I73.9 PVD (PERIPHERAL VASCULAR DISEASE): ICD-10-CM

## 2017-10-09 DIAGNOSIS — B35.1 ONYCHOMYCOSIS DUE TO DERMATOPHYTE: ICD-10-CM

## 2017-10-09 DIAGNOSIS — L84 CORN OR CALLUS: ICD-10-CM

## 2017-10-09 DIAGNOSIS — M89.9 DISORDER OF BONE: ICD-10-CM

## 2017-10-09 DIAGNOSIS — N18.3 CONTROLLED TYPE 2 DIABETES MELLITUS WITH STAGE 3 CHRONIC KIDNEY DISEASE, UNSPECIFIED LONG TERM INSULIN USE STATUS: ICD-10-CM

## 2017-10-09 DIAGNOSIS — N18.30 CKD STAGE 3 DUE TO TYPE 2 DIABETES MELLITUS: ICD-10-CM

## 2017-10-09 DIAGNOSIS — Z00.00 ENCOUNTER FOR PREVENTIVE HEALTH EXAMINATION: Primary | ICD-10-CM

## 2017-10-09 DIAGNOSIS — E87.6 HYPOKALEMIA: ICD-10-CM

## 2017-10-09 DIAGNOSIS — Z13.820 SCREENING FOR OSTEOPOROSIS: ICD-10-CM

## 2017-10-09 DIAGNOSIS — F33.1 DEPRESSION, MAJOR, RECURRENT, MODERATE: ICD-10-CM

## 2017-10-09 DIAGNOSIS — N18.30 ANEMIA OF CHRONIC RENAL FAILURE, STAGE 3 (MODERATE): ICD-10-CM

## 2017-10-09 DIAGNOSIS — M20.42 HAMMER TOES OF BOTH FEET: ICD-10-CM

## 2017-10-09 DIAGNOSIS — M20.10 HALLUX ABDUCTO VALGUS, UNSPECIFIED LATERALITY: ICD-10-CM

## 2017-10-09 DIAGNOSIS — R20.2 PARESTHESIAS: ICD-10-CM

## 2017-10-09 DIAGNOSIS — E11.49 TYPE II DIABETES MELLITUS WITH NEUROLOGICAL MANIFESTATIONS: Primary | ICD-10-CM

## 2017-10-09 DIAGNOSIS — Z23 NEED FOR IMMUNIZATION AGAINST INFLUENZA: ICD-10-CM

## 2017-10-09 PROCEDURE — 99999 PR PBB SHADOW E&M-EST. PATIENT-LVL III: CPT | Mod: PBBFAC,,, | Performed by: PODIATRIST

## 2017-10-09 PROCEDURE — G0009 ADMIN PNEUMOCOCCAL VACCINE: HCPCS | Mod: S$GLB,,, | Performed by: NURSE PRACTITIONER

## 2017-10-09 PROCEDURE — 11721 DEBRIDE NAIL 6 OR MORE: CPT | Mod: 59,Q9,S$GLB, | Performed by: PODIATRIST

## 2017-10-09 PROCEDURE — 99499 UNLISTED E&M SERVICE: CPT | Mod: S$GLB,,, | Performed by: NURSE PRACTITIONER

## 2017-10-09 PROCEDURE — 90662 IIV NO PRSV INCREASED AG IM: CPT | Mod: S$GLB,,, | Performed by: NURSE PRACTITIONER

## 2017-10-09 PROCEDURE — 99499 UNLISTED E&M SERVICE: CPT | Mod: S$GLB,,, | Performed by: PODIATRIST

## 2017-10-09 PROCEDURE — 99999 PR PBB SHADOW E&M-EST. PATIENT-LVL V: CPT | Mod: PBBFAC,,, | Performed by: NURSE PRACTITIONER

## 2017-10-09 PROCEDURE — G0439 PPPS, SUBSEQ VISIT: HCPCS | Mod: S$GLB,,, | Performed by: NURSE PRACTITIONER

## 2017-10-09 PROCEDURE — G0008 ADMIN INFLUENZA VIRUS VAC: HCPCS | Mod: S$GLB,,, | Performed by: NURSE PRACTITIONER

## 2017-10-09 PROCEDURE — 11056 PARNG/CUTG B9 HYPRKR LES 2-4: CPT | Mod: Q9,S$GLB,, | Performed by: PODIATRIST

## 2017-10-09 PROCEDURE — 90670 PCV13 VACCINE IM: CPT | Mod: S$GLB,,, | Performed by: NURSE PRACTITIONER

## 2017-10-09 NOTE — PATIENT INSTRUCTIONS
Recommend lotions: eucerin, aquaphor, A&D ointment, gold bond for diabetics, sween; urea 40 with aloe (found on amazon.com)    Shoe recommendations: (try 6pm.com, zappos.com , nordstromrack.com, or shoes.com for discounted prices) you can visit DSW shoes in [place] as well    Asics (GT 2000 or gel foundations), new balance, saucony (stabil c3),  Rose (GTS or Beast or transcend), vionic, propet (tennis shoe)    sofft brand, clarks, crocs, aerosoles, naturalizers, SAS, ecco, crystal, milton douglas, rockports (dress shoes)    Vionic, burkenstocks, fitflops, propet (sandals)    Nike comfort thong sandals, crocs, propet (house shoes)    Nail Home remedy:  Vicks Vapor rub to nails for easier managability    Occasional soaks for 15-20 mins in luke warm water with 1 cup of listerine and 1 cup of apple cider vinegar are ok You may add several drops of oil of oregano or tea tree oil as well          Diabetes: Inspecting Your Feet  Diabetes increases your chances of developing foot problems. So inspect your feet every day. This helps you find small skin irritations before they become serious infections. If you have trouble seeing the bottoms of your feet, use a mirror or ask a family member or friend to help.     Pressure spots on the bottom of the foot are common areas where problems develop.   How to check your feet  Below are tips to help you look for foot problems. Try to check your feet at the same time each day, such as when you get out of bed in the morning:  · Check the top of each foot. The tops of toes, back of the heel, and outer edge of the foot can get a lot of rubbing from poor-fitting shoes.  · Check the bottom of each foot. Daily wear and tear often leads to problems at pressure spots.  · Check the toes and nails. Fungal infections often occur between toes. Toenail problems can also be a sign of fungal infections or lead to breaks in the skin.  · Check your shoes, too. Loose objects inside a shoe can injure the  foot. Use your hand to feel inside your shoes for things like valery, loose stitching, or rough areas that could irritate your skin.  Warning signs  Look for any color changes in the foot. Redness with streaks can signal a severe infection, which needs immediate medical attention. Tell your doctor right away if you have any of these problems:  · Swelling, sometimes with color changes, may be a sign of poor blood flow or infection. Symptoms include tenderness and an increase in the size of your foot.  · Warm or hot areas on your feet may be signs of infection. A foot that is cold may not be getting enough blood.  · Sensations such as burning, tingling, or pins and needles can be signs of a problem. Also check for areas that may be numb.  · Hot spots are caused by friction or pressure. Look for hot spots in areas that get a lot of rubbing. Hot spots can turn into blisters, calluses, or sores.  · Cracks and sores are caused by dry or irritated skin. They are a sign that the skin is breaking down, which can lead to infection.  · Toenail problems to watch for include nails growing into the skin (ingrown toenail) and causing redness or pain. Thick, yellow, or discolored nails can signal a fungal infection.  · Drainage and odor can develop from untreated sores and ulcers. Call your doctor right away if you notice white or yellow drainage, bleeding, or unpleasant odor.   © 0558-2312 [employer]. [address] [place] [address], [place] [address]. All rights reserved. This information is not intended as a substitute for professional medical care. Always follow your healthcare professional's instructions.

## 2017-10-09 NOTE — PROGRESS NOTES
Subjective:      Patient ID: Magaly Nunes is a 84 y.o. female.    Chief Complaint: Diabetes Mellitus (pcp Dr. Bangura 8-10-17); Diabetic Foot Exam; and Nail Care    Magaly is a 84 y.o. female who presents to the clinic for evaluation and treatment of high risk feet. Magaly has a past medical history of Anxiety disorder; Carpal tunnel syndrome; CHF (congestive heart failure); Chronic allergic rhinitis; Chronic pain (10/22/2012); CKD stage 3 due to type 2 diabetes mellitus (2/14/2017); Constipation - functional (4/10/2013); Depression; Diabetes mellitus type II; Hot flash not due to menopause; HTN (hypertension); Hypokalemia (11/19/2012); Insomnia (4/10/2013); Knee pain, bilateral (7/24/2013); Personal history of DVT (deep vein thrombosis); Renovascular hypertension (2/14/2017); Severe tricuspid regurgitation (2/14/2017); Spinal stenosis; Spinal stenosis of lumbar region (2/3/2014); and Vertigo. The patient's chief complaint is DM foot exam. Diet controlled DM. Relates painful thick toenails on both feet. Both great toenails hurt her especially in shoes. No other complaints today.        This patient has documented high risk feet requiring routine maintenance secondary to diabetes mellitis and those secondary complications of diabetes, as mentioned..      PCP: Chris Bangura MD    Date Last Seen by PCP:  Chief Complaint   Patient presents with    Diabetes Mellitus     pcp Dr. Bangura 8-10-17    Diabetic Foot Exam    Nail Care     Current shoe gear:  sandals    Hemoglobin A1C   Date Value Ref Range Status   06/19/2017 6.0 (H) 4.0 - 5.6 % Final     Comment:     According to ADA guidelines, hemoglobin A1c <7.0% represents  optimal control in non-pregnant diabetic patients. Different  metrics may apply to specific patient populations.   Standards of Medical Care in Diabetes-2016.  For the purpose of screening for the presence of diabetes:  <5.7%     Consistent with the absence of diabetes  5.7-6.4%   Consistent with increasing risk for diabetes   (prediabetes)  >or=6.5%  Consistent with diabetes  Currently, no consensus exists for use of hemoglobin A1c  for diagnosis of diabetes for children.  This Hemoglobin A1c assay has significant interference with fetal   hemoglobin   (HbF). The results are invalid for patients with abnormal amounts of   HbF,   including those with known Hereditary Persistence   of Fetal Hemoglobin. Heterozygous hemoglobin variants (HbAS, HbAC,   HbAD, HbAE, HbA2) do not significantly interfere with this assay;   however, presence of multiple variants in a sample may impact the %   interference.     03/13/2017 6.5 (H) 4.5 - 6.2 % Final     Comment:     According to ADA guidelines, hemoglobin A1C <7.0% represents  optimal control in non-pregnant diabetic patients.  Different  metrics may apply to specific populations.   Standards of Medical Care in Diabetes - 2016.  For the purpose of screening for the presence of diabetes:  <5.7%     Consistent with the absence of diabetes  5.7-6.4%  Consistent with increasing risk for diabetes   (prediabetes)  >or=6.5%  Consistent with diabetes  Currently no consensus exists for use of hemoglobin A1C  for diagnosis of diabetes for children.     12/13/2016 6.1 4.5 - 6.2 % Final     Comment:     According to ADA guidelines, hemoglobin A1C <7.0% represents  optimal control in non-pregnant diabetic patients.  Different  metrics may apply to specific populations.   Standards of Medical Care in Diabetes - 2016.  For the purpose of screening for the presence of diabetes:  <5.7%     Consistent with the absence of diabetes  5.7-6.4%  Consistent with increasing risk for diabetes   (prediabetes)  >or=6.5%  Consistent with diabetes  Currently no consensus exists for use of hemoglobin A1C  for diagnosis of diabetes for children.       Patient Active Problem List   Diagnosis    Depression    Anxiety disorder    Hypokalemia    HTN (hypertension)    Constipation -  functional    Left knee pain    Spinal stenosis of lumbar region    PVD (peripheral vascular disease)    Aortic atherosclerosis    DM (diabetes mellitus) type II controlled with renal manifestation    Baker's cyst    Leg swelling    Depression, major, recurrent, moderate    Elevated troponin    Acute on chronic combined systolic and diastolic CHF (congestive heart failure)    Pulmonary hypertension    Synovial cyst of popliteal space (Baker), left knee    Renovascular hypertension    CKD stage 3 due to type 2 diabetes mellitus    Anemia of chronic renal failure, stage 3 (moderate)    Severe tricuspid regurgitation    Pseudogout of left knee    Acute on chronic systolic congestive heart failure     Current Outpatient Prescriptions on File Prior to Visit   Medication Sig Dispense Refill    carvedilol (COREG) 12.5 MG tablet Take 1 tablet (12.5 mg total) by mouth 2 (two) times daily. 60 tablet 11    duloxetine (CYMBALTA) 60 MG capsule       furosemide (LASIX) 40 MG tablet Take 1 tablet (40 mg total) by mouth once daily. 30 tablet 11    losartan (COZAAR) 100 MG tablet TAKE ONE TABLET BY MOUTH ONCE DAILY 90 tablet 0    meclizine (ANTIVERT) 25 mg tablet Take 1 tablet (25 mg total) by mouth 3 (three) times daily as needed. 90 tablet 90    meloxicam (MOBIC) 7.5 MG tablet Take 1 tablet (7.5 mg total) by mouth daily as needed for Pain. 30 tablet 3    omeprazole (PRILOSEC) 20 MG capsule TAKE 2 CAPSULES BY MOUTH EVERY DAY FOR ACID REFLUX OR STOMACH 180 capsule 0    potassium chloride SA (K-DUR,KLOR-CON) 20 MEQ tablet Take 1 tablet (20 mEq total) by mouth once daily. 90 tablet 1    temazepam (RESTORIL) 30 mg capsule TAKE 1 CAPSULE BY MOUTH EVERY DAY AT BEDTIME AS NEEDED FOR INSOMNIA 30 capsule 5    [DISCONTINUED] tramadol (ULTRAM) 50 mg tablet       lorazepam (ATIVAN) 0.5 MG tablet Take 1 tablet (0.5 mg total) by mouth every 8 (eight) hours as needed for Anxiety. (caution-may cause sleepiness) 60  tablet 3    [DISCONTINUED] omeprazole (PRILOSEC) 20 MG capsule TAKE 2 CAPSULES BY MOUTH EVERY DAY FOR ACID REFLUX AND STOMACH 180 capsule 1     No current facility-administered medications on file prior to visit.      Review of patient's allergies indicates:   Allergen Reactions    Ace inhibitors      Other reaction(s): Unknown    Aspirin      Other reaction(s): Unknown    Aciphex  [rabeprazole]      Other reaction(s): Stomach upset    Bextra  [valdecoxib]      Other reaction(s): Unknown    Clonidine      Other reaction(s): dry mouth.lip swelling    Cardizem  [diltiazem hcl]      Other reaction(s): Unknown    Mavik  [trandolapril]      Other reaction(s): Unknown    Nsaids (non-steroidal anti-inflammatory drug)      Other reaction(s): black spots on skin    Phenytoin sodium extended      Other reaction(s): Stomach upset    Tramadol      Other reaction(s): stomach pain     Past Surgical History:   Procedure Laterality Date    CATARACT EXTRACTION Bilateral     EYE SURGERY      FRACTURE SURGERY      HYSTERECTOMY      ORIF RADIUS & ULNA FRACTURES       Family History   Problem Relation Age of Onset    No Known Problems Mother     No Known Problems Father     No Known Problems Sister     No Known Problems Brother     No Known Problems Maternal Aunt     No Known Problems Maternal Uncle     No Known Problems Paternal Aunt     No Known Problems Paternal Uncle     No Known Problems Maternal Grandmother     No Known Problems Maternal Grandfather     No Known Problems Paternal Grandmother     No Known Problems Paternal Grandfather     Amblyopia Neg Hx     Blindness Neg Hx     Cancer Neg Hx     Diabetes Neg Hx     Glaucoma Neg Hx     Hypertension Neg Hx     Macular degeneration Neg Hx     Retinal detachment Neg Hx     Strabismus Neg Hx     Stroke Neg Hx     Thyroid disease Neg Hx      Social History     Social History    Marital status:      Spouse name: N/A    Number of children:  "N/A    Years of education: N/A     Occupational History    Not on file.     Social History Main Topics    Smoking status: Never Smoker    Smokeless tobacco: Never Used    Alcohol use No    Drug use: No    Sexual activity: No     Other Topics Concern    Not on file     Social History Narrative    .  Lives alone.   and two sons have .  Active.         Review of Systems   Constitution: Positive for weight loss (intentional, 22 lbs). Negative for chills, fever and weakness.   Cardiovascular: Positive for leg swelling. Negative for chest pain and claudication.   Respiratory: Negative for cough and shortness of breath.    Skin: Positive for dry skin and nail changes. Negative for itching and rash.   Musculoskeletal: Positive for arthritis, back pain, joint pain, joint swelling and muscle weakness. Negative for falls.        Walks with cane secondary to back pain    Gastrointestinal: Negative for diarrhea, nausea and vomiting.   Neurological: Positive for numbness and paresthesias. Negative for tremors.   Psychiatric/Behavioral: Negative for altered mental status and hallucinations.         Objective:       Vitals:    10/09/17 1057   BP: (!) 150/62   Weight: 72.6 kg (160 lb)   Height: 5' 3" (1.6 m)   PainSc: 0-No pain       Physical Exam   Constitutional:   General: Pt. is well-developed, well-nourished, appears stated age, in no acute distress, alert and oriented x 3. No evidence of depression, anxiety, or agitation. Calm, cooperative, and communicative. Appropriate interactions and affect.       Cardiovascular:   Pulses:       Dorsalis pedis pulses are 2+ on the right side, and 2+ on the left side.        Posterior tibial pulses are 1+ on the right side, and 1+ on the left side.   There is absent digital hair. Skin is atrophic, shiny Hyperpigmented b/l LEs. Cool to touch distal foot b/l.    Musculoskeletal:        Right foot: There is normal range of motion.        Left foot: There is normal range " of motion.   Adequate joint range of motion without pain, limitation, nor crepitation Bilateral feet and ankle joints. Muscle strength is 5/5 in all groups bilaterally.    Cane assisted gait    Patient has hammertoes of digits 2-5 bilateral partially reducible without symptom today.    Visible and palpable bunion without pain at dorsomedial 1st metatarsal head right and left.  Hallux abducted right and left partially reducible, tracks laterally without being track bound.  No ecchymosis, erythema, edema, or cardinal signs infection or signs of trauma same foot.    Fat pad atrophy to heels and met heads bilateral    Pain to b/l borders of b/l hallux with palpation. Pain to direct palpation and lateral squeeze of left heel hyperkeratotic lesions.    Neurological: A sensory deficit is present.   Al[place]-Devon 5.07 monofilamant testing is diminished John feet. Sharp/dull sensation diminished Bilaterally. Light touch absent Bilaterally.    Paresthesias, and hyperesthesia bilateral feet at toes with no clearly identified trigger or source.         Skin: Skin is warm, dry and intact. No abrasion, no ecchymosis, no lesion and no rash noted. She is not diaphoretic. No cyanosis. No pallor. Nails show no clubbing.   Toenails 1-5 bilaterally thickened with yellow/brown discoloration and subungual debris. B/l borders of b/l hallux moderately incurvated without wound/infection. Pain to touch. No erythema.     Hyperkeratotic lesion x 3 noted to plantar left heel. No signs of deep tissue injury.     hyperpigmentation to dorsal feet john    Interdigital Spaces clean, dry and without evidence of break in skin integrity   Psychiatric: She has a normal mood and affect. Her speech is normal.   Nursing note and vitals reviewed.        Assessment:       Encounter Diagnoses   Name Primary?    Bilateral lower extremity edema     PVD (peripheral vascular disease)     Hallux abducto valgus, unspecified laterality     Hammer toes of both  feet     Corn or callus     Paresthesias     Type II diabetes mellitus with neurological manifestations Yes    Onychomycosis due to dermatophyte          Plan:       Magaly was seen today for diabetes mellitus, diabetic foot exam and nail care.    Diagnoses and all orders for this visit:    Type II diabetes mellitus with neurological manifestations    Bilateral lower extremity edema    PVD (peripheral vascular disease)    Hallux abducto valgus, unspecified laterality    Hammer toes of both feet    Corn or callus    Paresthesias    Onychomycosis due to dermatophyte      I counseled the patient on her conditions, their implications and medical management.     With patient's permission, nails were aggressively reduced and debrided 1,2,3,4, 5 R and 1,2, 3,4,5 L and filed to their soft tissue attachment mechanically and with electric , removing all offending nail and debris. Utilizing a #15 scalpel, I trimmed the calluses at the above mentioned location (left plantar heel x 3)    Patient tolerated this well and no blood was drawn. Patient reports relief following the procedure.     She will continue to monitor the areas daily, inspect her feet, wear protective shoe gear when ambulatory, moisturizer to maintain skin integrity and follow in this office in approximately 3-4 months, sooner p.r.n.    Long discussion with patient regarding appropriate, supportive and comfortable shoes. Recommended SAS/Easy Spirit style shoe brands with adequate arch supports to alleviate abnormal pressure and improve stability of foot while walking. Avoid flat shoes and barefoot walking as these will exacerbate or worsen symptoms.     Continue Cymbalta per Neurology recommendations.     RTC 3 months call sooner if any problems arise.

## 2017-10-09 NOTE — PATIENT INSTRUCTIONS
Counseling and Referral of Other Preventative  (Italic type indicates deductible and co-insurance are waived)    Patient Name: Magaly Nunes  Today's Date: 10/9/2017      SERVICE LIMITATIONS RECOMMENDATION    Vaccines    · Pneumococcal (once after 65)    · Influenza (annually)    · Hepatitis B (if medium/high risk)    · Prevnar 13      Hepatitis B medium/high risk factors:       - End-stage renal disease       - Hemophiliacs who received Factor VII or         IX concentrates       - Clients of institutions for the mentally             retarded       - Persons who live in the same house as          a HepB carrier       - Homosexual men       - Illicit injectable drug abusers     Pneumococcal: Recommended to patient, declined     Influenza: Recommended to patient, declined     Hepatitis B: N/A     Prevnar 13: Recommended to patient, declined    Mammogram (biennial age 50-74)  Annually (age 40 or over)  N/A    Pap (up to age 70 and after 70 if unknown history or abnormal study last 10 years)    N/A     The USPSTF recommends against screening for cervical cancer in women older than age 65 years who have had adequate prior screening and are not otherwise at high risk for cervical cancer.      Colorectal cancer screening (to age 75)    · Fecal occult blood test (annual)  · Flexible sigmoidoscopy (5y)  · Screening colonoscopy (10y)  · Barium enema   Recommended to patient,     Diabetes self-management training (no USPSTF recommendations)  Requires referral by treating physician for patient with diabetes or renal disease. 10 hours of initial DSMT sessions of no less than 30 minutes each in a continuous 12-month period. 2 hours of follow-up DSMT in subsequent years.  Recommended to patient,     Bone mass measurements (age 65 & older, biennial)  Requires diagnosis related to osteoporosis or estrogen deficiency. Biennial benefit unless patient has history of long-term glucocorticoid  Scheduled, see appointments    Glaucoma  screening (no USPSTF recommendation)  Diabetes mellitus, family history   , age 50 or over    American, age 65 or over  Recommend follow up with eye care professional regularly    Medical nutrition therapy for diabetes or renal disease (no recommended schedule)  Requires referral by treating physician for patient with diabetes or renal disease or kidney transplant within the past 3 years.  Can be provided in same year as diabetes self-management training (DSMT), and CMS recommends medical nutrition therapy take place after DSMT. Up to 3 hours for initial year and 2 hours in subsequent years.  Recommended to patient,     Cardiovascular screening blood tests (every 5 years)  · Fasting lipid panel  Order as a panel if possible  Done this year, repeat every year    Diabetes screening tests (at least every 3 years, Medicare covers annually or at 6-month intervals for prediabetic patients)  · Fasting blood sugar (FBS) or glucose tolerance test (GTT)  Patient must be diagnosed with one of the following:       - Hypertension       - Dyslipidemia       - Obesity (BMI 30kg/m2)       - Previous elevated impaired FBS or GTT       ... or any two of the following:       - Overweight (BMI 25 but <30)       - Family history of diabetes       - Age 65 or older       - History of gestational diabetes or birth of baby weighing more than 9 pounds  Last done 06/19/2017, recommend to repeat every 6  months    HIV screening (annually for increased risk patients)  · HIV-1 and HIV-2 by EIA, or AMEENA, rapid antibody test or oral mucosa transudate  Patients must be at increased risk for HIV infection per USPSTF guidelines or pregnant. Tests covered annually for patient at increased risk or as requested by the patient. Pregnant patients may receive up to 3 tests during pregnancy.  Risks discussed, screening is not recommended    Smoking cessation counseling (up to 8 sessions per year)  Patients must be asymptomatic of  tobacco-related conditions to receive as a preventative service.  Non-smoker    Subsequent annual wellness visit  At least 12 months since last AWV  Return in one year     The following information is provided to all patients.  This information is to help you find resources for any of the problems found today that may be affecting your health:                Living healthy guide: [email or web address]      Understanding Diabetes: www.diabetes.org      Eating healthy: www.cdc.gov/healthyweight      CDC home safety checklist: www.cdc.gov/steadi/patient.html      Agency on Aging: www.goea.[place].[facility]      Alcoholics anonymous (AA): www.aa.org      Physical Activity: www.michaela.nih.gov/wt6yphq      Tobacco use: www.quitwithusla.org

## 2017-10-12 ENCOUNTER — HOSPITAL ENCOUNTER (OUTPATIENT)
Dept: RADIOLOGY | Facility: CLINIC | Age: 82
Discharge: HOME | End: 2017-10-12
Attending: NURSE PRACTITIONER
Payer: MEDICARE

## 2017-10-12 DIAGNOSIS — Z13.820 SCREENING FOR OSTEOPOROSIS: ICD-10-CM

## 2017-10-12 DIAGNOSIS — M89.9 DISORDER OF BONE: ICD-10-CM

## 2017-10-12 PROCEDURE — 77080 DXA BONE DENSITY AXIAL: CPT | Mod: 26,,, | Performed by: INTERNAL MEDICINE

## 2017-10-12 PROCEDURE — 77080 DXA BONE DENSITY AXIAL: CPT | Mod: TC,PO

## 2017-10-12 NOTE — PROGRESS NOTES
"Magaly Nunes presented for a  Medicare AWV and comprehensive Health Risk Assessment today. The following components were reviewed and updated:    · Medical history  · Family History  · Social history  · Allergies and Current Medications  · Health Risk Assessment  · Health Maintenance  · Care Team     ** See Completed Assessments for Annual Wellness Visit within the encounter summary.**       The following assessments were completed:  · Living Situation  · CAGE  · Depression Screening  · Timed Get Up and Go  · Whisper Test  · Cognitive Function Screening  · Nutrition Screening  · ADL Screening  · PAQ Screening    Vitals:    10/09/17 1154   BP: 114/70   BP Location: Left arm   Patient Position: Sitting   BP Method: Large (Manual)   Pulse: 78   Temp: 97.6 °F (36.4 °C)   TempSrc: Oral   SpO2: 97%   Weight: 78.4 kg (172 lb 13.5 oz)   Height: 5' 3" (1.6 m)     Body mass index is 30.62 kg/m².  Physical Exam   Constitutional: She is oriented to person, place, and time. She appears well-developed and well-nourished.   HENT:   Head: Normocephalic.   Cardiovascular: Normal rate, regular rhythm and normal heart sounds.    No murmur heard.  Pulmonary/Chest: Effort normal and breath sounds normal.   Abdominal: Soft. Bowel sounds are normal. She exhibits no mass.   Musculoskeletal: Normal range of motion. She exhibits no edema.   Neurological: She is alert and oriented to person, place, and time. She exhibits normal muscle tone.   Skin: Skin is warm and dry. Capillary refill takes less than 2 seconds.   Psychiatric: She has a normal mood and affect.   Nursing note and vitals reviewed.            Diagnoses and health risks identified today and associated recommendations/orders:    1. Encounter for preventive health examination  Education provided about preventive health examinations and procedures; discussed patient's health concerns, holistically addressed patient's health plan.        2. 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 Patient followed by Cardiology      3. Acute on chronic systolic congestive heart failure  The current medical regimen is effective;  continue present plan and medications. Patient followed by Cardiology    4. Anemia of chronic renal failure, stage 3 (moderate)  The current medical regimen is effective;  continue present plan and medications.      5. Anxiety disorder, unspecified type  The current medical regimen is effective;  continue present plan and medications.      6. Aortic atherosclerosis  Stable, asymptomatic, monitor. Taking Statin and antihypertensives with good control; Encouraged continued compliance with directives, diet and lifestyle modifications as recommended       7. CKD stage 3 due to type 2 diabetes mellitus  The current medical regimen is effective;  continue present plan and medications.      8. Depression, major, recurrent, moderate  The current medical regimen is effective;  continue present plan and medications.      9. Controlled type 2 diabetes mellitus with stage 3 chronic kidney disease, unspecified long term insulin use status  Diabetes currently is controlled. We discussed diabetic diet and regular exercise. We discussed home blood sugar monitoring, if appropriate. The current medical regimen is effective;  continue present plan and medications.    - Ambulatory referral to Ophthalmology    10. Essential hypertension  Discussed sodium restriction, maintaining ideal body weight and regular exercise program as physiologic means to achieve blood pressure control. The patient will strive towards this. The current medical regimen is effective; continue present plan and medications. Recommended patient to check home readings to monitor and see me for followup as scheduled or sooner as needed. Patient was educated that both decongestant and anti-inflammatory medication  may raise blood pressure      11. Hypokalemia  The current medical regimen is effective;  continue present plan and medications.      12. Pulmonary hypertension  The current medical regimen is effective;  continue present plan and medications.      13. PVD (peripheral vascular disease)  The current medical regimen is effective;  continue present plan and medications.      14. Severe tricuspid regurgitation  The current medical regimen is effective;  continue present plan and medications. Patient followed by Cardiology    15. Need for immunization against influenza  - Influenza - High Dose (65+) (PF) (IM)    16. Need for pneumococcal vaccine  - (In Office Administered) Pneumococcal Conjugate Vaccine (13 Valent) (IM)    17. Screening for osteoporosis  - DXA Bone Density Spine And Hip; Future    18. Disorder of bone  - DXA Bone Density Spine And Hip; Future      Provided Magaly with a 5-10 year written screening schedule and personal prevention plan. Recommendations were developed using the USPSTF age appropriate recommendations. Education, counseling, and referrals were provided as needed. After Visit Summary printed and given to patient which includes a list of additional screenings\tests needed.    Return in about 3 months (around 1/9/2018), or if symptoms worsen or fail to improve.    Bayron Jaramillo NP

## 2017-10-31 DIAGNOSIS — M79.2 NEUROPATHIC PAIN: ICD-10-CM

## 2017-10-31 RX ORDER — DULOXETIN HYDROCHLORIDE 60 MG/1
CAPSULE, DELAYED RELEASE ORAL
Qty: 30 CAPSULE | Refills: 12 | Status: SHIPPED | OUTPATIENT
Start: 2017-10-31 | End: 2018-12-07 | Stop reason: SDUPTHER

## 2017-11-03 ENCOUNTER — OFFICE VISIT (OUTPATIENT)
Dept: FAMILY MEDICINE | Facility: CLINIC | Age: 82
End: 2017-11-03
Payer: MEDICARE

## 2017-11-03 ENCOUNTER — LAB VISIT (OUTPATIENT)
Dept: LAB | Facility: HOSPITAL | Age: 82
End: 2017-11-03
Attending: INTERNAL MEDICINE
Payer: MEDICARE

## 2017-11-03 VITALS
TEMPERATURE: 98 F | HEIGHT: 63 IN | OXYGEN SATURATION: 98 % | HEART RATE: 85 BPM | BODY MASS INDEX: 29.26 KG/M2 | WEIGHT: 165.13 LBS | SYSTOLIC BLOOD PRESSURE: 140 MMHG | DIASTOLIC BLOOD PRESSURE: 70 MMHG

## 2017-11-03 DIAGNOSIS — M85.80 OSTEOPENIA, UNSPECIFIED LOCATION: ICD-10-CM

## 2017-11-03 DIAGNOSIS — F41.9 ANXIETY DISORDER, UNSPECIFIED TYPE: ICD-10-CM

## 2017-11-03 DIAGNOSIS — E11.22 CONTROLLED TYPE 2 DIABETES MELLITUS WITH STAGE 3 CHRONIC KIDNEY DISEASE, UNSPECIFIED LONG TERM INSULIN USE STATUS: ICD-10-CM

## 2017-11-03 DIAGNOSIS — N18.30 ANEMIA OF CHRONIC RENAL FAILURE, STAGE 3 (MODERATE): ICD-10-CM

## 2017-11-03 DIAGNOSIS — E55.9 VITAMIN D DEFICIENCY DISEASE: ICD-10-CM

## 2017-11-03 DIAGNOSIS — L74.519 EXCESSIVE SWEATING, LOCAL: ICD-10-CM

## 2017-11-03 DIAGNOSIS — D63.1 ANEMIA OF CHRONIC RENAL FAILURE, STAGE 3 (MODERATE): ICD-10-CM

## 2017-11-03 DIAGNOSIS — N18.3 CONTROLLED TYPE 2 DIABETES MELLITUS WITH STAGE 3 CHRONIC KIDNEY DISEASE, UNSPECIFIED LONG TERM INSULIN USE STATUS: ICD-10-CM

## 2017-11-03 DIAGNOSIS — E11.22 CONTROLLED TYPE 2 DIABETES MELLITUS WITH STAGE 3 CHRONIC KIDNEY DISEASE, UNSPECIFIED LONG TERM INSULIN USE STATUS: Primary | ICD-10-CM

## 2017-11-03 DIAGNOSIS — F33.1 DEPRESSION, MAJOR, RECURRENT, MODERATE: ICD-10-CM

## 2017-11-03 DIAGNOSIS — N18.3 CONTROLLED TYPE 2 DIABETES MELLITUS WITH STAGE 3 CHRONIC KIDNEY DISEASE, UNSPECIFIED LONG TERM INSULIN USE STATUS: Primary | ICD-10-CM

## 2017-11-03 DIAGNOSIS — M70.61 GREATER TROCHANTERIC BURSITIS OF RIGHT HIP: ICD-10-CM

## 2017-11-03 LAB
25(OH)D3+25(OH)D2 SERPL-MCNC: 33 NG/ML
ALBUMIN SERPL BCP-MCNC: 3.8 G/DL
ALP SERPL-CCNC: 120 U/L
ALT SERPL W/O P-5'-P-CCNC: 18 U/L
ANION GAP SERPL CALC-SCNC: 10 MMOL/L
AST SERPL-CCNC: 25 U/L
BASOPHILS # BLD AUTO: 0.03 K/UL
BASOPHILS NFR BLD: 0.6 %
BILIRUB SERPL-MCNC: 0.7 MG/DL
BUN SERPL-MCNC: 22 MG/DL
CALCIUM SERPL-MCNC: 10 MG/DL
CHLORIDE SERPL-SCNC: 101 MMOL/L
CO2 SERPL-SCNC: 28 MMOL/L
CREAT SERPL-MCNC: 1 MG/DL
CRP SERPL-MCNC: 3.5 MG/L
DIFFERENTIAL METHOD: ABNORMAL
EOSINOPHIL # BLD AUTO: 0.1 K/UL
EOSINOPHIL NFR BLD: 1.9 %
ERYTHROCYTE [DISTWIDTH] IN BLOOD BY AUTOMATED COUNT: 13.4 %
EST. GFR  (AFRICAN AMERICAN): 59.8 ML/MIN/1.73 M^2
EST. GFR  (NON AFRICAN AMERICAN): 51.9 ML/MIN/1.73 M^2
ESTIMATED AVG GLUCOSE: 123 MG/DL
GLUCOSE SERPL-MCNC: 114 MG/DL
HBA1C MFR BLD HPLC: 5.9 %
HCT VFR BLD AUTO: 40.1 %
HGB BLD-MCNC: 12 G/DL
IMM GRANULOCYTES # BLD AUTO: 0.03 K/UL
IMM GRANULOCYTES NFR BLD AUTO: 0.6 %
LYMPHOCYTES # BLD AUTO: 2.2 K/UL
LYMPHOCYTES NFR BLD: 41.4 %
MCH RBC QN AUTO: 26.4 PG
MCHC RBC AUTO-ENTMCNC: 29.9 G/DL
MCV RBC AUTO: 88 FL
MONOCYTES # BLD AUTO: 0.8 K/UL
MONOCYTES NFR BLD: 15.3 %
NEUTROPHILS # BLD AUTO: 2.1 K/UL
NEUTROPHILS NFR BLD: 40.2 %
NRBC BLD-RTO: 0 /100 WBC
PLATELET # BLD AUTO: 261 K/UL
PMV BLD AUTO: 10.5 FL
POTASSIUM SERPL-SCNC: 4.1 MMOL/L
PROT SERPL-MCNC: 7.8 G/DL
RBC # BLD AUTO: 4.55 M/UL
SODIUM SERPL-SCNC: 139 MMOL/L
WBC # BLD AUTO: 5.24 K/UL

## 2017-11-03 PROCEDURE — 99999 PR PBB SHADOW E&M-EST. PATIENT-LVL III: CPT | Mod: PBBFAC,,, | Performed by: INTERNAL MEDICINE

## 2017-11-03 PROCEDURE — 86140 C-REACTIVE PROTEIN: CPT

## 2017-11-03 PROCEDURE — 36415 COLL VENOUS BLD VENIPUNCTURE: CPT | Mod: PO

## 2017-11-03 PROCEDURE — 80053 COMPREHEN METABOLIC PANEL: CPT

## 2017-11-03 PROCEDURE — 82306 VITAMIN D 25 HYDROXY: CPT

## 2017-11-03 PROCEDURE — 85025 COMPLETE CBC W/AUTO DIFF WBC: CPT

## 2017-11-03 PROCEDURE — 99499 UNLISTED E&M SERVICE: CPT | Mod: S$GLB,,, | Performed by: INTERNAL MEDICINE

## 2017-11-03 PROCEDURE — 99214 OFFICE O/P EST MOD 30 MIN: CPT | Mod: S$GLB,,, | Performed by: INTERNAL MEDICINE

## 2017-11-03 PROCEDURE — 83036 HEMOGLOBIN GLYCOSYLATED A1C: CPT

## 2017-11-03 RX ORDER — DULOXETIN HYDROCHLORIDE 60 MG/1
60 CAPSULE, DELAYED RELEASE ORAL DAILY
Qty: 90 CAPSULE | Refills: 90 | Status: SHIPPED | OUTPATIENT
Start: 2017-11-03 | End: 2017-12-28

## 2017-11-03 RX ORDER — TRAMADOL HYDROCHLORIDE 50 MG/1
TABLET ORAL
COMMUNITY
Start: 2017-10-10 | End: 2017-12-28

## 2017-11-03 NOTE — PROGRESS NOTES
Chief complaint  follow-up on blood work, hip pain    84-year-old black female with multiple medical problems.    Following up on labs which was about 3 months ago.  Renal insufficiency was just slight.  Slight anemia persists with normal iron studies.  Sedimentation rate unremarkable.  C-reactive protein normal.  Her A1c improved 6.5 down to 6.0 and thyroid normal.  She does appear to have gained about 5 pounds.  Her friends with concerned about her weight loss but I reassured her that she's gained some weight and her inflammatory markers were negative.  She is eating less microwaving all of her food.  Her diabetes is improved and I reassured her about that      She is also continued to have the facial sweating a lot which makes her very anxious and depressed and she does not want to go out.  This is been a problem for her in the past.  Does not appear to be actual hot flashes.  We have tried clonidine another medication in the past.  It was present prior to the Cymbalta and has not been worse since being on the Cymbalta which we discussed can sometimes be a cause of increased sweating but I will be more profuse.  She does a refill of her Cymbalta.  She has not yet seen dermatology for the sweating.  Are not aware that there may be available medications for facial sweating like there is for axillary sweating but the she could pursue that as an option since it has been a very ongoing problem for her.      Other labs reviewed from June with improvement in the A1c of 6.5 down to 6.  Her anemia is better and iron studies are normal.  Kidney  function about the same    Also having some right lateral hip pain.  She does sleep on that side while she watches TV.  She has reproducible pain CONSISTENT with trochanteric or side effects.  She does see orthopedics and we discussed that if indeed he continues she can get an injection.  I instructed her on stretching exercises    Review of systems: No further swelling, no other  myalgias arthralgias currently that are new, no worsening numbness or tingling in that left arm    PAST MEDICAL HISTORY:                                                        1. Hypertension.                                                             2. Dyspnea, possibly secondary to pulmonary hypertension. Sleep study             negative. Has seen cardiology. Ultrasound and CT of chest are negative.            PFTs 2002 were essentially normal.                                           3. History of sinus bradycardia.                                             4. Angioedema on Mavik.                                                      5. Vertigo.                                                                  6. spinal stenosis, by history, 3 epidural injections in the past        per neurosurgery, Dr. Garcia.  Now seeing Dr Leon.                                                                    7. Allergic rhinitis.                                                                                                        8. IBS.                                                                      9. Carpal tunnel syndrome, status post surgery.                              10. Status post cholecystectomy, total hysterectomy, and left ovary                                                         11. History of iron deficiency anemia, normal EGD in 2002 and in 2000.       12. GERD, with hiatal hernia.                                                13. Osteoarthritis of the knees.                                             14. Severe hot flashes despite all forms of therapy and eval by GYN.         15. History of leg pain due to varicosities or spinal stenosis.              16. History of positive JAVID, mild.                                           17. Trigger fingers, with history of injections.                             18. Varicose veins with reflux on ultrasound 2003.                           19.  Borderline abdominal aneurysm at 2.5 cm on ultrasound .              20. Positive DSE, EKG portion, but indeterminate overall,. Neg nuclear stress ' And negative nuclear stress test . MILDLY ABNORMAL NUC  EF 38%          21. Mild stenosis of the renal arteries by ultrasound.                       22. Diabetes  23. History of multitrauma , with fracture of the right arm, subsequent  DVT, and has been on Coumadin. She was discharged from the hospital around   2006. She did develop anemia, and has had several transfusions during   that stay. She also had pulmonary embolus associated with that DVT.    24. History of polyarthralgia with positive JAVID. She has been evaluated by [facility]. Seeing Dr Leon now  25. ANXIETY -suspected partial cause of hot flashes  27. DEPRESSION -    28. Vit D def  29.  Mild peripheral vascular disease buy testing but not felt significant to pursue further  30.  Neuropathy, seen by neurology and put on Cymbalta   31.  Systolic CHF -MILDLY ABNORMAL NUC  EF 38%- Dr Humphrey   32.    pulm HTN on ECHO     ?colonoscopy    SOCIAL HISTORY:  She is  and lives with her spouse who has prostate   cancer.  She does not smoke cigarettes or drink alcohol. 2 sons .      Vitals, as above  Gen: no distress  Both hips full range of motion, no defects, joints stable and strength 5 out of 5.  She has reproducible pain over the right lateral hip area.  She walks somewhat bent over with a cane.       Magaly was seen today for hip pain, diabetes and labs only.    Diagnoses and all orders for this visit:    Controlled type 2 diabetes mellitus with stage 3 chronic kidney disease, unspecified long term insulin use status, improved with weight loss  -     Comprehensive metabolic panel; Future  -     CBC auto differential; Future  -     Hemoglobin A1c; Future  -     C-reactive protein; Future  -     Vitamin D; Future    Anemia of chronic  renal failure, stage 3 (moderate), reassess  -     CBC auto differential; Future    Depression, major, recurrent, moderate, continue Cymbalta    Excessive sweating, local    Anxiety disorder, unspecified type, continue Cymbalta  -     CBC auto differential; Future  -     C-reactive protein; Future    Vitamin D deficiency disease  -     Vitamin D; Future    Osteopenia, unspecified location, reviewed bone density and we'll recheck vitamin D level  -     Vitamin D; Future    Greater trochanteric bursitis of right hip, new problem, discussed stretching, avoidance of sleeping on that side and if it persists and limits ambulation she should get an injection

## 2017-11-09 ENCOUNTER — TELEPHONE (OUTPATIENT)
Dept: FAMILY MEDICINE | Facility: CLINIC | Age: 82
End: 2017-11-09

## 2017-11-09 NOTE — TELEPHONE ENCOUNTER
Please call with lab results, everything looks good    Dr. MON says that the diabetes continues to improve and the A1c is even better and very normal at 5.9.  The vitamin D level is normal.  Kidney, liver, sugar, electrolytes, and the blood count are all normal.

## 2017-12-11 ENCOUNTER — OFFICE VISIT (OUTPATIENT)
Dept: FAMILY MEDICINE | Facility: CLINIC | Age: 82
End: 2017-12-11
Payer: MEDICARE

## 2017-12-11 VITALS
HEART RATE: 79 BPM | BODY MASS INDEX: 29.77 KG/M2 | SYSTOLIC BLOOD PRESSURE: 110 MMHG | OXYGEN SATURATION: 98 % | TEMPERATURE: 98 F | HEIGHT: 63 IN | DIASTOLIC BLOOD PRESSURE: 60 MMHG | WEIGHT: 168 LBS

## 2017-12-11 DIAGNOSIS — N18.3 CONTROLLED TYPE 2 DIABETES MELLITUS WITH STAGE 3 CHRONIC KIDNEY DISEASE, UNSPECIFIED LONG TERM INSULIN USE STATUS: ICD-10-CM

## 2017-12-11 DIAGNOSIS — N18.30 ANEMIA OF CHRONIC RENAL FAILURE, STAGE 3 (MODERATE): ICD-10-CM

## 2017-12-11 DIAGNOSIS — D63.1 ANEMIA OF CHRONIC RENAL FAILURE, STAGE 3 (MODERATE): ICD-10-CM

## 2017-12-11 DIAGNOSIS — E11.22 CONTROLLED TYPE 2 DIABETES MELLITUS WITH STAGE 3 CHRONIC KIDNEY DISEASE, UNSPECIFIED LONG TERM INSULIN USE STATUS: ICD-10-CM

## 2017-12-11 DIAGNOSIS — L74.519 EXCESSIVE SWEATING, LOCAL: ICD-10-CM

## 2017-12-11 DIAGNOSIS — F41.9 ANXIETY DISORDER, UNSPECIFIED TYPE: ICD-10-CM

## 2017-12-11 DIAGNOSIS — I10 ESSENTIAL HYPERTENSION: Primary | ICD-10-CM

## 2017-12-11 DIAGNOSIS — F39 MOOD DISORDER: ICD-10-CM

## 2017-12-11 DIAGNOSIS — I50.42 SYSTOLIC AND DIASTOLIC CHF, CHRONIC: ICD-10-CM

## 2017-12-11 PROCEDURE — 99999 PR PBB SHADOW E&M-EST. PATIENT-LVL III: CPT | Mod: PBBFAC,,, | Performed by: INTERNAL MEDICINE

## 2017-12-11 PROCEDURE — 99214 OFFICE O/P EST MOD 30 MIN: CPT | Mod: S$GLB,,, | Performed by: INTERNAL MEDICINE

## 2017-12-11 PROCEDURE — 99499 UNLISTED E&M SERVICE: CPT | Mod: S$GLB,,, | Performed by: INTERNAL MEDICINE

## 2017-12-11 RX ORDER — HYDROCODONE BITARTRATE AND ACETAMINOPHEN 5; 325 MG/1; MG/1
TABLET ORAL
COMMUNITY
Start: 2017-11-21 | End: 2017-12-28

## 2017-12-11 RX ORDER — TIZANIDINE 4 MG/1
TABLET ORAL
COMMUNITY
Start: 2017-11-21 | End: 2017-12-11

## 2017-12-11 RX ORDER — LORAZEPAM 0.5 MG/1
0.5 TABLET ORAL EVERY 8 HOURS PRN
Qty: 60 TABLET | Refills: 3 | Status: ON HOLD | OUTPATIENT
Start: 2017-12-11 | End: 2019-05-09 | Stop reason: HOSPADM

## 2017-12-11 NOTE — PROGRESS NOTES
Chief complaint  follow-up on blood work, feeling weak in the morning    85-year-old black female with multiple medical problems.   For about 3 weeks now when she awakes in the morning before getting up and before taking medication she feels somewhat drained and not herself.  She does describe it somewhat as lightheadedness.  She sits on the edge of the bed for 10 and it's.  It lasts for about 3 hours.  She has not yet checked her blood pressure well feeling bad.  Yesterday in the afternoon it was 154.  Today she did take her losartan and her vagal all and her blood pressure the office is 110/60 with a pulse of 79.  She's feeling better at this moment.  She's had a return of her sweating which we do believe is related to her anxiety.  She has not had the Ativan in a while.  She is traveling to [place] to visit with family.  Other new medication changes is that she was given in November some Zanaflex 4 mg at night by outside orthopedics and pain management.  She takes hydrocodone twice a day and is had no symptoms referable to the hydrocodone dosing. Total time over 25 minutes with over 50% counseling.        Review of systems no chest pains or shortness of breath, no syncope or near-syncope    PAST MEDICAL HISTORY:                                                        1. Hypertension.                                                             2. Dyspnea, possibly secondary to pulmonary hypertension. Sleep study             negative. Has seen cardiology. Ultrasound and CT of chest are negative.            PFTs 2002 were essentially normal.                                           3. History of sinus bradycardia.                                             4. Angioedema on Mavik.                                                      5. Vertigo.                                                                  6. spinal stenosis, by history, 3 epidural injections in the past        per neurosurgery, Dr. Garcia.   Now seeing Dr eLon.                                                                    7. Allergic rhinitis.                                                                                                        8. IBS.                                                                      9. Carpal tunnel syndrome, status post surgery.                              10. Status post cholecystectomy, total hysterectomy, and left ovary                                                         11. History of iron deficiency anemia, normal EGD in 2002 and in 2000.       12. GERD, with hiatal hernia.                                                13. Osteoarthritis of the knees.                                             14. Severe hot flashes despite all forms of therapy and eval by GYN.         15. History of leg pain due to varicosities or spinal stenosis.              16. History of positive JAVID, mild.                                           17. Trigger fingers, with history of injections.                             18. Varicose veins with reflux on ultrasound 2003.                           19. Borderline abdominal aneurysm at 2.5 cm on ultrasound 2003.              20. Positive DSE, EKG portion, but indeterminate overall,2005. Neg nuclear stress '09 And negative nuclear stress test 2012. MILDLY ABNORMAL NUC 2017 EF 38%          21. Mild stenosis of the renal arteries by ultrasound.                       22. Diabetes  23. History of multitrauma 2006, with fracture of the right arm, subsequent  DVT, and has been on Coumadin. She was discharged from the hospital around   July 2006. She did develop anemia, and has had several transfusions during   that stay. She also had pulmonary embolus associated with that DVT.    24. History of polyarthralgia with positive JAVID. She has been evaluated by [facility]. Seeing Dr Leon now  25. ANXIETY -suspected partial cause of hot flashes  27. DEPRESSION -     28. Vit D def  29.  Mild peripheral vascular disease buy testing but not felt significant to pursue further  30.  Neuropathy, seen by neurology and put on Cymbalta   31.  Systolic CHF -MILDLY ABNORMAL NUC 2017 EF 38%- Dr Humphrey   32.    pulm HTN on ECHO     ?colonoscopy    SOCIAL HISTORY:  She is  and lives with her spouse who has prostate   cancer.  She does not smoke cigarettes or drink alcohol. 2 sons .      Vitals, as above  Gen: no distress, pulse is regular rate and rhythm, heart is regular rate and rhythm without murmurs rubs.  Respiratory was good and lungs are clear.  She's mentating and appears to be her normal self  She is walking slowly with a cane.    Prior labs reviewed with her    Ry her labs reviewed with Tarik was seen today for discuss health.    Diagnoses and all orders for this visit:    Essential hypertension, patient may be having episodes of hypotension and she will check while having symptoms since today her blood pressures under 10.  She was given written instructions to possibly hold the carvedilol and then hold the losartan with injections to restart half of the losartan if blood pressure rises thereafter.  Other considerations for her morning episodes could be the new Zanaflex which I have strong suspicion may relate to her symptoms and we'll have her hold the medication completely.    Anxiety disorder, unspecified type, refilled Ativan    Mood disorder    Controlled type 2 diabetes mellitus with stage 3 chronic kidney disease, unspecified long term insulin use status, chronic and stable    Anemia of chronic renal failure, stage 3 (moderate) stable and improved    Excessive sweating, suspect secondary to anxiety as we have discussed in the past, use Ativan as needed and assess response    Systolic and diastolic CHF, chronic, she only takes the Lasix intermittently and so I do not think she is volume depleted note I suspect her current symptoms are  CHF related.    Other orders  -     LORazepam (ATIVAN) 0.5 MG tablet; Take 1 tablet (0.5 mg total) by mouth every 8 (eight) hours as needed for Anxiety. (caution-may cause sleepiness)

## 2017-12-28 ENCOUNTER — HOSPITAL ENCOUNTER (INPATIENT)
Facility: HOSPITAL | Age: 82
LOS: 2 days | Discharge: HOME | DRG: 281 | End: 2017-12-30
Attending: EMERGENCY MEDICINE | Admitting: HOSPITALIST
Payer: MEDICARE

## 2017-12-28 DIAGNOSIS — R06.02 SHORTNESS OF BREATH: ICD-10-CM

## 2017-12-28 DIAGNOSIS — R06.02 SOB (SHORTNESS OF BREATH): ICD-10-CM

## 2017-12-28 DIAGNOSIS — R42 DIZZINESS: ICD-10-CM

## 2017-12-28 DIAGNOSIS — M25.50 ARTHRALGIA, UNSPECIFIED JOINT: ICD-10-CM

## 2017-12-28 DIAGNOSIS — I21.4 NSTEMI (NON-ST ELEVATED MYOCARDIAL INFARCTION): Primary | ICD-10-CM

## 2017-12-28 DIAGNOSIS — I50.32 CHRONIC DIASTOLIC HEART FAILURE: Chronic | ICD-10-CM

## 2017-12-28 DIAGNOSIS — F32.9 MAJOR DEPRESSIVE DISORDER, SINGLE EPISODE: ICD-10-CM

## 2017-12-28 DIAGNOSIS — I21.4 ACUTE NON-ST SEGMENT ELEVATION MYOCARDIAL INFARCTION: ICD-10-CM

## 2017-12-28 DIAGNOSIS — M19.90 ARTHRITIS: ICD-10-CM

## 2017-12-28 PROBLEM — I50.23 ACUTE ON CHRONIC SYSTOLIC CONGESTIVE HEART FAILURE: Status: RESOLVED | Noted: 2017-02-15 | Resolved: 2017-12-28

## 2017-12-28 PROBLEM — N18.30 CKD (CHRONIC KIDNEY DISEASE) STAGE 3, GFR 30-59 ML/MIN: Chronic | Status: ACTIVE | Noted: 2017-12-28

## 2017-12-28 PROBLEM — D63.1 ANEMIA OF CHRONIC RENAL FAILURE, STAGE 3 (MODERATE): Status: RESOLVED | Noted: 2017-02-14 | Resolved: 2017-12-28

## 2017-12-28 PROBLEM — N18.30 CKD STAGE 3 DUE TO TYPE 2 DIABETES MELLITUS: Status: RESOLVED | Noted: 2017-02-14 | Resolved: 2017-12-28

## 2017-12-28 PROBLEM — I15.0 RENOVASCULAR HYPERTENSION: Status: RESOLVED | Noted: 2017-02-14 | Resolved: 2017-12-28

## 2017-12-28 PROBLEM — E11.9 TYPE 2 DIABETES MELLITUS, CONTROLLED: Chronic | Status: ACTIVE | Noted: 2017-12-28

## 2017-12-28 PROBLEM — I27.20 PULMONARY HYPERTENSION: Chronic | Status: ACTIVE | Noted: 2017-02-01

## 2017-12-28 PROBLEM — K21.9 GERD (GASTROESOPHAGEAL REFLUX DISEASE): Chronic | Status: ACTIVE | Noted: 2017-12-28

## 2017-12-28 PROBLEM — I50.43 ACUTE ON CHRONIC COMBINED SYSTOLIC AND DIASTOLIC CHF (CONGESTIVE HEART FAILURE): Status: RESOLVED | Noted: 2017-01-12 | Resolved: 2017-12-28

## 2017-12-28 PROBLEM — N18.30 ANEMIA OF CHRONIC RENAL FAILURE, STAGE 3 (MODERATE): Status: RESOLVED | Noted: 2017-02-14 | Resolved: 2017-12-28

## 2017-12-28 PROBLEM — M11.262 PSEUDOGOUT OF LEFT KNEE: Status: RESOLVED | Noted: 2017-02-15 | Resolved: 2017-12-28

## 2017-12-28 PROBLEM — E11.22 CKD STAGE 3 DUE TO TYPE 2 DIABETES MELLITUS: Status: RESOLVED | Noted: 2017-02-14 | Resolved: 2017-12-28

## 2017-12-28 LAB
ALBUMIN SERPL BCP-MCNC: 4.1 G/DL
ALP SERPL-CCNC: 103 U/L
ALT SERPL W/O P-5'-P-CCNC: 23 U/L
ANION GAP SERPL CALC-SCNC: 10 MMOL/L
AST SERPL-CCNC: 32 U/L
BASOPHILS # BLD AUTO: 0.01 K/UL
BASOPHILS NFR BLD: 0.3 %
BILIRUB SERPL-MCNC: 0.5 MG/DL
BNP SERPL-MCNC: 136 PG/ML
BUN SERPL-MCNC: 15 MG/DL
CALCIUM SERPL-MCNC: 10.2 MG/DL
CHLORIDE SERPL-SCNC: 102 MMOL/L
CO2 SERPL-SCNC: 27 MMOL/L
CREAT SERPL-MCNC: 0.9 MG/DL
DIASTOLIC DYSFUNCTION: YES
DIFFERENTIAL METHOD: ABNORMAL
EOSINOPHIL # BLD AUTO: 0 K/UL
EOSINOPHIL NFR BLD: 0.5 %
ERYTHROCYTE [DISTWIDTH] IN BLOOD BY AUTOMATED COUNT: 13.5 %
EST. GFR  (AFRICAN AMERICAN): >60 ML/MIN/1.73 M^2
EST. GFR  (NON AFRICAN AMERICAN): 58 ML/MIN/1.73 M^2
ESTIMATED PA SYSTOLIC PRESSURE: 67.32
GLOBAL PERICARDIAL EFFUSION: ABNORMAL
GLUCOSE SERPL-MCNC: 109 MG/DL
HCT VFR BLD AUTO: 41.8 %
HGB BLD-MCNC: 13.2 G/DL
INR PPP: 1
LYMPHOCYTES # BLD AUTO: 1.4 K/UL
LYMPHOCYTES NFR BLD: 38.6 %
MCH RBC QN AUTO: 27.2 PG
MCHC RBC AUTO-ENTMCNC: 31.6 G/DL
MCV RBC AUTO: 86 FL
MITRAL VALVE REGURGITATION: ABNORMAL
MONOCYTES # BLD AUTO: 0.4 K/UL
MONOCYTES NFR BLD: 9.4 %
NEUTROPHILS # BLD AUTO: 1.9 K/UL
NEUTROPHILS NFR BLD: 51.2 %
PLATELET # BLD AUTO: 212 K/UL
PMV BLD AUTO: 10.2 FL
POCT GLUCOSE: 107 MG/DL (ref 70–110)
POCT GLUCOSE: 122 MG/DL (ref 70–110)
POTASSIUM SERPL-SCNC: 3.9 MMOL/L
PROT SERPL-MCNC: 8 G/DL
PROTHROMBIN TIME: 10.1 SEC
RBC # BLD AUTO: 4.85 M/UL
RETIRED EF AND QEF - SEE NOTES: 50 (ref 55–65)
SODIUM SERPL-SCNC: 139 MMOL/L
TRICUSPID VALVE REGURGITATION: ABNORMAL
TROPONIN I SERPL DL<=0.01 NG/ML-MCNC: 0.1 NG/ML
TROPONIN I SERPL DL<=0.01 NG/ML-MCNC: 1.7 NG/ML
WBC # BLD AUTO: 3.73 K/UL

## 2017-12-28 PROCEDURE — 85610 PROTHROMBIN TIME: CPT

## 2017-12-28 PROCEDURE — 63600175 PHARM REV CODE 636 W HCPCS: Performed by: INTERNAL MEDICINE

## 2017-12-28 PROCEDURE — 84484 ASSAY OF TROPONIN QUANT: CPT

## 2017-12-28 PROCEDURE — 25000003 PHARM REV CODE 250: Performed by: INTERNAL MEDICINE

## 2017-12-28 PROCEDURE — 99285 EMERGENCY DEPT VISIT HI MDM: CPT | Mod: 25

## 2017-12-28 PROCEDURE — 93306 TTE W/DOPPLER COMPLETE: CPT

## 2017-12-28 PROCEDURE — 99223 1ST HOSP IP/OBS HIGH 75: CPT | Mod: ,,, | Performed by: INTERNAL MEDICINE

## 2017-12-28 PROCEDURE — 84484 ASSAY OF TROPONIN QUANT: CPT | Mod: 91

## 2017-12-28 PROCEDURE — 96372 THER/PROPH/DIAG INJ SC/IM: CPT

## 2017-12-28 PROCEDURE — 21400001 HC TELEMETRY ROOM

## 2017-12-28 PROCEDURE — 25000003 PHARM REV CODE 250: Performed by: EMERGENCY MEDICINE

## 2017-12-28 PROCEDURE — 85025 COMPLETE CBC W/AUTO DIFF WBC: CPT

## 2017-12-28 PROCEDURE — 93010 ELECTROCARDIOGRAM REPORT: CPT | Mod: ,,, | Performed by: INTERNAL MEDICINE

## 2017-12-28 PROCEDURE — 63600175 PHARM REV CODE 636 W HCPCS: Performed by: EMERGENCY MEDICINE

## 2017-12-28 PROCEDURE — 93306 TTE W/DOPPLER COMPLETE: CPT | Mod: 26,,, | Performed by: INTERNAL MEDICINE

## 2017-12-28 PROCEDURE — 82962 GLUCOSE BLOOD TEST: CPT

## 2017-12-28 PROCEDURE — 80053 COMPREHEN METABOLIC PANEL: CPT

## 2017-12-28 PROCEDURE — 93005 ELECTROCARDIOGRAM TRACING: CPT

## 2017-12-28 PROCEDURE — 83880 ASSAY OF NATRIURETIC PEPTIDE: CPT

## 2017-12-28 RX ORDER — HYDRALAZINE HYDROCHLORIDE 20 MG/ML
10 INJECTION INTRAMUSCULAR; INTRAVENOUS EVERY 6 HOURS PRN
Status: DISCONTINUED | OUTPATIENT
Start: 2017-12-28 | End: 2017-12-30 | Stop reason: HOSPADM

## 2017-12-28 RX ORDER — LORAZEPAM 0.5 MG/1
0.5 TABLET ORAL EVERY 8 HOURS PRN
Status: DISCONTINUED | OUTPATIENT
Start: 2017-12-28 | End: 2017-12-30 | Stop reason: HOSPADM

## 2017-12-28 RX ORDER — CLOPIDOGREL 300 MG/1
300 TABLET, FILM COATED ORAL
Status: COMPLETED | OUTPATIENT
Start: 2017-12-28 | End: 2017-12-28

## 2017-12-28 RX ORDER — RAMELTEON 8 MG/1
8 TABLET ORAL NIGHTLY PRN
Status: DISCONTINUED | OUTPATIENT
Start: 2017-12-28 | End: 2017-12-30 | Stop reason: HOSPADM

## 2017-12-28 RX ORDER — ATORVASTATIN CALCIUM 40 MG/1
80 TABLET, FILM COATED ORAL DAILY
Status: DISCONTINUED | OUTPATIENT
Start: 2017-12-28 | End: 2017-12-30 | Stop reason: HOSPADM

## 2017-12-28 RX ORDER — ASPIRIN 325 MG
325 TABLET ORAL DAILY
Status: DISCONTINUED | OUTPATIENT
Start: 2017-12-29 | End: 2017-12-29

## 2017-12-28 RX ORDER — IBUPROFEN 200 MG
16 TABLET ORAL
Status: DISCONTINUED | OUTPATIENT
Start: 2017-12-28 | End: 2017-12-30 | Stop reason: HOSPADM

## 2017-12-28 RX ORDER — NITROGLYCERIN 0.4 MG/1
0.4 TABLET SUBLINGUAL EVERY 5 MIN PRN
Status: DISCONTINUED | OUTPATIENT
Start: 2017-12-28 | End: 2017-12-30 | Stop reason: HOSPADM

## 2017-12-28 RX ORDER — PROCHLORPERAZINE EDISYLATE 5 MG/ML
5 INJECTION INTRAMUSCULAR; INTRAVENOUS EVERY 6 HOURS PRN
Status: DISCONTINUED | OUTPATIENT
Start: 2017-12-28 | End: 2017-12-30 | Stop reason: HOSPADM

## 2017-12-28 RX ORDER — ENOXAPARIN SODIUM 100 MG/ML
1 INJECTION SUBCUTANEOUS
Status: COMPLETED | OUTPATIENT
Start: 2017-12-28 | End: 2017-12-28

## 2017-12-28 RX ORDER — ZOLPIDEM TARTRATE 5 MG/1
5 TABLET ORAL NIGHTLY PRN
Status: CANCELLED | OUTPATIENT
Start: 2017-12-28

## 2017-12-28 RX ORDER — DULOXETIN HYDROCHLORIDE 30 MG/1
60 CAPSULE, DELAYED RELEASE ORAL DAILY
Status: DISCONTINUED | OUTPATIENT
Start: 2017-12-29 | End: 2017-12-30 | Stop reason: HOSPADM

## 2017-12-28 RX ORDER — PANTOPRAZOLE SODIUM 40 MG/1
40 TABLET, DELAYED RELEASE ORAL DAILY
Status: DISCONTINUED | OUTPATIENT
Start: 2017-12-29 | End: 2017-12-30 | Stop reason: HOSPADM

## 2017-12-28 RX ORDER — ONDANSETRON 2 MG/ML
8 INJECTION INTRAMUSCULAR; INTRAVENOUS EVERY 8 HOURS PRN
Status: DISCONTINUED | OUTPATIENT
Start: 2017-12-28 | End: 2017-12-30 | Stop reason: HOSPADM

## 2017-12-28 RX ORDER — FUROSEMIDE 40 MG/1
40 TABLET ORAL DAILY
Status: DISCONTINUED | OUTPATIENT
Start: 2017-12-29 | End: 2017-12-30 | Stop reason: HOSPADM

## 2017-12-28 RX ORDER — MORPHINE SULFATE 10 MG/ML
2 INJECTION INTRAMUSCULAR; INTRAVENOUS; SUBCUTANEOUS EVERY 4 HOURS PRN
Status: DISCONTINUED | OUTPATIENT
Start: 2017-12-28 | End: 2017-12-30 | Stop reason: HOSPADM

## 2017-12-28 RX ORDER — LOSARTAN POTASSIUM 25 MG/1
100 TABLET ORAL DAILY
Status: DISCONTINUED | OUTPATIENT
Start: 2017-12-29 | End: 2017-12-30 | Stop reason: HOSPADM

## 2017-12-28 RX ORDER — ENOXAPARIN SODIUM 100 MG/ML
1 INJECTION SUBCUTANEOUS
Status: DISCONTINUED | OUTPATIENT
Start: 2017-12-28 | End: 2017-12-29

## 2017-12-28 RX ORDER — DEXTROSE MONOHYDRATE AND SODIUM CHLORIDE 5; .9 G/100ML; G/100ML
INJECTION, SOLUTION INTRAVENOUS CONTINUOUS
Status: CANCELLED | OUTPATIENT
Start: 2017-12-28

## 2017-12-28 RX ORDER — CLOPIDOGREL BISULFATE 75 MG/1
75 TABLET ORAL DAILY
Status: DISCONTINUED | OUTPATIENT
Start: 2017-12-29 | End: 2017-12-30 | Stop reason: HOSPADM

## 2017-12-28 RX ORDER — CARVEDILOL 6.25 MG/1
6.25 TABLET ORAL 2 TIMES DAILY
Status: DISCONTINUED | OUTPATIENT
Start: 2017-12-28 | End: 2017-12-30 | Stop reason: HOSPADM

## 2017-12-28 RX ORDER — HYDROCODONE BITARTRATE AND ACETAMINOPHEN 5; 325 MG/1; MG/1
1 TABLET ORAL EVERY 4 HOURS PRN
Qty: 20 TABLET | Refills: 0 | Status: SHIPPED | OUTPATIENT
Start: 2017-12-28 | End: 2018-03-20 | Stop reason: SDUPTHER

## 2017-12-28 RX ORDER — POTASSIUM CHLORIDE 20 MEQ/1
20 TABLET, EXTENDED RELEASE ORAL DAILY
Status: CANCELLED | OUTPATIENT
Start: 2017-12-29

## 2017-12-28 RX ORDER — TIZANIDINE HYDROCHLORIDE 4 MG/1
CAPSULE, GELATIN COATED ORAL
COMMUNITY
End: 2019-03-19

## 2017-12-28 RX ORDER — BETAMETHASONE SODIUM PHOSPHATE AND BETAMETHASONE ACETATE 3; 3 MG/ML; MG/ML
6 INJECTION, SUSPENSION INTRA-ARTICULAR; INTRALESIONAL; INTRAMUSCULAR; SOFT TISSUE ONCE
Status: DISCONTINUED | OUTPATIENT
Start: 2017-12-28 | End: 2017-12-28

## 2017-12-28 RX ORDER — GLUCAGON 1 MG
1 KIT INJECTION
Status: DISCONTINUED | OUTPATIENT
Start: 2017-12-28 | End: 2017-12-30 | Stop reason: HOSPADM

## 2017-12-28 RX ORDER — HYDROCODONE BITARTRATE AND ACETAMINOPHEN 5; 325 MG/1; MG/1
1 TABLET ORAL EVERY 4 HOURS PRN
Status: DISCONTINUED | OUTPATIENT
Start: 2017-12-28 | End: 2017-12-30 | Stop reason: HOSPADM

## 2017-12-28 RX ORDER — ACETAMINOPHEN 500 MG
500 TABLET ORAL EVERY 6 HOURS PRN
Status: DISCONTINUED | OUTPATIENT
Start: 2017-12-28 | End: 2017-12-30 | Stop reason: HOSPADM

## 2017-12-28 RX ORDER — IBUPROFEN 200 MG
24 TABLET ORAL
Status: DISCONTINUED | OUTPATIENT
Start: 2017-12-28 | End: 2017-12-30 | Stop reason: HOSPADM

## 2017-12-28 RX ADMIN — CLOPIDOGREL BISULFATE 300 MG: 300 TABLET, FILM COATED ORAL at 06:12

## 2017-12-28 RX ADMIN — NITROGLYCERIN 0.5 INCH: 20 OINTMENT TOPICAL at 08:12

## 2017-12-28 RX ADMIN — HYDROCODONE BITARTRATE AND ACETAMINOPHEN 1 TABLET: 5; 325 TABLET ORAL at 05:12

## 2017-12-28 RX ADMIN — CARVEDILOL 6.25 MG: 6.25 TABLET, FILM COATED ORAL at 10:12

## 2017-12-28 RX ADMIN — ENOXAPARIN SODIUM 80 MG: 100 INJECTION SUBCUTANEOUS at 05:12

## 2017-12-28 RX ADMIN — RAMELTEON 8 MG: 8 TABLET, FILM COATED ORAL at 10:12

## 2017-12-28 RX ADMIN — ENOXAPARIN SODIUM 80 MG: 80 INJECTION SUBCUTANEOUS at 10:12

## 2017-12-28 RX ADMIN — ATORVASTATIN CALCIUM 80 MG: 40 TABLET, FILM COATED ORAL at 10:12

## 2017-12-28 NOTE — DISCHARGE INSTRUCTIONS
Usual medicines.  Please return immediately if you get worse or if new problems develop.  Please follow-up with her cardiologist this week.  Rest.

## 2017-12-28 NOTE — ED NOTES
Pt requested some water and pain medication for back pain. Will notify MD. Pt in no acute distress. REU. AAO x 4.

## 2017-12-28 NOTE — ED NOTES
Dr. Mendoza aware of pts request for pain medication and water. Verbalized understanding. MD states pt can have water, no further orders. Water given to pt. In no acute distress. Will continue to monitor.

## 2017-12-28 NOTE — ED TRIAGE NOTES
"Pt arrived to ED via personal transportation from home for c/o SOB and dizziness x 3 days. Pt states it is gotten increasingly worse. Pt states it is worse in the AM. Reports productive phlegm cough. Denies chest pain. Denies N/V/D/F. C/o generalized body aches from arthritis pain but reports new knee pain. States she is "a little SOB" at this time. No dizziness reported at this time. REU. AAO x 4. Will continue to monitor.   "

## 2017-12-28 NOTE — ED PROVIDER NOTES
"Encounter Date: 12/28/2017    SCRIBE #1 NOTE: I, Marcia Carcamo, am scribing for, and in the presence of,  Darwin Mendoza MD. I have scribed the following portions of the note - Other sections scribed: HPI and ROS.       History     Chief Complaint   Patient presents with    Shortness of Breath     sob x 1 week.  all over body aches.  denies n/v/d or fever.  states "they have pulled fluid from around my heart".    Complaints of some dizziness.     CC: Shortness of Breath    HPI: This 85 y.o female who has a past medical history of DM, Vertigo, Depression, Anxiety, HTN, DVT, Spinal stenosis, Hypokalemia, CHF, CKD stage 3, and Arthritis presents to the ED for an evaluation of 3 week h/o shortness of breath with associated slight cough.  Patient also reports of a 3 week h/o increased generalized arthralgias.  Patient also reports waking in the am every morning for the past few weeks with dizziness described as feeling as though she is going to pass out.  She reports the dizziness last approximately 1-1.5 hours.  Patient denies fever, chills, nausea, emesis, diarrhea, abdominal pain, chest pain, or any other associated symptoms.  No alleviating factors.  Patient reports receiving injections to her knees in the past by her orthopedist.   Patient reports being prescribed hydrocodone in the past, but reports of no relief.      The history is provided by the patient. No  was used.     Review of patient's allergies indicates:   Allergen Reactions    Ace inhibitors      Other reaction(s): Unknown    Aciphex  [rabeprazole]      Other reaction(s): Stomach upset    Aspirin      Other reaction(s): Unknown    Bextra  [valdecoxib]      Other reaction(s): Unknown    Cardizem  [diltiazem hcl]      Other reaction(s): Unknown    Clonidine      Other reaction(s): dry mouth.lip swelling    Mavik  [trandolapril]      Other reaction(s): Unknown    Nsaids (non-steroidal anti-inflammatory drug)      Other " reaction(s): black spots on skin    Phenytoin sodium extended      Other reaction(s): Stomach upset    Tramadol      Other reaction(s): stomach pain     Past Medical History:   Diagnosis Date    Anxiety disorder     Carpal tunnel syndrome     CHF (congestive heart failure)     Chronic allergic rhinitis     Chronic pain 10/22/2012    CKD stage 3 due to type 2 diabetes mellitus 2/14/2017    Constipation - functional 4/10/2013    Depression     Diabetes mellitus type II     Hot flash not due to menopause     HTN (hypertension)     Hypokalemia 11/19/2012    Insomnia 4/10/2013    Knee pain, bilateral 7/24/2013    Personal history of DVT (deep vein thrombosis)     Renovascular hypertension 2/14/2017    Severe tricuspid regurgitation 2/14/2017    Spinal stenosis     Dr. Corrales    Spinal stenosis of lumbar region 2/3/2014    Vertigo      Past Surgical History:   Procedure Laterality Date    CATARACT EXTRACTION Bilateral     EYE SURGERY      FRACTURE SURGERY      HYSTERECTOMY      ORIF RADIUS & ULNA FRACTURES       Family History   Problem Relation Age of Onset    No Known Problems Mother     No Known Problems Father     No Known Problems Sister     No Known Problems Brother     No Known Problems Maternal Aunt     No Known Problems Maternal Uncle     No Known Problems Paternal Aunt     No Known Problems Paternal Uncle     No Known Problems Maternal Grandmother     No Known Problems Maternal Grandfather     No Known Problems Paternal Grandmother     No Known Problems Paternal Grandfather     Amblyopia Neg Hx     Blindness Neg Hx     Cancer Neg Hx     Diabetes Neg Hx     Glaucoma Neg Hx     Hypertension Neg Hx     Macular degeneration Neg Hx     Retinal detachment Neg Hx     Strabismus Neg Hx     Stroke Neg Hx     Thyroid disease Neg Hx      Social History   Substance Use Topics    Smoking status: Never Smoker    Smokeless tobacco: Never Used    Alcohol use No     Review of  Systems   Constitutional: Negative for chills and fever.   HENT: Negative for ear pain and sore throat.    Eyes: Negative for pain.   Respiratory: Positive for cough and shortness of breath.    Cardiovascular: Negative for chest pain.   Gastrointestinal: Negative for abdominal pain, diarrhea, nausea and vomiting.   Genitourinary: Negative for dysuria.   Musculoskeletal: Positive for arthralgias. Negative for back pain.   Skin: Negative for rash.   Neurological: Positive for dizziness. Negative for headaches.       Physical Exam     Initial Vitals [12/28/17 1255]   BP Pulse Resp Temp SpO2   (!) 147/88 106 18 97.9 °F (36.6 °C) 99 %      MAP       107.67         Physical Exam  The patient was examined specifically for the following:   General:No significant distress, Good color, Warm and dry. Head and neck:Scalp atraumatic, Neck supple. Neurological:Appropriate conversation, Gross motor deficits. Eyes:Conjugate gaze, Clear corneas. ENT: No epistaxis. Cardiac: Regular rate and rhythm, Grossly normal heart tones. Pulmonary: Wheezing, Rales. Gastrointestinal: Abdominal tenderness, Abdominal distention. Musculoskeletal: Extremity deformity, Apparent pain with range of motion of the joints. Skin: Rash.   The findings on examination were normal except for the following: There is no clinical evidence of respiratory distress.  The patient has an irregularly irregular rhythm with a heart rate of 106.  The patient's blood pressures 147/88.  The abdomen is soft.  There is mild pale range of motion of the knee joints.  There is no erythema or warmth.  The remainder the extremities.  There is no midline lumbar tenderness.  The abdomen is nontender.  ED Course   Critical Care  Date/Time: 1/27/2018 4:10 PM  Performed by: SAURABH COREAS  Authorized by: YOSELIN AVILES   Direct patient critical care time: 22 minutes  Additional history critical care time: 11 minutes  Ordering / reviewing critical care time: 11  minutes  Documentation critical care time: 17 minutes  Consulting other physicians critical care time: 4 minutes  Consult with family critical care time: 4 minutes  Total critical care time (exclusive of procedural time) : 69 minutes  Critical care time was exclusive of separately billable procedures and treating other patients.  Critical care was necessary to treat or prevent imminent or life-threatening deterioration of the following conditions: circulatory failure and cardiac failure.  Critical care was time spent personally by me on the following activities: examination of patient, evaluation of patient's response to treatment, discussions with primary provider, development of treatment plan with patient or surrogate, ordering and performing treatments and interventions, ordering and review of laboratory studies, ordering and review of radiographic studies and re-evaluation of patient's condition.        Labs Reviewed   CBC W/ AUTO DIFFERENTIAL - Abnormal; Notable for the following:        Result Value    WBC 3.73 (*)     MCHC 31.6 (*)     All other components within normal limits   COMPREHENSIVE METABOLIC PANEL - Abnormal; Notable for the following:     eGFR if non  58 (*)     All other components within normal limits   TROPONIN I - Abnormal; Notable for the following:     Troponin I 0.101 (*)     All other components within normal limits   B-TYPE NATRIURETIC PEPTIDE - Abnormal; Notable for the following:      (*)     All other components within normal limits   TROPONIN I - Abnormal; Notable for the following:     Troponin I 1.701 (*)     All other components within normal limits   POCT GLUCOSE - Abnormal; Notable for the following:     POCT Glucose 122 (*)     All other components within normal limits   PROTIME-INR   POCT GLUCOSE MONITORING CONTINUOUS     EKG Readings: (Independently Interpreted)   This patient has a heart rate of 98.  She is in a sinus rhythm.  She has occasional PACs  and PVCs.  There are no significant ST segment or T-wave changes.  There is no evidence of acute myocardial infarction or malignant arrhythmia.       X-Rays:   Independently Interpreted Readings:   Other Readings:  Chest x-ray fails to reveal pneumothorax pneumonia pleural effusion or pulmonary edema.    2-D echocardiogram failed to reveal any significant changes.    Medical decision making: This patient has a lot of nonspecific complaints including dizziness and shortness of breath.  All of her symptoms are fairly subacute.  There are no physical findings to suggest acute respiratory distress.  I find no evidence of pulmonary edema.  The patient has PACs.  She complains of arthritic pain in her shoulders elbows hips knees and ankles.  She is out of her hydrocodone.  She had an elevated troponin but she has had elevated troponins in the past.  The patient has pulmonary hypertension.  Consultation was obtained with , who recommended a second troponin and an echocardiogram to ensure stability.  The echocardiogram is stable from the last study.  Second troponin is pending at time of this dictation.  If it is negative I will discharge this patient outpatient evaluation and treatment.  The patient will accept a steroid shot.  I will refill her hydrocodone.  The patient's second troponin was 1.7.  This is markedly elevated from the first.  The patient will be admitted for further evaluation by cardiology.  She'll be treated with full dose Lovenox.  We will use aspirin and nitroglycerin.  Cardiology will be consulted.  The patient will be admitted.  I discussed this case with Dr. Eriberto Lanza Attestation:   Scribe #1: I performed the above scribed service and the documentation accurately describes the services I performed. I attest to the accuracy of the note.    Attending Attestation:           Physician Attestation for Scribe:  Physician Attestation Statement for Hardeepibe #1: I, Darwin Mendoza MD,  reviewed documentation, as scribed by Marcia Carcamo in my presence, and it is both accurate and complete.                 ED Course      Clinical Impression:   The primary encounter diagnosis was Arthritis. Diagnoses of SOB (shortness of breath), Shortness of breath, Arthralgia, unspecified joint, Dizziness, and Acute non-ST segment elevation myocardial infarction were also pertinent to this visit.                           Darwin Mendoza MD  12/28/17 1732       Darwin Mendoza MD  01/27/18 [id number]

## 2017-12-29 LAB
ALBUMIN SERPL BCP-MCNC: 3.3 G/DL
ALP SERPL-CCNC: 92 U/L
ALT SERPL W/O P-5'-P-CCNC: 20 U/L
ANION GAP SERPL CALC-SCNC: 8 MMOL/L
AST SERPL-CCNC: 37 U/L
BASOPHILS # BLD AUTO: 0.01 K/UL
BASOPHILS NFR BLD: 0.2 %
BILIRUB SERPL-MCNC: 0.8 MG/DL
BUN SERPL-MCNC: 14 MG/DL
CALCIUM SERPL-MCNC: 9.8 MG/DL
CHLORIDE SERPL-SCNC: 103 MMOL/L
CHOLEST SERPL-MCNC: 184 MG/DL
CHOLEST/HDLC SERPL: 2.1 {RATIO}
CO2 SERPL-SCNC: 29 MMOL/L
CORONARY STENOSIS: ABNORMAL
CREAT SERPL-MCNC: 0.8 MG/DL
DIFFERENTIAL METHOD: ABNORMAL
EOSINOPHIL # BLD AUTO: 0.1 K/UL
EOSINOPHIL NFR BLD: 1.3 %
ERYTHROCYTE [DISTWIDTH] IN BLOOD BY AUTOMATED COUNT: 13.6 %
EST. GFR  (AFRICAN AMERICAN): >60 ML/MIN/1.73 M^2
EST. GFR  (NON AFRICAN AMERICAN): >60 ML/MIN/1.73 M^2
ESTIMATED AVG GLUCOSE: 117 MG/DL
GLUCOSE SERPL-MCNC: 114 MG/DL
HBA1C MFR BLD HPLC: 5.7 %
HCT VFR BLD AUTO: 36.3 %
HDLC SERPL-MCNC: 86 MG/DL
HDLC SERPL: 46.7 %
HGB BLD-MCNC: 12.1 G/DL
LDLC SERPL CALC-MCNC: 90.6 MG/DL
LYMPHOCYTES # BLD AUTO: 2.2 K/UL
LYMPHOCYTES NFR BLD: 49.2 %
MAGNESIUM SERPL-MCNC: 1.6 MG/DL
MCH RBC QN AUTO: 28.5 PG
MCHC RBC AUTO-ENTMCNC: 33.3 G/DL
MCV RBC AUTO: 86 FL
MONOCYTES # BLD AUTO: 0.4 K/UL
MONOCYTES NFR BLD: 9.8 %
NEUTROPHILS # BLD AUTO: 1.8 K/UL
NEUTROPHILS NFR BLD: 39.5 %
NONHDLC SERPL-MCNC: 98 MG/DL
PHOSPHATE SERPL-MCNC: 3.4 MG/DL
PLATELET # BLD AUTO: 186 K/UL
PMV BLD AUTO: 9.9 FL
POCT GLUCOSE: 111 MG/DL (ref 70–110)
POCT GLUCOSE: 121 MG/DL (ref 70–110)
POCT GLUCOSE: 90 MG/DL (ref 70–110)
POTASSIUM SERPL-SCNC: 3.5 MMOL/L
PROT SERPL-MCNC: 6.9 G/DL
RBC # BLD AUTO: 4.24 M/UL
SODIUM SERPL-SCNC: 140 MMOL/L
TRIGL SERPL-MCNC: 37 MG/DL
TROPONIN I SERPL DL<=0.01 NG/ML-MCNC: 0.91 NG/ML
TROPONIN I SERPL DL<=0.01 NG/ML-MCNC: <0.006 NG/ML
WBC # BLD AUTO: 4.51 K/UL

## 2017-12-29 PROCEDURE — 99152 MOD SED SAME PHYS/QHP 5/>YRS: CPT | Mod: ,,, | Performed by: INTERNAL MEDICINE

## 2017-12-29 PROCEDURE — 93458 L HRT ARTERY/VENTRICLE ANGIO: CPT | Mod: 26,,, | Performed by: INTERNAL MEDICINE

## 2017-12-29 PROCEDURE — C1894 INTRO/SHEATH, NON-LASER: HCPCS

## 2017-12-29 PROCEDURE — 21400001 HC TELEMETRY ROOM

## 2017-12-29 PROCEDURE — 25000003 PHARM REV CODE 250: Performed by: INTERNAL MEDICINE

## 2017-12-29 PROCEDURE — 93005 ELECTROCARDIOGRAM TRACING: CPT

## 2017-12-29 PROCEDURE — 25000003 PHARM REV CODE 250

## 2017-12-29 PROCEDURE — 83735 ASSAY OF MAGNESIUM: CPT

## 2017-12-29 PROCEDURE — 36415 COLL VENOUS BLD VENIPUNCTURE: CPT

## 2017-12-29 PROCEDURE — 85025 COMPLETE CBC W/AUTO DIFF WBC: CPT

## 2017-12-29 PROCEDURE — 80053 COMPREHEN METABOLIC PANEL: CPT

## 2017-12-29 PROCEDURE — 84100 ASSAY OF PHOSPHORUS: CPT

## 2017-12-29 PROCEDURE — 63600175 PHARM REV CODE 636 W HCPCS

## 2017-12-29 PROCEDURE — A4216 STERILE WATER/SALINE, 10 ML: HCPCS

## 2017-12-29 PROCEDURE — B2111ZZ FLUOROSCOPY OF MULTIPLE CORONARY ARTERIES USING LOW OSMOLAR CONTRAST: ICD-10-PCS | Performed by: INTERNAL MEDICINE

## 2017-12-29 PROCEDURE — 83036 HEMOGLOBIN GLYCOSYLATED A1C: CPT

## 2017-12-29 PROCEDURE — 93010 ELECTROCARDIOGRAM REPORT: CPT | Mod: ,,, | Performed by: INTERNAL MEDICINE

## 2017-12-29 PROCEDURE — 84484 ASSAY OF TROPONIN QUANT: CPT | Mod: 91

## 2017-12-29 PROCEDURE — 4A023N7 MEASUREMENT OF CARDIAC SAMPLING AND PRESSURE, LEFT HEART, PERCUTANEOUS APPROACH: ICD-10-PCS | Performed by: INTERNAL MEDICINE

## 2017-12-29 PROCEDURE — 25000003 PHARM REV CODE 250: Performed by: EMERGENCY MEDICINE

## 2017-12-29 PROCEDURE — 80061 LIPID PANEL: CPT

## 2017-12-29 RX ORDER — NAPROXEN SODIUM 220 MG/1
81 TABLET, FILM COATED ORAL DAILY
Status: DISCONTINUED | OUTPATIENT
Start: 2017-12-30 | End: 2017-12-30 | Stop reason: HOSPADM

## 2017-12-29 RX ADMIN — DULOXETINE 60 MG: 30 CAPSULE, DELAYED RELEASE ORAL at 08:12

## 2017-12-29 RX ADMIN — CARVEDILOL 6.25 MG: 6.25 TABLET, FILM COATED ORAL at 08:12

## 2017-12-29 RX ADMIN — CARVEDILOL 6.25 MG: 6.25 TABLET, FILM COATED ORAL at 09:12

## 2017-12-29 RX ADMIN — PANTOPRAZOLE SODIUM 40 MG: 40 TABLET, DELAYED RELEASE ORAL at 08:12

## 2017-12-29 RX ADMIN — CLOPIDOGREL BISULFATE 75 MG: 75 TABLET ORAL at 08:12

## 2017-12-29 RX ADMIN — ASPIRIN 325 MG ORAL TABLET 325 MG: 325 PILL ORAL at 08:12

## 2017-12-29 RX ADMIN — ATORVASTATIN CALCIUM 80 MG: 40 TABLET, FILM COATED ORAL at 08:12

## 2017-12-29 RX ADMIN — ACETAMINOPHEN 500 MG: 500 TABLET ORAL at 08:12

## 2017-12-29 RX ADMIN — FUROSEMIDE 40 MG: 40 TABLET ORAL at 08:12

## 2017-12-29 RX ADMIN — LOSARTAN POTASSIUM 100 MG: 25 TABLET, FILM COATED ORAL at 08:12

## 2017-12-29 NOTE — CONSULTS
[facility]  Cardiology  Consult Note    Patient Name: Magaly Nunes  MRN: [id number]  Admission Date: 12/28/2017  Hospital Length of Stay: 0 days  Code Status: Full Code   Attending Provider: Amarjit Nunez MD   Consulting Provider: Hammad Hoffman MD  Primary Care Physician: Chris Bangura MD  Principal Problem:NSTEMI (non-ST elevated myocardial infarction)    Patient information was obtained from patient, past medical records and ER records.     Inpatient consult to Cardiology  Consult performed by: HAMMAD HOFFMAN  Consult ordered by: SAURABH COREAS  Reason for consult: NSTEMI        Subjective:     Chief Complaint:  CP     HPI:   85 y.o female who has a past medical history of DM, Vertigo, Depression, Anxiety, HTN, DVT, Spinal stenosis, Hypokalemia, CHF, CKD stage 3, and Arthritis presents to the ED for an evaluation of 3 week h/o shortness of breath with associated slight cough.  Patient also reports of a 3 week h/o increased generalized arthralgias.  Patient also reports waking in the am every morning for the past few weeks with dizziness described as feeling as though she is going to pass out.  She reports the dizziness last approximately 1-1.5 hours.  Patient denies fever, chills, nausea, emesis, diarrhea, abdominal pain, chest pain, or any other associated symptoms.  No alleviating factors.  Patient reports receiving injections to her knees in the past by her orthopedist.   Patient reports being prescribed hydrocodone in the past, but reports of no relief.    Known to me from clinic.  Presented with cp now resolved.  Previously known mild LV dysfunction but wanted conservative therapy due to advanced age.  D/w Dr. Coreas in detail, was planned for ED r/o ACS but 2nd trop set c/w NSTEMI.     Past Medical History:   Diagnosis Date    Anxiety disorder     Arthritis 12/28/2017    Carpal tunnel syndrome     CHF (congestive heart failure)     Chronic allergic rhinitis      Chronic pain 10/22/2012    CKD stage 3 due to type 2 diabetes mellitus 2/14/2017    Constipation - functional 4/10/2013    Depression     Diabetes mellitus type II     Hot flash not due to menopause     HTN (hypertension)     Hypokalemia 11/19/2012    Insomnia 4/10/2013    Knee pain, bilateral 7/24/2013    Personal history of DVT (deep vein thrombosis)     Renovascular hypertension 2/14/2017    Severe tricuspid regurgitation 2/14/2017    Spinal stenosis     Dr. Corrales    Spinal stenosis of lumbar region 2/3/2014    Vertigo        Past Surgical History:   Procedure Laterality Date    CATARACT EXTRACTION Bilateral     EYE SURGERY      FRACTURE SURGERY      HYSTERECTOMY      ORIF RADIUS & ULNA FRACTURES         Review of patient's allergies indicates:   Allergen Reactions    Ace inhibitors      Other reaction(s): Unknown    Aciphex  [rabeprazole]      Other reaction(s): Stomach upset    Aspirin      Other reaction(s): Unknown    Bextra  [valdecoxib]      Other reaction(s): Unknown    Cardizem  [diltiazem hcl]      Other reaction(s): Unknown    Clonidine      Other reaction(s): dry mouth.lip swelling    Mavik  [trandolapril]      Other reaction(s): Unknown    Nsaids (non-steroidal anti-inflammatory drug)      Other reaction(s): black spots on skin    Phenytoin sodium extended      Other reaction(s): Stomach upset    Tramadol      Other reaction(s): stomach pain       No current facility-administered medications on file prior to encounter.      Current Outpatient Prescriptions on File Prior to Encounter   Medication Sig    DULoxetine (CYMBALTA) 60 MG capsule TAKE 1 CAPSULE(60 MG) BY MOUTH EVERY DAY    furosemide (LASIX) 40 MG tablet Take 1 tablet (40 mg total) by mouth once daily.    LORazepam (ATIVAN) 0.5 MG tablet Take 1 tablet (0.5 mg total) by mouth every 8 (eight) hours as needed for Anxiety. (caution-may cause sleepiness)    losartan (COZAAR) 100 MG tablet TAKE ONE TABLET BY  MOUTH ONCE DAILY    omeprazole (PRILOSEC) 20 MG capsule TAKE 2 CAPSULES BY MOUTH EVERY DAY FOR ACID REFLUX OR STOMACH    potassium chloride SA (K-DUR,KLOR-CON) 20 MEQ tablet Take 1 tablet (20 mEq total) by mouth once daily.    temazepam (RESTORIL) 30 mg capsule TAKE 1 CAPSULE BY MOUTH EVERY DAY AT BEDTIME AS NEEDED FOR INSOMNIA    [DISCONTINUED] hydrocodone-acetaminophen 5-325mg (NORCO) 5-325 mg per tablet     [DISCONTINUED] carvedilol (COREG) 12.5 MG tablet Take 1 tablet (12.5 mg total) by mouth 2 (two) times daily.    [DISCONTINUED] DULoxetine (CYMBALTA) 60 MG capsule Take 1 capsule (60 mg total) by mouth once daily.    [DISCONTINUED] meclizine (ANTIVERT) 25 mg tablet Take 1 tablet (25 mg total) by mouth 3 (three) times daily as needed.    [DISCONTINUED] meloxicam (MOBIC) 7.5 MG tablet Take 1 tablet (7.5 mg total) by mouth daily as needed for Pain.    [DISCONTINUED] traMADol (ULTRAM) 50 mg tablet      Family History     Problem Relation (Age of Onset)    No Known Problems Mother, Father, Sister, Brother, Maternal Aunt, Maternal Uncle, Paternal Aunt, Paternal Uncle, Maternal Grandmother, Maternal Grandfather, Paternal Grandmother, Paternal Grandfather        Social History Main Topics    Smoking status: Never Smoker    Smokeless tobacco: Never Used    Alcohol use No    Drug use: No    Sexual activity: No     Review of Systems   Constitution: Negative.   HENT: Negative.    Eyes: Negative.    Cardiovascular: Positive for chest pain. Negative for dyspnea on exertion, irregular heartbeat, leg swelling, near-syncope, orthopnea, palpitations, paroxysmal nocturnal dyspnea and syncope.   Respiratory: Negative for shortness of breath.    Skin: Negative.    Musculoskeletal: Negative.    Gastrointestinal: Negative for abdominal pain, constipation and diarrhea.   Genitourinary: Negative for dysuria.   Neurological: Negative.    Psychiatric/Behavioral: Negative.      Objective:     Vital Signs (Most  Recent):  Temp: 98.3 °F (36.8 °C) (12/28/17 1703)  Pulse: 94 (12/28/17 2019)  Resp: 18 (12/28/17 2019)  BP: (!) 186/80 (12/28/17 2021)  SpO2: 98 % (12/28/17 2019) Vital Signs (24h Range):  Temp:  [97.9 °F (36.6 °C)-98.3 °F (36.8 °C)] 98.3 °F (36.8 °C)  Pulse:  [] 94  Resp:  [18] 18  SpO2:  [97 %-100 %] 98 %  BP: (147-193)/(72-94) 186/80     Weight: 76.7 kg (169 lb)  Body mass index is 29.01 kg/m².    SpO2: 98 %  O2 Device (Oxygen Therapy): room air    No intake or output data in the 24 hours ending 12/28/17 2120    Lines/Drains/Airways          No matching active lines, drains, or airways          Physical Exam   Constitutional: She is oriented to person, place, and time. She appears well-developed and well-nourished.   HENT:   Head: Normocephalic and atraumatic.   Eyes: Conjunctivae and EOM are normal. Pupils are equal, round, and reactive to light.   Neck: Normal range of motion. Neck supple. No thyromegaly present.   Cardiovascular: Normal rate and regular rhythm.    No murmur heard.  Pulmonary/Chest: Effort normal and breath sounds normal. No respiratory distress.   Abdominal: Soft. Bowel sounds are normal.   Musculoskeletal: She exhibits no edema.   Neurological: She is alert and oriented to person, place, and time.   Skin: Skin is warm and dry.   Psychiatric: She has a normal mood and affect. Her behavior is normal.       Significant Labs:   CMP   Recent Labs  Lab 12/28/17  1321      K 3.9      CO2 27      BUN 15   CREATININE 0.9   CALCIUM 10.2   PROT 8.0   ALBUMIN 4.1   BILITOT 0.5   ALKPHOS 103   AST 32   ALT 23   ANIONGAP 10   ESTGFRAFRICA >60   EGFRNONAA 58*   , CBC   Recent Labs  Lab 12/28/17  1321   WBC 3.73*   HGB 13.2   HCT 41.8      , INR   Recent Labs  Lab 12/28/17  1321   INR 1.0   , Lipid Panel No results for input(s): CHOL, HDL, LDLCALC, TRIG, CHOLHDL in the last 48 hours. and Troponin   Recent Labs  Lab 12/28/17  1321 12/28/17  1620   TROPONINI 0.101* 1.701*        Significant Imaging: Echocardiogram:   2D echo with color flow doppler:   Results for orders placed or performed during the hospital encounter of 12/28/17   2D echo with color flow doppler   Result Value Ref Range    EF 50 55 - 65    Mitral Valve Regurgitation MILD TO MODERATE     Diastolic Dysfunction Yes (A)     Est. PA Systolic Pressure 67.32 (A)     Pericardial Effusion NONE     Tricuspid Valve Regurgitation MODERATE TO SEVERE (A)      Assessment and Plan:     * NSTEMI (non-ST elevated myocardial infarction)    Load asa/plavix  Add bb etc  Echo shows low NL EF  Likely conservative med tx with advanced age but willd/w patient again in am        Essential hypertension             Type 2 diabetes mellitus, controlled    Per IM        CKD Stage 3                  VTE Risk Mitigation     None          Thank you for your consult. I will follow-up with patient. Please contact us if you have any additional questions.    Hammad Humphrey MD  Cardiology   [facility]

## 2017-12-29 NOTE — SUBJECTIVE & OBJECTIVE
Past Medical History:   Diagnosis Date    Anxiety disorder     Arthritis 12/28/2017    Carpal tunnel syndrome     CHF (congestive heart failure)     Chronic allergic rhinitis     Chronic pain 10/22/2012    CKD stage 3 due to type 2 diabetes mellitus 2/14/2017    Constipation - functional 4/10/2013    Depression     Diabetes mellitus type II     Hot flash not due to menopause     HTN (hypertension)     Hypokalemia 11/19/2012    Insomnia 4/10/2013    Knee pain, bilateral 7/24/2013    Personal history of DVT (deep vein thrombosis)     Renovascular hypertension 2/14/2017    Severe tricuspid regurgitation 2/14/2017    Spinal stenosis     Dr. Corrales    Spinal stenosis of lumbar region 2/3/2014    Vertigo        Past Surgical History:   Procedure Laterality Date    CATARACT EXTRACTION Bilateral     EYE SURGERY      FRACTURE SURGERY      HYSTERECTOMY      ORIF RADIUS & ULNA FRACTURES         Review of patient's allergies indicates:   Allergen Reactions    Ace inhibitors      Other reaction(s): Unknown    Aciphex  [rabeprazole]      Other reaction(s): Stomach upset    Aspirin      Other reaction(s): Unknown    Bextra  [valdecoxib]      Other reaction(s): Unknown    Cardizem  [diltiazem hcl]      Other reaction(s): Unknown    Clonidine      Other reaction(s): dry mouth.lip swelling    Mavik  [trandolapril]      Other reaction(s): Unknown    Nsaids (non-steroidal anti-inflammatory drug)      Other reaction(s): black spots on skin    Phenytoin sodium extended      Other reaction(s): Stomach upset    Tramadol      Other reaction(s): stomach pain       No current facility-administered medications on file prior to encounter.      Current Outpatient Prescriptions on File Prior to Encounter   Medication Sig    DULoxetine (CYMBALTA) 60 MG capsule TAKE 1 CAPSULE(60 MG) BY MOUTH EVERY DAY    furosemide (LASIX) 40 MG tablet Take 1 tablet (40 mg total) by mouth once daily.    LORazepam (ATIVAN) 0.5  MG tablet Take 1 tablet (0.5 mg total) by mouth every 8 (eight) hours as needed for Anxiety. (caution-may cause sleepiness)    losartan (COZAAR) 100 MG tablet TAKE ONE TABLET BY MOUTH ONCE DAILY    omeprazole (PRILOSEC) 20 MG capsule TAKE 2 CAPSULES BY MOUTH EVERY DAY FOR ACID REFLUX OR STOMACH    potassium chloride SA (K-DUR,KLOR-CON) 20 MEQ tablet Take 1 tablet (20 mEq total) by mouth once daily.    temazepam (RESTORIL) 30 mg capsule TAKE 1 CAPSULE BY MOUTH EVERY DAY AT BEDTIME AS NEEDED FOR INSOMNIA    [DISCONTINUED] hydrocodone-acetaminophen 5-325mg (NORCO) 5-325 mg per tablet     [DISCONTINUED] carvedilol (COREG) 12.5 MG tablet Take 1 tablet (12.5 mg total) by mouth 2 (two) times daily.    [DISCONTINUED] DULoxetine (CYMBALTA) 60 MG capsule Take 1 capsule (60 mg total) by mouth once daily.    [DISCONTINUED] meclizine (ANTIVERT) 25 mg tablet Take 1 tablet (25 mg total) by mouth 3 (three) times daily as needed.    [DISCONTINUED] meloxicam (MOBIC) 7.5 MG tablet Take 1 tablet (7.5 mg total) by mouth daily as needed for Pain.    [DISCONTINUED] traMADol (ULTRAM) 50 mg tablet      Family History     Problem Relation (Age of Onset)    No Known Problems Mother, Father, Sister, Brother, Maternal Aunt, Maternal Uncle, Paternal Aunt, Paternal Uncle, Maternal Grandmother, Maternal Grandfather, Paternal Grandmother, Paternal Grandfather        Social History Main Topics    Smoking status: Never Smoker    Smokeless tobacco: Never Used    Alcohol use No    Drug use: No    Sexual activity: No     Review of Systems   Constitutional: Negative for activity change, appetite change, chills, diaphoresis, fatigue, fever and unexpected weight change.   HENT: Negative.    Eyes: Negative.    Respiratory: Positive for shortness of breath. Negative for cough, chest tightness and wheezing.    Cardiovascular: Positive for chest pain and palpitations. Negative for leg swelling.   Gastrointestinal: Negative for abdominal  distention, abdominal pain, blood in stool, constipation, diarrhea, nausea and vomiting.   Genitourinary: Negative for dysuria and hematuria.   Musculoskeletal: Negative.    Skin: Negative.    Neurological: Positive for dizziness and light-headedness. Negative for seizures, syncope and weakness.   Psychiatric/Behavioral: Negative.      Objective:     Vital Signs (Most Recent):  Temp: 98.3 °F (36.8 °C) (12/28/17 1703)  Pulse: 94 (12/28/17 2019)  Resp: 18 (12/28/17 2019)  BP: (!) 186/80 (12/28/17 2021)  SpO2: 98 % (12/28/17 2019) Vital Signs (24h Range):  Temp:  [97.9 °F (36.6 °C)-98.3 °F (36.8 °C)] 98.3 °F (36.8 °C)  Pulse:  [] 94  Resp:  [18] 18  SpO2:  [97 %-100 %] 98 %  BP: (147-193)/(72-94) 186/80     Weight: 76.7 kg (169 lb)  Body mass index is 29.01 kg/m².    Physical Exam   Constitutional: She is oriented to person, place, and time. She appears well-developed and well-nourished. No distress.   HENT:   Head: Normocephalic and atraumatic.   Right Ear: External ear normal.   Left Ear: External ear normal.   Nose: Nose normal.   Eyes: Right eye exhibits no discharge. Left eye exhibits no discharge.   Neck: Normal range of motion.   Cardiovascular: Normal rate, regular rhythm, normal heart sounds and intact distal pulses.  Exam reveals no gallop and no friction rub.    No murmur heard.  Pulmonary/Chest: Effort normal and breath sounds normal. No respiratory distress. She has no wheezes. She has no rales. She exhibits no tenderness.   Abdominal: Soft. Bowel sounds are normal. She exhibits no distension. There is no tenderness. There is no rebound and no guarding.   Musculoskeletal: Normal range of motion. She exhibits no edema.   Neurological: She is alert and oriented to person, place, and time.   Skin: Skin is warm and dry. She is not diaphoretic. No erythema.   Psychiatric: She has a normal mood and affect. Her behavior is normal. Judgment and thought content normal.   Nursing note and vitals reviewed.           Significant Labs: All pertinent labs within the past 24 hours have been reviewed.    Significant Imaging: I have reviewed and interpreted all pertinent imaging results/findings within the past 24 hours.

## 2017-12-29 NOTE — PROGRESS NOTES
WRITTEN DISCHARGE INFORMATION:  Follow-up Information     Hammad Humphrey MD On 1/5/2018.    Specialties:  INTERVENTIONAL CARDIOLOGY, Cardiology  Why:  out patient services:  10:20 a.m.  Contact information:  [address]   [address]  [place] [address]  [phone]             Chris Bangura MD On 1/17/2018.    Specialty:  Internal Medicine  Why:  out patient services:  2:20 p.m.   Contact information:  [address] [place] [address]  [phone]               Things that YOU are responsible for to Manage Your Care At Home:  1. Getting your prescriptions filled.  2. Taking you medications as directed. DO NOT MISS ANY DOSES!  3. Going to your follow-up doctor appointments. This is important because it allows the doctor to monitor your progress and to determine if any changes need to be made to your treatment plan.                                                          Help at Home  After discharge for assistance [facility] On [facility] Line 24/7 assistance  [phone]   Thank you for choosing [facility] for your care.  Please answer any calls you may receive from [facility] we want to continue to support you as you manage your healthcare needs.  Sincerely, Your [facility] Healthcare Manager is,  Shena Jane RN [facility] [phone]

## 2017-12-29 NOTE — BRIEF OP NOTE
[facility]  Brief Operative Note    SUMMARY     Surgery Date: 12/29/2017     Surgeon(s) and Role:     * Hammad Humphrey MD - Primary    Assisting Surgeon: None    Pre-op Diagnosis:  Acute non-ST segment elevation myocardial infarction [I21.4]    Post-op Diagnosis:  Acute non-ST elevation MI    Procedure(s) (LRB):  Left heart cath (Left)    Anesthesia: Choice    Description of Procedure: Left heart catheterization with selective coronary angiography via right radial artery    Description of the findings of the procedure: Likely culprit small distal diagonal vessel with nonobstructive disease otherwise.  The vessel was about 2 mm in diameter and will be medically managed.    Recommendation:  -Continue med therapy including dual antiplatelet therapy for one year  -Observed overnight and DC in a.m. if no complications.    Estimated Blood Loss: Less than 50 cc         Specimens:   Specimen (12h ago through future)    None

## 2017-12-29 NOTE — HPI
Mrs. Magaly Nunes is a 85 y.o. female with essential hypertension, type 2 diabetes mellitus (HbA1c 5.9% Nov 2017), chronic diastolic heart failure (LVEF 45-50% Jan 2017), CKD Stage 3, pulmonary hypertension, GERD, and depression who presents to [facility] ED with complaints of chest pain for one day.  She had actually presented to the ED today after months of bilateral knee and back pain that, although it hasn't gotten any worse, it hasn't improved.  Yesterday, when she felt the chest pain, she decided to come to the ED.  The pain started at rest, was substernal with occasional radiation to the left chest, was sharp in quality, and was 6/10 in severity.  The pain was associated with some dyspnea and palpitations, but not any diaphoresis, nausea, vomiting, fevers, chills, coughing, hemoptysis, nor any lower extremity pain or swelling.  There were no alleviating or exacerbating factors and the pain resolved without intervention after about 10 minutes.  She does not have a personal or family history of heart disease but does say she was admitted in Feb 2017 for fluid problems.

## 2017-12-29 NOTE — PLAN OF CARE
Problem: Diabetes, Type 2 (Adult)  Intervention: Support/Optimize Psychosocial Response to Condition   12/29/17 0516   Coping/Psychosocial Interventions   Supportive Measures active listening utilized;verbalization of feelings encouraged   Psychosocial Support   Family/Support System Care self-care encouraged     Intervention: Optimize Glycemic Control   12/29/17 0516   Nutrition Interventions   Glycemic Management blood glucose monitoring       Goal: Signs and Symptoms of Listed Potential Problems Will be Absent, Minimized or Managed (Diabetes, Type 2)  Signs and symptoms of listed potential problems will be absent, minimized or managed by discharge/transition of care (reference Diabetes, Type 2 (Adult) CPG).    12/29/17 0516   Diabetes, Type 2   Problems Assessed (Type 2 Diabetes) situational response   Problems Present (Type 2 Diabetes) situational response       Problem: Patient Care Overview  Goal: Plan of Care Review  Outcome: Ongoing (interventions implemented as appropriate)   12/29/17 0516   Coping/Psychosocial   Plan Of Care Reviewed With patient   Rested quietly in bed throughout this shift.  No complaints of pain voiced this shift.  Remains free from falls and trauma throughout this shift.  Purposeful hourly rounding in progress.  Call bell within reach.  Will continue to monitor.

## 2017-12-29 NOTE — H&P
[facility] Hospital Medicine  History & Physical    Patient Name: Magaly Nunes  MRN: [id number]  Admission Date: 12/28/2017  Attending Physician: Amarjit Nunez MD   Primary Care Provider: Chris Bangura MD         Patient information was obtained from patient.     Subjective:     Principal Problem:NSTEMI (non-ST elevated myocardial infarction)    Chief Complaint: Chest pain yesterday.    HPI: Mrs. Magaly Nunes is a 85 y.o. female with essential hypertension, type 2 diabetes mellitus (HbA1c 5.9% Nov 2017), chronic diastolic heart failure (LVEF 45-50% Jan 2017), CKD Stage 3, pulmonary hypertension, GERD, and depression who presents to [facility] ED with complaints of chest pain for one day.  She had actually presented to the ED today after months of bilateral knee and back pain that, although it hasn't gotten any worse, it hasn't improved.  Yesterday, when she felt the chest pain, she decided to come to the ED.  The pain started at rest, was substernal with occasional radiation to the left chest, was sharp in quality, and was 6/10 in severity.  The pain was associated with some dyspnea and palpitations, but not any diaphoresis, nausea, vomiting, fevers, chills, coughing, hemoptysis, nor any lower extremity pain or swelling.  There were no alleviating or exacerbating factors and the pain resolved without intervention after about 10 minutes.  She does not have a personal or family history of heart disease but does say she was admitted in Feb 2017 for fluid problems.    Chart Review:  Previous Hospitalizations  Date Hospital Diagnosis   Feb 2017 [facility] Acute heart failure    Jan 2017 [facility] Elevated troponin - normal NST     Outpatient Follow-Up  Date of Visit Physician Service   Dec 2017 Chris Bangura MD Primary Care   Oct 2017 Brinda Alejandro DPM Podiatry    Jul 2017 Hammad Humphrey MD Cardiology    Feb 2017 [facility]    Sept 2016 Vic Suarez MD  Neurology    Sept 2016 Jessa Sutherland LCSW Psychology      Past Medical History:   Diagnosis Date    Anxiety disorder     Arthritis 12/28/2017    Carpal tunnel syndrome     CHF (congestive heart failure)     Chronic allergic rhinitis     Chronic pain 10/22/2012    CKD stage 3 due to type 2 diabetes mellitus 2/14/2017    Constipation - functional 4/10/2013    Depression     Diabetes mellitus type II     Hot flash not due to menopause     HTN (hypertension)     Hypokalemia 11/19/2012    Insomnia 4/10/2013    Knee pain, bilateral 7/24/2013    Personal history of DVT (deep vein thrombosis)     Renovascular hypertension 2/14/2017    Severe tricuspid regurgitation 2/14/2017    Spinal stenosis     Dr. Corrales    Spinal stenosis of lumbar region 2/3/2014    Vertigo        Past Surgical History:   Procedure Laterality Date    CATARACT EXTRACTION Bilateral     EYE SURGERY      FRACTURE SURGERY      HYSTERECTOMY      ORIF RADIUS & ULNA FRACTURES         Review of patient's allergies indicates:   Allergen Reactions    Ace inhibitors      Other reaction(s): Unknown    Aciphex  [rabeprazole]      Other reaction(s): Stomach upset    Aspirin      Other reaction(s): Unknown    Bextra  [valdecoxib]      Other reaction(s): Unknown    Cardizem  [diltiazem hcl]      Other reaction(s): Unknown    Clonidine      Other reaction(s): dry mouth.lip swelling    Mavik  [trandolapril]      Other reaction(s): Unknown    Nsaids (non-steroidal anti-inflammatory drug)      Other reaction(s): black spots on skin    Phenytoin sodium extended      Other reaction(s): Stomach upset    Tramadol      Other reaction(s): stomach pain       No current facility-administered medications on file prior to encounter.      Current Outpatient Prescriptions on File Prior to Encounter   Medication Sig    DULoxetine (CYMBALTA) 60 MG capsule TAKE 1 CAPSULE(60 MG) BY MOUTH EVERY DAY    furosemide (LASIX) 40 MG tablet Take 1 tablet (40  mg total) by mouth once daily.    LORazepam (ATIVAN) 0.5 MG tablet Take 1 tablet (0.5 mg total) by mouth every 8 (eight) hours as needed for Anxiety. (caution-may cause sleepiness)    losartan (COZAAR) 100 MG tablet TAKE ONE TABLET BY MOUTH ONCE DAILY    omeprazole (PRILOSEC) 20 MG capsule TAKE 2 CAPSULES BY MOUTH EVERY DAY FOR ACID REFLUX OR STOMACH    potassium chloride SA (K-DUR,KLOR-CON) 20 MEQ tablet Take 1 tablet (20 mEq total) by mouth once daily.    temazepam (RESTORIL) 30 mg capsule TAKE 1 CAPSULE BY MOUTH EVERY DAY AT BEDTIME AS NEEDED FOR INSOMNIA    [DISCONTINUED] hydrocodone-acetaminophen 5-325mg (NORCO) 5-325 mg per tablet     [DISCONTINUED] carvedilol (COREG) 12.5 MG tablet Take 1 tablet (12.5 mg total) by mouth 2 (two) times daily.    [DISCONTINUED] DULoxetine (CYMBALTA) 60 MG capsule Take 1 capsule (60 mg total) by mouth once daily.    [DISCONTINUED] meclizine (ANTIVERT) 25 mg tablet Take 1 tablet (25 mg total) by mouth 3 (three) times daily as needed.    [DISCONTINUED] meloxicam (MOBIC) 7.5 MG tablet Take 1 tablet (7.5 mg total) by mouth daily as needed for Pain.    [DISCONTINUED] traMADol (ULTRAM) 50 mg tablet      Family History     Problem Relation (Age of Onset)    No Known Problems Mother, Father, Sister, Brother, Maternal Aunt, Maternal Uncle, Paternal Aunt, Paternal Uncle, Maternal Grandmother, Maternal Grandfather, Paternal Grandmother, Paternal Grandfather        Social History Main Topics    Smoking status: Never Smoker    Smokeless tobacco: Never Used    Alcohol use No    Drug use: No    Sexual activity: No     Review of Systems   Constitutional: Negative for activity change, appetite change, chills, diaphoresis, fatigue, fever and unexpected weight change.   HENT: Negative.    Eyes: Negative.    Respiratory: Positive for shortness of breath. Negative for cough, chest tightness and wheezing.    Cardiovascular: Positive for chest pain and palpitations. Negative for leg  swelling.   Gastrointestinal: Negative for abdominal distention, abdominal pain, blood in stool, constipation, diarrhea, nausea and vomiting.   Genitourinary: Negative for dysuria and hematuria.   Musculoskeletal: Negative.    Skin: Negative.    Neurological: Positive for dizziness and light-headedness. Negative for seizures, syncope and weakness.   Psychiatric/Behavioral: Negative.      Objective:     Vital Signs (Most Recent):  Temp: 98.3 °F (36.8 °C) (12/28/17 1703)  Pulse: 94 (12/28/17 2019)  Resp: 18 (12/28/17 2019)  BP: (!) 186/80 (12/28/17 2021)  SpO2: 98 % (12/28/17 2019) Vital Signs (24h Range):  Temp:  [97.9 °F (36.6 °C)-98.3 °F (36.8 °C)] 98.3 °F (36.8 °C)  Pulse:  [] 94  Resp:  [18] 18  SpO2:  [97 %-100 %] 98 %  BP: (147-193)/(72-94) 186/80     Weight: 76.7 kg (169 lb)  Body mass index is 29.01 kg/m².    Physical Exam   Constitutional: She is oriented to person, place, and time. She appears well-developed and well-nourished. No distress.   HENT:   Head: Normocephalic and atraumatic.   Right Ear: External ear normal.   Left Ear: External ear normal.   Nose: Nose normal.   Eyes: Right eye exhibits no discharge. Left eye exhibits no discharge.   Neck: Normal range of motion.   Cardiovascular: Normal rate, regular rhythm, normal heart sounds and intact distal pulses.  Exam reveals no gallop and no friction rub.    No murmur heard.  Pulmonary/Chest: Effort normal and breath sounds normal. No respiratory distress. She has no wheezes. She has no rales. She exhibits no tenderness.   Abdominal: Soft. Bowel sounds are normal. She exhibits no distension. There is no tenderness. There is no rebound and no guarding.   Musculoskeletal: Normal range of motion. She exhibits no edema.   Neurological: She is alert and oriented to person, place, and time.   Skin: Skin is warm and dry. She is not diaphoretic. No erythema.   Psychiatric: She has a normal mood and affect. Her behavior is normal. Judgment and thought  content normal.   Nursing note and vitals reviewed.          Significant Labs: All pertinent labs within the past 24 hours have been reviewed.    Significant Imaging: I have reviewed and interpreted all pertinent imaging results/findings within the past 24 hours.    Assessment/Plan:     * NSTEMI (non-ST elevated myocardial infarction)    Her first set of troponin was 0.101 but the second set was significantly higher at 1.701.  She does have a baseline troponinemia but this is much higher than her baseline.  I have reviewed the EKG and it reveals normal sinus rhythm without evidence of acute ischemia.  She has been started on treatment dose enoxaparin and clopidogrel.  Will also start a beta-blocker and aspirin.  Will obtain a 2D-echo in the morning and consult Cardiology for further recommendations.  Will admit as inpatient to the Telemetry Unit.        Essential hypertension    Patient's blood pressure is poorly-controlled; will continue home regimen of furosemide and losartan, and provide as-needed clonidine.        Type 2 diabetes mellitus, controlled    Well-controlled currently on no medications at this time; will provide as-needed insulin sliding scale.        Chronic diastolic heart failure    Stable without evidence of acute heart failure; will continue to monitor.        CKD Stage 3    Her renal function appears to be at her baseline; will continue to monitor her urine output.        Pulmonary hypertension    Stable; there are no acute issues.        GERD (gastroesophageal reflux disease)    Will substitute her home regimen of omeprazole for pantoprazole while she is inpatient.        Depression    Stable; will continue her home regimen of duloxetine.          VTE Risk Mitigation     None             Total time spent on case: 45 minutes.        Rudy Goyal M.D.  Staff Nocturnist  Department of Hospital Medicine  [facility]  Pager: [phone]

## 2017-12-29 NOTE — ASSESSMENT & PLAN NOTE
Her first set of troponin was 0.101 but the second set was significantly higher at 1.701.  She does have a baseline troponinemia but this is much higher than her baseline.  I have reviewed the EKG and it reveals normal sinus rhythm without evidence of acute ischemia.  She has been started on treatment dose enoxaparin and clopidogrel.  Will also start a beta-blocker and aspirin.  Will obtain a 2D-echo in the morning and consult Cardiology for further recommendations.  Will admit as inpatient to the Telemetry Unit.

## 2017-12-29 NOTE — NURSING
Resting quietly in bed. Able to make needs known. Introductions complete.  Room orientation provided.  Verbalized understanding.  Denies pain at this time.  Encouraged to call with PRN assist. Call bell within reach.  Will continue to monitor.

## 2017-12-29 NOTE — ASSESSMENT & PLAN NOTE
Patient's blood pressure is poorly-controlled; will continue home regimen of furosemide and losartan, and provide as-needed clonidine.

## 2017-12-29 NOTE — PLAN OF CARE
"TN completed discharge needs assessment. TN provided and reviewed with patient "Blue My Health Packet" , "Help At Home" and "Discharge Planning Begins on Admission" handouts. TN discussed with patient the things the patient is responsible for to manage patient's  healthcare at home. Patient verbalized understanding & teachback implemented. Patient prefers pms doctor appointments.  Transportation by family Jas holbrook.     12/29/17 0952   Discharge Assessment   Assessment Type Discharge Planning Assessment   Confirmed/corrected address and phone number on facesheet? Yes   Assessment information obtained from? Patient   Communicated expected length of stay with patient/caregiver no   Prior to hospitilization cognitive status: Alert/Oriented;No Deficits   Prior to hospitalization functional status: Assistive Equipment;Independent   Current cognitive status: Alert/Oriented;No Deficits   Current Functional Status: Independent;Assistive Equipment   Lives With alone   Able to Return to Prior Arrangements yes   Is patient able to care for self after discharge? Yes   Who are your caregiver(s) and their phone number(s)? (chandler Pandey [phone] & Janeen Patrick [phone])   Patient's perception of discharge disposition admitted as an inpatient   Readmission Within The Last 30 Days no previous admission in last 30 days   Patient currently being followed by outpatient case management? No   Patient currently receives any other outside agency services? No   Equipment Currently Used at Home cane, straight;shower chair   Do you have any problems affording any of your prescribed medications? No   Is the patient taking medications as prescribed? yes   Does the patient have transportation home? Yes   Transportation Available family or friend will provide   Does the patient receive services at the [facility]? No   Discharge Plan A Home   Discharge Plan B Home;Home Health   Patient/Family In Agreement With " Plan yes   Readmission Questionnaire   Have you felt down, depressed, or hopeless? 1     [facility] - JOHNNA CHAMPION - [address] [place], [place] [address]P[place]OPE AT [facility] & [address] [place] [address]  Phone: [phone] Fax: [phone]    [facility] - [place], [place] - [address]  [address]  [facility] [address]  Phone: [phone] Fax: [phone]

## 2017-12-29 NOTE — ASSESSMENT & PLAN NOTE
Her first set of troponin was 0.101 but the second set was significantly higher at 1.701.  She does have a baseline troponinemia but this is much higher than her baseline.  I have reviewed the EKG and it reveals normal sinus rhythm without evidence of acute ischemia.  She has been started on treatment dose enoxaparin and clopidogrel.  Will also start a beta-blocker and aspirin.  2D-echo in the morning and consult Cardiology for further recommendations.  Will admit as inpatient to the Telemetry Unit.will have [facility] today.

## 2017-12-29 NOTE — NURSING
Patient to scheduled C procedure as ordered. Patient awake, alert, oriented without c/o discomfort at this time. No apparent distress noted.

## 2017-12-29 NOTE — PROGRESS NOTES
Discussed in detail with the patient.  Troponin is significant for non-ST elevation MI.  Echocardiogram shows no significant change in LV function with mildly reduced at low normal function.  She's currently chest pain-free.  We discussed in detail options in regards to conservative medical therapy with advanced age versus invasive angiography.  She's agreeable to proceed with cardiac catheterization.    **Risks/benefits of the procedure were d/w the patient including bleeding, infection, death, mi, arrhythmia, kidney failure, stroke, etc.  Patient understands and consent was placed on the chart.

## 2017-12-29 NOTE — ASSESSMENT & PLAN NOTE
Load asa/plavix  Add bb etc  Echo shows low NL EF  Likely conservative med tx with advanced age but willd/w patient again in am

## 2017-12-29 NOTE — HOSPITAL COURSE
Mrs. Magaly Nunes is a 85 y.o. female with essential hypertension, type 2 diabetes mellitus (HbA1c 5.9% Nov 2017), chronic diastolic heart failure (LVEF 45-50% Jan 2017), CKD Stage 3, pulmonary hypertension, GERD, and depression who presents to [facility] ED with complaints of chest pain for one day.  She had actually presented to the ED today after months of bilateral knee and back pain that, although it hasn't gotten any worse, it hasn't improved.  Yesterday, when she felt the chest pain, she decided to come to the ED.  The pain started at rest, was substernal with occasional radiation to the left chest, was sharp in quality, and was 6/10 in severity.  The pain was associated with some dyspnea and palpitations, but not any diaphoresis, nausea, vomiting, fevers, chills, coughing, hemoptysis, nor any lower extremity pain or swelling.  There were no alleviating or exacerbating factors and the pain resolved without intervention after about 10 minutes.  She does not have a personal or family history of heart disease but does say she was admitted in Feb 2017 for fluid problems.Patient was monitored n Telemetry with  serial cardiac marks,she was on ACS medication,cardiology was consulted and patient had    LHC,which showed , small distal diagonal vessel with nonobstructive disease otherwise.  The vessel was about 2 mm in diameter and will be medically managed,she has been observed and her chest pain resolved,she has been discharged home with ACS medication and follow with PCP and cardiology in next few days.

## 2017-12-29 NOTE — HPI
85 y.o female who has a past medical history of DM, Vertigo, Depression, Anxiety, HTN, DVT, Spinal stenosis, Hypokalemia, CHF, CKD stage 3, and Arthritis presents to the ED for an evaluation of 3 week h/o shortness of breath with associated slight cough.  Patient also reports of a 3 week h/o increased generalized arthralgias.  Patient also reports waking in the am every morning for the past few weeks with dizziness described as feeling as though she is going to pass out.  She reports the dizziness last approximately 1-1.5 hours.  Patient denies fever, chills, nausea, emesis, diarrhea, abdominal pain, chest pain, or any other associated symptoms.  No alleviating factors.  Patient reports receiving injections to her knees in the past by her orthopedist.   Patient reports being prescribed hydrocodone in the past, but reports of no relief.    Known to me from clinic.  Presented with cp now resolved.  Previously known mild LV dysfunction but wanted conservative therapy due to advanced age.  D/w Dr. Mendoza in detail, was planned for ED r/o ACS but 2nd trop set c/w NSTEMI.

## 2017-12-29 NOTE — ASSESSMENT & PLAN NOTE
Well-controlled currently on no medications at this time; will provide as-needed insulin sliding scale.

## 2017-12-29 NOTE — PROGRESS NOTES
[facility] Hospital Medicine  Progress Note    Patient Name: Magaly Nunes  MRN: [id number]  Patient Class: IP- Inpatient   Admission Date: 12/28/2017  Length of Stay: 1 days  Attending Physician: Amarjit Nunez MD  Primary Care Provider: Chris Bangura MD        Subjective:     Principal Problem:NSTEMI (non-ST elevated myocardial infarction)    HPI:  Mrs. Magaly Nunes is a 85 y.o. female with essential hypertension, type 2 diabetes mellitus (HbA1c 5.9% Nov 2017), chronic diastolic heart failure (LVEF 45-50% Jan 2017), CKD Stage 3, pulmonary hypertension, GERD, and depression who presents to [facility] ED with complaints of chest pain for one day.  She had actually presented to the ED today after months of bilateral knee and back pain that, although it hasn't gotten any worse, it hasn't improved.  Yesterday, when she felt the chest pain, she decided to come to the ED.  The pain started at rest, was substernal with occasional radiation to the left chest, was sharp in quality, and was 6/10 in severity.  The pain was associated with some dyspnea and palpitations, but not any diaphoresis, nausea, vomiting, fevers, chills, coughing, hemoptysis, nor any lower extremity pain or swelling.  There were no alleviating or exacerbating factors and the pain resolved without intervention after about 10 minutes.  She does not have a personal or family history of heart disease but does say she was admitted in Feb 2017 for fluid problems.    Hospital Course:  Mrs. Magaly Nunes is a 85 y.o. female with essential hypertension, type 2 diabetes mellitus (HbA1c 5.9% Nov 2017), chronic diastolic heart failure (LVEF 45-50% Jan 2017), CKD Stage 3, pulmonary hypertension, GERD, and depression who presents to [facility] ED with complaints of chest pain for one day.  She had actually presented to the ED today after months of bilateral knee and back pain that, although it hasn't gotten any worse, it hasn't  improved.  Yesterday, when she felt the chest pain, she decided to come to the ED.  The pain started at rest, was substernal with occasional radiation to the left chest, was sharp in quality, and was 6/10 in severity.  The pain was associated with some dyspnea and palpitations, but not any diaphoresis, nausea, vomiting, fevers, chills, coughing, hemoptysis, nor any lower extremity pain or swelling.  There were no alleviating or exacerbating factors and the pain resolved without intervention after about 10 minutes.  She does not have a personal or family history of heart disease but does say she was admitted in Feb 2017 for fluid problems.  Patient will have [facility] today.    Past Medical History:   Diagnosis Date    Anxiety disorder     Arthritis 12/28/2017    Carpal tunnel syndrome     CHF (congestive heart failure)     Chronic allergic rhinitis     Chronic pain 10/22/2012    CKD stage 3 due to type 2 diabetes mellitus 2/14/2017    Constipation - functional 4/10/2013    Depression     Diabetes mellitus type II     Hot flash not due to menopause     HTN (hypertension)     Hypokalemia 11/19/2012    Insomnia 4/10/2013    Knee pain, bilateral 7/24/2013    Personal history of DVT (deep vein thrombosis)     Renovascular hypertension 2/14/2017    Severe tricuspid regurgitation 2/14/2017    Spinal stenosis     Dr. Corrales    Spinal stenosis of lumbar region 2/3/2014    Vertigo        Past Surgical History:   Procedure Laterality Date    CATARACT EXTRACTION Bilateral     EYE SURGERY      FRACTURE SURGERY      HYSTERECTOMY      ORIF RADIUS & ULNA FRACTURES         Review of patient's allergies indicates:   Allergen Reactions    Ace inhibitors      Other reaction(s): Unknown    Aciphex  [rabeprazole]      Other reaction(s): Stomach upset    Aspirin      Other reaction(s): Unknown    Bextra  [valdecoxib]      Other reaction(s): Unknown    Cardizem  [diltiazem hcl]      Other reaction(s): Unknown     Clonidine      Other reaction(s): dry mouth.lip swelling    Mavik  [trandolapril]      Other reaction(s): Unknown    Nsaids (non-steroidal anti-inflammatory drug)      Other reaction(s): black spots on skin    Phenytoin sodium extended      Other reaction(s): Stomach upset    Tramadol      Other reaction(s): stomach pain       No current facility-administered medications on file prior to encounter.      Current Outpatient Prescriptions on File Prior to Encounter   Medication Sig    DULoxetine (CYMBALTA) 60 MG capsule TAKE 1 CAPSULE(60 MG) BY MOUTH EVERY DAY    furosemide (LASIX) 40 MG tablet Take 1 tablet (40 mg total) by mouth once daily.    LORazepam (ATIVAN) 0.5 MG tablet Take 1 tablet (0.5 mg total) by mouth every 8 (eight) hours as needed for Anxiety. (caution-may cause sleepiness)    losartan (COZAAR) 100 MG tablet TAKE ONE TABLET BY MOUTH ONCE DAILY    omeprazole (PRILOSEC) 20 MG capsule TAKE 2 CAPSULES BY MOUTH EVERY DAY FOR ACID REFLUX OR STOMACH    potassium chloride SA (K-DUR,KLOR-CON) 20 MEQ tablet Take 1 tablet (20 mEq total) by mouth once daily.    temazepam (RESTORIL) 30 mg capsule TAKE 1 CAPSULE BY MOUTH EVERY DAY AT BEDTIME AS NEEDED FOR INSOMNIA     Family History     Problem Relation (Age of Onset)    No Known Problems Mother, Father, Sister, Brother, Maternal Aunt, Maternal Uncle, Paternal Aunt, Paternal Uncle, Maternal Grandmother, Maternal Grandfather, Paternal Grandmother, Paternal Grandfather        Social History Main Topics    Smoking status: Never Smoker    Smokeless tobacco: Never Used    Alcohol use No    Drug use: No    Sexual activity: No     Review of Systems   Constitutional: Negative for activity change, appetite change, chills, diaphoresis, fatigue, fever and unexpected weight change.   HENT: Negative.    Eyes: Negative.    Respiratory: Negative for cough, chest tightness, shortness of breath and wheezing.    Cardiovascular: Negative for chest pain, palpitations and  leg swelling.   Gastrointestinal: Negative for abdominal distention, abdominal pain, blood in stool, constipation, diarrhea, nausea and vomiting.   Genitourinary: Negative for dysuria and hematuria.   Musculoskeletal: Negative.    Skin: Negative.    Neurological: Negative for dizziness, seizures, syncope, weakness and light-headedness.   Psychiatric/Behavioral: Negative.      Objective:     Vital Signs (Most Recent):  Temp: 97.7 °F (36.5 °C) (12/29/17 0712)  Pulse: 75 (12/29/17 0712)  Resp: 18 (12/29/17 0712)  BP: 125/60 (12/29/17 0712)  SpO2: 100 % (12/29/17 0712) Vital Signs (24h Range):  Temp:  [97.7 °F (36.5 °C)-98.5 °F (36.9 °C)] 97.7 °F (36.5 °C)  Pulse:  [] 75  Resp:  [18] 18  SpO2:  [97 %-100 %] 100 %  BP: (125-193)/(60-94) 125/60     Weight: 75 kg (165 lb 5.5 oz)  Body mass index is 28.38 kg/m².    Physical Exam   Constitutional: She is oriented to person, place, and time. She appears well-developed and well-nourished. No distress.   HENT:   Head: Normocephalic and atraumatic.   Right Ear: External ear normal.   Left Ear: External ear normal.   Nose: Nose normal.   Eyes: Right eye exhibits no discharge. Left eye exhibits no discharge.   Neck: Normal range of motion.   Cardiovascular: Normal rate, regular rhythm, normal heart sounds and intact distal pulses.  Exam reveals no gallop and no friction rub.    No murmur heard.  Pulmonary/Chest: Effort normal and breath sounds normal. No respiratory distress. She has no wheezes. She has no rales. She exhibits no tenderness.   Abdominal: Soft. Bowel sounds are normal. She exhibits no distension. There is no tenderness. There is no rebound and no guarding.   Musculoskeletal: Normal range of motion. She exhibits no edema.   Neurological: She is alert and oriented to person, place, and time.   Skin: Skin is warm and dry. She is not diaphoretic. No erythema.   Psychiatric: She has a normal mood and affect. Her behavior is normal. Judgment and thought content  normal.   Nursing note and vitals reviewed.          Significant Labs: All pertinent labs within the past 24 hours have been reviewed.    Significant Imaging: I have reviewed and interpreted all pertinent imaging results/findings within the past 24 hours.    Assessment/Plan:      * NSTEMI (non-ST elevated myocardial infarction)    Her first set of troponin was 0.101 but the second set was significantly higher at 1.701.  She does have a baseline troponinemia but this is much higher than her baseline.  I have reviewed the EKG and it reveals normal sinus rhythm without evidence of acute ischemia.  She has been started on treatment dose enoxaparin and clopidogrel.  Will also start a beta-blocker and aspirin.  2D-echo in the morning and consult Cardiology for further recommendations.  Will admit as inpatient to the Telemetry Unit.will have [facility] today.        Type 2 diabetes mellitus, controlled    Well-controlled currently on no medications at this time; will provide as-needed insulin sliding scale.        GERD (gastroesophageal reflux disease)    Will substitute her home regimen of omeprazole for pantoprazole while she is inpatient.        CKD Stage 3    Her renal function appears to be at her baseline; will continue to monitor her urine output.        Chronic diastolic heart failure    Stable without evidence of acute heart failure; will continue to monitor.        Pulmonary hypertension    Stable; there are no acute issues.        Essential hypertension    Patient's blood pressure is poorly-controlled; will continue home regimen of furosemide and losartan, and provide as-needed clonidine.        Depression    Stable; will continue her home regimen of duloxetine.          VTE Risk Mitigation         Ordered     Medium Risk of VTE  Once      12/28/17 2220              Amarjit Nunez MD  Department of Hospital Medicine   [facility]

## 2017-12-29 NOTE — SUBJECTIVE & OBJECTIVE
Past Medical History:   Diagnosis Date    Anxiety disorder     Arthritis 12/28/2017    Carpal tunnel syndrome     CHF (congestive heart failure)     Chronic allergic rhinitis     Chronic pain 10/22/2012    CKD stage 3 due to type 2 diabetes mellitus 2/14/2017    Constipation - functional 4/10/2013    Depression     Diabetes mellitus type II     Hot flash not due to menopause     HTN (hypertension)     Hypokalemia 11/19/2012    Insomnia 4/10/2013    Knee pain, bilateral 7/24/2013    Personal history of DVT (deep vein thrombosis)     Renovascular hypertension 2/14/2017    Severe tricuspid regurgitation 2/14/2017    Spinal stenosis     Dr. Corrales    Spinal stenosis of lumbar region 2/3/2014    Vertigo        Past Surgical History:   Procedure Laterality Date    CATARACT EXTRACTION Bilateral     EYE SURGERY      FRACTURE SURGERY      HYSTERECTOMY      ORIF RADIUS & ULNA FRACTURES         Review of patient's allergies indicates:   Allergen Reactions    Ace inhibitors      Other reaction(s): Unknown    Aciphex  [rabeprazole]      Other reaction(s): Stomach upset    Aspirin      Other reaction(s): Unknown    Bextra  [valdecoxib]      Other reaction(s): Unknown    Cardizem  [diltiazem hcl]      Other reaction(s): Unknown    Clonidine      Other reaction(s): dry mouth.lip swelling    Mavik  [trandolapril]      Other reaction(s): Unknown    Nsaids (non-steroidal anti-inflammatory drug)      Other reaction(s): black spots on skin    Phenytoin sodium extended      Other reaction(s): Stomach upset    Tramadol      Other reaction(s): stomach pain       No current facility-administered medications on file prior to encounter.      Current Outpatient Prescriptions on File Prior to Encounter   Medication Sig    DULoxetine (CYMBALTA) 60 MG capsule TAKE 1 CAPSULE(60 MG) BY MOUTH EVERY DAY    furosemide (LASIX) 40 MG tablet Take 1 tablet (40 mg total) by mouth once daily.    LORazepam (ATIVAN) 0.5  MG tablet Take 1 tablet (0.5 mg total) by mouth every 8 (eight) hours as needed for Anxiety. (caution-may cause sleepiness)    losartan (COZAAR) 100 MG tablet TAKE ONE TABLET BY MOUTH ONCE DAILY    omeprazole (PRILOSEC) 20 MG capsule TAKE 2 CAPSULES BY MOUTH EVERY DAY FOR ACID REFLUX OR STOMACH    potassium chloride SA (K-DUR,KLOR-CON) 20 MEQ tablet Take 1 tablet (20 mEq total) by mouth once daily.    temazepam (RESTORIL) 30 mg capsule TAKE 1 CAPSULE BY MOUTH EVERY DAY AT BEDTIME AS NEEDED FOR INSOMNIA     Family History     Problem Relation (Age of Onset)    No Known Problems Mother, Father, Sister, Brother, Maternal Aunt, Maternal Uncle, Paternal Aunt, Paternal Uncle, Maternal Grandmother, Maternal Grandfather, Paternal Grandmother, Paternal Grandfather        Social History Main Topics    Smoking status: Never Smoker    Smokeless tobacco: Never Used    Alcohol use No    Drug use: No    Sexual activity: No     Review of Systems   Constitutional: Negative for activity change, appetite change, chills, diaphoresis, fatigue, fever and unexpected weight change.   HENT: Negative.    Eyes: Negative.    Respiratory: Negative for cough, chest tightness, shortness of breath and wheezing.    Cardiovascular: Negative for chest pain, palpitations and leg swelling.   Gastrointestinal: Negative for abdominal distention, abdominal pain, blood in stool, constipation, diarrhea, nausea and vomiting.   Genitourinary: Negative for dysuria and hematuria.   Musculoskeletal: Negative.    Skin: Negative.    Neurological: Negative for dizziness, seizures, syncope, weakness and light-headedness.   Psychiatric/Behavioral: Negative.      Objective:     Vital Signs (Most Recent):  Temp: 97.7 °F (36.5 °C) (12/29/17 0712)  Pulse: 75 (12/29/17 0712)  Resp: 18 (12/29/17 0712)  BP: 125/60 (12/29/17 0712)  SpO2: 100 % (12/29/17 0712) Vital Signs (24h Range):  Temp:  [97.7 °F (36.5 °C)-98.5 °F (36.9 °C)] 97.7 °F (36.5 °C)  Pulse:  []  75  Resp:  [18] 18  SpO2:  [97 %-100 %] 100 %  BP: (125-193)/(60-94) 125/60     Weight: 75 kg (165 lb 5.5 oz)  Body mass index is 28.38 kg/m².    Physical Exam   Constitutional: She is oriented to person, place, and time. She appears well-developed and well-nourished. No distress.   HENT:   Head: Normocephalic and atraumatic.   Right Ear: External ear normal.   Left Ear: External ear normal.   Nose: Nose normal.   Eyes: Right eye exhibits no discharge. Left eye exhibits no discharge.   Neck: Normal range of motion.   Cardiovascular: Normal rate, regular rhythm, normal heart sounds and intact distal pulses.  Exam reveals no gallop and no friction rub.    No murmur heard.  Pulmonary/Chest: Effort normal and breath sounds normal. No respiratory distress. She has no wheezes. She has no rales. She exhibits no tenderness.   Abdominal: Soft. Bowel sounds are normal. She exhibits no distension. There is no tenderness. There is no rebound and no guarding.   Musculoskeletal: Normal range of motion. She exhibits no edema.   Neurological: She is alert and oriented to person, place, and time.   Skin: Skin is warm and dry. She is not diaphoretic. No erythema.   Psychiatric: She has a normal mood and affect. Her behavior is normal. Judgment and thought content normal.   Nursing note and vitals reviewed.          Significant Labs: All pertinent labs within the past 24 hours have been reviewed.    Significant Imaging: I have reviewed and interpreted all pertinent imaging results/findings within the past 24 hours.

## 2017-12-29 NOTE — SUBJECTIVE & OBJECTIVE
Past Medical History:   Diagnosis Date    Anxiety disorder     Arthritis 12/28/2017    Carpal tunnel syndrome     CHF (congestive heart failure)     Chronic allergic rhinitis     Chronic pain 10/22/2012    CKD stage 3 due to type 2 diabetes mellitus 2/14/2017    Constipation - functional 4/10/2013    Depression     Diabetes mellitus type II     Hot flash not due to menopause     HTN (hypertension)     Hypokalemia 11/19/2012    Insomnia 4/10/2013    Knee pain, bilateral 7/24/2013    Personal history of DVT (deep vein thrombosis)     Renovascular hypertension 2/14/2017    Severe tricuspid regurgitation 2/14/2017    Spinal stenosis     Dr. Corrales    Spinal stenosis of lumbar region 2/3/2014    Vertigo        Past Surgical History:   Procedure Laterality Date    CATARACT EXTRACTION Bilateral     EYE SURGERY      FRACTURE SURGERY      HYSTERECTOMY      ORIF RADIUS & ULNA FRACTURES         Review of patient's allergies indicates:   Allergen Reactions    Ace inhibitors      Other reaction(s): Unknown    Aciphex  [rabeprazole]      Other reaction(s): Stomach upset    Aspirin      Other reaction(s): Unknown    Bextra  [valdecoxib]      Other reaction(s): Unknown    Cardizem  [diltiazem hcl]      Other reaction(s): Unknown    Clonidine      Other reaction(s): dry mouth.lip swelling    Mavik  [trandolapril]      Other reaction(s): Unknown    Nsaids (non-steroidal anti-inflammatory drug)      Other reaction(s): black spots on skin    Phenytoin sodium extended      Other reaction(s): Stomach upset    Tramadol      Other reaction(s): stomach pain       No current facility-administered medications on file prior to encounter.      Current Outpatient Prescriptions on File Prior to Encounter   Medication Sig    DULoxetine (CYMBALTA) 60 MG capsule TAKE 1 CAPSULE(60 MG) BY MOUTH EVERY DAY    furosemide (LASIX) 40 MG tablet Take 1 tablet (40 mg total) by mouth once daily.    LORazepam (ATIVAN) 0.5  MG tablet Take 1 tablet (0.5 mg total) by mouth every 8 (eight) hours as needed for Anxiety. (caution-may cause sleepiness)    losartan (COZAAR) 100 MG tablet TAKE ONE TABLET BY MOUTH ONCE DAILY    omeprazole (PRILOSEC) 20 MG capsule TAKE 2 CAPSULES BY MOUTH EVERY DAY FOR ACID REFLUX OR STOMACH    potassium chloride SA (K-DUR,KLOR-CON) 20 MEQ tablet Take 1 tablet (20 mEq total) by mouth once daily.    temazepam (RESTORIL) 30 mg capsule TAKE 1 CAPSULE BY MOUTH EVERY DAY AT BEDTIME AS NEEDED FOR INSOMNIA    [DISCONTINUED] hydrocodone-acetaminophen 5-325mg (NORCO) 5-325 mg per tablet     [DISCONTINUED] carvedilol (COREG) 12.5 MG tablet Take 1 tablet (12.5 mg total) by mouth 2 (two) times daily.    [DISCONTINUED] DULoxetine (CYMBALTA) 60 MG capsule Take 1 capsule (60 mg total) by mouth once daily.    [DISCONTINUED] meclizine (ANTIVERT) 25 mg tablet Take 1 tablet (25 mg total) by mouth 3 (three) times daily as needed.    [DISCONTINUED] meloxicam (MOBIC) 7.5 MG tablet Take 1 tablet (7.5 mg total) by mouth daily as needed for Pain.    [DISCONTINUED] traMADol (ULTRAM) 50 mg tablet      Family History     Problem Relation (Age of Onset)    No Known Problems Mother, Father, Sister, Brother, Maternal Aunt, Maternal Uncle, Paternal Aunt, Paternal Uncle, Maternal Grandmother, Maternal Grandfather, Paternal Grandmother, Paternal Grandfather        Social History Main Topics    Smoking status: Never Smoker    Smokeless tobacco: Never Used    Alcohol use No    Drug use: No    Sexual activity: No     Review of Systems   Constitution: Negative.   HENT: Negative.    Eyes: Negative.    Cardiovascular: Positive for chest pain. Negative for dyspnea on exertion, irregular heartbeat, leg swelling, near-syncope, orthopnea, palpitations, paroxysmal nocturnal dyspnea and syncope.   Respiratory: Negative for shortness of breath.    Skin: Negative.    Musculoskeletal: Negative.    Gastrointestinal: Negative for abdominal pain,  constipation and diarrhea.   Genitourinary: Negative for dysuria.   Neurological: Negative.    Psychiatric/Behavioral: Negative.      Objective:     Vital Signs (Most Recent):  Temp: 98.3 °F (36.8 °C) (12/28/17 1703)  Pulse: 94 (12/28/17 2019)  Resp: 18 (12/28/17 2019)  BP: (!) 186/80 (12/28/17 2021)  SpO2: 98 % (12/28/17 2019) Vital Signs (24h Range):  Temp:  [97.9 °F (36.6 °C)-98.3 °F (36.8 °C)] 98.3 °F (36.8 °C)  Pulse:  [] 94  Resp:  [18] 18  SpO2:  [97 %-100 %] 98 %  BP: (147-193)/(72-94) 186/80     Weight: 76.7 kg (169 lb)  Body mass index is 29.01 kg/m².    SpO2: 98 %  O2 Device (Oxygen Therapy): room air    No intake or output data in the 24 hours ending 12/28/17 2120    Lines/Drains/Airways          No matching active lines, drains, or airways          Physical Exam   Constitutional: She is oriented to person, place, and time. She appears well-developed and well-nourished.   HENT:   Head: Normocephalic and atraumatic.   Eyes: Conjunctivae and EOM are normal. Pupils are equal, round, and reactive to light.   Neck: Normal range of motion. Neck supple. No thyromegaly present.   Cardiovascular: Normal rate and regular rhythm.    No murmur heard.  Pulmonary/Chest: Effort normal and breath sounds normal. No respiratory distress.   Abdominal: Soft. Bowel sounds are normal.   Musculoskeletal: She exhibits no edema.   Neurological: She is alert and oriented to person, place, and time.   Skin: Skin is warm and dry.   Psychiatric: She has a normal mood and affect. Her behavior is normal.       Significant Labs:   CMP   Recent Labs  Lab 12/28/17  1321      K 3.9      CO2 27      BUN 15   CREATININE 0.9   CALCIUM 10.2   PROT 8.0   ALBUMIN 4.1   BILITOT 0.5   ALKPHOS 103   AST 32   ALT 23   ANIONGAP 10   ESTGFRAFRICA >60   EGFRNONAA 58*   , CBC   Recent Labs  Lab 12/28/17  1321   WBC 3.73*   HGB 13.2   HCT 41.8      , INR   Recent Labs  Lab 12/28/17  1321   INR 1.0   , Lipid Panel No  results for input(s): CHOL, HDL, LDLCALC, TRIG, CHOLHDL in the last 48 hours. and Troponin   Recent Labs  Lab 12/28/17  1321 12/28/17  1620   TROPONINI 0.101* 1.701*       Significant Imaging: Echocardiogram:   2D echo with color flow doppler:   Results for orders placed or performed during the hospital encounter of 12/28/17   2D echo with color flow doppler   Result Value Ref Range    EF 50 55 - 65    Mitral Valve Regurgitation MILD TO MODERATE     Diastolic Dysfunction Yes (A)     Est. PA Systolic Pressure 67.32 (A)     Pericardial Effusion NONE     Tricuspid Valve Regurgitation MODERATE TO SEVERE (A)

## 2017-12-30 VITALS
TEMPERATURE: 98 F | OXYGEN SATURATION: 97 % | RESPIRATION RATE: 18 BRPM | HEART RATE: 97 BPM | BODY MASS INDEX: 29.02 KG/M2 | WEIGHT: 170 LBS | SYSTOLIC BLOOD PRESSURE: 125 MMHG | HEIGHT: 64 IN | DIASTOLIC BLOOD PRESSURE: 54 MMHG

## 2017-12-30 LAB
POCT GLUCOSE: 109 MG/DL (ref 70–110)
POCT GLUCOSE: 114 MG/DL (ref 70–110)

## 2017-12-30 PROCEDURE — 25000003 PHARM REV CODE 250: Performed by: INTERNAL MEDICINE

## 2017-12-30 PROCEDURE — 25000003 PHARM REV CODE 250: Performed by: EMERGENCY MEDICINE

## 2017-12-30 PROCEDURE — 93010 ELECTROCARDIOGRAM REPORT: CPT | Mod: ,,, | Performed by: INTERNAL MEDICINE

## 2017-12-30 PROCEDURE — 93005 ELECTROCARDIOGRAM TRACING: CPT

## 2017-12-30 RX ORDER — CARVEDILOL 6.25 MG/1
6.25 TABLET ORAL 2 TIMES DAILY
Qty: 60 TABLET | Refills: 0 | Status: ON HOLD | OUTPATIENT
Start: 2017-12-30 | End: 2019-03-22 | Stop reason: HOSPADM

## 2017-12-30 RX ORDER — NAPROXEN SODIUM 220 MG/1
81 TABLET, FILM COATED ORAL DAILY
Refills: 0
Start: 2017-12-30 | End: 2019-03-19

## 2017-12-30 RX ORDER — ATORVASTATIN CALCIUM 20 MG/1
20 TABLET, FILM COATED ORAL DAILY
Qty: 30 TABLET | Refills: 0 | Status: SHIPPED | OUTPATIENT
Start: 2017-12-30 | End: 2018-02-05 | Stop reason: SDUPTHER

## 2017-12-30 RX ORDER — CLOPIDOGREL BISULFATE 75 MG/1
75 TABLET ORAL DAILY
Qty: 30 TABLET | Refills: 0 | Status: SHIPPED | OUTPATIENT
Start: 2017-12-30 | End: 2018-02-05 | Stop reason: SDUPTHER

## 2017-12-30 RX ADMIN — HYDROCODONE BITARTRATE AND ACETAMINOPHEN 1 TABLET: 5; 325 TABLET ORAL at 03:12

## 2017-12-30 RX ADMIN — CARVEDILOL 6.25 MG: 6.25 TABLET, FILM COATED ORAL at 08:12

## 2017-12-30 RX ADMIN — FUROSEMIDE 40 MG: 40 TABLET ORAL at 08:12

## 2017-12-30 RX ADMIN — LOSARTAN POTASSIUM 100 MG: 25 TABLET, FILM COATED ORAL at 08:12

## 2017-12-30 RX ADMIN — PANTOPRAZOLE SODIUM 40 MG: 40 TABLET, DELAYED RELEASE ORAL at 08:12

## 2017-12-30 RX ADMIN — ASPIRIN 81 MG 81 MG: 81 TABLET ORAL at 08:12

## 2017-12-30 RX ADMIN — ATORVASTATIN CALCIUM 80 MG: 40 TABLET, FILM COATED ORAL at 08:12

## 2017-12-30 RX ADMIN — CLOPIDOGREL BISULFATE 75 MG: 75 TABLET ORAL at 08:12

## 2017-12-30 RX ADMIN — DULOXETINE 60 MG: 30 CAPSULE, DELAYED RELEASE ORAL at 08:12

## 2017-12-30 RX ADMIN — LORAZEPAM 0.5 MG: 0.5 TABLET ORAL at 03:12

## 2017-12-30 NOTE — NURSING
Slight bleeding noted to vasc band site. 4cc air replaced into band. Will hold, reassess, begin releases in hour if no new bleeding noted.

## 2017-12-30 NOTE — DISCHARGE SUMMARY
[facility] Hospital Medicine  Discharge Summary      Patient Name: Magaly Nunes  MRN: [id number]  Admission Date: 12/28/2017  Hospital Length of Stay: 2 days  Discharge Date and Time:  12/30/2017 10:04 AM  Attending Physician: Amarjit Nunez MD   Discharging Provider: Amarjit Nunez MD  Primary Care Provider: Chris Bangura MD      HPI:   Mrs. Magaly Nunes is a 85 y.o. female with essential hypertension, type 2 diabetes mellitus (HbA1c 5.9% Nov 2017), chronic diastolic heart failure (LVEF 45-50% Jan 2017), CKD Stage 3, pulmonary hypertension, GERD, and depression who presents to [facility] ED with complaints of chest pain for one day.  She had actually presented to the ED today after months of bilateral knee and back pain that, although it hasn't gotten any worse, it hasn't improved.  Yesterday, when she felt the chest pain, she decided to come to the ED.  The pain started at rest, was substernal with occasional radiation to the left chest, was sharp in quality, and was 6/10 in severity.  The pain was associated with some dyspnea and palpitations, but not any diaphoresis, nausea, vomiting, fevers, chills, coughing, hemoptysis, nor any lower extremity pain or swelling.  There were no alleviating or exacerbating factors and the pain resolved without intervention after about 10 minutes.  She does not have a personal or family history of heart disease but does say she was admitted in Feb 2017 for fluid problems.    Procedure(s) (LRB):  Left heart cath (Left)      Hospital Course:   Mrs. Magaly Nunes is a 85 y.o. female with essential hypertension, type 2 diabetes mellitus (HbA1c 5.9% Nov 2017), chronic diastolic heart failure (LVEF 45-50% Jan 2017), CKD Stage 3, pulmonary hypertension, GERD, and depression who presents to [facility] ED with complaints of chest pain for one day.  She had actually presented to the ED today after months of bilateral knee and back pain that,  although it hasn't gotten any worse, it hasn't improved.  Yesterday, when she felt the chest pain, she decided to come to the ED.  The pain started at rest, was substernal with occasional radiation to the left chest, was sharp in quality, and was 6/10 in severity.  The pain was associated with some dyspnea and palpitations, but not any diaphoresis, nausea, vomiting, fevers, chills, coughing, hemoptysis, nor any lower extremity pain or swelling.  There were no alleviating or exacerbating factors and the pain resolved without intervention after about 10 minutes.  She does not have a personal or family history of heart disease but does say she was admitted in Feb 2017 for fluid problems.Patient was monitored n Telemetry with  serial cardiac marks,she was on ACS medication,cardiology was consulted and patient had    LHC,which showed , small distal diagonal vessel with nonobstructive disease otherwise.  The vessel was about 2 mm in diameter and will be medically managed,she has been observed and her chest pain resolved,she has been discharged home with ACS medication and follow with PCP and cardiology in next few days.     Consults:   Consults         Status Ordering Provider     Inpatient consult to Cardiology  Once     Provider:  Hammad Humphrey MD    Completed SAURABH COREAS     Inpatient consult to Social Work  Once     Provider:  (Not yet assigned)    Completed YOSELIN AVILES          No new Assessment & Plan notes have been filed under this hospital service since the last note was generated.  Service: Hospital Medicine    Final Active Diagnoses:    Diagnosis Date Noted POA    PRINCIPAL PROBLEM:  NSTEMI (non-ST elevated myocardial infarction) [I21.4] 01/01/2017 Yes    Chronic diastolic heart failure [I50.32] 12/28/2017 Yes     Chronic    CKD Stage 3 [N18.3] 12/28/2017 Yes     Chronic    GERD (gastroesophageal reflux disease) [K21.9] 12/28/2017 Yes     Chronic    Type 2 diabetes mellitus, controlled  [E11.9] 12/28/2017 Yes     Chronic    Pulmonary hypertension [I27.20] 02/01/2017 Yes     Chronic    Essential hypertension [I10] 11/19/2012 Yes     Chronic    Depression [F32.9] 08/15/2012 Yes     Chronic      Problems Resolved During this Admission:    Diagnosis Date Noted Date Resolved POA    Arthritis [M19.90] 12/28/2017 12/28/2017 Yes       Discharged Condition: stable    Disposition: Home or Self Care    Follow Up:  Follow-up Information     Hammad Humphrey MD On 1/5/2018.    Specialties:  INTERVENTIONAL CARDIOLOGY, Cardiology  Why:  out patient services:  10:20 a.m.  Contact information:  [address]  [address]  [place] [id number] [phone]             Chris Bangura MD On 1/17/2018.    Specialty:  Internal Medicine  Why:  out patient services:  2:20 p.m.   Contact information:  [address] [id number] [phone]                 Patient Instructions:     Diet Cardiac (2gm sodium and 60gm fat)     Activity as tolerated         Significant Diagnostic Studies: Labs:   BMP:   Recent Labs  Lab 12/28/17  1321 12/29/17  0313    114*    140   K 3.9 3.5    103   CO2 27 29   BUN 15 14   CREATININE 0.9 0.8   CALCIUM 10.2 9.8   MG  --  1.6   , CBC   Recent Labs  Lab 12/28/17  1321 12/29/17  0313   WBC 3.73* 4.51   HGB 13.2 12.1   HCT 41.8 36.3*    186   , Lipid Panel   Lab Results   Component Value Date    CHOL 184 12/29/2017    HDL 86 (H) 12/29/2017    LDLCALC 90.6 12/29/2017    TRIG 37 12/29/2017    CHOLHDL 46.7 12/29/2017    and Troponin   Recent Labs  Lab 12/29/17  0814   TROPONINI 0.912*       Cardiac Graphics: Echocardiogram:   2D echo with color flow doppler:   Results for orders placed or performed during the hospital encounter of 12/28/17   2D echo with color flow doppler   Result Value Ref Range    EF 50 55 - 65    Mitral Valve Regurgitation MILD TO MODERATE     Diastolic Dysfunction Yes (A)     Est. PA Systolic Pressure 67.32 (A)     Pericardial Effusion  NONE     Tricuspid Valve Regurgitation MODERATE TO SEVERE (A)      Angiography: [facility]     Pending Diagnostic Studies:     Procedure Component Value Units Date/Time    EKG 12-LEAD [[id number]]     Order Status:  Sent Lab Status:  No result     EKG 12-LEAD [[id number]]     Order Status:  Sent Lab Status:  No result     EKG 12-LEAD [[id number]]     Order Status:  Sent Lab Status:  No result     EKG 12-LEAD [[id number]]     Order Status:  Sent Lab Status:  No result     EKG 12-LEAD [[id number]]     Order Status:  Sent Lab Status:  No result     EKG 12-LEAD [[id number]]     Order Status:  Sent Lab Status:  No result     EKG 12-LEAD [[id number]]     Order Status:  Sent Lab Status:  No result     EKG 12-LEAD [[id number]]     Order Status:  Sent Lab Status:  No result     EKG 12-LEAD starting tomorrow [[id number]]     Order Status:  Sent Lab Status:  No result          Medications:  Reconciled Home Medications:   Current Discharge Medication List      START taking these medications    Details   aspirin 81 MG Chew Take 1 tablet (81 mg total) by mouth once daily.  Refills: 0      atorvastatin (LIPITOR) 20 MG tablet Take 1 tablet (20 mg total) by mouth once daily.  Qty: 30 tablet, Refills: 0      carvedilol (COREG) 6.25 MG tablet Take 1 tablet (6.25 mg total) by mouth 2 (two) times daily.  Qty: 60 tablet, Refills: 0      clopidogrel (PLAVIX) 75 mg tablet Take 1 tablet (75 mg total) by mouth once daily.  Qty: 30 tablet, Refills: 0         CONTINUE these medications which have CHANGED    Details   hydrocodone-acetaminophen 5-325mg (NORCO) 5-325 mg per tablet Take 1 tablet by mouth every 4 (four) hours as needed for Pain.  Qty: 20 tablet, Refills: 0         CONTINUE these medications which have NOT CHANGED    Details   DULoxetine (CYMBALTA) 60 MG capsule TAKE 1 CAPSULE(60 MG) BY MOUTH EVERY DAY  Qty: 30 capsule, Refills: 12    Associated Diagnoses: Neuropathic pain      furosemide (LASIX) 40 MG tablet Take 1 tablet (40 mg total) by mouth  once daily.  Qty: 30 tablet, Refills: 11      LORazepam (ATIVAN) 0.5 MG tablet Take 1 tablet (0.5 mg total) by mouth every 8 (eight) hours as needed for Anxiety. (caution-may cause sleepiness)  Qty: 60 tablet, Refills: 3      losartan (COZAAR) 100 MG tablet TAKE ONE TABLET BY MOUTH ONCE DAILY  Qty: 90 tablet, Refills: 0    Associated Diagnoses: Essential hypertension      omeprazole (PRILOSEC) 20 MG capsule TAKE 2 CAPSULES BY MOUTH EVERY DAY FOR ACID REFLUX OR STOMACH  Qty: 180 capsule, Refills: 0      potassium chloride SA (K-DUR,KLOR-CON) 20 MEQ tablet Take 1 tablet (20 mEq total) by mouth once daily.  Qty: 90 tablet, Refills: 1    Comments: **Patient requests 90 days supply**      tiZANidine 4 mg Cap Take by mouth.         STOP taking these medications       temazepam (RESTORIL) 30 mg capsule Comments:   Reason for Stopping:               Indwelling Lines/Drains at time of discharge:   Lines/Drains/Airways          No matching active lines, drains, or airways          Time spent on the discharge of patient: 30  minutes  Patient was seen and examined on the date of discharge and determined to be suitable for discharge.         Amarjit Nunez MD  Department of Hospital Medicine  [facility]

## 2017-12-30 NOTE — PROGRESS NOTES
Note that patient awake, alert and fully oriented; Denies pain to right wrist and no bleeding/redness observed; Patient is aware that she is being discharged home; Reports that a family friend will transport her home;  Patient consumed 100% of her breakfast; She ambulated with cane and one-person assistance to sink for wash-off;   Also note: Awaiting case management review; Will continue to f/u;

## 2017-12-30 NOTE — PLAN OF CARE
Problem: Cardiac Catheterization (Diagnostic/Interventional) (Adult)  Intervention: Monitor ECG and Peripheral Pulses   12/30/17 0811   ECG   Lead Monitored Lead II   Rhythm normal sinus rhythm     Intervention: Prevent/Manage Pain   12/30/17 0811   Pain/Comfort/Sleep Interventions   Pain Management Interventions pain management plan reviewed with patient/caregiver;awakened for pain meds per patient request;medication offered but refused;relaxation promoted     Intervention: Maintain Extremity in Straight Position Post Procedure   12/30/17 0728   Positioning   Body Position positioned/repositioned independently       Goal: Signs and Symptoms of Listed Potential Problems Will be Absent, Minimized or Managed (Cardiac Catheterization)  Signs and symptoms of listed potential problems will be absent, minimized or managed by discharge/transition of care (reference Cardiac Catheterization (Diagnostic/Interventional) (Adult) CPG).   Outcome: Ongoing (interventions implemented as appropriate)   12/29/17 2033 12/30/17 0811   Cardiac Catheterization (Diagnostic/Interventional)   Problems Assessed (Cardiac Catheterization)   12/29/17 2033   Cardiac Catheterization (Diagnostic/Interventional)   Problems Assessed (Cardiac Catheterization) all   Problems Present (Cardiac Catheterization) none    --  all   Problems Present (Cardiac Catheterization) none --       12/29/17 2033   Cardiac Catheterization (Diagnostic/Interventional)   Problems Present (Cardiac Catheterization) none

## 2017-12-30 NOTE — NURSING
1915 Report received from off going nurse. Patient noted lying quietly awake in bed with c/o  Headache 3/10 which has improved. Right radial vasc band noted intacted with pressure to release air at 8pm.    2015 Assmt completed. Denies pain at present time. 2cc of air releases. No bleeding or hematoma noted at site. Instructed to notify nurse for any signs of bleeding.    2230 all air released. Vasc band removed 2x2 gauze applied with silk tape. Patient with c/o  Gas to right lower quad . Refuses any medications at this time. Bedside commode placed at bedside.    0105 continue to c/o gas pain, but still the gas is slowly passing, continues to refuse medications. Will continue to monitor at this time.     0251 Dr. Jay Jay galeana on unit. Notified him of patient c/o gas pain. New orders received.     0317 Patient with c/o right arm pain. States it is sore and it hurts with movement. Repositioned. on1 pillow. Patient given order pain medication and antianxiety to assist with rest.

## 2017-12-30 NOTE — PLAN OF CARE
12/30/17 0925   Final Note   Assessment Type Final Discharge Note   Discharge Disposition Home   What phone number can be called within the next 1-3 days to see how you are doing after discharge? ([phone])   Hospital Follow Up  Appt(s) scheduled? Yes   Discharge plans and expectations educations in teach back method with documentation complete? Yes   Right Care Referral Info   Post Acute Recommendation No Care   NurseKayla notified that pt ok for DC from a CM standpoint

## 2017-12-30 NOTE — PROGRESS NOTES
Preparation for discharge: Patient is alert and fully oriented; Patient sitting in chair at bed-side; Patient is aware that she is being discharged and pleased about same;  Left IV and telemetry monitor removed; Patient denies pain to IV and right wrist-band sites;  Discussed discharge plan with patient and she voiced an understanding of same; Note that patient has already received influenza and pneumonia vaccines;   Case management intervention complete;   Patient says that ride will be here after lunch;   Will f/u until departure from hospital;

## 2017-12-30 NOTE — PLAN OF CARE
Problem: Diabetes, Type 2 (Adult)  Goal: Signs and Symptoms of Listed Potential Problems Will be Absent, Minimized or Managed (Diabetes, Type 2)  Signs and symptoms of listed potential problems will be absent, minimized or managed by discharge/transition of care (reference Diabetes, Type 2 (Adult) CPG).   Outcome: Ongoing (interventions implemented as appropriate)   12/29/17 2033   Diabetes, Type 2   Problems Assessed (Type 2 Diabetes) all   Problems Present (Type 2 Diabetes) none       Problem: Fall Risk (Adult)  Goal: Identify Related Risk Factors and Signs and Symptoms  Related risk factors and signs and symptoms are identified upon initiation of Human Response Clinical Practice Guideline (CPG)   Outcome: Ongoing (interventions implemented as appropriate)   12/29/17 2033   Fall Risk   Related Risk Factors (Fall Risk) age-related changes;fatigue/slow reaction;gait/mobility problems;environment unfamiliar   Signs and Symptoms (Fall Risk) presence of risk factors     Goal: Absence of Falls  Patient will demonstrate the desired outcomes by discharge/transition of care.   Outcome: Ongoing (interventions implemented as appropriate)   12/29/17 2033   Fall Risk (Adult)   Absence of Falls making progress toward outcome       Problem: Cardiac Catheterization (Diagnostic/Interventional) (Adult)  Goal: Signs and Symptoms of Listed Potential Problems Will be Absent, Minimized or Managed (Cardiac Catheterization)  Signs and symptoms of listed potential problems will be absent, minimized or managed by discharge/transition of care (reference Cardiac Catheterization (Diagnostic/Interventional) (Adult) CPG).  Outcome: Ongoing (interventions implemented as appropriate)   12/29/17 2033   Cardiac Catheterization (Diagnostic/Interventional)   Problems Assessed (Cardiac Catheterization) all   Problems Present (Cardiac Catheterization) none     Goal: Anesthesia/Sedation Recovery  Outcome: Ongoing (interventions implemented as  appropriate)   12/29/17 2033   Goal/Outcome Evaluation   Anesthesia/Sedation Recovery criteria met for transfer       Problem: Cardiac: ACS (Acute Coronary Syndrome) (Adult)  Goal: Signs and Symptoms of Listed Potential Problems Will be Absent, Minimized or Managed (Cardiac: ACS)  Signs and symptoms of listed potential problems will be absent, minimized or managed by discharge/transition of care (reference Cardiac: ACS (Acute Coronary Syndrome) (Adult) CPG).  Outcome: Ongoing (interventions implemented as appropriate)   12/29/17 2033   Cardiac: ACS (Acute Coronary Syndrome)   Problems Assessed (Acute Coronary Syndrome) all   Problems Present (Acute Coronary Syndrome) ischemia leading to infarction       Comments: Pt to [facility] today, right radial access (vasc band). No hematoma noted. Air releases delayed due to slight bleeding noted. BG WNL, no SS insulin coverage needed. Pt free of falls this shift, assisted with ambulation, pt walks with cane. No new skin breakdown this shift.

## 2018-01-12 ENCOUNTER — OFFICE VISIT (OUTPATIENT)
Dept: CARDIOLOGY | Facility: CLINIC | Age: 83
End: 2018-01-12
Payer: MEDICARE

## 2018-01-12 VITALS
OXYGEN SATURATION: 100 % | HEART RATE: 92 BPM | BODY MASS INDEX: 29.55 KG/M2 | WEIGHT: 173.06 LBS | DIASTOLIC BLOOD PRESSURE: 90 MMHG | HEIGHT: 64 IN | RESPIRATION RATE: 15 BRPM | SYSTOLIC BLOOD PRESSURE: 129 MMHG

## 2018-01-12 DIAGNOSIS — I50.32 CHRONIC DIASTOLIC HEART FAILURE: ICD-10-CM

## 2018-01-12 DIAGNOSIS — I25.10 CORONARY ARTERY DISEASE INVOLVING NATIVE CORONARY ARTERY OF NATIVE HEART WITHOUT ANGINA PECTORIS: Primary | ICD-10-CM

## 2018-01-12 DIAGNOSIS — I10 ESSENTIAL HYPERTENSION: ICD-10-CM

## 2018-01-12 DIAGNOSIS — I21.4 NSTEMI (NON-ST ELEVATED MYOCARDIAL INFARCTION): ICD-10-CM

## 2018-01-12 DIAGNOSIS — E78.5 DYSLIPIDEMIA: ICD-10-CM

## 2018-01-12 DIAGNOSIS — F32.0 CURRENT MILD EPISODE OF MAJOR DEPRESSIVE DISORDER WITHOUT PRIOR EPISODE: ICD-10-CM

## 2018-01-12 DIAGNOSIS — I73.9 PVD (PERIPHERAL VASCULAR DISEASE): ICD-10-CM

## 2018-01-12 DIAGNOSIS — E11.29 CONTROLLED TYPE 2 DIABETES MELLITUS WITH OTHER DIABETIC KIDNEY COMPLICATION, WITHOUT LONG-TERM CURRENT USE OF INSULIN: ICD-10-CM

## 2018-01-12 PROCEDURE — 99214 OFFICE O/P EST MOD 30 MIN: CPT | Mod: S$GLB,,, | Performed by: INTERNAL MEDICINE

## 2018-01-12 PROCEDURE — 99499 UNLISTED E&M SERVICE: CPT | Mod: S$GLB,,, | Performed by: INTERNAL MEDICINE

## 2018-01-12 PROCEDURE — 99999 PR PBB SHADOW E&M-EST. PATIENT-LVL III: CPT | Mod: PBBFAC,,, | Performed by: INTERNAL MEDICINE

## 2018-01-12 NOTE — PROGRESS NOTES
Subjective:    Patient ID:  Magaly Nunes is a 85 y.o. female who presents for follow-up of No chief complaint on file.      Congestive Heart Failure      previous history:  Here for follow-up of combined heart failure.  She feels better than what we last saw her.  She feels like she's gotten rid of the swelling and her shortness of breath with the increased dose of Lasix.  She's currently again on maintenance Lasix.  She doesn't know whether she took any of her other pills besides her fluid pill this morning.  Blood pressures a little elevated in clinic today.  She denies any chest pain, shortness of breath or palpitations.  She's not expressing PND, orthopnea or lower edema.  She now knows again to emphasize sodium restriction.  Otherwise she's better usual state of health.  She denies any dizziness, presyncope or syncope.  She mainly says that she feels a little depressed and lack of energy.  She really doesn't do much exercise.  She mainly just feels down after the death of her  3 years ago.  She mainly otherwise dealing with arthritis that limits her.    Today:  Here for follow-up of coronary artery disease and recent non-STEMI.  She denies any problems post hospitalization.  She's had no further chest pain episodes.  She had a small diagonal branch that was medically managed and no intervention was performed.  She denies any PND, orthopnea or lower edema.  She mainly just feels weak and has been getting very good sleep at night.  She denies any dizziness, presyncope or syncope.  She remained limitation again is arthritis in her knee.      Review of Systems   Constitution: Negative.   HENT: Negative.    Eyes: Negative.    Cardiovascular: Negative for dyspnea on exertion, irregular heartbeat, leg swelling, orthopnea, paroxysmal nocturnal dyspnea and syncope.   Skin: Negative.    Musculoskeletal: Positive for arthritis.   Gastrointestinal: Negative for constipation and diarrhea.   Genitourinary:  Negative for dysuria.   Neurological: Negative.    Psychiatric/Behavioral: Negative.         Objective:    Physical Exam   Constitutional: She is oriented to person, place, and time. She appears well-developed and well-nourished.   HENT:   Head: Normocephalic and atraumatic.   Eyes: Conjunctivae and EOM are normal. Pupils are equal, round, and reactive to light.   Neck: Normal range of motion. Neck supple. No thyromegaly present.   Cardiovascular: Normal rate and regular rhythm.    No murmur heard.  Pulmonary/Chest: Effort normal and breath sounds normal. No respiratory distress.   Abdominal: Soft. Bowel sounds are normal.   Musculoskeletal: She exhibits no edema.   Neurological: She is alert and oriented to person, place, and time.   Skin: Skin is warm and dry.   Psychiatric: She has a normal mood and affect. Her behavior is normal.       echo: 12-17  CONCLUSIONS     1 - Low normal to mildly depressed left ventricular systolic function (EF 50-55%).     2 - Concentric remodeling.     3 - Impaired LV relaxation, elevated LAP (grade 2 diastolic dysfunction).     4 - Low normal right ventricular systolic function .     5 - Pulmonary hypertension. The estimated PA systolic pressure is 67 mmHg.     6 - Mild to moderate mitral regurgitation.     C:     B. Summary/Post-Operative Diagnosis       Single vessel coronary artery disease.    Possible small branch diagonal likely cuprit for NSTEMI.    Diagnostic:          Patient has a right dominant coronary artery.        - Left Main Coronary Artery:             The LM is normal. There is VIJI 3 flow.     - Left Anterior Descending Artery:             The LAD is normal. There is VIJI 3 flow.     - D4:             The D4 has a 90% stenosis. There is VIJI 3 flow. small vessel   2 mm     - Left Circumflex Artery:             The LCX is normal. There is VIJI 3 flow.     - Right Coronary Artery:             The RCA has luminal irregularities. There is VIJI 3 flow.    Assessment:        1. Coronary artery disease involving native coronary artery of native heart without angina pectoris    2. NSTEMI (non-ST elevated myocardial infarction)    3. Chronic diastolic heart failure    4. Current mild episode of major depressive disorder without prior episode    5. Essential hypertension    6. Dyslipidemia    7. PVD (peripheral vascular disease)    8. 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

## 2018-01-18 ENCOUNTER — HOSPITAL ENCOUNTER (EMERGENCY)
Facility: HOSPITAL | Age: 83
Discharge: HOME | End: 2018-01-18
Attending: EMERGENCY MEDICINE
Payer: MEDICARE

## 2018-01-18 VITALS
DIASTOLIC BLOOD PRESSURE: 72 MMHG | BODY MASS INDEX: 29.53 KG/M2 | RESPIRATION RATE: 18 BRPM | HEIGHT: 64 IN | OXYGEN SATURATION: 97 % | WEIGHT: 173 LBS | SYSTOLIC BLOOD PRESSURE: 158 MMHG | HEART RATE: 78 BPM | TEMPERATURE: 98 F

## 2018-01-18 DIAGNOSIS — N39.0 URINARY TRACT INFECTION WITHOUT HEMATURIA, SITE UNSPECIFIED: Primary | ICD-10-CM

## 2018-01-18 LAB
ALBUMIN SERPL BCP-MCNC: 3.9 G/DL
ALP SERPL-CCNC: 118 U/L
ALT SERPL W/O P-5'-P-CCNC: 21 U/L
ANION GAP SERPL CALC-SCNC: 11 MMOL/L
AST SERPL-CCNC: 28 U/L
BACTERIA #/AREA URNS HPF: ABNORMAL /HPF
BASOPHILS # BLD AUTO: 0.02 K/UL
BASOPHILS NFR BLD: 0.5 %
BILIRUB SERPL-MCNC: 0.8 MG/DL
BILIRUB UR QL STRIP: NEGATIVE
BNP SERPL-MCNC: 141 PG/ML
BUN SERPL-MCNC: 13 MG/DL
CALCIUM SERPL-MCNC: 9.8 MG/DL
CHLORIDE SERPL-SCNC: 101 MMOL/L
CLARITY UR: ABNORMAL
CO2 SERPL-SCNC: 28 MMOL/L
COLOR UR: YELLOW
CREAT SERPL-MCNC: 0.9 MG/DL
DIFFERENTIAL METHOD: ABNORMAL
EOSINOPHIL # BLD AUTO: 0.1 K/UL
EOSINOPHIL NFR BLD: 1.5 %
ERYTHROCYTE [DISTWIDTH] IN BLOOD BY AUTOMATED COUNT: 13.4 %
EST. GFR  (AFRICAN AMERICAN): >60 ML/MIN/1.73 M^2
EST. GFR  (NON AFRICAN AMERICAN): 58 ML/MIN/1.73 M^2
GLUCOSE SERPL-MCNC: 102 MG/DL
GLUCOSE UR QL STRIP: NEGATIVE
HCT VFR BLD AUTO: 36.8 %
HGB BLD-MCNC: 11.9 G/DL
HGB UR QL STRIP: ABNORMAL
HYALINE CASTS #/AREA URNS LPF: 0 /LPF
INR PPP: 1
KETONES UR QL STRIP: NEGATIVE
LEUKOCYTE ESTERASE UR QL STRIP: ABNORMAL
LYMPHOCYTES # BLD AUTO: 1.3 K/UL
LYMPHOCYTES NFR BLD: 33.3 %
MCH RBC QN AUTO: 27.4 PG
MCHC RBC AUTO-ENTMCNC: 32.3 G/DL
MCV RBC AUTO: 85 FL
MICROSCOPIC COMMENT: ABNORMAL
MONOCYTES # BLD AUTO: 0.5 K/UL
MONOCYTES NFR BLD: 12.9 %
NEUTROPHILS # BLD AUTO: 2.1 K/UL
NEUTROPHILS NFR BLD: 51.8 %
NITRITE UR QL STRIP: NEGATIVE
PH UR STRIP: 7 [PH] (ref 5–8)
PLATELET # BLD AUTO: 227 K/UL
PMV BLD AUTO: 9.7 FL
POTASSIUM SERPL-SCNC: 3.8 MMOL/L
PROT SERPL-MCNC: 7.4 G/DL
PROT UR QL STRIP: ABNORMAL
PROTHROMBIN TIME: 10.7 SEC
RBC # BLD AUTO: 4.34 M/UL
RBC #/AREA URNS HPF: 75 /HPF (ref 0–4)
SODIUM SERPL-SCNC: 140 MMOL/L
SP GR UR STRIP: 1.01 (ref 1–1.03)
TROPONIN I SERPL DL<=0.01 NG/ML-MCNC: 0.04 NG/ML
URN SPEC COLLECT METH UR: ABNORMAL
UROBILINOGEN UR STRIP-ACNC: NEGATIVE EU/DL
WBC # BLD AUTO: 3.96 K/UL
WBC #/AREA URNS HPF: 10 /HPF (ref 0–5)

## 2018-01-18 PROCEDURE — 84484 ASSAY OF TROPONIN QUANT: CPT

## 2018-01-18 PROCEDURE — 93005 ELECTROCARDIOGRAM TRACING: CPT

## 2018-01-18 PROCEDURE — 80053 COMPREHEN METABOLIC PANEL: CPT

## 2018-01-18 PROCEDURE — 87086 URINE CULTURE/COLONY COUNT: CPT

## 2018-01-18 PROCEDURE — 25000003 PHARM REV CODE 250: Performed by: EMERGENCY MEDICINE

## 2018-01-18 PROCEDURE — 93010 ELECTROCARDIOGRAM REPORT: CPT | Mod: ,,, | Performed by: INTERNAL MEDICINE

## 2018-01-18 PROCEDURE — 85025 COMPLETE CBC W/AUTO DIFF WBC: CPT

## 2018-01-18 PROCEDURE — 85610 PROTHROMBIN TIME: CPT

## 2018-01-18 PROCEDURE — 83880 ASSAY OF NATRIURETIC PEPTIDE: CPT

## 2018-01-18 PROCEDURE — 81000 URINALYSIS NONAUTO W/SCOPE: CPT

## 2018-01-18 PROCEDURE — 99284 EMERGENCY DEPT VISIT MOD MDM: CPT | Mod: 25

## 2018-01-18 RX ORDER — CEPHALEXIN 250 MG/1
500 CAPSULE ORAL
Status: COMPLETED | OUTPATIENT
Start: 2018-01-18 | End: 2018-01-18

## 2018-01-18 RX ORDER — CEPHALEXIN 500 MG/1
500 CAPSULE ORAL 4 TIMES DAILY
Qty: 40 CAPSULE | Refills: 0 | Status: SHIPPED | OUTPATIENT
Start: 2018-01-18 | End: 2018-01-28

## 2018-01-18 RX ADMIN — CEPHALEXIN 500 MG: 250 CAPSULE ORAL at 06:01

## 2018-01-18 NOTE — ED TRIAGE NOTES
Patient comes to the ER with generalized body aches since yesterday. Patient states the pain got worse when she was putting stuff in the washing machine. Patient has a nonproductive cough. Patient aaox4.

## 2018-01-18 NOTE — ED NOTES
Pt and family instructed about plan of care: ecg (immediately shown to MD), chest xray, and blood work (which will take about 1.5-2hrs to process).

## 2018-01-18 NOTE — ED PROVIDER NOTES
"Encounter Date: 1/18/2018    SCRIBE #1 NOTE: I, Allie Thompson, am scribing for, and in the presence of,  Stevo Lerma MD. I have scribed the following portions of the note - Other sections scribed: ROS and HPI.       History     Chief Complaint   Patient presents with    Generalized Body Aches     "pain all over my joints, I have arthritis, for about 3 wks." coughing x 2 wks, denies chest pain, reports mild heart attack 3 wks ago     CC: Generalized Body Aches  HPI: This 85 y.o. female with a past medical history of Anxiety disorder; Arthritis;CHF; CKD stage 3 due to type 2 diabetes mellitus; Diabetes mellitus type II; HTN; Hypokalemia; I Knee pain, bilateral; Personal history of DVT; Renovascular hypertension; Severe tricuspid regurgitation; Spinal stenosis of lumbar region; and Vertigo, presents to the ED complaining of worsening generalized body aches for last 3 weeks. Pain progressively got worse when she bent down yesterday to  her clothes. She attributes her sx are due to her arthritis. She stopped taking Tramadol and Zanaflex because they were  not helping her. She had an acute NSTEMI on 12/28/2017. She also stopped taking Cymbalta a month ago. She takes aspirin daily. No prior medical intervention for her pain today. Sx are severe and constant.       The history is provided by the patient. No  was used.     Review of patient's allergies indicates:   Allergen Reactions    Ace inhibitors      Other reaction(s): Unknown    Aciphex  [rabeprazole]      Other reaction(s): Stomach upset    Aspirin      Other reaction(s): Unknown    Bextra  [valdecoxib]      Other reaction(s): Unknown    Cardizem  [diltiazem hcl]      Other reaction(s): Unknown    Clonidine      Other reaction(s): dry mouth.lip swelling    Mavik  [trandolapril]      Other reaction(s): Unknown    Nsaids (non-steroidal anti-inflammatory drug)      Other reaction(s): black spots on skin    Phenytoin sodium extended      " Other reaction(s): Stomach upset    Tramadol      Other reaction(s): stomach pain     Past Medical History:   Diagnosis Date    Anxiety disorder     Arthritis 12/28/2017    Carpal tunnel syndrome     CHF (congestive heart failure)     Chronic allergic rhinitis     Chronic pain 10/22/2012    CKD stage 3 due to type 2 diabetes mellitus 2/14/2017    Constipation - functional 4/10/2013    Depression     Diabetes mellitus type II     Hot flash not due to menopause     HTN (hypertension)     Hypokalemia 11/19/2012    Insomnia 4/10/2013    Knee pain, bilateral 7/24/2013    Personal history of DVT (deep vein thrombosis)     Renovascular hypertension 2/14/2017    Severe tricuspid regurgitation 2/14/2017    Spinal stenosis     Dr. Corrales    Spinal stenosis of lumbar region 2/3/2014    Vertigo      Past Surgical History:   Procedure Laterality Date    CATARACT EXTRACTION Bilateral     EYE SURGERY      FRACTURE SURGERY      HYSTERECTOMY      ORIF RADIUS & ULNA FRACTURES       Family History   Problem Relation Age of Onset    No Known Problems Mother     No Known Problems Father     No Known Problems Sister     No Known Problems Brother     No Known Problems Maternal Aunt     No Known Problems Maternal Uncle     No Known Problems Paternal Aunt     No Known Problems Paternal Uncle     No Known Problems Maternal Grandmother     No Known Problems Maternal Grandfather     No Known Problems Paternal Grandmother     No Known Problems Paternal Grandfather     Amblyopia Neg Hx     Blindness Neg Hx     Cancer Neg Hx     Diabetes Neg Hx     Glaucoma Neg Hx     Hypertension Neg Hx     Macular degeneration Neg Hx     Retinal detachment Neg Hx     Strabismus Neg Hx     Stroke Neg Hx     Thyroid disease Neg Hx      Social History   Substance Use Topics    Smoking status: Never Smoker    Smokeless tobacco: Never Used    Alcohol use No     Review of Systems   Constitutional: Negative for  fever.   HENT: Negative for sore throat.    Respiratory: Positive for cough. Negative for shortness of breath.    Cardiovascular: Negative for chest pain.   Gastrointestinal: Negative for nausea.   Genitourinary: Negative for dysuria.   Musculoskeletal: Positive for arthralgias. Negative for back pain.        (+) generalized body aches   Skin: Negative for rash.   Neurological: Negative for weakness.   Hematological: Does not bruise/bleed easily.       Physical Exam     Initial Vitals [01/18/18 1033]   BP Pulse Resp Temp SpO2   (!) 113/57 88 20 97.9 °F (36.6 °C) (!) 94 %      MAP       75.67         Physical Exam    Nursing note and vitals reviewed.  Constitutional: She appears well-developed and well-nourished.  Non-toxic appearance. She does not appear ill.   HENT:   Head: Normocephalic and atraumatic.   Eyes: EOM are normal.   Neck: Neck supple.   Cardiovascular: Normal rate and regular rhythm.   Pulmonary/Chest: Effort normal and breath sounds normal. No respiratory distress.   Abdominal: Soft. Normal appearance and bowel sounds are normal. She exhibits no distension. There is no tenderness.   Musculoskeletal: Normal range of motion.   Neurological: She is alert.   Skin: Skin is warm and dry.   Psychiatric: She has a normal mood and affect.         ED Course   Procedures  Labs Reviewed   CBC W/ AUTO DIFFERENTIAL - Abnormal; Notable for the following:        Result Value    Hemoglobin 11.9 (*)     Hematocrit 36.8 (*)     All other components within normal limits   COMPREHENSIVE METABOLIC PANEL - Abnormal; Notable for the following:     eGFR if non  58 (*)     All other components within normal limits   B-TYPE NATRIURETIC PEPTIDE - Abnormal; Notable for the following:      (*)     All other components within normal limits   TROPONIN I - Abnormal; Notable for the following:     Troponin I 0.037 (*)     All other components within normal limits   URINALYSIS - Abnormal; Notable for the  following:     Appearance, UA Hazy (*)     Protein, UA 1+ (*)     Occult Blood UA 3+ (*)     Leukocytes, UA 3+ (*)     All other components within normal limits   URINALYSIS MICROSCOPIC - Abnormal; Notable for the following:     RBC, UA 75 (*)     WBC, UA 10 (*)     All other components within normal limits   CULTURE, URINE   PROTIME-INR     EKG Readings: (Independently Interpreted)   Initial Reading: No STEMI. Rhythm: Normal Sinus Rhythm. Heart Rate: 88. ST Segments: Normal ST Segments. T Waves: Normal.          Medical Decision Making:   History:   Old Medical Records: I decided to obtain old medical records.  Clinical Tests:   Lab Tests: Ordered and Reviewed  Radiological Study: Ordered and Reviewed  ED Management:  This is an emergent evaluation.  The patient is an 85-year-old female complaining of generalized body aches.  She suffered from a non-STEMI in early January.  She stopped taking her Cymbalta for fibromyalgia about one month ago.  The body aches began a few days ago.  The aches are all over.  They got worse when she bent over to pick something up off the floor.  She is CONCERNED that her arthritis is acting up.  She denies any fever or chills or cough.  The patient has mild hypertension.  Her vital signs are otherwise stable.  She is afebrile.  She is nontoxic and non-ill-appearing.  Her exam is normal.  Her workup has only returned positive UTI which may be the cause of her symptoms.  She also needs to restart her Cymbalta for her fibromyalgia as this may be a culprit.  She does not have leukocytosis or any evidence of a new STEMI or non-STEMI.  Her troponin is improved.  She will be discharged in stable condition.  She will be given Keflex for her UTI.            Scribe Attestation:   Scribe #1: I performed the above scribed service and the documentation accurately describes the services I performed. I attest to the accuracy of the note.    Attending Attestation:           Physician Attestation for  Scribe:  Physician Attestation Statement for Scribe #1: I, Stevo Lerma MD, reviewed documentation, as scribed by Allie Thompson in my presence, and it is both accurate and complete.                 ED Course      Clinical Impression:   The encounter diagnosis was Urinary tract infection without hematuria, site unspecified.    Disposition:   Disposition: Discharged  Condition: Stable                        Obi Lerma MD  01/18/18 3202

## 2018-01-20 LAB — BACTERIA UR CULT: NORMAL

## 2018-01-23 ENCOUNTER — HOSPITAL ENCOUNTER (OUTPATIENT)
Dept: RADIOLOGY | Facility: HOSPITAL | Age: 83
Discharge: HOME | End: 2018-01-23
Attending: INTERNAL MEDICINE
Payer: MEDICARE

## 2018-01-23 ENCOUNTER — HOSPITAL ENCOUNTER (OUTPATIENT)
Dept: CARDIOLOGY | Facility: HOSPITAL | Age: 83
Discharge: HOME | End: 2018-01-23
Attending: INTERNAL MEDICINE
Payer: MEDICARE

## 2018-01-23 DIAGNOSIS — I25.10 CORONARY ARTERY DISEASE INVOLVING NATIVE CORONARY ARTERY OF NATIVE HEART WITHOUT ANGINA PECTORIS: ICD-10-CM

## 2018-01-23 LAB — DIASTOLIC DYSFUNCTION: NO

## 2018-01-23 PROCEDURE — 78452 HT MUSCLE IMAGE SPECT MULT: CPT | Mod: 26,,, | Performed by: INTERNAL MEDICINE

## 2018-01-23 PROCEDURE — A9502 TC99M TETROFOSMIN: HCPCS

## 2018-01-23 PROCEDURE — 93016 CV STRESS TEST SUPVJ ONLY: CPT | Mod: ,,, | Performed by: INTERNAL MEDICINE

## 2018-01-23 PROCEDURE — 63600175 PHARM REV CODE 636 W HCPCS

## 2018-01-23 PROCEDURE — 93018 CV STRESS TEST I&R ONLY: CPT | Mod: ,,, | Performed by: INTERNAL MEDICINE

## 2018-01-23 PROCEDURE — 93017 CV STRESS TEST TRACING ONLY: CPT

## 2018-01-23 RX ORDER — REGADENOSON 0.08 MG/ML
INJECTION, SOLUTION INTRAVENOUS
Status: DISPENSED
Start: 2018-01-23 | End: 2018-01-23

## 2018-01-24 ENCOUNTER — OUTPATIENT CASE MANAGEMENT (OUTPATIENT)
Dept: ADMINISTRATIVE | Facility: OTHER | Age: 83
End: 2018-01-24

## 2018-01-24 NOTE — PROGRESS NOTES
The following patient has been assigned to Manisha Zavala RN with [facility] for high risk screening.    Reason: High Risk    Please contact [facility] at [phone] with any questions.    Thank you,    Natacha Edwards, [facility]

## 2018-02-05 RX ORDER — CLOPIDOGREL BISULFATE 75 MG/1
75 TABLET ORAL DAILY
Qty: 30 TABLET | Refills: 10 | Status: SHIPPED | OUTPATIENT
Start: 2018-02-05 | End: 2019-01-07 | Stop reason: SDUPTHER

## 2018-02-05 RX ORDER — ATORVASTATIN CALCIUM 20 MG/1
20 TABLET, FILM COATED ORAL DAILY
Qty: 30 TABLET | Refills: 10 | Status: SHIPPED | OUTPATIENT
Start: 2018-02-05 | End: 2018-03-20

## 2018-02-09 ENCOUNTER — OFFICE VISIT (OUTPATIENT)
Dept: CARDIOLOGY | Facility: CLINIC | Age: 83
End: 2018-02-09
Payer: MEDICARE

## 2018-02-09 VITALS
DIASTOLIC BLOOD PRESSURE: 74 MMHG | OXYGEN SATURATION: 100 % | SYSTOLIC BLOOD PRESSURE: 163 MMHG | BODY MASS INDEX: 29.48 KG/M2 | WEIGHT: 171.75 LBS | HEART RATE: 87 BPM

## 2018-02-09 DIAGNOSIS — I73.9 PVD (PERIPHERAL VASCULAR DISEASE): ICD-10-CM

## 2018-02-09 DIAGNOSIS — E78.5 DYSLIPIDEMIA: ICD-10-CM

## 2018-02-09 DIAGNOSIS — I21.4 NSTEMI (NON-ST ELEVATED MYOCARDIAL INFARCTION): ICD-10-CM

## 2018-02-09 DIAGNOSIS — F32.0 CURRENT MILD EPISODE OF MAJOR DEPRESSIVE DISORDER WITHOUT PRIOR EPISODE: ICD-10-CM

## 2018-02-09 DIAGNOSIS — E11.29 CONTROLLED TYPE 2 DIABETES MELLITUS WITH OTHER DIABETIC KIDNEY COMPLICATION, WITHOUT LONG-TERM CURRENT USE OF INSULIN: ICD-10-CM

## 2018-02-09 DIAGNOSIS — I50.32 CHRONIC DIASTOLIC HEART FAILURE: ICD-10-CM

## 2018-02-09 DIAGNOSIS — I25.10 CORONARY ARTERY DISEASE INVOLVING NATIVE CORONARY ARTERY OF NATIVE HEART WITHOUT ANGINA PECTORIS: Primary | ICD-10-CM

## 2018-02-09 DIAGNOSIS — I10 ESSENTIAL HYPERTENSION: ICD-10-CM

## 2018-02-09 PROCEDURE — 99214 OFFICE O/P EST MOD 30 MIN: CPT | Mod: S$GLB,,, | Performed by: INTERNAL MEDICINE

## 2018-02-09 PROCEDURE — 3008F BODY MASS INDEX DOCD: CPT | Mod: S$GLB,,, | Performed by: INTERNAL MEDICINE

## 2018-02-09 PROCEDURE — 1125F AMNT PAIN NOTED PAIN PRSNT: CPT | Mod: S$GLB,,, | Performed by: INTERNAL MEDICINE

## 2018-02-09 PROCEDURE — 99499 UNLISTED E&M SERVICE: CPT | Mod: S$GLB,,, | Performed by: INTERNAL MEDICINE

## 2018-02-09 PROCEDURE — 1159F MED LIST DOCD IN RCRD: CPT | Mod: S$GLB,,, | Performed by: INTERNAL MEDICINE

## 2018-02-09 PROCEDURE — 99999 PR PBB SHADOW E&M-EST. PATIENT-LVL III: CPT | Mod: PBBFAC,,, | Performed by: INTERNAL MEDICINE

## 2018-02-19 ENCOUNTER — OFFICE VISIT (OUTPATIENT)
Dept: FAMILY MEDICINE | Facility: CLINIC | Age: 83
End: 2018-02-19
Payer: MEDICARE

## 2018-02-19 VITALS
SYSTOLIC BLOOD PRESSURE: 162 MMHG | WEIGHT: 171.5 LBS | BODY MASS INDEX: 30.39 KG/M2 | HEIGHT: 63 IN | DIASTOLIC BLOOD PRESSURE: 88 MMHG | TEMPERATURE: 98 F | OXYGEN SATURATION: 99 % | HEART RATE: 84 BPM

## 2018-02-19 DIAGNOSIS — R76.8 ANA POSITIVE: Primary | ICD-10-CM

## 2018-02-19 DIAGNOSIS — M54.9 BACK PAIN, UNSPECIFIED BACK LOCATION, UNSPECIFIED BACK PAIN LATERALITY, UNSPECIFIED CHRONICITY: ICD-10-CM

## 2018-02-19 PROCEDURE — 1125F AMNT PAIN NOTED PAIN PRSNT: CPT | Mod: S$GLB,,, | Performed by: NURSE PRACTITIONER

## 2018-02-19 PROCEDURE — 99213 OFFICE O/P EST LOW 20 MIN: CPT | Mod: 25,S$GLB,, | Performed by: NURSE PRACTITIONER

## 2018-02-19 PROCEDURE — 1159F MED LIST DOCD IN RCRD: CPT | Mod: S$GLB,,, | Performed by: NURSE PRACTITIONER

## 2018-02-19 PROCEDURE — 96372 THER/PROPH/DIAG INJ SC/IM: CPT | Mod: S$GLB,,, | Performed by: INTERNAL MEDICINE

## 2018-02-19 PROCEDURE — 99999 PR PBB SHADOW E&M-EST. PATIENT-LVL IV: CPT | Mod: PBBFAC,,, | Performed by: NURSE PRACTITIONER

## 2018-02-19 PROCEDURE — 3008F BODY MASS INDEX DOCD: CPT | Mod: S$GLB,,, | Performed by: NURSE PRACTITIONER

## 2018-02-19 RX ORDER — KETOROLAC TROMETHAMINE 30 MG/ML
30 INJECTION, SOLUTION INTRAMUSCULAR; INTRAVENOUS
Status: COMPLETED | OUTPATIENT
Start: 2018-02-19 | End: 2018-02-19

## 2018-02-19 RX ADMIN — KETOROLAC TROMETHAMINE 30 MG: 30 INJECTION, SOLUTION INTRAMUSCULAR; INTRAVENOUS at 02:02

## 2018-02-20 NOTE — PROGRESS NOTES
This dictation has been generated using Dragon Dictation some phonetic errors may occur.     Magaly was seen today for knee pain.    Diagnoses and all orders for this visit:    JAVID positive  Comments:  2005. Follow up with Rheumatology  Orders:  -     Ambulatory consult to Rheumatology    Back pain, unspecified back location, unspecified back pain laterality, unspecified chronicity  -     ketorolac injection 30 mg; Inject 1 mL (30 mg total) into the muscle one time.  -     methylPREDNISolone sod suc(PF) injection 125 mg; Inject 125 mg into the muscle one time.      She has had a positive JAVID in the past.  Remote rheumatology evaluation.  Given the arthritic complaints: Like to have her reevaluated at this time.  Back pain partly arthritic also.  Treat with meds as above.  Stage III renal disease avoiding routine NSAID's    Follow-up if symptoms worsen or fail to improve.      ________________________________________________________________  ________________________________________________________________        Chief Complaint   Patient presents with    Knee Pain     History of present illness  This 85 y.o. presents today for complaint of back pain and joint pain.  Patient notes pain and swelling of the finger joints.  She notes arthritic changes.  She also notes back pain.  Prior visit with injection was helpful.  She is desirous of the same.  She does have some renal insufficiency so routine NSAID use will be avoided.  She also has GERD and hypertension.  Patient denies history of injury  Review of systems  No fever chills malaise body aches.  Weight is stable.  No change in bowel or bladder habits  No lumbar radiculopathy    Past Medical History:   Diagnosis Date    Anxiety disorder     Arthritis 12/28/2017    Carpal tunnel syndrome     CHF (congestive heart failure)     Chronic allergic rhinitis     Chronic pain 10/22/2012    CKD stage 3 due to type 2 diabetes mellitus 2/14/2017    Constipation -  functional 4/10/2013    Depression     Diabetes mellitus type II     Hot flash not due to menopause     HTN (hypertension)     Hypokalemia 2012    Insomnia 4/10/2013    Knee pain, bilateral 2013    Personal history of DVT (deep vein thrombosis)     Renovascular hypertension 2017    Severe tricuspid regurgitation 2017    Spinal stenosis     Dr. Corrales    Spinal stenosis of lumbar region 2/3/2014    Vertigo        Past Surgical History:   Procedure Laterality Date    CATARACT EXTRACTION Bilateral     EYE SURGERY      FRACTURE SURGERY      HYSTERECTOMY      ORIF RADIUS & ULNA FRACTURES         Family History   Problem Relation Age of Onset    No Known Problems Mother     No Known Problems Father     No Known Problems Sister     No Known Problems Brother     No Known Problems Maternal Aunt     No Known Problems Maternal Uncle     No Known Problems Paternal Aunt     No Known Problems Paternal Uncle     No Known Problems Maternal Grandmother     No Known Problems Maternal Grandfather     No Known Problems Paternal Grandmother     No Known Problems Paternal Grandfather     Amblyopia Neg Hx     Blindness Neg Hx     Cancer Neg Hx     Diabetes Neg Hx     Glaucoma Neg Hx     Hypertension Neg Hx     Macular degeneration Neg Hx     Retinal detachment Neg Hx     Strabismus Neg Hx     Stroke Neg Hx     Thyroid disease Neg Hx        Social History     Social History    Marital status:      Spouse name: N/A    Number of children: N/A    Years of education: N/A     Social History Main Topics    Smoking status: Never Smoker    Smokeless tobacco: Never Used    Alcohol use No    Drug use: No    Sexual activity: No     Other Topics Concern    None     Social History Narrative    .  Lives alone.   and two sons have .  Active.         Current Outpatient Prescriptions   Medication Sig Dispense Refill    aspirin 81 MG Chew Take 1 tablet (81 mg  total) by mouth once daily.  0    atorvastatin (LIPITOR) 20 MG tablet Take 1 tablet (20 mg total) by mouth once daily. 30 tablet 10    clopidogrel (PLAVIX) 75 mg tablet Take 1 tablet (75 mg total) by mouth once daily. 30 tablet 10    DULoxetine (CYMBALTA) 60 MG capsule TAKE 1 CAPSULE(60 MG) BY MOUTH EVERY DAY 30 capsule 12    furosemide (LASIX) 40 MG tablet Take 1 tablet (40 mg total) by mouth once daily. 30 tablet 11    losartan (COZAAR) 100 MG tablet TAKE ONE TABLET BY MOUTH ONCE DAILY 90 tablet 0    omeprazole (PRILOSEC) 20 MG capsule TAKE 2 CAPSULES BY MOUTH EVERY DAY FOR ACID REFLUX OR STOMACH 180 capsule 0    potassium chloride SA (K-DUR,KLOR-CON) 20 MEQ tablet Take 1 tablet (20 mEq total) by mouth once daily. 90 tablet 1    tiZANidine 4 mg Cap Take by mouth.      carvedilol (COREG) 6.25 MG tablet Take 1 tablet (6.25 mg total) by mouth 2 (two) times daily. 60 tablet 0    hydrocodone-acetaminophen 5-325mg (NORCO) 5-325 mg per tablet Take 1 tablet by mouth every 4 (four) hours as needed for Pain. 20 tablet 0    LORazepam (ATIVAN) 0.5 MG tablet Take 1 tablet (0.5 mg total) by mouth every 8 (eight) hours as needed for Anxiety. (caution-may cause sleepiness) 60 tablet 3     No current facility-administered medications for this visit.        Review of patient's allergies indicates:   Allergen Reactions    Ace inhibitors      Other reaction(s): Unknown    Aciphex  [rabeprazole]      Other reaction(s): Stomach upset    Aspirin      Other reaction(s): Unknown    Bextra  [valdecoxib]      Other reaction(s): Unknown    Cardizem  [diltiazem hcl]      Other reaction(s): Unknown    Clonidine      Other reaction(s): dry mouth.lip swelling    Mavik  [trandolapril]      Other reaction(s): Unknown    Nsaids (non-steroidal anti-inflammatory drug)      Other reaction(s): black spots on skin    Phenytoin sodium extended      Other reaction(s): Stomach upset    Tramadol      Other reaction(s): stomach pain        Physical examination  Vitals Reviewed  Gen. Well-dressed well-nourished patient in obvious pain.  Unable to transfer to exam table for exam.  Skin warm dry and intact.  No rashes noted.  Neuro. Awake alert oriented x4.  Normal judgment and cognition noted.  Extremities no clubbing cyanosis or edema noted.  Palpable spasms of the paraspinal muscles of the lumbar spine.  She also has tenderness over the lumbar spinous processes.  Arthritic changes noted in the fingers.    Call or return to clinic prn if these symptoms worsen or fail to improve as anticipated.

## 2018-02-26 RX ORDER — FUROSEMIDE 40 MG/1
40 TABLET ORAL DAILY
Qty: 30 TABLET | Refills: 11 | Status: ON HOLD | OUTPATIENT
Start: 2018-02-26 | End: 2019-05-09 | Stop reason: SDUPTHER

## 2018-02-28 RX ORDER — POTASSIUM CHLORIDE 20 MEQ/1
TABLET, EXTENDED RELEASE ORAL
Qty: 90 TABLET | Refills: 0 | Status: SHIPPED | OUTPATIENT
Start: 2018-02-28 | End: 2018-06-08 | Stop reason: SDUPTHER

## 2018-03-01 ENCOUNTER — OUTPATIENT CASE MANAGEMENT (OUTPATIENT)
Dept: ADMINISTRATIVE | Facility: OTHER | Age: 83
End: 2018-03-01

## 2018-03-01 NOTE — PROGRESS NOTES
Contacted pt to complete initial assessment. Pt states she is not feeling well. She denies chest pain or SOB. Pt has appt tomorrow at 11:15 to see Dr. Subramanian. Pt does not feel up to completing assessment today. Will meet with pt after clinic appt tomorrow. Asked pt if she would like a message sent to her pcp and call back and she declined.

## 2018-03-02 ENCOUNTER — OUTPATIENT CASE MANAGEMENT (OUTPATIENT)
Dept: ADMINISTRATIVE | Facility: OTHER | Age: 83
End: 2018-03-02

## 2018-03-02 ENCOUNTER — TELEPHONE (OUTPATIENT)
Dept: FAMILY MEDICINE | Facility: CLINIC | Age: 83
End: 2018-03-02

## 2018-03-02 ENCOUNTER — OFFICE VISIT (OUTPATIENT)
Dept: FAMILY MEDICINE | Facility: CLINIC | Age: 83
End: 2018-03-02
Payer: MEDICARE

## 2018-03-02 ENCOUNTER — OFFICE VISIT (OUTPATIENT)
Dept: PODIATRY | Facility: CLINIC | Age: 83
End: 2018-03-02
Payer: MEDICARE

## 2018-03-02 VITALS
BODY MASS INDEX: 29.36 KG/M2 | TEMPERATURE: 98 F | SYSTOLIC BLOOD PRESSURE: 160 MMHG | OXYGEN SATURATION: 98 % | HEART RATE: 88 BPM | DIASTOLIC BLOOD PRESSURE: 76 MMHG | WEIGHT: 165.69 LBS | HEIGHT: 63 IN

## 2018-03-02 VITALS
DIASTOLIC BLOOD PRESSURE: 84 MMHG | BODY MASS INDEX: 30.3 KG/M2 | HEIGHT: 63 IN | WEIGHT: 171 LBS | SYSTOLIC BLOOD PRESSURE: 160 MMHG

## 2018-03-02 DIAGNOSIS — R60.0 BILATERAL LOWER EXTREMITY EDEMA: ICD-10-CM

## 2018-03-02 DIAGNOSIS — B35.1 ONYCHOMYCOSIS DUE TO DERMATOPHYTE: ICD-10-CM

## 2018-03-02 DIAGNOSIS — M19.90 ARTHRITIS: ICD-10-CM

## 2018-03-02 DIAGNOSIS — K59.04 FUNCTIONAL CONSTIPATION: ICD-10-CM

## 2018-03-02 DIAGNOSIS — E11.51 TYPE II DIABETES MELLITUS WITH PERIPHERAL CIRCULATORY DISORDER: Primary | ICD-10-CM

## 2018-03-02 DIAGNOSIS — R20.2 PARESTHESIAS: ICD-10-CM

## 2018-03-02 DIAGNOSIS — R23.3 EASY BRUISING: Primary | ICD-10-CM

## 2018-03-02 DIAGNOSIS — M85.80 OSTEOPENIA, UNSPECIFIED LOCATION: ICD-10-CM

## 2018-03-02 PROCEDURE — 99999 PR PBB SHADOW E&M-EST. PATIENT-LVL III: CPT | Mod: PBBFAC,,, | Performed by: PODIATRIST

## 2018-03-02 PROCEDURE — 11056 PARNG/CUTG B9 HYPRKR LES 2-4: CPT | Mod: Q9,S$GLB,, | Performed by: PODIATRIST

## 2018-03-02 PROCEDURE — 99499 UNLISTED E&M SERVICE: CPT | Mod: S$GLB,,, | Performed by: PODIATRIST

## 2018-03-02 PROCEDURE — 99999 PR PBB SHADOW E&M-EST. PATIENT-LVL V: CPT | Mod: PBBFAC,,, | Performed by: NURSE PRACTITIONER

## 2018-03-02 PROCEDURE — 11721 DEBRIDE NAIL 6 OR MORE: CPT | Mod: 59,Q9,S$GLB, | Performed by: PODIATRIST

## 2018-03-02 PROCEDURE — 99214 OFFICE O/P EST MOD 30 MIN: CPT | Mod: S$GLB,,, | Performed by: NURSE PRACTITIONER

## 2018-03-02 PROCEDURE — 3078F DIAST BP <80 MM HG: CPT | Mod: S$GLB,,, | Performed by: NURSE PRACTITIONER

## 2018-03-02 PROCEDURE — 3077F SYST BP >= 140 MM HG: CPT | Mod: S$GLB,,, | Performed by: NURSE PRACTITIONER

## 2018-03-02 RX ORDER — TRAMADOL HYDROCHLORIDE 50 MG/1
TABLET ORAL
Refills: 2 | COMMUNITY
Start: 2018-01-09 | End: 2018-03-20

## 2018-03-02 RX ORDER — TEMAZEPAM 30 MG/1
CAPSULE ORAL
COMMUNITY
Start: 2018-02-06 | End: 2018-03-09 | Stop reason: SDUPTHER

## 2018-03-02 NOTE — PROGRESS NOTES
"History of Present Illness   Magaly Nunes is a 85 y.o. woman with medical history as listed below who presents today for evaluation of constipation, chronic and easy bruising. She states that her last BM was this morning, however, it was hard stool and small in amount. This is causing her to have low back pain. She denies associated diarrhea, nausea, vomiting, or fever. She has not tried OTC medications. She does take pain medication and does not drink much water. She is also concerned because she has noticed an increase in bruising and is taking Plavix. She is unsure if she has bumped her arm and states the bruises "pop up over night." She denies fall or other injury. She has no blood in her stool, black tarry stool, or blood in urine. She has no additional complaints and is otherwise healthy on today's visit.      Past Medical History:   Diagnosis Date    Anxiety disorder     Arthritis 12/28/2017    Carpal tunnel syndrome     CHF (congestive heart failure)     Chronic allergic rhinitis     Chronic pain 10/22/2012    CKD stage 3 due to type 2 diabetes mellitus 2/14/2017    Constipation - functional 4/10/2013    Depression     Diabetes mellitus type II     Hot flash not due to menopause     HTN (hypertension)     Hypokalemia 11/19/2012    Insomnia 4/10/2013    Knee pain, bilateral 7/24/2013    Personal history of DVT (deep vein thrombosis)     Renovascular hypertension 2/14/2017    Severe tricuspid regurgitation 2/14/2017    Spinal stenosis     Dr. Corrales    Spinal stenosis of lumbar region 2/3/2014    Vertigo        Past Surgical History:   Procedure Laterality Date    CATARACT EXTRACTION Bilateral     EYE SURGERY      FRACTURE SURGERY      HYSTERECTOMY      ORIF RADIUS & ULNA FRACTURES         Social History     Social History    Marital status:      Spouse name: N/A    Number of children: N/A    Years of education: N/A     Social History Main Topics    Smoking status: " "Never Smoker    Smokeless tobacco: Never Used    Alcohol use No    Drug use: No    Sexual activity: No     Other Topics Concern    None     Social History Narrative    .  Lives alone.   and two sons have .  Active.         Family History   Problem Relation Age of Onset    No Known Problems Mother     No Known Problems Father     No Known Problems Sister     No Known Problems Brother     No Known Problems Maternal Aunt     No Known Problems Maternal Uncle     No Known Problems Paternal Aunt     No Known Problems Paternal Uncle     No Known Problems Maternal Grandmother     No Known Problems Maternal Grandfather     No Known Problems Paternal Grandmother     No Known Problems Paternal Grandfather     Amblyopia Neg Hx     Blindness Neg Hx     Cancer Neg Hx     Diabetes Neg Hx     Glaucoma Neg Hx     Hypertension Neg Hx     Macular degeneration Neg Hx     Retinal detachment Neg Hx     Strabismus Neg Hx     Stroke Neg Hx     Thyroid disease Neg Hx        Review of Systems  Review of Systems   Constitutional: Negative for fever.   Respiratory: Negative for shortness of breath.    Cardiovascular: Negative for chest pain and palpitations.   Gastrointestinal: Positive for constipation. Negative for abdominal pain, blood in stool, diarrhea, melena, nausea and vomiting.   Musculoskeletal: Positive for back pain and joint pain.   Skin: Negative for itching and rash.   Neurological: Negative for dizziness and loss of consciousness.   Endo/Heme/Allergies: Bruises/bleeds easily.     A complete review of systems was otherwise negative.    Physical Exam  BP (!) 160/76 (BP Location: Left arm, Patient Position: Sitting, BP Method: Medium (Manual))   Pulse 88   Temp 97.7 °F (36.5 °C) (Oral)   Ht 5' 3" (1.6 m)   Wt 75.1 kg (165 lb 10.8 oz)   LMP  (LMP Unknown)   SpO2 98%   BMI 29.35 kg/m²   General appearance: alert, appears stated age, cooperative and no distress  Back: symmetric, no " curvature. ROM normal. No CVA tenderness.  Lungs: clear to auscultation bilaterally  Heart: regular rate and rhythm, S1, S2 normal, no murmur, click, rub or gallop  Abdomen: soft, non-tender; bowel sounds normal; no masses,  no organomegaly  Extremities: extremities normal, atraumatic, no cyanosis or edema  Pulses: 2+ and symmetric  Skin: Skin color, texture, turgor normal. No rashes or lesions or bruising noted to upper and lower extremities  Neurologic: Grossly normal    Assessment/Plan  Easy bruising  Check platelet count.  Follow-up with cardiologist, do not stop the Plavix without speaking to your cardiologist.  -     CBC auto differential; Future; Expected date: 03/02/2018    Functional constipation  Obtain x-ray to rule out obstruction.  We discussed that she needs to take a stool softener with her pain medication and drink plenty of water.  If no relief, may require an enema.  -     X-Ray Abdomen Flat And Erect; Future; Expected date: 03/02/2018    Arthritis  The current medical regimen is effective;  continue present plan and medications.  Take stool softener with pain medication.    Osteopenia, unspecified location  The current medical regimen is effective;  continue present plan and medications.    She has verbalized understanding and is in agreement with plan of care.    Follow-up if symptoms worsen or fail to improve.

## 2018-03-02 NOTE — PATIENT INSTRUCTIONS
Constipation (Adult)  Constipation means that you have bowel movements that are less frequent than usual. Stools often become very hard and difficult to pass.  Constipation is very common. At some point in life it affects almost everyone. Since everyone's bowel habits are different, what is constipation to one person may not be to another. Your healthcare provider may do tests to diagnose constipation. It depends on what he or she finds when evaluating you.    Symptoms of constipation include:  · Abdominal pain  · Bloating  · Vomiting  · Painful bowel movements  · Itching, swelling, bleeding, or pain around the anus  Causes  Constipation can have many causes. These include:  · Diet low in fiber  · Too much dairy  · Not drinking enough liquids  · Lack of exercise or physical activity. This is especially true for older adults.  · Changes in lifestyle or daily routine, including pregnancy, aging, work, and travel  · Frequent use or misuse of laxatives  · Ignoring the urge to have a bowel movement or delaying it until later  · Medicines, such as certain prescription pain medicines, iron supplements, antacids, certain antidepressants, and calcium supplements  · Diseases like irritable bowel syndrome, bowel obstructions, stroke, diabetes, thyroid disease, Parkinson disease, hemorrhoids, and colon cancer  Complications  Potential complications of constipation can include:  · Hemorrhoids  · Rectal bleeding from hemorrhoids or anal fissures (skin tears)  · Hernias  · Dependency on laxatives  · Chronic constipation  · Fecal impaction  · Bowel obstruction or perforation  Home care  All treatment should be done after talking with your healthcare provider. This is especially true if you have another medical problems, are taking prescription medicines, or are an older adult. Treatment most often involves lifestyle changes. You may also need medicines. Your healthcare provider will tell you which will work best for you. Follow  the advice below to help avoid this problem in the future.  Lifestyle changes  These lifestyle changes can help prevent constipation:  · Diet. Eat a high-fiber diet, with fresh fruit and vegetables, and reduce dairy intake, meats, and processed foods  · Fluids. It's important to get enough fluids each day. Drink plenty of water when you eat more fiber. If you are on diet that limits the amount of fluid you can have, talk about this with your healthcare provider.  · Regular exercise. Check with your healthcare provider first.  Medications  Take any medicines as directed. Some laxatives are safe to use only every now and then. Others can be taken on a regular basis. Talk with your doctor or pharmacist if you have questions.  Prescription pain medicines can cause constipation. If you are taking this kind of medicine, ask your healthcare provider if you should also take a stool softener.  Medicines you may take to treat constipation include:  · Fiber supplements  · Stool softeners  · Laxatives  · Enemas  · Rectal suppositories  Follow-up care  Follow up with your healthcare provider if symptoms don't get better in the next few days. You may need to have more tests or see a specialist.  Call 911  Call 911 if any of these occur:  · Trouble breathing  · Stiff, rigid abdomen that is severely painful to touch  · Confusion  · Fainting or loss of consciousness  · Rapid heart rate  · Chest pain  When to seek medical advice  Call your healthcare provider right away if any of these occur:  · Fever over 100.4°F (38°C)  · Failure to resume normal bowel movements  · Pain in your abdomen or back gets worse  · Nausea or vomiting  · Swelling in your abdomen  · Blood in the stool  · Black, tarry stool  · Involuntary weight loss  · Weakness  Date Last Reviewed: 12/30/2015  © 9062-3154 [employer]. [address] [place] [address], [place] [address]. All rights reserved. This information is not intended as a substitute for  professional medical care. Always follow your healthcare professional's instructions.

## 2018-03-02 NOTE — TELEPHONE ENCOUNTER
----- Message from Manisha Zavala RN sent at 3/2/2018  3:42 PM CST -----  Dr. Bangura,   Ms. Nunes scored 10 on the PHQ9. She is taking Cymbalta. She reports feeling depressed over the loss of her  and 2 sons. I have referred her to our  and she has agreed to grief counseling. She denies SI/HI.     Thanks,     Manisha Zavala RN, [facility]  Outpatient Case Management  [facility]

## 2018-03-02 NOTE — PROGRESS NOTES
"Chart reviewed. Met with pt at Podiatry appt at [facility]. Pt ambulatory with cane. Pt reports intermittent swelling to bilateral lower extremities. Pt states she is having right hip pain and low back pain that she thinks is due to constipation. She states she does take Miralax as needed. She states she did have 2 small BMs yesterday but still feels constipated. She is not taking a stool softener. Pt also c/o bruising to bilateral arms. She denies trauma. She states "they just popped up". Pt is taking ASA 81 mg and Plavix 75 mg since January. Pt denies blood in stool. Pt has history of arthritis. She was instructed by Dr. Subramanian to see ortho. Pt goes to Dr. Estevez. She states she will schedule an appt. Dr. Subramanian ordered a cream for pt's pain in her feet in legs. Pt has been referred to Rheumatology. She does not have an appt yet. She states they are trying to find her someone on the [facility].  Pt has history of CHF. She is weighing daily. Provided daily weight logs. Educated on s/s of CHF. Assisted pt with getting appt with NP today. Offered to take pt in wheelchair. Pt declined stating she likes to get the exercise.  Walked pt from first floor to 2nd floor. Pt ambulates with a cane. Once she stepped out of the elevator. Pt states she needs a wheelchair. Assisted pt to a chair and got wheelchair for pt. Pt states she will have to call a cab to get home. Clinic staff with assist her to first floor after appt.       Pt lives alone. She has 2 nieces who live nearby. Pt does not drive. She states she sometimes has to take a cab to get to appts. Discussed [employer] transportation with pt and she states she used it before and "takes too long" . Pt lost her  of 67 years about 3 years ago. Pt states she has been feeling depressed because it was his birthday recently. Pt also lost 2 sons. Discussed grief counseling. Pt states she is interested. Completed phq 9 (see assessment). Pt denies SI/HI. Pt takes Cymbalta. " Pt's primary support is received form her 2 nieces, Deborah and Janeen.     Interventions performed:   Refer to [facility] for transportation and grief counseling, Pt is depressed 2/2 loss of  and 2 sons.   Med rec  Provide education materials on CHF, salt restriction, fall/safety precautions - pt denies falls in the last 12 months.   Encourage pt to wear compression stockings. She does have them but states they are uncomfortable.   Facilitate Rheumatology appt and Cardiac Rehab  Instruct on bowel regimen  Send message to Dr. Bangura regarding PHQ score of 10.       Plan:   Follow up on compression stockings  Follow up on appt for cardiac rehab and Rheumatology  Continue education on CHF and fall safety precautions  Follow up on constipation and bowel regimen  Follow up on receipt of new cream ordered by Dr. Subramanian  Discuss Digital Hypertension program

## 2018-03-02 NOTE — PATIENT INSTRUCTIONS
Heart Failure: Making Changes to Your Diet  You have a condition called heart failure. When you have heart failure, excess fluid is more likely to build up in your body because your heart isn't working well. This makes the heart work harder to pump blood. Fluid buildup causes symptoms such as shortness of breath and swelling (edema). This is often referred to as congestive heart failure or CHF. Controlling the amount of salt (sodium) you eat may help stop fluid from building up. Your doctor may also tell you to reduce the amount of fluid you drink.  Reading food labels    Your healthcare provider will tell you how much sodium you can eat each day. Read food labels to keep track. Keep in mind that certain foods are high in salt. These include canned, frozen, and processed foods. Check the amount of sodium in each serving. Watch out for high-sodium ingredients. These include MSG (monosodium glutamate), baking soda, and sodium phosphate.   Eating less salt  Give yourself time to get used to eating less salt. It may take a little while. Here are some tips to help:  · Take the saltshaker off the table. Replace it with salt-free herb mixes and spices.  · Eat fresh or plain frozen vegetables. These have much less salt than canned vegetables.  · Choose low-sodium snacks like sodium-free pretzels, crackers, or air-popped popcorn.  · Dont add salt to your food when youre cooking. Instead, season your foods with pepper, lemon, garlic, or onion.  · When you eat out, ask that your food be cooked without added salt.  · Avoid eating fried foods as these often have a great deal of salt.  If youre told to limit fluids  You may need to limit how much fluid you have to help prevent swelling. This includes anything that is liquid at room temperature, such as ice cream and soup. If your doctor tells you to limit fluid, try these tips:  · Measure drinks in a measuring cup before you drink them. This will help you meet daily  goals.  · Chill drinks to make them more refreshing.  · Suck on frozen lemon wedges to quench thirst.  · Only drink when youre thirsty.  · Chew sugarless gum or suck on hard candy to keep your mouth moist.  · Weigh yourself daily to know if your body's fluid content is rising.  My sodium goal  Your healthcare provider may give you a sodium goal to meet each day. This includes sodium found in food as well as salt that you add. My goal is to eat no more than ___________ mg of sodium per day.     When to call your doctor  Call your doctor right away if you have any symptoms of worsening heart failure. These can include:  · Sudden weight gain  · Increased swelling of your legs or ankles  · Trouble breathing when youre resting or at night  · Increase in the number of pillows you have to sleep on  · Chest pain, pressure, discomfort, or pain in the jaw, neck, or back   Date Last Reviewed: 3/21/2016  © 0874-7977 [employer]. [address] [place] [address]. All rights reserved. This information is not intended as a substitute for professional medical care. Always follow your healthcare professional's instructions.        Heart Failure: Tracking Your Weight  You have a condition called heart failure. When you have heart failure, a sudden weight gain or a steady rise in weight is a warning sign that your body is retaining too much water and salt. This could mean your heart failure is getting worse. If left untreated, it can cause problems for your lungs and result in shortness of breath. Weighing yourself each day is the best way to know if youre retaining water. If your weight goes up quickly, call your doctor. You will be given instructions on how to get rid of the excess water. You will likely need medicines and to avoid salt. This will help your heart work better.  Call your doctor if you gain more than 2 pounds in 1 day, more than 5 pounds in 1 week, or whatever weight gain you were told to  report by your doctor. This is often a sign of worsening heart failure and needs to be evaluated and treated. Your doctor will tell you what to do next.   Tips for weighing yourself    · Weigh yourself at the same time each morning, wearing the same clothes. Weigh yourself after urinating and before eating.  · Use the same scale each day. Make sure the numbers are easy to read. Put the scale on a flat, hard surface -- not on a rug or carpet.  · Do not stop weighing yourself. If you forget one day, weigh again the next morning.  How to use your weight chart  · Keep your weight chart near the scale. Write your weight on the chart as soon as you get off the scale.  · Fill in the month and the start date on the chart. Then write down your weight each day. Your chart will look like this:    · If you miss a day, leave the space blank. Weigh yourself the next day and write your weight in the next space.  · Take your weight chart with you when you go to see your doctor.  Date Last Reviewed: 3/20/2016  © 3601-7517 [email or web address]. [address] [place] [address]. All rights reserved. This information is not intended as a substitute for professional medical care. Always follow your healthcare professional's instructions.        Heart Failure: Warning Signs of a Flare-Up  You have a condition called heart failure. Once you have heart failure, flare-ups can happen. Below are signs that can mean your heart failure is getting worse. If you notice any of these warning signs, call your healthcare provider.  Swelling    · Your feet, ankles, or lower legs get puffier.  · You notice skin changes on your lower legs.  · Your shoes feel too tight.  · Your clothes are tighter in the waist.  · You have trouble getting rings on or off your fingers.  Shortness of breath  · You have to breathe harder even when youre doing your normal activities or when youre resting.  · You are short of breath walking up stairs or even  short distances.  · You wake up at night short of breath or coughing.  · You need to use more pillows or sit up to sleep.  · You wake up tired or restless.  Other warning signs  · You feel weaker, dizzy, or more tired.  · You have chest pain or changes in your heartbeat.  · You have a cough that wont go away.  · You cant remember things or dont feel like eating.  Tracking your weight  Gaining weight is often the first warning sign that heart failure is getting worse. Gaining even a few pounds can be a sign that your body is retaining excess water and salt. Weighing yourself each day in the morning after you urinate and before you eat, is the best way to know if you're retaining water. Get a scale that is easy to read and make sure you wear the same clothes and use the same scale every time you weigh. Your healthcare provider will show you how to track your weight. Call your doctor if you gain more than 2 pounds in 1 day, 5 pounds in 1 week, or whatever weight gain you were told to report by your doctor. This is often a sign of worsening heart failure and needs to be evaluated and treated before it compromises your breathing. Your doctor will tell you what to do next.    Date Last Reviewed: 3/15/2016  © 4698-2147 [employer]. [address] [place] [address], [place] [address]. All rights reserved. This information is not intended as a substitute for professional medical care. Always follow your healthcare professional's instructions.        Warning Signs of Heart Attack    Since youve had a heart attack, your risk of having another is increased. Before you leave the hospital, ask your healthcare provider what symptoms to watch for. The list below may help. Keep in mind that the symptoms of a second heart attack may be similar--or different--from the ones of the first. Also, remember that women may have certain symptoms that are different from men.  The faster you get help during a heart attack, the less  damage your heart will likely have. If you think youre having another heart attack, call 911 right away. Dont wait longer than 5 minutes.  · Pressure, squeezing, discomfort or pain in the chest  · Other discomfort in the neck, jaw, shoulders, arms, or back  · Severe shortness of breath  · Dizziness or faintness  · Nausea or vomiting  · Sweating  Resources  · American Heart Association www.heart.org  · National Heart, Lung, and Blood [place] (NHLBI) www.nhlbi.nih.gov   · Womens Hearts: The National Coalition for Women with Heart Disease. www.womenheart.org  Date Last Reviewed: 9/1/2016 © 2000-2017 [employer]. [address] [place] [address], [place] [address]. All rights reserved. This information is not intended as a substitute for professional medical care. Always follow your healthcare professional's instructions.        Discharge Instructions for Heart Attack  You have had a heart attack (acute myocardial infarction). A heart attack occurs when a vessel that sends blood to your heart suddenly becomes blocked. This causes your heart not to work as well as it should. Follow these guidelines for home care and lifestyle changes.  Home care  · Take your medicines exactly as directed. Dont skip doses. Talk with your healthcare provider if your medicines aren't working for you. Together you can come up with another treatment plan.  · Remember that recovery after a heart attack takes time. Plan to rest for at least 4 to 8 weeks while you recover. Then return to normal activity when your doctor says its OK.  · Ask your doctor about joining a heart rehabilitation program. This can help strengthen your heart and lungs and give you more energy and confidence.  · Tell your doctor if you are feeling depressed. Feelings of sadness are common after a heart attack. But it is important to speak to someone or seek counseling if you are feeling overwhelmed by these feelings.  · Call 911 right away if you have chest  pain or pain that goes to your shoulder, neck, or back. Don't drive yourself to the hospital.  · Ask your family members to learn CPR. This is an important skill that can save lives when it's needed.  · Learn to take your own blood pressure and pulse. Keep a record of your results. Ask your doctor when you should seek emergency medical attention. He or she will tell you which blood pressure reading is dangerous.  Lifestyle changes  Your heart attack might have been caused by cardiovascular disease. Your healthcare provider will work with you to make changes to your lifestyle. This will help the heart disease from getting worse. These changes will most likely be a combination of diet and exercise.  Diet  Your healthcare provider will tell you what changes you need to make to your diet. You may need to see a registered dietitian for help with these diet changes. These changes may include:  · Cutting back on how much fat and cholesterol you eat  · Cutting back on how much salt (sodium) you eat, especially if you have high blood pressure  · Eating more fresh vegetables and fruits  · Eating lean proteins such as fish, poultry, beans, and peas, and eating less red meat and processed meats  · Using low-fat dairy products  · Using vegetable and nut oils in limited amounts  · Limiting how many sweets and processed foods such as chips, cookies, and baked goods you eat  · Limiting how often you eat out. And when you do eat out, making better food choices.  · Not eating fried or greasy foods, or foods high in saturated fat  Exercise  Your healthcare provider may tell you to get more exercise if you haven't been physically active. Depending on your case, your provider may recommend that you get moderate to vigorous physical activity for at least 40 minutes each day, and for at least 3 to 4 days each week. A few examples of moderate to vigorous activity include:  · Walking at a brisk pace, about 3 to 4 miles per hour  · Jogging  or running  · Swimming or water aerobics  · Hiking  · Dancing  · Martial arts  · Tennis  · Riding a bicycle or stationary bike  Other changes  Your healthcare provider may also recommend that you:  · Lose weight. If you are overweight or obese, your provider will work with you to lose extra pounds. Making diet changes and getting more exercise can help. A good goal is to lose your 10% of your body weight in one year.  · Stop smoking. Sign up for a stop-smoking program to make it more likely for you to quit for good. You can join a stop-smoking support group. Or ask your doctor about nicotine replacement products.  · Learn to manage stress. Stress management techniques to help you deal with stress in your home and work life. This will help you feel better emotionally and ease the strain on your heart.  Follow-up  Make a follow-up appointment as directed by our staff.     When to seek medical advice  Call 911 right away if you have:  · Chest pain that goes to your neck, jaw, back, or shoulder  · Shortness of breath  Otherwise, call your doctor immediately if you have:  · Lightheadedness, dizziness, or fainting  · Feeling of irregular heartbeat or fast pulse.   Date Last Reviewed: 10/1/2016  © 7016-9857 [employer]. [address] [place] [address], [place] [address]. All rights reserved. This information is not intended as a substitute for professional medical care. Always follow your healthcare professional's instructions.        Fall Prevention  Falls often occur due to slipping, tripping or losing your balance. Millions of people fall every year and injure themselves. Here are ways to reduce your risk of falling again.  · Think about your fall, was there anything that caused your fall that can be fixed, removed, or replaced?  · Make your home safe by keeping walkways clear of objects you may trip over.  · Use non-slip pads under rugs. Do not use area rugs or small throw rugs.  · Use non-slip mats in bathtubs  and showers.  · Install handrails and lights on staircases.  · Do not walk in poorly lit areas.  · Do not stand on chairs or wobbly ladders.  · Use caution when reaching overhead or looking upward. This position can cause a loss of balance.  · Be sure your shoes fit properly, have non-slip bottoms and are in good condition.   · Wear shoes both inside and out. Avoid going barefoot or wearing slippers.  · Be cautious when going up and down stairs, curbs, and when walking on uneven sidewalks.  · If your balance is poor, consider using a cane or walker.  · If your fall was related to alcohol use, stop or limit alcohol intake.   · If your fall was related to use of sleeping medicines, talk to your doctor about this. You may need to reduce your dosage at bedtime if you awaken during the night to go to the bathroom.    · To reduce the need for nighttime bathroom trips:  ¨ Avoid drinking fluids for several hours before going to bed  ¨ Empty your bladder before going to bed  ¨ Men can keep a urinal at the bedside  · Stay as active as you can. Balance, flexibility, strength, and endurance all come from exercise. They all play a role in preventing falls. Ask your healthcare provider which types of activity are right for you.  · Get your vision checked on a regular basis.  · If you have pets, know where they are before you stand up or walk so you don't trip over them.  · Use night lights.  Date Last Reviewed: 11/5/2015  © 3563-8402 [employer]. [address] [place] [address], [place] [address]. All rights reserved. This information is not intended as a substitute for professional medical care. Always follow your healthcare professional's instructions.        Depression  Depression is one of the most common mental health problems today. It is not just a state of unhappiness or sadness. It is a true disease. The cause seems to be related to a decrease in chemicals that transmit signals in the brain. Having a family history  of depression, alcoholism, or suicide increases the risk. Chronic illness, chronic pain, migraine headaches and high emotional stress also increase the risk.  Depression is something we tend to recognize in others, but may have a hard time seeing in ourselves. It can show in many physical and emotional ways:  · Loss of appetite  · Over-eating  · Not being able to sleep  · Sleeping too much  · Tiredness not related to physical exertion  · Restlessness or irritability  · Slowness of movement or speech  · Feeling depressed or withdrawn  · Loss of interest in things you once enjoyed  · Trouble concentrating, poor memory, trouble making decisions  · Thoughts of harming or killing oneself, or thoughts that life is not worth living  · Low self-esteem  The treatment for depression may include both medicine and psychotherapy. Antidepressants can reduce suffering and can improve the ability to function during the depressed period. Therapy can offer emotional support and help you understand emotional factors that may be causing the depression.  Home care  · On-going care and support helps people manage this disease.  Find a healthcare provider and therapist who meet your needs. Seek help when you feel like you may be getting ill.  · Be kind to yourself. Make it a point to do things that you enjoy (gardening, walking in nature, going to a movie, etc.). Reward yourself for small successes.  · Take care of your physical body. Eat a balanced diet (low in saturated fat and high in fruits and vegetables). Exercise at least 3 times a week for 30 minutes. Even mild-moderate exercise (like brisk walking) can make you feel better.  · Avoid alcohol, which can make depression worse.  · Take medicine as prescribed.  · Tell each of your healthcare providers about all of the prescription drugs, over-the-counter medicines, vitamins, and supplements you take. Certain supplements interact with medicines and can result in dangerous side  effects. Ask your pharmacist when you have questions about drug interactions.  · Talk with your family and trusted friends about your feelings and thoughts. Ask them to help you recognize behavior changes early so you can get help and, if needed, medicine can be adjusted.  Follow-up care  Follow up with your healthcare provider, or as advised.  Call 911  Call 911 if you:  · Have suicidal thoughts, a suicide plan, and the means to carry out the plan  · Have trouble breathing  · Are very confused  · Feel very drowsy or have trouble awakening  · Faint or lose consciousness  · Have new chest pain that becomes more severe, lasts longer, or spreads into your shoulder, arm, neck, jaw or back  When to seek medical advice  Call your healthcare provider right away if any of these occur:  · Feeling extreme depression, fear, anxiety, or anger toward yourself or others  · Feeling out of control  · Feeling that you may try to harm yourself or another  · Hearing voices that others do not hear  · Seeing things that others do not see  · Cant sleep or eat for 3 days in a row  · Friends or family express concern over your behavior and ask you to seek help  Date Last Reviewed: 9/29/2015  © 6503-6689 [employer]. [address] [place] [address], [place] [address]. All rights reserved. This information is not intended as a substitute for professional medical care. Always follow your healthcare professional's instructions.

## 2018-03-03 DIAGNOSIS — I10 ESSENTIAL HYPERTENSION: ICD-10-CM

## 2018-03-04 NOTE — PROGRESS NOTES
Subjective:      Patient ID: Magaly Nunes is a 85 y.o. female.    Chief Complaint: Diabetic Foot Exam (bilateral toe pain Pcp Dr. Bangura 12/11/17); Diabetes Mellitus; Nail Care; and Foot Pain    Magaly is a 85 y.o. female who presents to the clinic for evaluation and treatment of high risk feet. Magaly has a past medical history of Anxiety disorder; Arthritis (12/28/2017); Carpal tunnel syndrome; CHF (congestive heart failure); Chronic allergic rhinitis; Chronic pain (10/22/2012); CKD stage 3 due to type 2 diabetes mellitus (2/14/2017); Constipation - functional (4/10/2013); Depression; Diabetes mellitus type II; Hot flash not due to menopause; HTN (hypertension); Hypokalemia (11/19/2012); Insomnia (4/10/2013); Knee pain, bilateral (7/24/2013); Personal history of DVT (deep vein thrombosis); Renovascular hypertension (2/14/2017); Severe tricuspid regurgitation (2/14/2017); Spinal stenosis; Spinal stenosis of lumbar region (2/3/2014); and Vertigo. The patient's chief complaint is DM foot exam. Diet controlled DM but has neuropathy symptoms. Relates painful thick toenails on both feet especially both great toenails which hurt her especially in shoes. No other complaints today.        This patient has documented high risk feet requiring routine maintenance secondary to diabetes mellitis and those secondary complications of diabetes, as mentioned..      PCP: Chris Bangura MD    Date Last Seen by PCP:  Chief Complaint   Patient presents with    Diabetic Foot Exam     bilateral toe pain Pcp Dr. Bangura 12/11/17    Diabetes Mellitus    Nail Care    Foot Pain     Current shoe gear:  sandals    Hemoglobin A1C   Date Value Ref Range Status   12/29/2017 5.7 (H) 4.0 - 5.6 % Final     Comment:     According to ADA guidelines, hemoglobin A1c <7.0% represents  optimal control in non-pregnant diabetic patients. Different  metrics may apply to specific patient populations.   Standards of Medical Care in  Diabetes-2016.  For the purpose of screening for the presence of diabetes:  <5.7%     Consistent with the absence of diabetes  5.7-6.4%  Consistent with increasing risk for diabetes   (prediabetes)  >or=6.5%  Consistent with diabetes  Currently, no consensus exists for use of hemoglobin A1c  for diagnosis of diabetes for children.  This Hemoglobin A1c assay has significant interference with fetal   hemoglobin   (HbF). The results are invalid for patients with abnormal amounts of   HbF,   including those with known Hereditary Persistence   of Fetal Hemoglobin. Heterozygous hemoglobin variants (HbAS, HbAC,   HbAD, HbAE, HbA2) do not significantly interfere with this assay;   however, presence of multiple variants in a sample may impact the %   interference.     11/03/2017 5.9 (H) 4.0 - 5.6 % Final     Comment:     According to ADA guidelines, hemoglobin A1c <7.0% represents  optimal control in non-pregnant diabetic patients. Different  metrics may apply to specific patient populations.   Standards of Medical Care in Diabetes-2016.  For the purpose of screening for the presence of diabetes:  <5.7%     Consistent with the absence of diabetes  5.7-6.4%  Consistent with increasing risk for diabetes   (prediabetes)  >or=6.5%  Consistent with diabetes  Currently, no consensus exists for use of hemoglobin A1c  for diagnosis of diabetes for children.  This Hemoglobin A1c assay has significant interference with fetal   hemoglobin   (HbF). The results are invalid for patients with abnormal amounts of   HbF,   including those with known Hereditary Persistence   of Fetal Hemoglobin. Heterozygous hemoglobin variants (HbAS, HbAC,   HbAD, HbAE, HbA2) do not significantly interfere with this assay;   however, presence of multiple variants in a sample may impact the %   interference.     06/19/2017 6.0 (H) 4.0 - 5.6 % Final     Comment:     According to ADA guidelines, hemoglobin A1c <7.0% represents  optimal control in non-pregnant  diabetic patients. Different  metrics may apply to specific patient populations.   Standards of Medical Care in Diabetes-2016.  For the purpose of screening for the presence of diabetes:  <5.7%     Consistent with the absence of diabetes  5.7-6.4%  Consistent with increasing risk for diabetes   (prediabetes)  >or=6.5%  Consistent with diabetes  Currently, no consensus exists for use of hemoglobin A1c  for diagnosis of diabetes for children.  This Hemoglobin A1c assay has significant interference with fetal   hemoglobin   (HbF). The results are invalid for patients with abnormal amounts of   HbF,   including those with known Hereditary Persistence   of Fetal Hemoglobin. Heterozygous hemoglobin variants (HbAS, HbAC,   HbAD, HbAE, HbA2) do not significantly interfere with this assay;   however, presence of multiple variants in a sample may impact the %   interference.       Patient Active Problem List   Diagnosis    Depression    Anxiety disorder    Essential hypertension    Functional constipation    Spinal stenosis of lumbar region    PVD (peripheral vascular disease)    Aortic atherosclerosis    Baker's cyst    Depression, major, recurrent, moderate    NSTEMI (non-ST elevated myocardial infarction)    Pulmonary hypertension    Synovial cyst of popliteal space (Baker), left knee    Severe tricuspid regurgitation    Vitamin D deficiency disease    Osteopenia    Chronic diastolic heart failure    CKD Stage 3    GERD (gastroesophageal reflux disease)    Type 2 diabetes mellitus, controlled     Current Outpatient Prescriptions on File Prior to Visit   Medication Sig Dispense Refill    aspirin 81 MG Chew Take 1 tablet (81 mg total) by mouth once daily.  0    atorvastatin (LIPITOR) 20 MG tablet Take 1 tablet (20 mg total) by mouth once daily. 30 tablet 10    clopidogrel (PLAVIX) 75 mg tablet Take 1 tablet (75 mg total) by mouth once daily. 30 tablet 10    DULoxetine (CYMBALTA) 60 MG capsule TAKE 1  CAPSULE(60 MG) BY MOUTH EVERY DAY 30 capsule 12    furosemide (LASIX) 40 MG tablet Take 1 tablet (40 mg total) by mouth once daily. 30 tablet 11    hydrocodone-acetaminophen 5-325mg (NORCO) 5-325 mg per tablet Take 1 tablet by mouth every 4 (four) hours as needed for Pain. 20 tablet 0    losartan (COZAAR) 100 MG tablet TAKE ONE TABLET BY MOUTH ONCE DAILY 90 tablet 0    omeprazole (PRILOSEC) 20 MG capsule TAKE 2 CAPSULES BY MOUTH EVERY DAY FOR ACID REFLUX OR STOMACH 180 capsule 0    potassium chloride SA (K-DUR,KLOR-CON) 20 MEQ tablet TAKE 1 TABLET BY MOUTH ONCE DAILY. 90 tablet 0    tiZANidine 4 mg Cap Take by mouth.      carvedilol (COREG) 6.25 MG tablet Take 1 tablet (6.25 mg total) by mouth 2 (two) times daily. 60 tablet 0    LORazepam (ATIVAN) 0.5 MG tablet Take 1 tablet (0.5 mg total) by mouth every 8 (eight) hours as needed for Anxiety. (caution-may cause sleepiness) 60 tablet 3     No current facility-administered medications on file prior to visit.      Review of patient's allergies indicates:   Allergen Reactions    Ace inhibitors      Other reaction(s): Unknown    Aspirin      Other reaction(s): Unknown    Aciphex  [rabeprazole]      Other reaction(s): Stomach upset    Bextra  [valdecoxib]      Other reaction(s): Unknown    Clonidine      Other reaction(s): dry mouth.lip swelling    Cardizem  [diltiazem hcl]      Other reaction(s): Unknown    Mavik  [trandolapril]      Other reaction(s): Unknown    Nsaids (non-steroidal anti-inflammatory drug)      Other reaction(s): black spots on skin    Phenytoin sodium extended      Other reaction(s): Stomach upset    Tramadol      Other reaction(s): stomach pain     Past Surgical History:   Procedure Laterality Date    CATARACT EXTRACTION Bilateral     EYE SURGERY      FRACTURE SURGERY      HYSTERECTOMY      ORIF RADIUS & ULNA FRACTURES       Family History   Problem Relation Age of Onset    No Known Problems Mother     No Known Problems  Father     No Known Problems Sister     No Known Problems Brother     No Known Problems Maternal Aunt     No Known Problems Maternal Uncle     No Known Problems Paternal Aunt     No Known Problems Paternal Uncle     No Known Problems Maternal Grandmother     No Known Problems Maternal Grandfather     No Known Problems Paternal Grandmother     No Known Problems Paternal Grandfather     Amblyopia Neg Hx     Blindness Neg Hx     Cancer Neg Hx     Diabetes Neg Hx     Glaucoma Neg Hx     Hypertension Neg Hx     Macular degeneration Neg Hx     Retinal detachment Neg Hx     Strabismus Neg Hx     Stroke Neg Hx     Thyroid disease Neg Hx      Social History     Social History    Marital status:      Spouse name: N/A    Number of children: N/A    Years of education: N/A     Occupational History    Not on file.     Social History Main Topics    Smoking status: Never Smoker    Smokeless tobacco: Never Used    Alcohol use No    Drug use: No    Sexual activity: No     Other Topics Concern    Not on file     Social History Narrative    .  Lives alone.   and two sons have .  Active.         Review of Systems   Constitution: Positive for weight loss (intentional, 22 lbs). Negative for chills, fever and weakness.   Cardiovascular: Positive for leg swelling. Negative for chest pain and claudication.   Respiratory: Negative for cough and shortness of breath.    Skin: Positive for dry skin and nail changes. Negative for itching and rash.   Musculoskeletal: Positive for arthritis, back pain, joint pain, joint swelling and muscle weakness. Negative for falls.        Walks with cane secondary to back pain    Gastrointestinal: Negative for diarrhea, nausea and vomiting.   Neurological: Positive for numbness and paresthesias. Negative for tremors.   Psychiatric/Behavioral: Negative for altered mental status and hallucinations.         Objective:       Vitals:    18 1108   BP: (!)  "160/84   Weight: 77.6 kg (171 lb)   Height: 5' 3" (1.6 m)   PainSc: 10-Worst pain ever   PainLoc: Toe       Physical Exam   Constitutional:   General: Pt. is well-developed, well-nourished, appears stated age, in no acute distress, alert and oriented x 3. No evidence of depression, anxiety, or agitation. Calm, cooperative, and communicative. Appropriate interactions and affect.       Cardiovascular:   Pulses:       Dorsalis pedis pulses are 2+ on the right side, and 2+ on the left side.        Posterior tibial pulses are 1+ on the right side, and 1+ on the left side.   There is absent digital hair. Skin is atrophic, shiny Hyperpigmented b/l LEs. Cool to touch distal foot b/l.    Musculoskeletal:        Right foot: There is normal range of motion.        Left foot: There is normal range of motion.   Adequate joint range of motion without pain, limitation, nor crepitation Bilateral feet and ankle joints. Muscle strength is 5/5 in all groups bilaterally.    Cane assisted gait    Patient has hammertoes of digits 2-5 bilateral partially reducible without symptom today.    Visible and palpable bunion without pain at dorsomedial 1st metatarsal head right and left.  Hallux abducted right and left partially reducible, tracks laterally without being track bound.  No ecchymosis, erythema, edema, or cardinal signs infection or signs of trauma same foot.    Fat pad atrophy to heels and met heads bilateral    Pain to b/l borders of b/l hallux with palpation. Pain to direct palpation and lateral squeeze of left heel hyperkeratotic lesions.    Neurological: A sensory deficit is present.   Lincoln-Devon 5.07 monofilamant testing is diminished John feet. Sharp/dull sensation diminished Bilaterally. Light touch absent Bilaterally.    Paresthesias, and hyperesthesia bilateral feet at toes with no clearly identified trigger or source.         Skin: Skin is warm, dry and intact. No abrasion, no ecchymosis, no lesion and no rash noted. " She is not diaphoretic. No cyanosis. No pallor. Nails show no clubbing.   Toenails 1-5 bilaterally thickened with yellow/brown discoloration and subungual debris. B/l borders of b/l hallux moderately incurvated without wound/infection. Pain to touch. No erythema.     Separate focal hyperkeratotic lesions x 3 noted to plantar left heel. No signs of deep tissue injury.     hyperpigmentation to dorsal feet loni    Interdigital Spaces clean, dry and without evidence of break in skin integrity   Psychiatric: She has a normal mood and affect. Her speech is normal.   Nursing note and vitals reviewed.        Assessment:       Encounter Diagnoses   Name Primary?    Type II diabetes mellitus with peripheral circulatory disorder Yes    Bilateral lower extremity edema     Paresthesias     Onychomycosis due to dermatophyte          Plan:       Magaly was seen today for diabetic foot exam, diabetes mellitus, nail care and foot pain.    Diagnoses and all orders for this visit:    Type II diabetes mellitus with peripheral circulatory disorder    Bilateral lower extremity edema    Paresthesias    Onychomycosis due to dermatophyte      I counseled the patient on her conditions, their implications and medical management.     With patient's permission, nails were aggressively reduced and debrided 1,2,3,4, 5 R and 1,2, 3,4,5 L and filed to their soft tissue attachment mechanically and with electric , removing all offending nail and debris. Utilizing a #15 scalpel, I trimmed the calluses at the above mentioned location (left plantar heel x 3)    Patient tolerated this well and no blood was drawn. Patient reports relief following the procedure.     She will continue to monitor the areas daily, inspect her feet, wear protective shoe gear when ambulatory, moisturizer to maintain skin integrity and follow in this office in approximately 3-4 months, sooner p.r.n.    Long discussion with patient regarding appropriate, supportive and  comfortable shoes. Recommended SAS/Easy Spirit style shoe brands with adequate arch supports to alleviate abnormal pressure and improve stability of foot while walking. Avoid flat shoes and barefoot walking as these will exacerbate or worsen symptoms.     Continue Cymbalta per Neurology recommendations. Will add Rx compound pain cream to be applied to areas of paresthesias up to 4-5 x daily as needed for pain. Prescription faxed to [facility].     Discussed proper and consistent elevation of lower extremities, above the level of the heart, while at rest, to help control/improve edema.     RTC 3 months call sooner if any problems arise.

## 2018-03-05 ENCOUNTER — TELEPHONE (OUTPATIENT)
Dept: FAMILY MEDICINE | Facility: CLINIC | Age: 83
End: 2018-03-05

## 2018-03-05 ENCOUNTER — HOSPITAL ENCOUNTER (OUTPATIENT)
Dept: RADIOLOGY | Facility: HOSPITAL | Age: 83
Discharge: HOME | End: 2018-03-05
Attending: NURSE PRACTITIONER
Payer: MEDICARE

## 2018-03-05 DIAGNOSIS — K59.04 FUNCTIONAL CONSTIPATION: ICD-10-CM

## 2018-03-05 PROCEDURE — 74019 RADEX ABDOMEN 2 VIEWS: CPT | Mod: 26,,, | Performed by: RADIOLOGY

## 2018-03-05 PROCEDURE — 74019 RADEX ABDOMEN 2 VIEWS: CPT | Mod: TC,FY,PO

## 2018-03-05 RX ORDER — LOSARTAN POTASSIUM 100 MG/1
TABLET ORAL
Qty: 90 TABLET | Refills: 0 | Status: SHIPPED | OUTPATIENT
Start: 2018-03-05 | End: 2018-06-09 | Stop reason: SDUPTHER

## 2018-03-05 NOTE — TELEPHONE ENCOUNTER
Please let the patient know that her x-ray does show constipation. She should continue the stool softeners.    Also, her blood work shows that her platelet count is normal. Continue the Plavix and discuss the bruising further with cardiologist at next appointment.    Thanks!  JASPAL TrippC

## 2018-03-06 ENCOUNTER — OUTPATIENT CASE MANAGEMENT (OUTPATIENT)
Dept: ADMINISTRATIVE | Facility: OTHER | Age: 83
End: 2018-03-06

## 2018-03-06 NOTE — LETTER
March 6, 2018    Magaly Nunes  [id number] [id number]             [facility] [address] [place] [address] Dear Ms. Nunes:    I am writing from the Outpatient Complex Care Management Department at [facility].  It was a pleasure speaking with you earlier today. Per our conversation, enclosed are some resource information that I believe may be beneficial to you. If you have any questions, please feel free to contact me.    I can be reached at [phone] Monday thru Friday from 8:00am to 4:30pm.  [facility] also has a program with a nurse available 24/7 to answer questions or provide medical advice.  [facility] can be reached at [phone].    Sincerely,       Celia Campbell LCSW

## 2018-03-06 NOTE — PATIENT INSTRUCTIONS
Grief and Loss  Losing someone you care about is painful. Grief is the emotional reaction that follows. Its a normal process, with both physical and emotional signs. But even with major life changes, such as the loss of a spouse or parent, you can face the loss and move on.    Grief takes many forms  Each person will have his or her own grieving process. Grief may or may not occur in predictable stages. Or, it may bounce between stages. But, in time, grief will gradually lead you towards acceptance of the loss. Many people are able to continue normal daily activities even with bouts of grief.  Common grief reactions include:  · Not want to believe the loss is real  · Emotional numbness, shock  · Feel annoyed or outright angry  · Think you could have done something to stop the loss  · Have sad moods and feel hopeless  · Loss of appetite, sleep  and loss of interest in things you used to enjoy  Normal grief typically does not need to be treated. Chronic or blunted grief may require treatment with talk therapy. Serious grief reactions or depression related to grief may require treatment. If you think you have grief-related depression, talk to your doctor about whether you need treatment.     Date Last Reviewed: 11/3/2015  © 5781-8174 [employer]. [address] [place] [address], [place] [address]. All rights reserved. This information is not intended as a substitute for professional medical care. Always follow your healthcare professional's instructions.        Helping Yourself Through Grief and Loss  Do what you can to stay healthy. Reaching out for support will help a great deal. You may find yourself asking: Why? Its normal to seek meaning by asking questions, but theres not always an answer for loss. With time, your loss may still be part of your life, but not the only thing in it.    Take care of yourself  Taking good care of yourself helps your body heal from the physical and emotional symptoms of  grief. Pay extra attention to healthy exercise, sleep, and eating routines. What else do you need to feel better? Having family around can help you feel loved. Or you might need a walk or movie with friends to take your mind off things for a little while.  Accept support  Joining the world again is part of healing. These tips may help:  · Stay in touch with family and friends, even if its hard to talk.  · Tell people how they can help. It can be as simple as walking your dog.  · Stick to a daily routine that keeps you connected to friends and family.  · Try to avoid making major decisions   · Attend a support group of people who have been through the same type of loss.  When to get help  There's no normal length of time to grieve. But if you feel stuck and unable to move on, or if it has been 6 months or more since your loss and you still have signs of grief listed below, it may be time for professional help. Seeking professional help is not a sign of weakness. It indicates that you are taking responsibility for your recovery. Be alert to depression and call your healthcare provider if you:  · Cant go to work or take care of the kids.  · Cant eat or sleep normally.  · Feel helpless, hopeless, or worthless.  · Lose interest in hobbies, friends, and activities that used to give pleasure.  · Gain or lose a lot of weight.  · Feel your grief is getting worse, or not getting any better.  · Have repeated thoughts of suicide or of harming yourself. You can also call 9-1-1 or a crisis hotline (located in the yellow pages of your phonebook) if you have these thoughts.  At some point, youll begin thinking about the future. Youll want to look ahead and make plans. To help yourself reach this point, try to do one thing each day to join in life. Keep at it, even if it feels strange at first. Your life can never be exactly the same. But one day youll find youre living life fully again.  Date Last Reviewed: 11/10/2015  ©  7424-0620 [employer]. [address] [place] [address], [place] [address]. All rights reserved. This information is not intended as a substitute for professional medical care. Always follow your healthcare professional's instructions.        Grief Reaction  Grief is the feeling that we all have when we lose someone or something that has been important in our life. Grief is an unavoidable and normal reaction to this loss, and can last from months to years. The amount of time depends on different factors. These include how close the person was to you, and how much support you have through the grief process. Symptoms can be both physical and emotional.  Physical reactions:  · Loss of appetite or overeating  · Changes in weight  · Trouble getting to sleep or staying asleep  · Hair loss  · Upset stomach, indigestion, heart burn, abdominal pain, cramping, diarrhea  · Sense of trouble breathing  · Trembling, shakiness  Emotional reactions:  · Sadness  · Anxiety  · Feeling depressed or helpless  · Difficulty concentrating  · Detachment or withdrawal from those around you  · Loss of interest in your normal life and work  Home care  · Allow yourself to feel the pain of your loss. For some, this can be a key part of healing grief. Talk about your pain with others who understand. Share good memories that involve the person, pet, or possession  you lost.  · Take time for yourself. Make it a point to do things that you enjoy (gardening, walking in nature, going to a movie, etc.).  · Take care of your physical body. Eat a balanced diet (low in saturated fat and high in fruits and vegetables) and establish an exercise plan at least 3 times a week for 30 minutes. Even mild-moderate exercise (like brisk walking) can make you feel better. Get plenty of sleep.  · Avoid the use of alcohol and drugs to cover your emotional pain. This only slows down the emotional healing process.  · Do not isolate yourself from others. Have daily  contact with family or friends. Talk about your loss to those closest to you.  · For additional support, meet with your //rabbi, a counselor or therapist, or your own doctor.  · Consider joining a grief support group. Ask your doctor or our staff for information on how to find one in your area.  · If you have been prescribed a medicine to help with your symptoms, take it only as directed. Do not use it with alcohol.  Follow-up care  Follow up with your healthcare provider, or as advised.  Call 911  Call 911 if any of these occur:  · Trouble breathing  · Very confused  · Very drowsy or trouble awakening  · Fainting or loss of consciousness  · Rapid heart rate  · Seizure  · New chest pain that becomes more severe, lasts longer, or spreads into your shoulder, arm, neck, jaw or back  When to seek medical advice  Call your healthcare provider right away if any of these occur:  · Worsening symptoms  · Unable to eat or sleep for three days in a row  · Feeling extreme depression, fear, anxiety, or anger toward yourself or others  · Feeling out of control  · Feeling that you may try to harm yourself  · family or friends express concern over your behavior and ask you to get help  Date Last Reviewed: 9/29/2015  © 0290-7479 [employer]. [address] [place] [address], [place] [address]. All rights reserved. This information is not intended as a substitute for professional medical care. Always follow your healthcare professional's instructions.        Moving Through Grief    Feeling better wont happen overnight. At first, it may be all you can do just to get through the day. But there is hope. Know that you will feel better with time, as long as you let yourself grieve. You need to grieve in order to heal. It hurts, but it is a normal part of healing process.  The first response  Your first response is often the most intense. You may cry a lot. Or you may feel a deep numbness or shock. Everyone grieves in his  or her own way, but there are common signs of grief:  · Having intense mood swings  · Anxiety and loneliness because you are not with your loved one, and you want to bring the person back.  · Sleeping too much or too little  · Eating too much or too little  · Having trouble thinking clearly  · Wanting to be alone all the time  For most people, these symptoms lessen within 6 to 12 months. Talk to your healthcare provider if your symptoms last longer than 12 months.   Give yourself a break  Try not to expect too much of yourself right away. It may be hard to work, take care of the kids, or focus on projects for a while. Give yourself more time than usual to get things done, since you may be distracted. Take time for yourself. Do some things that you enjoy. Go for a ride in the country. Read. It may feel like nothing brings you fredy. But know that time really does help.  Know your grief process  Let yourself feel all of your feelings and go through your grief fully. The process is full of ups and downs. One day you may feel a lot better. The next day, you may cry again. Try not to think: I should be over this by now. There are no shoulds to grief. Let yourself mourn your loss as long as you need to. Remember the good times you had with your loved one. It might help to think of ways you dealt with a loss in the past. That way grief wont seem so scary and overwhelming.  Date Last Reviewed: 11/4/2015  © 3326-4984 [employer]. [address] [place] [address], [place] [address]. All rights reserved. This information is not intended as a substitute for professional medical care. Always follow your healthcare professional's instructions.

## 2018-03-06 NOTE — TELEPHONE ENCOUNTER
Patient notified of message below and verbalized understanding.  Encouraged to drink plenty of extra fluids.  Verbalized understanding and states she will see her cardiologist on 3/8/2018.

## 2018-03-06 NOTE — PROGRESS NOTES
LCSW received a referral from [facility] for transportation and grief counseling. Pt is a 86y/o female who resides alone. Her closet support system is her niece, Deborah and surrogate son, Glenn Sweeney. Pt denied having a transportation barrier, as she reports that Glenn transports her to her appointment. PT does not want to utilize the transportation benefit through [employer] or through [employer] as she states she does not want to wait for long-periods of time. Pt reports she has a MPOA; however it is not on file. LCSW asked that pt submit a copy of the form. Pt discussed she at times has difficulty paying for her utilities and has some outstanding [facility] Medical Bills. Pt reports her niece, Deborah can assist throughout her home for homemaker services. Pt discussed she is in need of a referral to Rheumatology; a referral was made; however the providers are on the [place] and pt does not want to travel over there. In addition, pt stated she is in need of a back and knee brace. She is also requesting a referral to Physical Therapy due to difficulty walking and pain while standing. Pt reports she is still grieving the deaths of her  and 2 sons. Pt scored a 10 on her PHQ-9 assessment. She is interested in information on grief, attending support groups and possibly attending centers for increasing her socialization. No other concerns were voiced at this time. LCSW will follow up with pt within one week.     Intervention:  Transportation resource for future reference - [employer] transportation benefit  Mental Health resource- Grief support groups  ARY garcia - Depression  Financial Assistance - [facility] Patient Accounts  Homemaker resource - facilitate referral to [facility] and provide pt with information for [facility], per her request  Collaborate with [facility] regarding healthcare teams (desires knee and back brace, referral to Pt, finding a Rheumatologist on the [facility])  Increase socialization - facilitate referral  to [facility]    Plan for next encounter:  Confirm receipt of mailing  Discuss if made contact with Patient Accounts  Review KRALEONARDO material for Depression  Discuss referral to [facility] for [facility] . [facility].

## 2018-03-08 ENCOUNTER — OFFICE VISIT (OUTPATIENT)
Dept: CARDIOLOGY | Facility: CLINIC | Age: 83
End: 2018-03-08
Payer: MEDICARE

## 2018-03-08 VITALS
WEIGHT: 171.31 LBS | DIASTOLIC BLOOD PRESSURE: 78 MMHG | OXYGEN SATURATION: 100 % | SYSTOLIC BLOOD PRESSURE: 179 MMHG | BODY MASS INDEX: 30.35 KG/M2 | HEIGHT: 63 IN | HEART RATE: 90 BPM | RESPIRATION RATE: 15 BRPM

## 2018-03-08 DIAGNOSIS — I73.9 PVD (PERIPHERAL VASCULAR DISEASE): ICD-10-CM

## 2018-03-08 DIAGNOSIS — I21.4 NSTEMI (NON-ST ELEVATED MYOCARDIAL INFARCTION): ICD-10-CM

## 2018-03-08 DIAGNOSIS — I25.10 CORONARY ARTERY DISEASE INVOLVING NATIVE CORONARY ARTERY OF NATIVE HEART WITHOUT ANGINA PECTORIS: Primary | ICD-10-CM

## 2018-03-08 DIAGNOSIS — E78.5 DYSLIPIDEMIA: ICD-10-CM

## 2018-03-08 DIAGNOSIS — I10 ESSENTIAL HYPERTENSION: ICD-10-CM

## 2018-03-08 DIAGNOSIS — I50.32 CHRONIC DIASTOLIC HEART FAILURE: ICD-10-CM

## 2018-03-08 DIAGNOSIS — E11.29 CONTROLLED TYPE 2 DIABETES MELLITUS WITH OTHER DIABETIC KIDNEY COMPLICATION, WITHOUT LONG-TERM CURRENT USE OF INSULIN: ICD-10-CM

## 2018-03-08 DIAGNOSIS — F32.0 CURRENT MILD EPISODE OF MAJOR DEPRESSIVE DISORDER WITHOUT PRIOR EPISODE: ICD-10-CM

## 2018-03-08 PROCEDURE — 3078F DIAST BP <80 MM HG: CPT | Mod: S$GLB,,, | Performed by: INTERNAL MEDICINE

## 2018-03-08 PROCEDURE — 99499 UNLISTED E&M SERVICE: CPT | Mod: S$GLB,,, | Performed by: INTERNAL MEDICINE

## 2018-03-08 PROCEDURE — 99214 OFFICE O/P EST MOD 30 MIN: CPT | Mod: S$GLB,,, | Performed by: INTERNAL MEDICINE

## 2018-03-08 PROCEDURE — 99999 PR PBB SHADOW E&M-EST. PATIENT-LVL III: CPT | Mod: PBBFAC,,, | Performed by: INTERNAL MEDICINE

## 2018-03-08 PROCEDURE — 3077F SYST BP >= 140 MM HG: CPT | Mod: S$GLB,,, | Performed by: INTERNAL MEDICINE

## 2018-03-08 NOTE — PROGRESS NOTES
Subjective:    Patient ID:  Magaly Nunes is a 85 y.o. female who presents for follow-up of No chief complaint on file.      Congestive Heart Failure      previous history:  Here for follow-up of coronary artery disease and recent non-STEMI.  She underwent diagnostic testing for risk stratification.  Should a small defect that correlated with the diagonal blockage from the catheterization.  She's had no complaints of chest pain.  She's mainly limited by her arthritis.  She does have an appointment to go see Dr. Estevez to consider surgery.  We discussed going to the ED for any worsening cardiopulmonary complaints but otherwise been doing very well post hospitalization.  She denies any PND, orthopnea or lower edema.  She's not expressing dizziness, presyncope or syncope.    Today:  Here for follow-up of coronary artery disease.  She denies any cardiopulmonary complaints.  She's not expressing chest pain.  She mainly just feels weak and been dealing with arthritis still.  She's been getting injections in her knee which haven't helped.  She's being considered for surgery.  She is okay to come off of dual antiplatelet therapy at this time.  She denies any PND, orthopnea or lower edema.  She's not expressing dizziness to the point of presyncope or syncope.  The wise her only main complaint is that she had recent constipation.  She took a Dulcolax suppository for this.      Review of Systems   Constitution: Negative.   HENT: Negative.    Eyes: Negative.    Cardiovascular: Negative for dyspnea on exertion, irregular heartbeat, leg swelling, orthopnea, paroxysmal nocturnal dyspnea and syncope.   Skin: Negative.    Musculoskeletal: Positive for arthritis.   Gastrointestinal: Negative for constipation and diarrhea.   Genitourinary: Negative for dysuria.   Neurological: Negative.    Psychiatric/Behavioral: Negative.         Objective:    Physical Exam   Constitutional: She is oriented to person, place, and time. She  appears well-developed and well-nourished.   HENT:   Head: Normocephalic and atraumatic.   Eyes: Conjunctivae and EOM are normal. Pupils are equal, round, and reactive to light.   Neck: Normal range of motion. Neck supple. No thyromegaly present.   Cardiovascular: Normal rate and regular rhythm.    No murmur heard.  Pulmonary/Chest: Effort normal and breath sounds normal. No respiratory distress.   Abdominal: Soft. Bowel sounds are normal.   Musculoskeletal: She exhibits no edema.   Neurological: She is alert and oriented to person, place, and time.   Skin: Skin is warm and dry.   Psychiatric: She has a normal mood and affect. Her behavior is normal.       echo: 12-17  CONCLUSIONS     1 - Low normal to mildly depressed left ventricular systolic function (EF 50-55%).     2 - Concentric remodeling.     3 - Impaired LV relaxation, elevated LAP (grade 2 diastolic dysfunction).     4 - Low normal right ventricular systolic function .     5 - Pulmonary hypertension. The estimated PA systolic pressure is 67 mmHg.     6 - Mild to moderate mitral regurgitation.     C:     B. Summary/Post-Operative Diagnosis       Single vessel coronary artery disease.    Possible small branch diagonal likely cuprit for NSTEMI.    Diagnostic:          Patient has a right dominant coronary artery.        - Left Main Coronary Artery:             The LM is normal. There is VIJI 3 flow.     - Left Anterior Descending Artery:             The LAD is normal. There is VIJI 3 flow.     - D4:             The D4 has a 90% stenosis. There is VIJI 3 flow. small vessel   2 mm     - Left Circumflex Artery:             The LCX is normal. There is VIJI 3 flow.     - Right Coronary Artery:             The RCA has luminal irregularities. There is VIJI 3 flow.    NST: 1-18  Impression: ABNORMAL MYOCARDIAL PERFUSION  1. There is evidence for mild myocardial ischemia in the anteroapical wall of the left ventricle.   2. The perfusion scan is free of evidence for  myocardial injury.   3. Resting wall motion is physiologic.   4. There is resting LV dysfunction with a reduced ejection fraction of 46 %.   5. The ventricular volumes are normal at rest and stress.   6. The extracardiac distribution of radioactivity is normal.   7. When compared to the previous study from 01/03/2017, mild anteroapical ischemia now present    Assessment:       1. Coronary artery disease involving native coronary artery of native heart without angina pectoris    2. NSTEMI (non-ST elevated myocardial infarction)    3. Chronic diastolic heart failure    4. Current mild episode of major depressive disorder without prior episode    5. Essential hypertension    6. Dyslipidemia    7. 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 PVD (peripheral vascular disease)         Plan:       -Continue med therapy  -Non-STEMI without revascularization, mild defect discussed options will plan for medical therapy which correlates with known diagonal disease which is small vessel  -Refer to cardiac rehabilitation  -Stable on maintenance Lasix  -Compression stockings  -Salt restriction  -Exercise as tolerated  -PAD evaluation with known disease    Return to clinic in 6 months

## 2018-03-09 RX ORDER — TEMAZEPAM 30 MG/1
CAPSULE ORAL
Qty: 30 CAPSULE | Refills: 3 | Status: ON HOLD | OUTPATIENT
Start: 2018-03-09 | End: 2018-07-06 | Stop reason: SDUPTHER

## 2018-03-12 ENCOUNTER — TELEPHONE (OUTPATIENT)
Dept: PODIATRY | Facility: CLINIC | Age: 83
End: 2018-03-12

## 2018-03-12 ENCOUNTER — OUTPATIENT CASE MANAGEMENT (OUTPATIENT)
Dept: ADMINISTRATIVE | Facility: OTHER | Age: 83
End: 2018-03-12

## 2018-03-12 NOTE — TELEPHONE ENCOUNTER
----- Message from Jeffrey Aguilar sent at 3/12/2018 10:03 AM CDT -----  Contact: Self/[phone]  Patient called stating that her toes really hurt. She's requesting a prescription that was prescribed before. Thank you.

## 2018-03-13 ENCOUNTER — OUTPATIENT CASE MANAGEMENT (OUTPATIENT)
Dept: ADMINISTRATIVE | Facility: OTHER | Age: 83
End: 2018-03-13

## 2018-03-13 NOTE — PROGRESS NOTES
"Summary:  LCSW contacted pt for follow up. Pt confirmed receiving the mailing; however she did not have the opportunity to review and complete. Pt desires to keep her medical care at [facility]; however, she discussed at length her dissatisfaction with ortho - Dr. Estevez. Pt stated "all he does is give me a shot in my knees and says I need surgery... I'm 85 years old I'm not doing that". Pt is still in need of a rheumatologist; however after review of [facility] in-network providers pt would have to change care to [place] she wants to do. There are medications ready for pick-up within the clinic; pt reported if she feels well she will come pick them up, otherwise she will get her surrogate son, Jas Sweeney (correction from previous note of Glenn) to retrieve it. Pt is grateful for the intervention with [facility] staff. Pt has not received a call from [facility] regarding attending the Centers; however she plans to wait a few days before making contact with them for follow up.  Pt had awoken at the time of the call and was getting ready to eat lunch at 111:45am. Pt reported she did not have the opportunity to make contact with Patient Accounts regarding financial bills and co-payments. No other concerns were voiced. LCSW will follow up with pt within 1 week.     Intervention:  Encourage compliance with medical treatment  Medical provider resource for speciality  Encourage family involvement in care  Resource for increase socialization - -[facility]    Plan for next encounter:  Discuss if made contact with Patient Accounts  Review KRAMES material for Depression  Discuss referral to [facility] for [facility].  "

## 2018-03-13 NOTE — PROGRESS NOTES
Spoke with Ms. Des. She states she was supposed to come in today to  the medication for her feet and a prescription for Restoril. She states she is tired and has been taking her medication for constipation so she doesn't think she will make it today. She states she will come by tomorrow.     Interventions:   Notify Dr. Bangura's staff that pt will not come until tomorrow.   Review education materials on constipation and bowel regimen        Plan:   Follow up on compression stockings  Follow up on appt for cardiac rehab and Rheumatology  Continue education on CHF and fall safety precautions  Follow up on receipt of new cream ordered by Dr. Subramanian  Discuss Digital Hypertension program  Continue education

## 2018-03-15 ENCOUNTER — OUTPATIENT CASE MANAGEMENT (OUTPATIENT)
Dept: ADMINISTRATIVE | Facility: OTHER | Age: 83
End: 2018-03-15

## 2018-03-15 NOTE — PROGRESS NOTES
Please note an Outpatient Complex Care Management welcome packet and consent form was created and mailed to the patient on 3/15/18.    Please contact [facility] at ext. [phone] with any questions.    Thank you,    Rosalba Prince, [facility]  Outpatient [place] Care Mgmt  Ext [phone]

## 2018-03-15 NOTE — LETTER
March 15, 2018                 Outpatient Case Management  [address]  [facility] [address] Dear Magaly Nunes:    We understand that receiving many services from different doctors and healthcare providers is overwhelming. There are appointments to make, transportation to arrange, dietary instructions to understand, and new medications to obtain.    This is where [facility] can help.     You are eligible to receive Outpatient Case Management services when you have healthcare needs that require the coordination of many providers, treatments, and services. You also qualify if you need assistance with a new treatment plan.     There is no charge for this support. You may have been referred to this program from your doctor(s), hospital staff member(s), or insurance company but you always have a choice to participate or not participate. To participate, you must give us your permission to be enrolled.     When you are enrolled in the [facility] program, the  who is assigned to you is    Manisha Zavala, RN  [phone]    Depending on your needs and wishes, your  may speak with you by phone, visit you at your place of living (for example your home, skilled nursing facility, or rehabilitation facility), or meet you at your doctors office.     Your  will tell you why you have been selected to participate in the program and will complete an assessment of your needs. Then a personalized plan of care will be developed with you and or your caregiver.             Here are examples of the services your [facility]  provides.     Coordinate communication among multiple providers.   Arrange for transportation, doctors visits, durable medical equipment, home care services, and special clinics.    Provide coaching on how to manage your health condition.    Answer questions about your health condition.   Help you  understand your doctors treatment plan.    Provide additional instruction about your health condition, treatments, and medications.    Help you obtain information about your insurance coverage.    Advocate for your individual needs.    Request a Licensed Clinical  (LCSW) to visit you if you need their services. LCSWs help with long term planning (discussing placement options, advanced planning directives), financial planning, and assistance (for example rent, utilities, medication funding).     Your  will coordinate their activities with other outpatient services you are receiving. All services provided by [facility]  are coordinated with and communicated to your primary care physician.    Our goal is to help you manage your health condition(s) safely within your living environment, whether that is your home or a medical facility. We want to help you function at the healthiest and highest level possible.     Sincerely,      Александр Dhaliwal MD  Medical Director    Enclosures:    Frequently Asked Questions  Patient Rights and Responsibilities   Reporting a Grievance or Complaint  Consent/Release of Information  Stamped Addressed Envelope                  Frequently Asked Questions about [facility]    What is [facility]?  Outpatient Case management is not Home healthcare services. [facility]  do not provide hands-on care. [facility]  will work with your doctor to arrange for home health services, if needed. Home health services have a limited duration and there are some restrictions as to who can get these services. There is no prescribed limit to the amount of time you receive [facility] services. [facility]  are not agents of your insurance company. However, [facility]  can help you obtain information from your  insurance company.     Who are the [facility] ?  [facility]  are Registered Nurses and Social Workers. It is important to remember that you and your  are a team that works together with your primary care physician to create your individualized plan of care. The ultimate goal of your care plan is to help you implement your doctors treatment plan and to help you function at the highest level of health possible.     What are my rights as a patient?  It is important for you to know and understand your rights and responsibilities while receiving services from the [facility] program. We have enclosed a complete description of your rights and responsibilities. You can help to make your care more effective when you understand your right and responsibilities.     What is needed to be enrolled in the program?  You are only enrolled in the [facility] Program when you give us your consent to participate. You will find enclosed a consent form. You are receiving this letter because you or your caregivers have given us a verbal consent to enroll you in [facility] Program. We ask that you sign and return the enclosed written consent in the stamped self-addressed envelope.                           Patient Rights and Responsibilities    We consider you a partner in your care. When you are well informed, participate in treatment decisions and communicate openly with your doctor and other healthcare professionals, you help make your care more effective.     While you are in the Outpatient Case Management Program, your rights include the following:     You have a right to be provided services in a non-discriminatory manner in accordance with the provisions of Title VI of the Civil Rights Act of 1964, Section 504 of the Rehabilitation Act of 1973, the Age Discrimination Act of 1975, the Americans with  Disabilities Act as well as any other applicable Federal and State laws and regulations.     You have the right to a reasonable, timely response to your request or need for care, as well as the right to considerate and respectful care including an environment that preserves dignity and contributes to a positive self-image. You are responsible for being considerate and respectful of our staff.     You have a right to information regarding patient rights, advocacy services and complaint mechanisms, and the right to prompt resolution of any complaint. You or a designee has the right to participate in the resolution of ethical issues surrounding your care. You have a right to file a complaint if you feel that your rights have been infringed, without fear or penalty from [facility] or the federal government. You may file a complaint with the Director of Outpatient Case Management by calling [phone]. At any time, you may lodge a grievance with the [facility] and [facility] by calling [phone], or the Joint Commission on Accreditation of Healthcare Organizations at [phone].     You, or someone acting on your behalf, have the right to understandable information on your health status, treatment and progress in order to make decisions. You have the right to know the nature, risks and alternatives to treatment. You have the right to be informed, when appropriate, regarding the outcome of the care that has been provided. You have the right to refuse treatment to the extent permitted by law, and the right to be informed of the alternatives and consequences of refusing treatment.     You, in collaboration with your physician, have the right to make decisions regarding care and the right to participate in the development and implementation of the plan of care and effective pain management. You have the right to know the name and professional status of those responsible for the delivery of your care  and treatment.       You have a right, within legal guidelines, to have a guardian, next-of-kin or legal designee exercises your patient rights when you are unable to do so. You have the right for your wishes regarding end-of-life decisions to be addressed by the healthcare team through advance directives. You have the right to personal privacy and confidentiality and to expect confidentiality of all records and communications pertaining to your care.      You have the right to receive communications about your health information confidentially. You have the right to request restrictions on the uses and disclosures of your health information. You have the right to inspect, copy, request amendments and receive an accounting of to whom we have disclosed your health information.     You have the right to be provided with interpretation services if you do not speak English; to alternative communication techniques if you are hearing or vision impaired; and to have any other resources needed on your behalf to ensure effective communication. These services are provided free of charge.     You have a right to personal safety (free from mental, physical, sexual and verbal abuse, neglect and exploitation). You have the right to access protective and advocacy services.     Advance Directives  A Patient Advocate is available to meet with patients to answer questions regarding advance directives.    Living Will  A document that outlines what medical treatment the patient does or does not want in the event the patient becomes unable to make those decisions at the appropriate time.    Durable Medical Power of   A document by which the patient designates an individual to be responsible for making medical decisions in the event the patient becomes unable to do so.    HIPAA Notice of Privacy Practices  Your medical information is governed by federal privacy laws. HIPAA protects private medical information and how that  information is disclosed. If you have a question regarding the HIPAA Notice of Privacy Practices, or if you believe your privacy rights have been violated, you may call our designated hotline at [phone].            Quality Improvement  Because we consistently strive to improve the care and service provided to our patients, we welcome your feedback. Your comments are an important part of our quality improvement process, as we like to know what we are doing right and which areas are in need of improvement. Our policy is to listen, be responsive and provide you with an appropriate and timely follow-up to your questions or concerns. Our goal is active patient and family involvement in all aspects of the care process.          Reporting a Grievance or Complaint    During your time with the [facility] team you may have a grievance or complaint with our services. Your Patients Bill of Rights gives patients, families, and caregivers the right to express concerns and grievances and the right to expect a reasonable and timely response.     Your presentation of your concerns is not viewed negatively. It is an opportunity for us to improve the quality of our care and services we provide to you.     You may report your concerns directly to your , or you can phone in a complaint to:     Director of Outpatient Case Management  [phone]    You may also send a complaint letter to:    Director of Outpatient Case Management Services  [address] [place] [id number]    Tell us the details of your complaint and provide us with a contact phone number so we can contact you to obtain additional information. We will return a call to you within two business days of our receipt of your complaint, and to request additional information as needed. If you choose to mail a letter, your complaint may take a few days longer to reach us.     All grievances will be addressed as quickly as  possible. A grievance or complaint that involves situations or practices which place patients in immediate danger will be addressed as an urgent matter. We will work to resolve all other complaints within seven days of receipt. By that time, you will receive a phone call with either the resolution of your complaint, or a plan for corrective action. A formal written response will be sent to you within 30 days of receipt of your grievance.     If a resolution cannot be completed within 30 days, a letter will be sent to you or your family member with an estimated time for the final response.    Additionally, all patients have the right to file complaints with external agencies, without exception. Complaints/grievances can be addressed to the following agencies:            Patient Safety or Quality of Care Concerns  Office of Quality Monitoring   [facility]     [address] [place] [address]  [phone] Toll Free    HIPPA Privacy/Security Concerns  Office for Civil Rights Region IV  [place] Department of Health & Human Services  [address], [address]  [place] [address] [phone] Phone  [phone] TDD  [phone] Toll Free    Medicare/Medicaid Billing Concerns  [place] for Medicare & Medicaid Services  Region 6  [address], [address]  [place] [address] [phone] Phone  [phone] Toll Free    General Concerns  [facility] and \[facility]\"" ([facility])  [phone] Toll Free Complaint Hotline                                          Consent Form/Release of Information    By signing--     (1) I agree I have read the Outpatient Case Management information provided to me;     (2) I agree to voluntarily participate in the Outpatient Case Management program;     (3) I understand I must consent to participation in the Outpatient Case Management program during my first interview with my ;    (4) I consent to the discussion and release  of my personal health information to my healthcare team (including my personal physician, my medical home care team, any specialty physician(s), and my [facility] team);     (5) I agree my consent is valid for the length of time I am receiving Outpatient Case Management;    (6) I agree to referrals to community resources which my Case Management team recommends for me. I agree to the release of my personal information and personal health information as necessary to referral sources.    ___________________________________________________________________  Patients Printed Name     ___________________________________________________________________  Patients Signature       Date    If patient is in being cared for, please complete this section:     ___________________________________________________________________  Printed Name of Person Caring For Patient   Relationship To Patient    ___________________________________________________________________   Signature of Person Caring For Patient     Date    PLEASE SIGN AND RETURN IN THE ENCLOSED PRE-ADDRESSED ENVELOPE.

## 2018-03-20 ENCOUNTER — OFFICE VISIT (OUTPATIENT)
Dept: FAMILY MEDICINE | Facility: CLINIC | Age: 83
End: 2018-03-20
Payer: MEDICARE

## 2018-03-20 VITALS
DIASTOLIC BLOOD PRESSURE: 80 MMHG | TEMPERATURE: 98 F | HEART RATE: 64 BPM | WEIGHT: 168 LBS | OXYGEN SATURATION: 98 % | SYSTOLIC BLOOD PRESSURE: 106 MMHG | HEIGHT: 63 IN | BODY MASS INDEX: 29.77 KG/M2

## 2018-03-20 DIAGNOSIS — G89.29 OTHER CHRONIC PAIN: Primary | ICD-10-CM

## 2018-03-20 DIAGNOSIS — E11.22 CONTROLLED TYPE 2 DIABETES MELLITUS WITH STAGE 3 CHRONIC KIDNEY DISEASE, WITHOUT LONG-TERM CURRENT USE OF INSULIN: Chronic | ICD-10-CM

## 2018-03-20 DIAGNOSIS — I10 ESSENTIAL HYPERTENSION: Chronic | ICD-10-CM

## 2018-03-20 DIAGNOSIS — N18.30 CONTROLLED TYPE 2 DIABETES MELLITUS WITH STAGE 3 CHRONIC KIDNEY DISEASE, WITHOUT LONG-TERM CURRENT USE OF INSULIN: Chronic | ICD-10-CM

## 2018-03-20 DIAGNOSIS — M25.50 POLYARTHRALGIA: ICD-10-CM

## 2018-03-20 DIAGNOSIS — F32.A DEPRESSION, UNSPECIFIED DEPRESSION TYPE: Chronic | ICD-10-CM

## 2018-03-20 DIAGNOSIS — M70.61 GREATER TROCHANTERIC BURSITIS OF RIGHT HIP: ICD-10-CM

## 2018-03-20 PROCEDURE — 99214 OFFICE O/P EST MOD 30 MIN: CPT | Mod: S$GLB,,, | Performed by: INTERNAL MEDICINE

## 2018-03-20 PROCEDURE — 3079F DIAST BP 80-89 MM HG: CPT | Mod: CPTII,S$GLB,, | Performed by: INTERNAL MEDICINE

## 2018-03-20 PROCEDURE — 99999 PR PBB SHADOW E&M-EST. PATIENT-LVL III: CPT | Mod: PBBFAC,,, | Performed by: INTERNAL MEDICINE

## 2018-03-20 PROCEDURE — 99499 UNLISTED E&M SERVICE: CPT | Mod: S$GLB,,, | Performed by: INTERNAL MEDICINE

## 2018-03-20 PROCEDURE — 3074F SYST BP LT 130 MM HG: CPT | Mod: CPTII,S$GLB,, | Performed by: INTERNAL MEDICINE

## 2018-03-20 RX ORDER — HYDROCODONE BITARTRATE AND ACETAMINOPHEN 5; 325 MG/1; MG/1
1 TABLET ORAL EVERY 6 HOURS PRN
Qty: 60 TABLET | Refills: 0 | Status: SHIPPED | OUTPATIENT
Start: 2018-03-20 | End: 2018-05-15 | Stop reason: SDUPTHER

## 2018-03-20 RX ORDER — LIDOCAINE AND PRILOCAINE 25; 25 MG/G; MG/G
CREAM TOPICAL
Status: ON HOLD | COMMUNITY
Start: 2018-03-13 | End: 2019-05-09 | Stop reason: HOSPADM

## 2018-03-20 NOTE — PROGRESS NOTES
Chief complaint  follow-up on blood work, feeling weak in the morning    85-year-old black female with multiple medical problems.    Patient  has vaue complaints.  Her primary complaint is feeling bad in the morning.  She wakes up  Lot of generalzed stiffness and pain and no energy  She seems frustrated as usual but explained to her that she is seeing physicians  Who are addressing her issues and trying to come up with a treatment plan.  She does have some topical lidocaine was fearful of being ALLERGIC to it.    She was ALLERGIC to ibuprofen reassured her that the anti-inflammatory in her prior topical cream has been removed.  She was fearful of taking medicine but encouraged her to try it on anything that is painful.  She also has tolerated hydrocodone in the past.  Tramadol was ineffective.  We discussed taking a hydrocodone first thing in the morning and assessing if we can control her overall pain.  He follows with an outside orthopedic who apparently is not planning to have surgery but there was some talk about that according to the most recent neurology note that I reviewed.  She was cleared by cardiology to get off platelet agents if surgery needed.  She is still somewhat depressed and apparently taking her antidepressants.  Her overall feeling from seeing her before is that she is just generally frustrated a degree of her arthralgias.  Looks like she's having a rheumatological workup but the positive JAVID is not a new thing.  I pointed out to her how all the various physicians she is seeing are all trying to help reduce her symptoms and I think it's more of her perception of these issues.  I reassured at the one thing we might give to control is her pain and that if indeed she needs regular dosing of the hydrocodone such as 2 or 3 a day and noted to control her pains that may be her goal may be the one thing we can try to accomplish.Total time over 25 minutes with over 50% counseling.      Review of systems  no chest pains or shortness of breath, no syncope or near-syncope    PAST MEDICAL HISTORY:                                                        1. Hypertension.                                                             2. Dyspnea, possibly secondary to pulmonary hypertension. Sleep study             negative. Has seen cardiology. Ultrasound and CT of chest are negative.            PFTs 2002 were essentially normal.                                           3. History of sinus bradycardia.                                             4. Angioedema on [place].                                                      5. Vertigo.                                                                  6. spinal stenosis, by history, 3 epidural injections in the past        per neurosurgery, Dr. Garcia.  Now seeing Dr Leon.                                                                    7. Allergic rhinitis.                                                                                                        8. IBS.                                                                      9. Carpal tunnel syndrome, status post surgery.                              10. Status post cholecystectomy, total hysterectomy, and left ovary                                                         11. History of iron deficiency anemia, normal EGD in 2002 and in 2000.       12. GERD, with hiatal hernia.                                                13. Osteoarthritis of the knees.                                             14. Severe hot flashes despite all forms of therapy and eval by GYN.         15. History of leg pain due to varicosities or spinal stenosis.              16. History of positive JAVID, mild.                                           17. Trigger fingers, with history of injections.                             18. Varicose veins with reflux on ultrasound 2003.                           19. Borderline abdominal aneurysm at 2.5  cm on ultrasound .              20. Positive DSE, EKG portion, but indeterminate overall,. Neg nuclear stress ' And negative nuclear stress test . MILDLY ABNORMAL NUC 2017 EF 38%          21. Mild stenosis of the renal arteries by ultrasound.                       22. Diabetes  23. History of multitrauma , with fracture of the right arm, subsequent  DVT, and has been on Coumadin. She was discharged from the hospital around   2006. She did develop anemia, and has had several transfusions during   that stay. She also had pulmonary embolus associated with that DVT.    24. History of polyarthralgia with positive JAVID. She has been evaluated by [facility]. Seeing Dr Leon now  25. ANXIETY -suspected partial cause of hot flashes  27. DEPRESSION -    28. Vit D def  29.  Mild peripheral vascular disease buy testing but not felt significant to pursue further  30.  Neuropathy, seen by neurology and put on Cymbalta   31.  Systolic CHF -MILDLY ABNORMAL NUC 2017 EF 38%- Dr Humphrey   32.    pulm HTN on ECHO     ?colonoscopy    SOCIAL HISTORY:  She is  and lives with her spouse who has prostate   cancer.  She does not smoke cigarettes or drink alcohol. 2 sons .      Vitals, as above  Gen: no distress, pulse is regular rate and rhythm, heart is regular rate and rhythm without murmurs rubs.  Respiratory was good and lungs are clear.  She's mentating and appears to be her normal self  She is walking slowly with a cane.    Prior labs reviewed with her    .Magaly was seen today for arthritis and medication refill.    Diagnoses and all orders for this visit:    Other chronic pain, she currently has not had any good pain medications and presumably out of the hydrocodone.  I will refill her a supply of 60 rather than the minimal supplies she was getting before as she may need to take it on a regular basis 2-3 times per day.  We will target her morning symptoms having  her take 1 probably when she gets up and using the topical and aesthetic cream    Polyarthralgia    Controlled type 2 diabetes mellitus with stage 3 chronic kidney disease, without long-term current use of insulin, labs reviewed, still controlled    Essential hypertension, chronic and stable, she has checked her blood pressure and apparently her general weakness and so forth in the morning is not hypertension    Depression, unspecified depression type    Greater trochanteric bursitis of right hip, intermittent and I showed her the exercises but reaffirmed with her that this could be a recurrent problem and she may well need to continue physical therapy or get an injection and so forth.  Reassured that this problem in and of itself can be treated but may be recurrent    Other orders  -     hydrocodone-acetaminophen 5-325mg (NORCO) 5-325 mg per tablet; Take 1 tablet by mouth every 6 (six) hours as needed for Pain.

## 2018-03-21 ENCOUNTER — OUTPATIENT CASE MANAGEMENT (OUTPATIENT)
Dept: ADMINISTRATIVE | Facility: OTHER | Age: 83
End: 2018-03-21

## 2018-03-21 NOTE — PROGRESS NOTES
Summary:  1st Attempt to complete SW follow-up for Outpatient Care Management; LCSW was unable to leave a voicemail due to no mailbox    Intervention:  None    Plan for next encounter:  Discuss if made contact with Patient Accounts  Review KRAMES material for Depression  Discuss referral to [facility].

## 2018-03-23 ENCOUNTER — OUTPATIENT CASE MANAGEMENT (OUTPATIENT)
Dept: ADMINISTRATIVE | Facility: OTHER | Age: 83
End: 2018-03-23

## 2018-03-23 NOTE — PROGRESS NOTES
Summary:  LCSW contacted pt for follow up. Pt stated she has been doing fair. She reported pain in her legs and feel that her new medication is helping with the pain. Pt stated she plans to contact Patient Accounts after she eats lunch to possibly get on a payment plan for recent bills. Pt had to abruptly end the call stating she had to use the restroom. No other concerns were voiced.     Intervention:  Encourage communication with provider/compliance with medical treatment  Financial assistance - patient accounts    Plan for next encounter:  Discuss if made contact with [facility] for [facility] [place]  Continue to review KRALEONARDO material for depression

## 2018-03-27 ENCOUNTER — OUTPATIENT CASE MANAGEMENT (OUTPATIENT)
Dept: ADMINISTRATIVE | Facility: OTHER | Age: 83
End: 2018-03-27

## 2018-03-27 NOTE — PROGRESS NOTES
Summary: Attempted to contact pt for follow up. No answer and unable to leave a message.     Interventions:   none            Plan:   Follow up on compression stockings  Follow up on appt for cardiac rehab and Rheumatology  Continue education on CHF and fall safety precautions  Follow up on receipt of new cream ordered by Dr. Subramanian  Discuss Digital Hypertension program  Continue education

## 2018-04-03 ENCOUNTER — OUTPATIENT CASE MANAGEMENT (OUTPATIENT)
Dept: ADMINISTRATIVE | Facility: OTHER | Age: 83
End: 2018-04-03

## 2018-04-03 NOTE — PROGRESS NOTES
"Summary:  Follow up with pt. Reviewed upcoming appts. Pt states she cancelled ultrasound appts for tomorrow because she cannot afford the copays. Pt does not qualify for [facility]'s financial assistance. She has set up a payment plan. She states she doesn't want to incur anymore copayments until she catches up and she also doesn't want to leave any bills for her family. Call placed to financial assistance. Spoke with Matt. She states they can bill for the copayment since the ultrasounds are medically necessary. Pt states still declines. She states she will reschedule once she gets the bill down. Message sent last week to Dr. Humphrey to notify him pt was cancelling. Pt does have an appt with podiatry on 4/11. She states "I didn't want to go but I'm worried about my feet". Reminded pt of same day appts at the clinic and [facility] and Urgent care, if needed.     Interventions:   Reviewed upcoming appts  Contacted financial assistance dept regarding copays  Strongly encouraged pt to comply with appts.   Provided information on [facility], same day clinic appts and urgent care.           Plan:   Follow up on compression stockings  Follow up on appt for cardiac rehab and Rheumatology  Continue education on CHF and fall safety precautions  Follow up on receipt of new cream ordered by Dr. Subramanian  Discuss Digital Hypertension program  Continue education    "

## 2018-04-05 ENCOUNTER — OUTPATIENT CASE MANAGEMENT (OUTPATIENT)
Dept: ADMINISTRATIVE | Facility: OTHER | Age: 83
End: 2018-04-05

## 2018-04-05 NOTE — PROGRESS NOTES
Summary:  LCSW contacted pt for follow up. Pt reported she is doing fair but is in a lot of pain with her knee and foot. Pt stated she does not feel like she needs to see immediate medical attention; however stated she would if/when necessary. Pt is aware of her podiatry appointment scheduled for next week. Pt reported she does not want to attend the [facility] at this time due to her pain but has their contact information. No other concerns were voiced.     Intervention:  Resources to increase socialization - deferred due to pain  Encouraged compliance with medical treatment/attending appointments    Plan for next encounter:  Follow up after medical appointment  Re-assess for further needs

## 2018-04-09 ENCOUNTER — OUTPATIENT CASE MANAGEMENT (OUTPATIENT)
Dept: ADMINISTRATIVE | Facility: OTHER | Age: 83
End: 2018-04-09

## 2018-04-09 NOTE — PROGRESS NOTES
Summary:Returned missed call to pt. Pt states she had questions about vitamins that were recommended by her pharmacist to help with her leg/joint pain. Pt states it was recommended that she take B-complex, iron and Glucosamine Chondroitin. She will discuss with Dr. Subramanian at her appt this week. Discussed compressions stockings. Pt states she doesn't like the thigh high stockings. She will try the knee high. Pt states she does not want to schedule any other appts until she can pay down her debt. Pt does not qualify for financial assistance.   Interventions: Encourage pt to wear compression stockings as ordered.   Reinforce teaching on CHF and fall precuations  Plan:   1. CHF - Encourage compliance with appt, assist with rescheduling appts, follow up on compression stockings  2. Falls - Reinforce teaching  3. Depression - Encourage compliance with medications and appts.

## 2018-04-11 ENCOUNTER — TELEPHONE (OUTPATIENT)
Dept: PODIATRY | Facility: CLINIC | Age: 83
End: 2018-04-11

## 2018-04-11 NOTE — TELEPHONE ENCOUNTER
----- Message from Elizabeth Kay sent at 4/11/2018  7:18 AM CDT -----  Contact: self  Patient requesting a call back from office. Please contact her at [phone].

## 2018-04-17 ENCOUNTER — OUTPATIENT CASE MANAGEMENT (OUTPATIENT)
Dept: ADMINISTRATIVE | Facility: OTHER | Age: 83
End: 2018-04-17

## 2018-04-17 NOTE — PROGRESS NOTES
Summary:attempted to contact pt for follow up. No answer and unable to leave a message.   Interventions:none  Plan:  1. CHF - Encourage compliance with appt, assist with rescheduling appts, follow up on compression stockings  2. Falls - Reinforce teaching  3. Depression - Encourage compliance with medications and appts.

## 2018-04-19 ENCOUNTER — OUTPATIENT CASE MANAGEMENT (OUTPATIENT)
Dept: ADMINISTRATIVE | Facility: OTHER | Age: 83
End: 2018-04-19

## 2018-04-19 NOTE — PROGRESS NOTES
Summary:  LCSW contacted pt for follow up. Pt reports she is not feeling at her best and is battling with sinus infections. Pt stated if her s/s worsen over the next few days she will visit Urgent Care, or Ed. Pt has the contact information for [facility]. Pt stated hse continues to have knee swelling but has not been using her compression stockings. Pt stated her niece and surrogate son continues to come by often and check up on her. No new concerns were voiced.     Intervention:  Encouraged family involvement in care  Encourage seeking medical treatment/communication with providers if s/s worsen    Plan for next encounter:  Follow up if sought out medical treatment  Re-assess for further needs

## 2018-05-01 ENCOUNTER — OUTPATIENT CASE MANAGEMENT (OUTPATIENT)
Dept: ADMINISTRATIVE | Facility: OTHER | Age: 83
End: 2018-05-01

## 2018-05-01 NOTE — PROGRESS NOTES
Summary:Follow up with pt. Pt states she is not feeling well today. She denies chest pain or sob. She states her legs are hurting. Encouraged pt to rest and elevate her legs. Offered to schedule clinic appt for pt. Pt declined. Pt states she went to urgent care last night. She states she will call her niece to sit with her and will call for an appt if needed. Pt states she is washing clothes. Pt states she will wear compression stockings today.   Interventions:Encouraged pt to take rest periods and elevate feet.   Offered clinic appt  Encouraged pt to wear compression stockings.     Plan:  1. CHF - Encourage compliance with appt, assist with rescheduling appts  , 2. Falls - Reinforce teaching  3. Depression - Encourage compliance with medications and appts.

## 2018-05-02 ENCOUNTER — TELEPHONE (OUTPATIENT)
Dept: PODIATRY | Facility: CLINIC | Age: 83
End: 2018-05-02

## 2018-05-02 NOTE — TELEPHONE ENCOUNTER
----- Message from Sterling Velazco sent at 5/2/2018  3:57 PM CDT -----  Contact: [phone]/PT  Calling TO speak with nurse regarding med for feet . Pt wanst to get refill for feet med,states she forgot name

## 2018-05-03 ENCOUNTER — OUTPATIENT CASE MANAGEMENT (OUTPATIENT)
Dept: ADMINISTRATIVE | Facility: OTHER | Age: 83
End: 2018-05-03

## 2018-05-03 NOTE — PROGRESS NOTES
Summary:  LCSW contacted pt for follow up. Pt stated she is doing fair. Pt discussed swelling in her legs; however she has an appointment with Primary care on tomorrow. Pt is aware of the time of the appointment and stated her surrogate son, Jas, will transport her to the appointment. No other concerns were voiced.     Intervention:  Encouraged compliance with medical tx/encouraged communication with medical provider    Plan for next encounter;  Attempt to meet with pt while in the clinic  Re-assess for further needs. If no needs, close out

## 2018-05-04 ENCOUNTER — OUTPATIENT CASE MANAGEMENT (OUTPATIENT)
Dept: ADMINISTRATIVE | Facility: OTHER | Age: 83
End: 2018-05-04

## 2018-05-04 NOTE — PROGRESS NOTES
Summary:  Chart review - pt cx her scheduled appt with primary care for follow up.     Intervention:  Chart review    Plan for next encounter:  Re-assess for further needs. If no needs, close out

## 2018-05-09 ENCOUNTER — OFFICE VISIT (OUTPATIENT)
Dept: CARDIOLOGY | Facility: CLINIC | Age: 83
End: 2018-05-09
Payer: MEDICARE

## 2018-05-09 VITALS
RESPIRATION RATE: 20 BRPM | BODY MASS INDEX: 30.03 KG/M2 | SYSTOLIC BLOOD PRESSURE: 154 MMHG | WEIGHT: 169.56 LBS | HEART RATE: 93 BPM | DIASTOLIC BLOOD PRESSURE: 69 MMHG | OXYGEN SATURATION: 99 %

## 2018-05-09 DIAGNOSIS — E11.29 CONTROLLED TYPE 2 DIABETES MELLITUS WITH OTHER DIABETIC KIDNEY COMPLICATION, WITHOUT LONG-TERM CURRENT USE OF INSULIN: ICD-10-CM

## 2018-05-09 DIAGNOSIS — I25.10 CORONARY ARTERY DISEASE INVOLVING NATIVE CORONARY ARTERY OF NATIVE HEART WITHOUT ANGINA PECTORIS: Primary | ICD-10-CM

## 2018-05-09 DIAGNOSIS — I21.4 NSTEMI (NON-ST ELEVATED MYOCARDIAL INFARCTION): ICD-10-CM

## 2018-05-09 DIAGNOSIS — E78.5 DYSLIPIDEMIA: ICD-10-CM

## 2018-05-09 DIAGNOSIS — F32.0 CURRENT MILD EPISODE OF MAJOR DEPRESSIVE DISORDER WITHOUT PRIOR EPISODE: ICD-10-CM

## 2018-05-09 DIAGNOSIS — I50.32 CHRONIC DIASTOLIC HEART FAILURE: ICD-10-CM

## 2018-05-09 DIAGNOSIS — I73.9 PVD (PERIPHERAL VASCULAR DISEASE): ICD-10-CM

## 2018-05-09 DIAGNOSIS — I10 ESSENTIAL HYPERTENSION: ICD-10-CM

## 2018-05-09 PROCEDURE — 3077F SYST BP >= 140 MM HG: CPT | Mod: CPTII,S$GLB,, | Performed by: INTERNAL MEDICINE

## 2018-05-09 PROCEDURE — 3078F DIAST BP <80 MM HG: CPT | Mod: CPTII,S$GLB,, | Performed by: INTERNAL MEDICINE

## 2018-05-09 PROCEDURE — 99499 UNLISTED E&M SERVICE: CPT | Mod: S$PBB,,, | Performed by: INTERNAL MEDICINE

## 2018-05-09 PROCEDURE — 99999 PR PBB SHADOW E&M-EST. PATIENT-LVL III: CPT | Mod: PBBFAC,,, | Performed by: INTERNAL MEDICINE

## 2018-05-09 PROCEDURE — 99214 OFFICE O/P EST MOD 30 MIN: CPT | Mod: S$GLB,,, | Performed by: INTERNAL MEDICINE

## 2018-05-09 NOTE — PROGRESS NOTES
Subjective:    Patient ID:  Magaly Nunes is a 85 y.o. female who presents for follow-up of Follow-up (3 mo )      Congestive Heart Failure      previous history:  Here for follow-up of coronary artery disease.  She denies any cardiopulmonary complaints.  She's not expressing chest pain.  She mainly just feels weak and been dealing with arthritis still.  She's been getting injections in her knee which haven't helped.  She's being considered for surgery.  She is okay to come off of dual antiplatelet therapy at this time.  She denies any PND, orthopnea or lower edema.  She's not expressing dizziness to the point of presyncope or syncope.  The wise her only main complaint is that she had recent constipation.  She took a Dulcolax suppository for this.    Today:  Here for follow-up of coronary artery disease.  She denies any worsening cardiopulmonary complaints.  She couldn't get into cardiac rehabilitation but his been doing exercises with his couple of lady's in her neighborhood.  She also goes to the mall and walks around.  She's mainly limited by significant arthritis all throughout her body.  She denies any PND, orthopnea or lower edema.  She's not expressing dizziness, presyncope or syncope.  She's agreeable to reschedule her peripheral stuff and follows with podiatry as well.      Review of Systems   Constitution: Negative.   HENT: Negative.    Eyes: Negative.    Cardiovascular: Negative for dyspnea on exertion, irregular heartbeat, leg swelling, orthopnea, paroxysmal nocturnal dyspnea and syncope.   Skin: Negative.    Musculoskeletal: Positive for arthritis.   Gastrointestinal: Negative for constipation and diarrhea.   Genitourinary: Negative for dysuria.   Neurological: Negative.    Psychiatric/Behavioral: Negative.         Objective:    Physical Exam   Constitutional: She is oriented to person, place, and time. She appears well-developed and well-nourished.   HENT:   Head: Normocephalic and atraumatic.    Eyes: Conjunctivae and EOM are normal. Pupils are equal, round, and reactive to light.   Neck: Normal range of motion. Neck supple. No thyromegaly present.   Cardiovascular: Normal rate and regular rhythm.    No murmur heard.  Pulmonary/Chest: Effort normal and breath sounds normal. No respiratory distress.   Abdominal: Soft. Bowel sounds are normal.   Musculoskeletal: She exhibits no edema.   Neurological: She is alert and oriented to person, place, and time.   Skin: Skin is warm and dry.   Psychiatric: She has a normal mood and affect. Her behavior is normal.       echo: 12-17  CONCLUSIONS     1 - Low normal to mildly depressed left ventricular systolic function (EF 50-55%).     2 - Concentric remodeling.     3 - Impaired LV relaxation, elevated LAP (grade 2 diastolic dysfunction).     4 - Low normal right ventricular systolic function .     5 - Pulmonary hypertension. The estimated PA systolic pressure is 67 mmHg.     6 - Mild to moderate mitral regurgitation.     [facility]:     B. Summary/Post-Operative Diagnosis       Single vessel coronary artery disease.    Possible small branch diagonal likely cuprit for NSTEMI.    Diagnostic:          Patient has a right dominant coronary artery.        - Left Main Coronary Artery:             The LM is normal. There is VIJI 3 flow.     - Left Anterior Descending Artery:             The LAD is normal. There is VIJI 3 flow.     - D4:             The D4 has a 90% stenosis. There is VIJI 3 flow. small vessel   2 mm     - Left Circumflex Artery:             The LCX is normal. There is VIJI 3 flow.     - Right Coronary Artery:             The RCA has luminal irregularities. There is VIJI 3 flow.    NST: 1-18  Impression: ABNORMAL MYOCARDIAL PERFUSION  1. There is evidence for mild myocardial ischemia in the anteroapical wall of the left ventricle.   2. The perfusion scan is free of evidence for myocardial injury.   3. Resting wall motion is physiologic.   4. There is resting LV  dysfunction with a reduced ejection fraction of 46 %.   5. The ventricular volumes are normal at rest and stress.   6. The extracardiac distribution of radioactivity is normal.   7. When compared to the previous study from 01/03/2017, mild anteroapical ischemia now present    Assessment:       1. Coronary artery disease involving native coronary artery of native heart without angina pectoris    2. NSTEMI (non-ST elevated myocardial infarction)    3. Chronic diastolic heart failure    4. Current mild episode of major depressive disorder without prior episode    5. Essential hypertension    6. Dyslipidemia    7. 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 PVD (peripheral vascular disease)         Plan:       -Continue med therapy  -Non-STEMI without revascularization, mild defect discussed options will plan for medical therapy which correlates with known diagonal disease which is small vessel  -Stable on maintenance Lasix  -Compression stockings  -Salt restriction  -Exercise as tolerated  -PAD evaluation with known disease    Return to clinic in 6 mos

## 2018-05-11 ENCOUNTER — TELEPHONE (OUTPATIENT)
Dept: FAMILY MEDICINE | Facility: CLINIC | Age: 83
End: 2018-05-11

## 2018-05-11 ENCOUNTER — TELEPHONE (OUTPATIENT)
Dept: PODIATRY | Facility: CLINIC | Age: 83
End: 2018-05-11

## 2018-05-11 DIAGNOSIS — N18.30 CONTROLLED TYPE 2 DIABETES MELLITUS WITH STAGE 3 CHRONIC KIDNEY DISEASE, WITHOUT LONG-TERM CURRENT USE OF INSULIN: Primary | ICD-10-CM

## 2018-05-11 DIAGNOSIS — I10 ESSENTIAL HYPERTENSION: ICD-10-CM

## 2018-05-11 DIAGNOSIS — E11.22 CONTROLLED TYPE 2 DIABETES MELLITUS WITH STAGE 3 CHRONIC KIDNEY DISEASE, WITHOUT LONG-TERM CURRENT USE OF INSULIN: Primary | ICD-10-CM

## 2018-05-11 NOTE — TELEPHONE ENCOUNTER
----- Message from Francesca Pacheco sent at 5/11/2018 11:07 AM CDT -----  Contact: self  Pt requesting lab work. [phone].

## 2018-05-11 NOTE — TELEPHONE ENCOUNTER
Called patient.  No answer.  Requesting labs.  If ordered, will call patient to schedule lab appointment and office visit.  LOV 3/20/18.  Had CBC, CMP and Lipids in March 2018.

## 2018-05-11 NOTE — TELEPHONE ENCOUNTER
Spoke with patient. Requesting [facility] for [facility] to get refill for cream for foot. Advised that insurance will only pay once for medication. She states that her insurance did not pay for it last time and she will pay for it again     [facility] contact number given to patient

## 2018-05-11 NOTE — TELEPHONE ENCOUNTER
Spoke with patient- scheduled to see Dr. Angelo on 5/25/18, lab appt scheduled 05/22/18.  Will link labs once orders are placed

## 2018-05-11 NOTE — TELEPHONE ENCOUNTER
----- Message from Francesca Pacheco sent at 5/11/2018 11:06 AM CDT -----  Contact: Self  Pt calling to speak to nurse regarding health. [phone]

## 2018-05-14 ENCOUNTER — OUTPATIENT CASE MANAGEMENT (OUTPATIENT)
Dept: ADMINISTRATIVE | Facility: OTHER | Age: 83
End: 2018-05-14

## 2018-05-14 NOTE — PROGRESS NOTES
Summary:follow up with pt. Pt states she is doing ok. She states she had visitors for mother's day. She states her knee is still bothering her. She did call [facility] about the compound cream and her cost is $92. She state she will hold off on getting this for now. She is taking Tylenol daily and she states it seems to be helping some. She is not taking more than 3 tablets per day. Pt states she is starting the payment plan to get some of her medical bills paid. She states she will be able to reschedule the tests she missed soon. She did have a follow up appt with Dr Humphrey. Pt denies any recent falls. She is weighing daily and verbalizes s/s of CHF.   Interventions:Reviewed upcoming appts with pt.   Plan:Review lab results  Plan to dis-enroll if no further needs.

## 2018-05-15 RX ORDER — ATORVASTATIN CALCIUM 20 MG/1
TABLET, FILM COATED ORAL
Refills: 10 | COMMUNITY
Start: 2018-05-04 | End: 2018-11-15 | Stop reason: SDUPTHER

## 2018-05-15 RX ORDER — ZOSTER VACCINE RECOMBINANT, ADJUVANTED 50 MCG/0.5
KIT INTRAMUSCULAR
COMMUNITY
Start: 2018-05-08 | End: 2018-08-23

## 2018-05-15 RX ORDER — CLOPIDOGREL BISULFATE 75 MG/1
TABLET ORAL
Refills: 7 | Status: ON HOLD | COMMUNITY
Start: 2018-05-04 | End: 2019-03-22

## 2018-05-15 NOTE — TELEPHONE ENCOUNTER
----- Message from Jono Mccormick sent at 5/15/2018 12:33 PM CDT -----  Contact: self  Refill: hydrocodone-acetaminophen 5-325mg (NORCO) 5-325 mg per tablet. Once script is ready for pickup contact pt at [phone].    Thanks-

## 2018-05-17 RX ORDER — HYDROCODONE BITARTRATE AND ACETAMINOPHEN 5; 325 MG/1; MG/1
1 TABLET ORAL EVERY 6 HOURS PRN
Qty: 60 TABLET | Refills: 0 | Status: SHIPPED | OUTPATIENT
Start: 2018-05-17 | End: 2019-03-19

## 2018-05-18 ENCOUNTER — OUTPATIENT CASE MANAGEMENT (OUTPATIENT)
Dept: ADMINISTRATIVE | Facility: OTHER | Age: 83
End: 2018-05-18

## 2018-05-18 NOTE — PROGRESS NOTES
"Summary:  1st Attempt to complete SW follow-up for Outpatient Care Management; unable to leave a voicemail as the phone continuously rung. LCSW will make another attempt next week.     Intervention:  None    Plan for next encounter"  Re-assess for further needs. If no needs, close out   "

## 2018-05-18 NOTE — PROGRESS NOTES
Summary:  LCSW contacted pt for follow up after having missed called/voicemail from pt. She visit Ortho and was given 2 shots to the knee and fluid removal. She reports her knee is still swollen but is hopeful for it to go down tomorrow. Pt appeared to be in good spirits and discussed her family is coming over tonight to watch the Royal Wedding. No other concerns were voiced. Review of plan of care - goals met. Pt's case will be closed today.     Intervention:  Collaboration with OPCM-RN regarding case closure    Plan for next encounteR;  None

## 2018-05-22 ENCOUNTER — LAB VISIT (OUTPATIENT)
Dept: LAB | Facility: HOSPITAL | Age: 83
End: 2018-05-22
Attending: INTERNAL MEDICINE
Payer: MEDICARE

## 2018-05-22 DIAGNOSIS — N18.30 CONTROLLED TYPE 2 DIABETES MELLITUS WITH STAGE 3 CHRONIC KIDNEY DISEASE, WITHOUT LONG-TERM CURRENT USE OF INSULIN: ICD-10-CM

## 2018-05-22 DIAGNOSIS — E11.22 CONTROLLED TYPE 2 DIABETES MELLITUS WITH STAGE 3 CHRONIC KIDNEY DISEASE, WITHOUT LONG-TERM CURRENT USE OF INSULIN: ICD-10-CM

## 2018-05-22 DIAGNOSIS — I10 ESSENTIAL HYPERTENSION: ICD-10-CM

## 2018-05-22 LAB
ESTIMATED AVG GLUCOSE: 128 MG/DL
HBA1C MFR BLD HPLC: 6.1 %
TSH SERPL DL<=0.005 MIU/L-ACNC: 1.81 UIU/ML

## 2018-05-22 PROCEDURE — 36415 COLL VENOUS BLD VENIPUNCTURE: CPT | Mod: PO

## 2018-05-22 PROCEDURE — 83036 HEMOGLOBIN GLYCOSYLATED A1C: CPT

## 2018-05-22 PROCEDURE — 84443 ASSAY THYROID STIM HORMONE: CPT

## 2018-05-25 ENCOUNTER — PES CALL (OUTPATIENT)
Dept: ADMINISTRATIVE | Facility: CLINIC | Age: 83
End: 2018-05-25

## 2018-06-01 ENCOUNTER — TELEPHONE (OUTPATIENT)
Dept: PODIATRY | Facility: CLINIC | Age: 83
End: 2018-06-01

## 2018-06-01 NOTE — TELEPHONE ENCOUNTER
----- Message from Shena Prado sent at 6/1/2018 12:23 PM CDT -----  Contact: self  Pt would like a call back to ask for an earlier appt. She states her foot is black due to her poor circulation and does not feel she can wait until her appt on 06/08.     [phone].

## 2018-06-08 ENCOUNTER — OFFICE VISIT (OUTPATIENT)
Dept: PODIATRY | Facility: CLINIC | Age: 83
End: 2018-06-08
Payer: MEDICARE

## 2018-06-08 VITALS
BODY MASS INDEX: 30.04 KG/M2 | DIASTOLIC BLOOD PRESSURE: 70 MMHG | SYSTOLIC BLOOD PRESSURE: 140 MMHG | HEIGHT: 63 IN | WEIGHT: 169.56 LBS

## 2018-06-08 DIAGNOSIS — E11.51 TYPE II DIABETES MELLITUS WITH PERIPHERAL CIRCULATORY DISORDER: Primary | ICD-10-CM

## 2018-06-08 DIAGNOSIS — R60.0 BILATERAL LOWER EXTREMITY EDEMA: ICD-10-CM

## 2018-06-08 DIAGNOSIS — R20.2 PARESTHESIAS: ICD-10-CM

## 2018-06-08 DIAGNOSIS — B35.1 ONYCHOMYCOSIS DUE TO DERMATOPHYTE: ICD-10-CM

## 2018-06-08 PROCEDURE — 99999 PR PBB SHADOW E&M-EST. PATIENT-LVL III: CPT | Mod: PBBFAC,,, | Performed by: PODIATRIST

## 2018-06-08 PROCEDURE — 99499 UNLISTED E&M SERVICE: CPT | Mod: S$GLB,,, | Performed by: PODIATRIST

## 2018-06-08 PROCEDURE — 99499 UNLISTED E&M SERVICE: CPT | Mod: S$PBB,,, | Performed by: PODIATRIST

## 2018-06-08 PROCEDURE — 11721 DEBRIDE NAIL 6 OR MORE: CPT | Mod: Q9,S$GLB,, | Performed by: PODIATRIST

## 2018-06-08 RX ORDER — POTASSIUM CHLORIDE 20 MEQ/1
20 TABLET, EXTENDED RELEASE ORAL DAILY
Qty: 90 TABLET | Refills: 0 | Status: SHIPPED | OUTPATIENT
Start: 2018-06-08 | End: 2018-09-13 | Stop reason: SDUPTHER

## 2018-06-08 RX ORDER — GABAPENTIN 100 MG/1
100 CAPSULE ORAL 3 TIMES DAILY
Qty: 90 CAPSULE | Refills: 11 | Status: SHIPPED | OUTPATIENT
Start: 2018-06-08 | End: 2019-06-08

## 2018-06-08 NOTE — PROGRESS NOTES
Subjective:      Patient ID: Magaly Nunes is a 85 y.o. female.    Chief Complaint: Diabetes Mellitus (pcp Ehrensing 3-20-18); Diabetic Foot Exam; and Nail Care    Magaly is a 85 y.o. female who presents to the clinic for evaluation and treatment of high risk feet. Magaly has a past medical history of Anxiety disorder; Arthritis (12/28/2017); Carpal tunnel syndrome; CHF (congestive heart failure); Chronic allergic rhinitis; Chronic pain (10/22/2012); CKD stage 3 due to type 2 diabetes mellitus (2/14/2017); Constipation - functional (4/10/2013); Depression; Diabetes mellitus type II; Hot flash not due to menopause; HTN (hypertension); Hypokalemia (11/19/2012); Insomnia (4/10/2013); Knee pain, bilateral (7/24/2013); Personal history of DVT (deep vein thrombosis); Renovascular hypertension (2/14/2017); Severe tricuspid regurgitation (2/14/2017); Spinal stenosis; Spinal stenosis of lumbar region (2/3/2014); and Vertigo. The patient's chief complaint is follow up 3 month DM foot exam. Diet controlled DM but has neuropathy symptoms. Takes gabapentin 100mg 3 times daily for nerve pain. Relates painful thick toenails on both feet especially both great toenails which hurt her especially in shoes. No other complaints today. Has compound cream for breakthrough pain but no longer using. Used it for a few days but stopped for unknown reasons.  This patient has documented high risk feet requiring routine maintenance secondary to diabetes mellitis and those secondary complications of diabetes, as mentioned.        PCP: Chris Bangura MD    Date Last Seen by PCP:  Chief Complaint   Patient presents with    Diabetes Mellitus     pcp Ehrensing 3-20-18    Diabetic Foot Exam    Nail Care     Current shoe gear:  panchito    Hemoglobin A1C   Date Value Ref Range Status   05/22/2018 6.1 (H) 4.0 - 5.6 % Final     Comment:     According to ADA guidelines, hemoglobin A1c <7.0% represents  optimal control in non-pregnant diabetic  patients. Different  metrics may apply to specific patient populations.   Standards of Medical Care in Diabetes-2016.  For the purpose of screening for the presence of diabetes:  <5.7%     Consistent with the absence of diabetes  5.7-6.4%  Consistent with increasing risk for diabetes   (prediabetes)  >or=6.5%  Consistent with diabetes  Currently, no consensus exists for use of hemoglobin A1c  for diagnosis of diabetes for children.  This Hemoglobin A1c assay has significant interference with fetal   hemoglobin   (HbF). The results are invalid for patients with abnormal amounts of   HbF,   including those with known Hereditary Persistence   of Fetal Hemoglobin. Heterozygous hemoglobin variants (HbAS, HbAC,   HbAD, HbAE, HbA2) do not significantly interfere with this assay;   however, presence of multiple variants in a sample may impact the %   interference.     12/29/2017 5.7 (H) 4.0 - 5.6 % Final     Comment:     According to ADA guidelines, hemoglobin A1c <7.0% represents  optimal control in non-pregnant diabetic patients. Different  metrics may apply to specific patient populations.   Standards of Medical Care in Diabetes-2016.  For the purpose of screening for the presence of diabetes:  <5.7%     Consistent with the absence of diabetes  5.7-6.4%  Consistent with increasing risk for diabetes   (prediabetes)  >or=6.5%  Consistent with diabetes  Currently, no consensus exists for use of hemoglobin A1c  for diagnosis of diabetes for children.  This Hemoglobin A1c assay has significant interference with fetal   hemoglobin   (HbF). The results are invalid for patients with abnormal amounts of   HbF,   including those with known Hereditary Persistence   of Fetal Hemoglobin. Heterozygous hemoglobin variants (HbAS, HbAC,   HbAD, HbAE, HbA2) do not significantly interfere with this assay;   however, presence of multiple variants in a sample may impact the %   interference.     11/03/2017 5.9 (H) 4.0 - 5.6 % Final      Comment:     According to ADA guidelines, hemoglobin A1c <7.0% represents  optimal control in non-pregnant diabetic patients. Different  metrics may apply to specific patient populations.   Standards of Medical Care in Diabetes-2016.  For the purpose of screening for the presence of diabetes:  <5.7%     Consistent with the absence of diabetes  5.7-6.4%  Consistent with increasing risk for diabetes   (prediabetes)  >or=6.5%  Consistent with diabetes  Currently, no consensus exists for use of hemoglobin A1c  for diagnosis of diabetes for children.  This Hemoglobin A1c assay has significant interference with fetal   hemoglobin   (HbF). The results are invalid for patients with abnormal amounts of   HbF,   including those with known Hereditary Persistence   of Fetal Hemoglobin. Heterozygous hemoglobin variants (HbAS, HbAC,   HbAD, HbAE, HbA2) do not significantly interfere with this assay;   however, presence of multiple variants in a sample may impact the %   interference.       Patient Active Problem List   Diagnosis    Depression    Anxiety disorder    Essential hypertension    Functional constipation    Spinal stenosis of lumbar region    PVD (peripheral vascular disease)    Aortic atherosclerosis    Baker's cyst    Depression, major, recurrent, moderate    Pulmonary hypertension    Synovial cyst of popliteal space (Baker), left knee    Severe tricuspid regurgitation    Vitamin D deficiency disease    Osteopenia    Chronic diastolic heart failure    CKD Stage 3    GERD (gastroesophageal reflux disease)     Current Outpatient Prescriptions on File Prior to Visit   Medication Sig Dispense Refill    aspirin 81 MG Chew Take 1 tablet (81 mg total) by mouth once daily.  0    atorvastatin (LIPITOR) 20 MG tablet   10    clopidogrel (PLAVIX) 75 mg tablet   7    DULoxetine (CYMBALTA) 60 MG capsule TAKE 1 CAPSULE(60 MG) BY MOUTH EVERY DAY 30 capsule 12    furosemide (LASIX) 40 MG tablet Take 1 tablet (40  mg total) by mouth once daily. 30 tablet 11    HYDROcodone-acetaminophen (NORCO) 5-325 mg per tablet Take 1 tablet by mouth every 6 (six) hours as needed for Pain. 60 tablet 0    lidocaine-prilocaine (EMLA) cream       losartan (COZAAR) 100 MG tablet TAKE 1 TABLET BY MOUTH ONCE DAILY 90 tablet 0    omeprazole (PRILOSEC) 20 MG capsule TAKE 2 CAPSULES BY MOUTH EVERY DAY FOR ACID REFLUX OR STOMACH 180 capsule 0    SHINGRIX, PF, 50 mcg/0.5 mL injection       temazepam (RESTORIL) 30 mg capsule TAKE ONE CAPSULE BY MOUTH EVERY NIGHT AT BEDTIME AS NEEDED FOR INSOMNIA 30 capsule 3    tiZANidine 4 mg Cap Take by mouth.      [DISCONTINUED] potassium chloride SA (K-DUR,KLOR-CON) 20 MEQ tablet TAKE 1 TABLET BY MOUTH ONCE DAILY. 90 tablet 0    carvedilol (COREG) 6.25 MG tablet Take 1 tablet (6.25 mg total) by mouth 2 (two) times daily. 60 tablet 0    LORazepam (ATIVAN) 0.5 MG tablet Take 1 tablet (0.5 mg total) by mouth every 8 (eight) hours as needed for Anxiety. (caution-may cause sleepiness) 60 tablet 3     No current facility-administered medications on file prior to visit.      Review of patient's allergies indicates:   Allergen Reactions    Ace inhibitors      Other reaction(s): Unknown    Aspirin      Other reaction(s): Unknown    Aciphex  [rabeprazole]      Other reaction(s): Stomach upset    Bextra  [valdecoxib]      Other reaction(s): Unknown    Clonidine      Other reaction(s): dry mouth.lip swelling    Cardizem  [diltiazem hcl]      Other reaction(s): Unknown    Mavik  [trandolapril]      Other reaction(s): Unknown    Nsaids (non-steroidal anti-inflammatory drug)      Other reaction(s): black spots on skin    Phenytoin sodium extended      Other reaction(s): Stomach upset    Tramadol      Other reaction(s): stomach pain     Past Surgical History:   Procedure Laterality Date    CATARACT EXTRACTION Bilateral     EYE SURGERY      FRACTURE SURGERY      HYSTERECTOMY      ORIF RADIUS & ULNA  FRACTURES       Family History   Problem Relation Age of Onset    No Known Problems Mother     No Known Problems Father     No Known Problems Sister     No Known Problems Brother     No Known Problems Maternal Aunt     No Known Problems Maternal Uncle     No Known Problems Paternal Aunt     No Known Problems Paternal Uncle     No Known Problems Maternal Grandmother     No Known Problems Maternal Grandfather     No Known Problems Paternal Grandmother     No Known Problems Paternal Grandfather     Amblyopia Neg Hx     Blindness Neg Hx     Cancer Neg Hx     Diabetes Neg Hx     Glaucoma Neg Hx     Hypertension Neg Hx     Macular degeneration Neg Hx     Retinal detachment Neg Hx     Strabismus Neg Hx     Stroke Neg Hx     Thyroid disease Neg Hx      Social History     Social History    Marital status:      Spouse name: N/A    Number of children: N/A    Years of education: N/A     Occupational History    Not on file.     Social History Main Topics    Smoking status: Never Smoker    Smokeless tobacco: Never Used    Alcohol use No    Drug use: No    Sexual activity: No     Other Topics Concern    Not on file     Social History Narrative    .  Lives alone.   and two sons have .  Active.         Review of Systems   Constitution: Positive for weight loss (intentional, 22 lbs). Negative for chills, fever and weakness.   Cardiovascular: Positive for leg swelling. Negative for chest pain and claudication.   Respiratory: Negative for cough and shortness of breath.    Skin: Positive for dry skin and nail changes. Negative for itching and rash.   Musculoskeletal: Positive for arthritis, back pain, joint pain, joint swelling and muscle weakness. Negative for falls.        Walks with cane secondary to back pain    Gastrointestinal: Negative for diarrhea, nausea and vomiting.   Neurological: Positive for numbness and paresthesias. Negative for tremors.   Psychiatric/Behavioral:  "Negative for altered mental status and hallucinations.         Objective:       Vitals:    06/08/18 1037   BP: (!) 140/70   Weight: 76.9 kg (169 lb 8.5 oz)   Height: 5' 3" (1.6 m)   PainSc: 10-Worst pain ever   PainLoc: Foot       Physical Exam   Constitutional:   General: Pt. is well-developed, well-nourished, appears stated age, in no acute distress, alert and oriented x 3. No evidence of depression, anxiety, or agitation. Calm, cooperative, and communicative. Appropriate interactions and affect.       Cardiovascular:   Pulses:       Dorsalis pedis pulses are 2+ on the right side, and 2+ on the left side.        Posterior tibial pulses are 1+ on the right side, and 1+ on the left side.   There is absent digital hair. Skin is atrophic, shiny Hyperpigmented b/l LEs. Cool to touch distal foot b/l.    Musculoskeletal:        Right foot: There is normal range of motion.        Left foot: There is normal range of motion.   Adequate joint range of motion without pain, limitation, nor crepitation Bilateral feet and ankle joints. Muscle strength is 5/5 in all groups bilaterally.    Cane assisted gait    Patient has hammertoes of digits 2-5 bilateral partially reducible without symptom today.    Visible and palpable bunion without pain at dorsomedial 1st metatarsal head right and left.  Hallux abducted right and left partially reducible, tracks laterally without being track bound.  No ecchymosis, erythema, edema, or cardinal signs infection or signs of trauma same foot.    Fat pad atrophy to heels and met heads bilateral    Pain to b/l borders of b/l hallux with palpation. Pain to direct palpation and lateral squeeze of left heel hyperkeratotic lesions.    Neurological: A sensory deficit is present.   [place] 5.07 monofilamant testing is diminished John feet. Sharp/dull sensation diminished Bilaterally. Light touch absent Bilaterally.    Paresthesias, and hyperesthesia bilateral feet at toes with no clearly " identified trigger or source.         Skin: Skin is warm, dry and intact. No abrasion, no ecchymosis, no lesion and no rash noted. She is not diaphoretic. No cyanosis. No pallor. Nails show no clubbing.   Toenails 1-5 bilaterally thickened with yellow/brown discoloration and subungual debris. B/l borders of b/l hallux moderately incurvated without wound/infection. Pain to touch. No erythema.     hyperpigmentation to dorsal feet loni    Interdigital Spaces clean, dry and without evidence of break in skin integrity   Psychiatric: She has a normal mood and affect. Her speech is normal.   Nursing note and vitals reviewed.        Assessment:       Encounter Diagnoses   Name Primary?    Type II diabetes mellitus with peripheral circulatory disorder Yes    Bilateral lower extremity edema     Paresthesias     Onychomycosis due to dermatophyte          Plan:       Magaly was seen today for diabetes mellitus, diabetic foot exam and nail care.    Diagnoses and all orders for this visit:    Type II diabetes mellitus with peripheral circulatory disorder    Bilateral lower extremity edema    Paresthesias    Onychomycosis due to dermatophyte      I counseled the patient on her conditions, their implications and medical management.     With patient's permission, nails were aggressively reduced and debrided 1,2,3,4, 5 R and 1,2, 3,4,5 L and filed to their soft tissue attachment mechanically and with electric , removing all offending nail and debris.  Patient tolerated this well and no blood was drawn. Patient reports relief following the procedure.     She will continue to monitor the areas daily, inspect her feet, wear protective shoe gear when ambulatory, moisturizer to maintain skin integrity and follow in this office in approximately 3-4 months, sooner p.r.n.    Long discussion with patient regarding appropriate, supportive and comfortable shoes. Recommended SAS/Easy Spirit style shoe brands with adequate arch supports  to alleviate abnormal pressure and improve stability of foot while walking. Avoid flat shoes and barefoot walking as these will exacerbate or worsen symptoms.     Advised to use  Rx compound pain cream on areas of paresthesias up to 4-5 x daily as needed for pain. Has plenty at home.     Discuss increasing dose of Gabapentin with PCP.     Discussed proper and consistent elevation of lower extremities, above the level of the heart, while at rest, to help control/improve edema.     RTC 3 months call sooner if any problems arise.

## 2018-06-08 NOTE — TELEPHONE ENCOUNTER
----- Message from Ngoc Ramon sent at 6/8/2018  1:21 PM CDT -----  Contact: Self   Patient says her Podiatrist told her to call Dr. Bangura for Gabapentin. Patient says she also need a refill on her Potassium. Please call patient at [phone].    potassium chloride SA (K-DUR,KLOR-CON) 20 MEQ tablet        [facility] [id number] [facility] [address] [facility] AT [facility]

## 2018-06-09 DIAGNOSIS — I10 ESSENTIAL HYPERTENSION: ICD-10-CM

## 2018-06-11 RX ORDER — LOSARTAN POTASSIUM 100 MG/1
TABLET ORAL
Qty: 90 TABLET | Refills: 0 | Status: SHIPPED | OUTPATIENT
Start: 2018-06-11 | End: 2018-09-20 | Stop reason: SDUPTHER

## 2018-06-12 ENCOUNTER — OUTPATIENT CASE MANAGEMENT (OUTPATIENT)
Dept: ADMINISTRATIVE | Facility: OTHER | Age: 83
End: 2018-06-12

## 2018-06-12 NOTE — PROGRESS NOTES
Summary:Follow up with pt. Pt is taking all medications as prescribed. Pt states she will reschedule the Carotid US soon. She has started a payment plan with [facility]. She sates her feet looking much better. She continues to use the compound cream. Pt is weighing daily. Pt states she is eating better and has lost 10-15 pounds. Pt last A1C was 6.1. All  Goals met.   Interventions:Dis-enrolled from opcm  Plan:none

## 2018-06-19 NOTE — TELEPHONE ENCOUNTER
Other (Free Text): Patient requesting refill of oral prednisone that she can use “as needed“. Previous prescription which it issued by her rheumatologist. Advised she consult her Rheumatologist for prescriptions of oral prednisone. If she flares discussed coming in for IM Kenalog to help calm things down then maintain with topicals.  Side effects of oral glucocorticoids were discussed Pt informed that script ready for pickup here at clinic     Note Text (......Xxx Chief Complaint.): This diagnosis correlates with the Detail Level: Zone

## 2018-07-05 ENCOUNTER — HOSPITAL ENCOUNTER (INPATIENT)
Facility: HOSPITAL | Age: 83
LOS: 3 days | Discharge: HOME | DRG: 280 | End: 2018-07-08
Attending: INTERNAL MEDICINE | Admitting: HOSPITALIST
Payer: MEDICARE

## 2018-07-05 DIAGNOSIS — F32.9 MAJOR DEPRESSIVE DISORDER, SINGLE EPISODE: ICD-10-CM

## 2018-07-05 DIAGNOSIS — I21.4 NSTEMI (NON-ST ELEVATED MYOCARDIAL INFARCTION): ICD-10-CM

## 2018-07-05 DIAGNOSIS — I70.422 ATHEROSCLEROSIS OF AUTOLOGOUS VEIN BYPASS GRAFT OF LEFT LOWER EXTREMITY WITH REST PAIN: ICD-10-CM

## 2018-07-05 DIAGNOSIS — R60.0 EDEMA OF LEFT LOWER EXTREMITY: Primary | ICD-10-CM

## 2018-07-05 DIAGNOSIS — R07.9 CHEST PAIN: ICD-10-CM

## 2018-07-05 LAB
ALBUMIN SERPL-MCNC: 3.5 G/DL (ref 3.3–5.5)
ALP SERPL-CCNC: 112 U/L (ref 42–141)
BILIRUB SERPL-MCNC: 1.4 MG/DL (ref 0.2–1.6)
BUN SERPL-MCNC: 15 MG/DL (ref 7–22)
CALCIUM SERPL-MCNC: 9.5 MG/DL (ref 8–10.3)
CHLORIDE SERPL-SCNC: 98 MMOL/L (ref 98–108)
CREAT SERPL-MCNC: 1.3 MG/DL (ref 0.6–1.2)
GLUCOSE SERPL-MCNC: 153 MG/DL (ref 73–118)
HCO3 UR-SCNC: 17.9 MMOL/L (ref 24–28)
LDH SERPL L TO P-CCNC: 10.2 MMOL/L (ref 0.5–2.2)
PCO2 BLDA: 46.1 MMHG (ref 35–45)
PH SMN: 7.2 [PH] (ref 7.35–7.45)
PO2 BLDA: 143 MMHG (ref 40–60)
POC ALT (SGPT): 19 U/L (ref 10–47)
POC AST (SGOT): 41 U/L (ref 11–38)
POC B-TYPE NATRIURETIC PEPTIDE: 631 PG/ML (ref 0–100)
POC BE: -10 MMOL/L
POC CARDIAC TROPONIN I: 0.31 NG/ML
POC D-DI: 4110 NG/ML (ref 0–450)
POC PTINR: 1.3 (ref 0.9–1.2)
POC PTWBT: 15.7 SEC (ref 9.7–14.3)
POC SATURATED O2: 99 % (ref 95–100)
POC TCO2: 19 MMOL/L (ref 24–29)
POC TCO2: 21 MMOL/L (ref 18–33)
POTASSIUM BLD-SCNC: 3.7 MMOL/L (ref 3.6–5.1)
PROTEIN, POC: 8 G/DL (ref 6.4–8.1)
SAMPLE: ABNORMAL
SODIUM BLD-SCNC: 136 MMOL/L (ref 128–145)

## 2018-07-05 PROCEDURE — 25000003 PHARM REV CODE 250: Performed by: INTERNAL MEDICINE

## 2018-07-05 PROCEDURE — 99285 EMERGENCY DEPT VISIT HI MDM: CPT | Mod: 25

## 2018-07-05 PROCEDURE — 93010 ELECTROCARDIOGRAM REPORT: CPT | Mod: ,,, | Performed by: INTERNAL MEDICINE

## 2018-07-05 PROCEDURE — 87040 BLOOD CULTURE FOR BACTERIA: CPT | Mod: 59

## 2018-07-05 PROCEDURE — 96365 THER/PROPH/DIAG IV INF INIT: CPT

## 2018-07-05 PROCEDURE — 85610 PROTHROMBIN TIME: CPT

## 2018-07-05 PROCEDURE — 93005 ELECTROCARDIOGRAM TRACING: CPT

## 2018-07-05 PROCEDURE — 25500020 PHARM REV CODE 255: Performed by: INTERNAL MEDICINE

## 2018-07-05 PROCEDURE — 21400001 HC TELEMETRY ROOM

## 2018-07-05 PROCEDURE — 85025 COMPLETE CBC W/AUTO DIFF WBC: CPT

## 2018-07-05 PROCEDURE — 80048 BASIC METABOLIC PNL TOTAL CA: CPT

## 2018-07-05 PROCEDURE — 84550 ASSAY OF BLOOD/URIC ACID: CPT

## 2018-07-05 PROCEDURE — 80053 COMPREHEN METABOLIC PANEL: CPT

## 2018-07-05 PROCEDURE — 82803 BLOOD GASES ANY COMBINATION: CPT

## 2018-07-05 PROCEDURE — 63600175 PHARM REV CODE 636 W HCPCS: Performed by: INTERNAL MEDICINE

## 2018-07-05 PROCEDURE — 84484 ASSAY OF TROPONIN QUANT: CPT

## 2018-07-05 PROCEDURE — 85379 FIBRIN DEGRADATION QUANT: CPT

## 2018-07-05 RX ORDER — SODIUM CHLORIDE 9 MG/ML
500 INJECTION, SOLUTION INTRAVENOUS ONCE
Status: DISCONTINUED | OUTPATIENT
Start: 2018-07-05 | End: 2018-07-05

## 2018-07-05 RX ORDER — SODIUM CHLORIDE 9 MG/ML
500 INJECTION, SOLUTION INTRAVENOUS ONCE
Status: COMPLETED | OUTPATIENT
Start: 2018-07-05 | End: 2018-07-06

## 2018-07-05 RX ADMIN — SODIUM CHLORIDE 500 ML: 0.9 INJECTION, SOLUTION INTRAVENOUS at 11:07

## 2018-07-05 RX ADMIN — IOHEXOL 100 ML: 350 INJECTION, SOLUTION INTRAVENOUS at 11:07

## 2018-07-05 RX ADMIN — PIPERACILLIN AND TAZOBACTAM 4.5 G: 4; .5 INJECTION, POWDER, LYOPHILIZED, FOR SOLUTION INTRAVENOUS; PARENTERAL at 11:07

## 2018-07-06 PROBLEM — I70.429: Status: ACTIVE | Noted: 2018-07-06

## 2018-07-06 PROBLEM — R79.89 ELEVATED BRAIN NATRIURETIC PEPTIDE (BNP) LEVEL: Status: ACTIVE | Noted: 2018-07-06

## 2018-07-06 PROBLEM — R79.89 ELEVATED LACTIC ACID LEVEL: Status: ACTIVE | Noted: 2018-07-06

## 2018-07-06 PROBLEM — R60.0 EDEMA OF LEFT LOWER EXTREMITY: Status: ACTIVE | Noted: 2018-07-06

## 2018-07-06 PROBLEM — R79.89 ELEVATED D-DIMER: Status: ACTIVE | Noted: 2018-07-06

## 2018-07-06 PROBLEM — E11.9 DIABETES MELLITUS TYPE II: Status: ACTIVE | Noted: 2018-07-06

## 2018-07-06 LAB
ALBUMIN SERPL BCP-MCNC: 3.1 G/DL
ALP SERPL-CCNC: 131 U/L
ALT SERPL W/O P-5'-P-CCNC: 28 U/L
ANION GAP SERPL CALC-SCNC: 12 MMOL/L
AST SERPL-CCNC: 59 U/L
BASOPHILS # BLD AUTO: 0.01 K/UL
BASOPHILS NFR BLD: 0.2 %
BILIRUB SERPL-MCNC: 1.5 MG/DL
BUN SERPL-MCNC: 19 MG/DL
CALCIUM SERPL-MCNC: 9.6 MG/DL
CHLORIDE SERPL-SCNC: 103 MMOL/L
CHOLEST SERPL-MCNC: 124 MG/DL
CHOLEST/HDLC SERPL: 2.1 {RATIO}
CO2 SERPL-SCNC: 23 MMOL/L
CORONARY STENOSIS: ABNORMAL
CREAT SERPL-MCNC: 1.2 MG/DL
DIFFERENTIAL METHOD: ABNORMAL
EOSINOPHIL # BLD AUTO: 0 K/UL
EOSINOPHIL NFR BLD: 0.3 %
ERYTHROCYTE [DISTWIDTH] IN BLOOD BY AUTOMATED COUNT: 13.3 %
EST. GFR  (AFRICAN AMERICAN): 48 ML/MIN/1.73 M^2
EST. GFR  (NON AFRICAN AMERICAN): 41 ML/MIN/1.73 M^2
ESTIMATED AVG GLUCOSE: 126 MG/DL
ESTIMATED AVG GLUCOSE: 126 MG/DL
GLUCOSE SERPL-MCNC: 131 MG/DL
HBA1C MFR BLD HPLC: 6 %
HBA1C MFR BLD HPLC: 6 %
HCO3 UR-SCNC: 20.9 MMOL/L (ref 24–28)
HCT VFR BLD AUTO: 29.1 %
HDLC SERPL-MCNC: 59 MG/DL
HDLC SERPL: 47.6 %
HGB BLD-MCNC: 9.2 G/DL
INR PPP: 1.1
LDH SERPL L TO P-CCNC: 2.53 MMOL/L (ref 0.5–2.2)
LDLC SERPL CALC-MCNC: 55.6 MG/DL
LYMPHOCYTES # BLD AUTO: 1.5 K/UL
LYMPHOCYTES NFR BLD: 23.9 %
MAGNESIUM SERPL-MCNC: 1.8 MG/DL
MCH RBC QN AUTO: 26.1 PG
MCHC RBC AUTO-ENTMCNC: 31.6 G/DL
MCV RBC AUTO: 83 FL
MONOCYTES # BLD AUTO: 0.9 K/UL
MONOCYTES NFR BLD: 14 %
NEUTROPHILS # BLD AUTO: 3.7 K/UL
NEUTROPHILS NFR BLD: 61.4 %
NONHDLC SERPL-MCNC: 65 MG/DL
PCO2 BLDA: 31.4 MMHG (ref 35–45)
PH SMN: 7.43 [PH] (ref 7.35–7.45)
PHOSPHATE SERPL-MCNC: 4.3 MG/DL
PLATELET # BLD AUTO: 220 K/UL
PMV BLD AUTO: 10.7 FL
PO2 BLDA: 39 MMHG (ref 40–60)
POC BE: -3 MMOL/L
POC CARDIAC TROPONIN I: 1.31 NG/ML
POC SATURATED O2: 76 % (ref 95–100)
POC TCO2: 22 MMOL/L (ref 24–29)
POCT GLUCOSE: 162 MG/DL (ref 70–110)
POTASSIUM SERPL-SCNC: 4.1 MMOL/L
PROT SERPL-MCNC: 7.1 G/DL
PROTHROMBIN TIME: 11.3 SEC
RBC # BLD AUTO: 3.52 M/UL
SAMPLE: ABNORMAL
SAMPLE: ABNORMAL
SODIUM SERPL-SCNC: 138 MMOL/L
T4 FREE SERPL-MCNC: 1.09 NG/DL
TRIGL SERPL-MCNC: 47 MG/DL
TROPONIN I SERPL DL<=0.01 NG/ML-MCNC: 7.23 NG/ML
TSH SERPL DL<=0.005 MIU/L-ACNC: 1.83 UIU/ML
URATE SERPL-MCNC: 5.2 MG/DL
WBC # BLD AUTO: 6.07 K/UL

## 2018-07-06 PROCEDURE — 84443 ASSAY THYROID STIM HORMONE: CPT

## 2018-07-06 PROCEDURE — 93458 L HRT ARTERY/VENTRICLE ANGIO: CPT | Mod: 26,,, | Performed by: INTERNAL MEDICINE

## 2018-07-06 PROCEDURE — 83036 HEMOGLOBIN GLYCOSYLATED A1C: CPT

## 2018-07-06 PROCEDURE — 84100 ASSAY OF PHOSPHORUS: CPT

## 2018-07-06 PROCEDURE — 80053 COMPREHEN METABOLIC PANEL: CPT

## 2018-07-06 PROCEDURE — 25000003 PHARM REV CODE 250: Performed by: HOSPITALIST

## 2018-07-06 PROCEDURE — 25500020 PHARM REV CODE 255

## 2018-07-06 PROCEDURE — 36415 COLL VENOUS BLD VENIPUNCTURE: CPT

## 2018-07-06 PROCEDURE — 99152 MOD SED SAME PHYS/QHP 5/>YRS: CPT | Mod: ,,, | Performed by: INTERNAL MEDICINE

## 2018-07-06 PROCEDURE — 21400001 HC TELEMETRY ROOM

## 2018-07-06 PROCEDURE — 84439 ASSAY OF FREE THYROXINE: CPT

## 2018-07-06 PROCEDURE — 94761 N-INVAS EAR/PLS OXIMETRY MLT: CPT

## 2018-07-06 PROCEDURE — 80061 LIPID PANEL: CPT

## 2018-07-06 PROCEDURE — 93010 ELECTROCARDIOGRAM REPORT: CPT | Mod: 76,,, | Performed by: INTERNAL MEDICINE

## 2018-07-06 PROCEDURE — B2111ZZ FLUOROSCOPY OF MULTIPLE CORONARY ARTERIES USING LOW OSMOLAR CONTRAST: ICD-10-PCS | Performed by: INTERNAL MEDICINE

## 2018-07-06 PROCEDURE — 85025 COMPLETE CBC W/AUTO DIFF WBC: CPT

## 2018-07-06 PROCEDURE — 83735 ASSAY OF MAGNESIUM: CPT

## 2018-07-06 PROCEDURE — C1894 INTRO/SHEATH, NON-LASER: HCPCS

## 2018-07-06 PROCEDURE — 85610 PROTHROMBIN TIME: CPT

## 2018-07-06 PROCEDURE — 25000003 PHARM REV CODE 250: Performed by: INTERNAL MEDICINE

## 2018-07-06 PROCEDURE — 84484 ASSAY OF TROPONIN QUANT: CPT

## 2018-07-06 PROCEDURE — A4216 STERILE WATER/SALINE, 10 ML: HCPCS | Performed by: HOSPITALIST

## 2018-07-06 PROCEDURE — 63600175 PHARM REV CODE 636 W HCPCS: Performed by: HOSPITALIST

## 2018-07-06 PROCEDURE — 93005 ELECTROCARDIOGRAM TRACING: CPT

## 2018-07-06 PROCEDURE — 25000003 PHARM REV CODE 250

## 2018-07-06 PROCEDURE — 99223 1ST HOSP IP/OBS HIGH 75: CPT | Mod: ,,, | Performed by: INTERNAL MEDICINE

## 2018-07-06 PROCEDURE — 63600175 PHARM REV CODE 636 W HCPCS: Performed by: INTERNAL MEDICINE

## 2018-07-06 PROCEDURE — 27000221 HC OXYGEN, UP TO 24 HOURS

## 2018-07-06 PROCEDURE — 63600175 PHARM REV CODE 636 W HCPCS

## 2018-07-06 PROCEDURE — 93458 L HRT ARTERY/VENTRICLE ANGIO: CPT

## 2018-07-06 PROCEDURE — 4A023N7 MEASUREMENT OF CARDIAC SAMPLING AND PRESSURE, LEFT HEART, PERCUTANEOUS APPROACH: ICD-10-PCS | Performed by: INTERNAL MEDICINE

## 2018-07-06 RX ORDER — HYDROCODONE BITARTRATE AND ACETAMINOPHEN 5; 325 MG/1; MG/1
1 TABLET ORAL EVERY 6 HOURS PRN
Status: DISCONTINUED | OUTPATIENT
Start: 2018-07-06 | End: 2018-07-08 | Stop reason: HOSPADM

## 2018-07-06 RX ORDER — BISACODYL 10 MG
10 SUPPOSITORY, RECTAL RECTAL DAILY PRN
Status: DISCONTINUED | OUTPATIENT
Start: 2018-07-06 | End: 2018-07-08 | Stop reason: HOSPADM

## 2018-07-06 RX ORDER — ENOXAPARIN SODIUM 100 MG/ML
1 INJECTION SUBCUTANEOUS
Status: COMPLETED | OUTPATIENT
Start: 2018-07-06 | End: 2018-07-06

## 2018-07-06 RX ORDER — GABAPENTIN 100 MG/1
100 CAPSULE ORAL 3 TIMES DAILY
Status: DISCONTINUED | OUTPATIENT
Start: 2018-07-06 | End: 2018-07-08 | Stop reason: HOSPADM

## 2018-07-06 RX ORDER — DULOXETIN HYDROCHLORIDE 30 MG/1
60 CAPSULE, DELAYED RELEASE ORAL DAILY
Status: DISCONTINUED | OUTPATIENT
Start: 2018-07-06 | End: 2018-07-08 | Stop reason: HOSPADM

## 2018-07-06 RX ORDER — POLYETHYLENE GLYCOL 3350 17 G/17G
17 POWDER, FOR SOLUTION ORAL DAILY PRN
Status: DISCONTINUED | OUTPATIENT
Start: 2018-07-06 | End: 2018-07-08 | Stop reason: HOSPADM

## 2018-07-06 RX ORDER — CARVEDILOL 6.25 MG/1
6.25 TABLET ORAL 2 TIMES DAILY
Status: DISCONTINUED | OUTPATIENT
Start: 2018-07-06 | End: 2018-07-08 | Stop reason: HOSPADM

## 2018-07-06 RX ORDER — MORPHINE SULFATE 4 MG/ML
4 INJECTION, SOLUTION INTRAMUSCULAR; INTRAVENOUS EVERY 4 HOURS PRN
Status: DISCONTINUED | OUTPATIENT
Start: 2018-07-06 | End: 2018-07-08 | Stop reason: HOSPADM

## 2018-07-06 RX ORDER — PROMETHAZINE HYDROCHLORIDE 25 MG/1
25 SUPPOSITORY RECTAL EVERY 6 HOURS PRN
Status: DISCONTINUED | OUTPATIENT
Start: 2018-07-06 | End: 2018-07-08 | Stop reason: HOSPADM

## 2018-07-06 RX ORDER — SPIRONOLACTONE 25 MG/1
25 TABLET ORAL DAILY
Status: DISCONTINUED | OUTPATIENT
Start: 2018-07-06 | End: 2018-07-08 | Stop reason: HOSPADM

## 2018-07-06 RX ORDER — LOSARTAN POTASSIUM 25 MG/1
100 TABLET ORAL DAILY
Status: DISCONTINUED | OUTPATIENT
Start: 2018-07-06 | End: 2018-07-08 | Stop reason: HOSPADM

## 2018-07-06 RX ORDER — CLOPIDOGREL BISULFATE 75 MG/1
75 TABLET ORAL DAILY
Status: DISCONTINUED | OUTPATIENT
Start: 2018-07-06 | End: 2018-07-08 | Stop reason: HOSPADM

## 2018-07-06 RX ORDER — SODIUM CHLORIDE 0.9 % (FLUSH) 0.9 %
3 SYRINGE (ML) INJECTION EVERY 8 HOURS
Status: DISCONTINUED | OUTPATIENT
Start: 2018-07-06 | End: 2018-07-08 | Stop reason: HOSPADM

## 2018-07-06 RX ORDER — FUROSEMIDE 10 MG/ML
40 INJECTION INTRAMUSCULAR; INTRAVENOUS 2 TIMES DAILY
Status: DISCONTINUED | OUTPATIENT
Start: 2018-07-06 | End: 2018-07-06

## 2018-07-06 RX ORDER — NAPROXEN SODIUM 220 MG/1
81 TABLET, FILM COATED ORAL DAILY
Status: DISCONTINUED | OUTPATIENT
Start: 2018-07-06 | End: 2018-07-08 | Stop reason: HOSPADM

## 2018-07-06 RX ORDER — AMOXICILLIN 250 MG
1 CAPSULE ORAL 2 TIMES DAILY
Status: DISCONTINUED | OUTPATIENT
Start: 2018-07-06 | End: 2018-07-08 | Stop reason: HOSPADM

## 2018-07-06 RX ORDER — ATORVASTATIN CALCIUM 40 MG/1
40 TABLET, FILM COATED ORAL DAILY
Status: DISCONTINUED | OUTPATIENT
Start: 2018-07-06 | End: 2018-07-08 | Stop reason: HOSPADM

## 2018-07-06 RX ORDER — RAMELTEON 8 MG/1
8 TABLET ORAL NIGHTLY PRN
Status: DISCONTINUED | OUTPATIENT
Start: 2018-07-06 | End: 2018-07-08 | Stop reason: HOSPADM

## 2018-07-06 RX ORDER — ONDANSETRON 2 MG/ML
8 INJECTION INTRAMUSCULAR; INTRAVENOUS EVERY 8 HOURS PRN
Status: DISCONTINUED | OUTPATIENT
Start: 2018-07-06 | End: 2018-07-08 | Stop reason: HOSPADM

## 2018-07-06 RX ORDER — TEMAZEPAM 30 MG/1
CAPSULE ORAL
Qty: 30 CAPSULE | Refills: 5 | Status: ON HOLD | OUTPATIENT
Start: 2018-07-06 | End: 2019-05-09 | Stop reason: HOSPADM

## 2018-07-06 RX ORDER — HYDRALAZINE HYDROCHLORIDE 20 MG/ML
10 INJECTION INTRAMUSCULAR; INTRAVENOUS EVERY 6 HOURS PRN
Status: DISCONTINUED | OUTPATIENT
Start: 2018-07-06 | End: 2018-07-08 | Stop reason: HOSPADM

## 2018-07-06 RX ORDER — PANTOPRAZOLE SODIUM 40 MG/1
40 TABLET, DELAYED RELEASE ORAL DAILY
Status: DISCONTINUED | OUTPATIENT
Start: 2018-07-06 | End: 2018-07-08 | Stop reason: HOSPADM

## 2018-07-06 RX ORDER — GLUCAGON 1 MG
1 KIT INJECTION
Status: DISCONTINUED | OUTPATIENT
Start: 2018-07-06 | End: 2018-07-08 | Stop reason: HOSPADM

## 2018-07-06 RX ORDER — HYDROCODONE BITARTRATE AND ACETAMINOPHEN 7.5; 325 MG/1; MG/1
1 TABLET ORAL
Status: COMPLETED | OUTPATIENT
Start: 2018-07-06 | End: 2018-07-06

## 2018-07-06 RX ORDER — INSULIN ASPART 100 [IU]/ML
1-10 INJECTION, SOLUTION INTRAVENOUS; SUBCUTANEOUS EVERY 6 HOURS PRN
Status: DISCONTINUED | OUTPATIENT
Start: 2018-07-06 | End: 2018-07-08 | Stop reason: HOSPADM

## 2018-07-06 RX ADMIN — SODIUM CHLORIDE, PRESERVATIVE FREE 3 ML: 5 INJECTION INTRAVENOUS at 11:07

## 2018-07-06 RX ADMIN — PANTOPRAZOLE SODIUM 40 MG: 40 TABLET, DELAYED RELEASE ORAL at 11:07

## 2018-07-06 RX ADMIN — GABAPENTIN 100 MG: 100 CAPSULE ORAL at 11:07

## 2018-07-06 RX ADMIN — HYDROCODONE BITARTRATE AND ACETAMINOPHEN 1 TABLET: 5; 325 TABLET ORAL at 11:07

## 2018-07-06 RX ADMIN — ATORVASTATIN CALCIUM 40 MG: 40 TABLET, FILM COATED ORAL at 05:07

## 2018-07-06 RX ADMIN — ASPIRIN 81 MG 81 MG: 81 TABLET ORAL at 11:07

## 2018-07-06 RX ADMIN — NITROGLYCERIN 2 INCH: 20 OINTMENT TOPICAL at 05:07

## 2018-07-06 RX ADMIN — HYDROCODONE BITARTRATE AND ACETAMINOPHEN 1 TABLET: 7.5; 325 TABLET ORAL at 12:07

## 2018-07-06 RX ADMIN — HYDROCODONE BITARTRATE AND ACETAMINOPHEN 1 TABLET: 5; 325 TABLET ORAL at 07:07

## 2018-07-06 RX ADMIN — DOCUSATE SODIUM AND SENNOSIDES 1 TABLET: 8.6; 5 TABLET, FILM COATED ORAL at 08:07

## 2018-07-06 RX ADMIN — GABAPENTIN 100 MG: 100 CAPSULE ORAL at 08:07

## 2018-07-06 RX ADMIN — CLOPIDOGREL BISULFATE 75 MG: 75 TABLET ORAL at 11:07

## 2018-07-06 RX ADMIN — FUROSEMIDE 40 MG: 10 INJECTION, SOLUTION INTRAMUSCULAR; INTRAVENOUS at 11:07

## 2018-07-06 RX ADMIN — DOCUSATE SODIUM AND SENNOSIDES 1 TABLET: 8.6; 5 TABLET, FILM COATED ORAL at 11:07

## 2018-07-06 RX ADMIN — SPIRONOLACTONE 25 MG: 25 TABLET ORAL at 11:07

## 2018-07-06 RX ADMIN — ENOXAPARIN SODIUM 70 MG: 100 INJECTION SUBCUTANEOUS at 05:07

## 2018-07-06 RX ADMIN — CARVEDILOL 6.25 MG: 6.25 TABLET, FILM COATED ORAL at 05:07

## 2018-07-06 RX ADMIN — DULOXETINE 60 MG: 30 CAPSULE, DELAYED RELEASE ORAL at 11:07

## 2018-07-06 NOTE — ASSESSMENT & PLAN NOTE
Known branch disease in D4 by [facility] 12/2017. Troponin increased to 7.2. Case discussed with Dr Humphrey - plan for repeat C today. Check echo

## 2018-07-06 NOTE — PLAN OF CARE
Problem: Cardiac Catheterization (Diagnostic/Interventional) (Adult)  Intervention: Prevent/Manage Vascular Access Site Complications   07/06/18 1658   Activity   Activity Management bedrest maintained per order*   Positioning   Head of Bed (HOB) HOB at 30-45 degrees   Safety Interventions   Bleeding Precautions blood pressure closely monitored   Cardiac Interventions   Hemostasis Management mechanical compression device utilized       Goal: Signs and Symptoms of Listed Potential Problems Will be Absent, Minimized or Managed (Cardiac Catheterization)  Signs and symptoms of listed potential problems will be absent, minimized or managed by discharge/transition of care (reference Cardiac Catheterization (Diagnostic/Interventional) (Adult) CPG).  Outcome: Ongoing (interventions implemented as appropriate)   07/06/18 1658   Cardiac Catheterization (Diagnostic/Interventional)   Problems Assessed (Cardiac Catheterization) all   Problems Present (Cardiac Catheterization) cardiopulmonary complications;situational response     Goal: Anesthesia/Sedation Recovery  Outcome: Ongoing (interventions implemented as appropriate)   07/06/18 1658   Goal/Outcome Evaluation   Anesthesia/Sedation Recovery progressing toward baseline

## 2018-07-06 NOTE — ED NOTES
Malgorzata contacted, transport set up for with in the hour, notified of cardiac monitor and hep lock needs, spoke with [facility] dispatch for [facility] ambulance service

## 2018-07-06 NOTE — CARE UPDATE
Reviewed H and P by my colleague and agree with A and P. Patient presenting with non-stemi with trop of 7. Known CAD. Cards consulted. To [facility] today. All data/labs/imaging reviewed.

## 2018-07-06 NOTE — NURSING
0630 Patient noted lying in bed with eyes closed . Resp easy and unlabored. Labs drawn , EKG obtained. AM meds given. Patient with c/o pain to back and left foot.   0715 Rounded with oncoming nurse, but awake and alert. Pain meds given for left leg and back pain. Noted to be mildly diaphoretic. Patient states.; I am always diaphoretic. Inform will continue monitor. Call placed to Dr. Breaux . Made aware of  Elevated d-dimer results from [facility], and of patient status after administration of Nitroglycerin Paste. New orders received.Nitropaste removed as ordered.   0730  Re assessed patient;  Noted severely diaphoretic with increased anxiety. B/P at 90/56 manually with a heart rate of 67. Continue to monitor.  0735 Remains diaphoretic. B/P Faint at 104/62.O2 sats at 100% on 100% non rebreather.Patient remains   0745 Rapid response was called.  DIANA Aguila at bedside with ICU nurses, House Supervisor, Resp, OC. See rapid response note.

## 2018-07-06 NOTE — BRIEF OP NOTE
[facility]  Brief Operative Note    SUMMARY     Surgery Date: 7/6/2018     Surgeon(s) and Role:     * Hammad Humphrey MD - Primary    Assisting Surgeon: None    Pre-op Diagnosis:  NSTEMI (non-ST elevated myocardial infarction) [I21.4]    Post-op Diagnosis:  NSTEMI    Procedure(s) (LRB):  Left heart cath (Left)    Anesthesia: RN IV Sedation    Description of Procedure: Selective coronary angiography, LV not not assessed    Description of the findings of the procedure: No change since previous angiogram.  There is branch vessel disease in diagonals correlating with previous stress test post-recent non-STEMI.  As no progression of disease in the RCA as well.      Recommendation:  -We'll continue medical management    Estimated Blood Loss: Less than 50 cc         Specimens:   Specimen (12h ago through future)    None

## 2018-07-06 NOTE — NURSING
Patient received from [place] ED via ambulance . Transferred to bed with pull over transfer. AAOX3. Patient with c/o left leg pain . States 7/10. To review admit. Admit assessment in [place] at bedside.

## 2018-07-06 NOTE — HPI
Magaly Nunes is a 85 y.o. female that (in part)  has a past medical history of Anxiety disorder; Arthritis; Carpal tunnel syndrome; CHF (congestive heart failure); Chronic allergic rhinitis; Chronic pain; CKD stage 3 due to type 2 diabetes mellitus; Constipation - functional; Depression; Diabetes mellitus type II; Hot flash not due to menopause; HTN (hypertension); Hypokalemia; Insomnia; Knee pain, bilateral; Personal history of DVT (deep vein thrombosis); Renovascular hypertension; Severe tricuspid regurgitation; Spinal stenosis; Spinal stenosis of lumbar region; and Vertigo.  has a past surgical history that includes Hysterectomy; Cataract extraction (Bilateral); Eye surgery; Fracture surgery; and ORIF radius & ulna fractures.   Presents to [facility] as a transfer patient from the [place] [address] stand-alone Emergency Department complaining of shortness breath associated with bilateral lower extremity edema and abdominal bloating.  She also reports having chest pain that began approximately 4:30 p.m. lasted roughly 5 min.  It spontaneously resolved prior to arrival.  Radiation of the main bilateral lower extremities.  It worsened with exertion.  Minimally relieved with addition of supplemental oxygen in the ED.  Subacute onset with risks were sent.  Concentration.  Daily frequency.  Denies fever or chills.  No congestion. Positive for nonproductive cough.  No hemoptysis.  She is compliant with her aspirin and Plavix.    In the emergency department EKG number chest x-ray, cardiac enzymes, and routine laboratory studies were obtained.  Showed multiple abnormalities including an elevated troponin level, elevated BNP, elevated D-dimer, and physical exam findings concerning for possible DVT or arterial thrombosis.  She underwent CT angiography of the chest which was negative as well as lower extremity ultrasound which was also negative for DVT.  Sec troponin was obtained which was markedly  elevated compared to the 1st.  Dr. Lee at the [place] [facility] discussed the case with Cardiology who [place] [facility] [place] and treatment. Hospital medicine has been asked to admit.

## 2018-07-06 NOTE — SUBJECTIVE & OBJECTIVE
Past Medical History:   Diagnosis Date    Anxiety disorder     Arthritis 12/28/2017    Carpal tunnel syndrome     CHF (congestive heart failure)     Chronic allergic rhinitis     Chronic pain 10/22/2012    CKD stage 3 due to type 2 diabetes mellitus 2/14/2017    Constipation - functional 4/10/2013    Depression     Diabetes mellitus type II     Hot flash not due to menopause     HTN (hypertension)     Hypokalemia 11/19/2012    Insomnia 4/10/2013    Knee pain, bilateral 7/24/2013    Personal history of DVT (deep vein thrombosis)     Renovascular hypertension 2/14/2017    Severe tricuspid regurgitation 2/14/2017    Spinal stenosis     Dr. Corrales    Spinal stenosis of lumbar region 2/3/2014    Vertigo        Past Surgical History:   Procedure Laterality Date    CATARACT EXTRACTION Bilateral     EYE SURGERY      FRACTURE SURGERY      HYSTERECTOMY      ORIF RADIUS & ULNA FRACTURES         Review of patient's allergies indicates:   Allergen Reactions    Ace inhibitors      Other reaction(s): Unknown    Aciphex  [rabeprazole]      Other reaction(s): Stomach upset    Aspirin      Other reaction(s): Unknown    Bextra  [valdecoxib]      Other reaction(s): Unknown    Cardizem  [diltiazem hcl]      Other reaction(s): Unknown    Clonidine      Other reaction(s): dry mouth.lip swelling    Mavik  [trandolapril]      Other reaction(s): Unknown    Nsaids (non-steroidal anti-inflammatory drug)      Other reaction(s): black spots on skin    Phenytoin sodium extended      Other reaction(s): Stomach upset    Tramadol      Other reaction(s): stomach pain       No current facility-administered medications on file prior to encounter.      Current Outpatient Prescriptions on File Prior to Encounter   Medication Sig    aspirin 81 MG Chew Take 1 tablet (81 mg total) by mouth once daily.    atorvastatin (LIPITOR) 20 MG tablet     clopidogrel (PLAVIX) 75 mg tablet     DULoxetine (CYMBALTA) 60 MG capsule  TAKE 1 CAPSULE(60 MG) BY MOUTH EVERY DAY    furosemide (LASIX) 40 MG tablet Take 1 tablet (40 mg total) by mouth once daily.    gabapentin (NEURONTIN) 100 MG capsule Take 1 capsule (100 mg total) by mouth 3 (three) times daily.    losartan (COZAAR) 100 MG tablet TAKE 1 TABLET BY MOUTH ONCE DAILY    omeprazole (PRILOSEC) 20 MG capsule TAKE 2 CAPSULES BY MOUTH EVERY DAY FOR ACID REFLUX OR STOMACH    potassium chloride SA (K-DUR,KLOR-CON) 20 MEQ tablet Take 1 tablet (20 mEq total) by mouth once daily.    [DISCONTINUED] temazepam (RESTORIL) 30 mg capsule TAKE ONE CAPSULE BY MOUTH EVERY NIGHT AT BEDTIME AS NEEDED FOR INSOMNIA    carvedilol (COREG) 6.25 MG tablet Take 1 tablet (6.25 mg total) by mouth 2 (two) times daily.    HYDROcodone-acetaminophen (NORCO) 5-325 mg per tablet Take 1 tablet by mouth every 6 (six) hours as needed for Pain.    lidocaine-prilocaine (EMLA) cream     LORazepam (ATIVAN) 0.5 MG tablet Take 1 tablet (0.5 mg total) by mouth every 8 (eight) hours as needed for Anxiety. (caution-may cause sleepiness)    SHINGRIX, PF, 50 mcg/0.5 mL injection     tiZANidine 4 mg Cap Take by mouth.     Family History     Problem Relation (Age of Onset)    No Known Problems Mother, Father, Sister, Brother, Maternal Aunt, Maternal Uncle, Paternal Aunt, Paternal Uncle, Maternal Grandmother, Maternal Grandfather, Paternal Grandmother, Paternal Grandfather        Social History Main Topics    Smoking status: Never Smoker    Smokeless tobacco: Never Used    Alcohol use No    Drug use: No    Sexual activity: No     Review of Systems   Constitution: Negative for decreased appetite.   HENT: Negative for ear discharge.    Eyes: Negative for blurred vision.   Respiratory: Negative for hemoptysis.    Endocrine: Negative for polyphagia.   Hematologic/Lymphatic: Negative for adenopathy.   Skin: Negative for color change.   Musculoskeletal: Negative for joint swelling.   Neurological: Negative for brief paralysis.    Psychiatric/Behavioral: Negative for hallucinations.     Objective:     Vital Signs (Most Recent):  Temp: 96.3 °F (35.7 °C) (07/06/18 0333)  Pulse: 110 (07/06/18 0817)  Resp: 20 (07/06/18 0333)  BP: 102/65 (07/06/18 0817)  SpO2: 100 % (07/06/18 0817) Vital Signs (24h Range):  Temp:  [96.3 °F (35.7 °C)-99.4 °F (37.4 °C)] 96.3 °F (35.7 °C)  Pulse:  [102-130] 110  Resp:  [18-20] 20  SpO2:  [76 %-100 %] 100 %  BP: (102-138)/(55-72) 102/65     Weight: 78.2 kg (172 lb 6.4 oz)  Body mass index is 30.54 kg/m².    SpO2: 100 %  O2 Device (Oxygen Therapy): nasal cannula      Intake/Output Summary (Last 24 hours) at 07/06/18 0906  Last data filed at 07/06/18 0019   Gross per 24 hour   Intake              600 ml   Output                0 ml   Net              600 ml       Lines/Drains/Airways     Peripheral Intravenous Line                 Peripheral IV - Single Lumen 07/05/18 2135 Left Antecubital less than 1 day                Physical Exam   Constitutional: She is oriented to person, place, and time. She appears well-developed and well-nourished.   HENT:   Head: Normocephalic and atraumatic.   Eyes: Conjunctivae are normal. Pupils are equal, round, and reactive to light.   Neck: Normal range of motion. Neck supple.   Cardiovascular: Normal rate, normal heart sounds and intact distal pulses.    Pulmonary/Chest: Effort normal and breath sounds normal.   Abdominal: Soft. Bowel sounds are normal.   Musculoskeletal: Normal range of motion. She exhibits edema.   Neurological: She is alert and oriented to person, place, and time.   Skin: Skin is warm and dry.       Significant Labs: All pertinent lab results from the last 24 hours have been reviewed.    Significant Imaging: Echocardiogram:   2D echo with color flow doppler:   Results for orders placed or performed during the hospital encounter of 12/28/17   2D echo with color flow doppler   Result Value Ref Range    EF 50 55 - 65    Mitral Valve Regurgitation MILD TO MODERATE      Diastolic Dysfunction Yes (A)     Est. PA Systolic Pressure 67.32 (A)     Pericardial Effusion NONE     Tricuspid Valve Regurgitation MODERATE TO SEVERE (A)

## 2018-07-06 NOTE — ED PROVIDER NOTES
Encounter Date: 7/5/2018    SCRIBE #1 NOTE: I, Nataliya Bernstein, am scribing for, and in the presence of,  Dr. Lee. I have scribed the entire note.       History     Chief Complaint   Patient presents with    Joint Swelling     pt c/o bilaternal ankle swelling starting tuesday night with worse swelling to left ankle and today. reports appt tomorrow    Shortness of Breath     pt c/o sob starting 430pm today prior to chest pain lasting approx 5 mins, cp resolved, denies cp at this time.     This is a 85 y.o female who presents with bilateral leg swelling for 3 days. Patient noticed swelling worsened today and noticed redness in the left ankle. She has associated ROTH, and leg pain. She denies chest pain, nausea, and vomiting. She notes having this kind of swelling before, but  not localized swelling in the ankles. She has a past medical history of CHF. She notes not drinking a lot of water today.       The history is provided by the patient.     Review of patient's allergies indicates:   Allergen Reactions    Ace inhibitors      Other reaction(s): Unknown    Aciphex  [rabeprazole]      Other reaction(s): Stomach upset    Aspirin      Other reaction(s): Unknown    Bextra  [valdecoxib]      Other reaction(s): Unknown    Cardizem  [diltiazem hcl]      Other reaction(s): Unknown    Clonidine      Other reaction(s): dry mouth.lip swelling    Mavik  [trandolapril]      Other reaction(s): Unknown    Nsaids (non-steroidal anti-inflammatory drug)      Other reaction(s): black spots on skin    Phenytoin sodium extended      Other reaction(s): Stomach upset    Tramadol      Other reaction(s): stomach pain     Past Medical History:   Diagnosis Date    Anxiety disorder     Arthritis 12/28/2017    Carpal tunnel syndrome     CHF (congestive heart failure)     Chronic allergic rhinitis     Chronic pain 10/22/2012    CKD stage 3 due to type 2 diabetes mellitus 2/14/2017    Constipation - functional 4/10/2013     Depression     Diabetes mellitus type II     Hot flash not due to menopause     HTN (hypertension)     Hypokalemia 11/19/2012    Insomnia 4/10/2013    Knee pain, bilateral 7/24/2013    Personal history of DVT (deep vein thrombosis)     Renovascular hypertension 2/14/2017    Severe tricuspid regurgitation 2/14/2017    Spinal stenosis     Dr. Corrales    Spinal stenosis of lumbar region 2/3/2014    Vertigo      Past Surgical History:   Procedure Laterality Date    CATARACT EXTRACTION Bilateral     EYE SURGERY      FRACTURE SURGERY      HYSTERECTOMY      ORIF RADIUS & ULNA FRACTURES       Family History   Problem Relation Age of Onset    No Known Problems Mother     No Known Problems Father     No Known Problems Sister     No Known Problems Brother     No Known Problems Maternal Aunt     No Known Problems Maternal Uncle     No Known Problems Paternal Aunt     No Known Problems Paternal Uncle     No Known Problems Maternal Grandmother     No Known Problems Maternal Grandfather     No Known Problems Paternal Grandmother     No Known Problems Paternal Grandfather     Amblyopia Neg Hx     Blindness Neg Hx     Cancer Neg Hx     Diabetes Neg Hx     Glaucoma Neg Hx     Hypertension Neg Hx     Macular degeneration Neg Hx     Retinal detachment Neg Hx     Strabismus Neg Hx     Stroke Neg Hx     Thyroid disease Neg Hx      Social History   Substance Use Topics    Smoking status: Never Smoker    Smokeless tobacco: Never Used    Alcohol use No     Review of Systems   Constitutional: Negative for chills and fever.   Respiratory: Positive for shortness of breath.    Cardiovascular: Positive for leg swelling.   Musculoskeletal: Positive for arthralgias.   All other systems reviewed and are negative.      Physical Exam     Initial Vitals [07/05/18 2054]   BP Pulse Resp Temp SpO2   (!) 127/56 (!) 130 18 99.4 °F (37.4 °C) 98 %      MAP       --         Physical Exam    Nursing note and vitals  reviewed.  Constitutional: She appears well-developed and well-nourished. She is not diaphoretic. No distress.   HENT:   Head: Normocephalic and atraumatic.   Right Ear: External ear normal.   Left Ear: External ear normal.   Mouth/Throat: Oropharynx is clear and moist.   Eyes: Conjunctivae and EOM are normal.   Neck: Normal range of motion.   Cardiovascular: Regular rhythm, normal heart sounds and intact distal pulses. Tachycardia present.  Exam reveals no gallop and no friction rub.    No murmur heard.  Pulmonary/Chest: Breath sounds normal. No respiratory distress. She has no wheezes. She has no rhonchi. She has no rales. She exhibits no tenderness.   Abdominal: Soft. Bowel sounds are normal. She exhibits no distension.   Musculoskeletal: She exhibits edema and tenderness.   left lateral malleolus of ankle TTP. erythematous with increased warmth and pain with flexion and extension. Edema of ankle and leg.   Neurological: She is alert and oriented to person, place, and time. She has normal strength. No cranial nerve deficit.   Skin: Skin is warm and dry.   Psychiatric: She has a normal mood and affect. Thought content normal.         ED Course   Critical Care  Date/Time: 7/17/2018 11:31 PM  Performed by: ARCADIO COELHO  Authorized by: MARY WALLACE   Direct patient critical care time: 20 minutes  Ordering / reviewing critical care time: 15 minutes  Documentation critical care time: 15 minutes  Consulting other physicians critical care time: 20 minutes  Consult with family critical care time: 10 minutes  Total critical care time (exclusive of procedural time) : 80 minutes  Critical care was necessary to treat or prevent imminent or life-threatening deterioration of the following conditions: cardiac failure and sepsis.  Critical care was time spent personally by me on the following activities: evaluation of patient's response to treatment, obtaining history from patient or surrogate, ordering and review of  laboratory studies, re-evaluation of patient's condition, ordering and performing treatments and interventions, examination of patient and development of treatment plan with patient or surrogate.  Subsequent provider of critical care: I assumed direction of critical care for this patient from another provider of my specialty.        Labs Reviewed   TROPONIN ISTAT - Abnormal; Notable for the following:        Result Value    POC Cardiac Troponin I 0.31 (*)     All other components within normal limits   POCT CMP - Abnormal; Notable for the following:     AST (SGOT), POC 41 (*)     POC Chloride 98 (*)     POC Creatinine 1.3 (*)     POC Glucose 153 (*)     All other components within normal limits   ISTAT PROCEDURE - Abnormal; Notable for the following:     POC PTWBT 15.7 (*)     POC PTINR 1.3 (*)     All other components within normal limits   ISTAT PROCEDURE - Abnormal; Notable for the following:     POC PH 7.197 (*)     POC PCO2 46.1 (*)     POC PO2 143 (*)     POC HCO3 17.9 (*)     POC Lactate 10.20 (*)     POC TCO2 19 (*)     All other components within normal limits   POCT D DIMER - Abnormal; Notable for the following:     POC D-DI 4110 (*)     All other components within normal limits   POCT B-TYPE NATRIURETIC PEPTIDE (BNP) - Abnormal; Notable for the following:     POC B-Type Natriuretic Peptide 631 (*)     All other components within normal limits   CULTURE, BLOOD   CULTURE, BLOOD   URIC ACID   POCT CBC   POCT URINALYSIS W/O SCOPE   POCT CMP   POCT PROTIME-INR   POCT TROPONIN   POCT D DIMER   POCT B-TYPE NATRIURETIC PEPTIDE (BNP)     EKG Readings: (Independently Interpreted)   Initial Reading: No STEMI. Rhythm: Sinus Tachycardia. Heart Rate: 133. ST Segment Depression: II, III, AVF, V4, V5 and V6.       Imaging Results          US Lower Extremity Veins Left (Final result)  Result time 07/06/18 00:08:40    Final result by Preston Negro MD (07/06/18 00:08:40)                 Impression:      No evidence of  DVT in the left lower extremity.    Probable left Baker's cyst.      Electronically signed by: Preston Negro MD  Date:    07/06/2018  Time:    00:08             Narrative:    EXAMINATION:  US LOWER EXTREMITY VEINS LEFT    CLINICAL HISTORY:  Localized edema    TECHNIQUE:  Duplex and color flow Doppler evaluation and graded compression of the left lower extremity veins was performed.    COMPARISON:  Venous ultrasound, 02/14/2017.    FINDINGS:  The imaged veins of the left lower extremity demonstrate normal gray scale graded compression, color flow and Doppler waveforms, normal respiratory phasicity and augmentation.  There is a focal fluid collection with internal complexity in the left popliteal fossa measuring 5.4 x 2.7 x 3.0 cm.  This finding is overall similar in appearance to the prior exam.                               CTA Chest Non-Coronary (PE Study) (Final result)  Result time 07/05/18 23:42:47    Final result by Tad Harrington MD (07/05/18 23:42:47)                 Impression:      1. No evidence of PE.  2. No acute intrathoracic abnormalities identified.  3. Cardiomegaly with mild aortic and coronary artery calcific atherosclerosis.      Electronically signed by: Tad Harrington MD  Date:    07/05/2018  Time:    23:42             Narrative:    EXAMINATION:  CTA CHEST NON CORONARY    CLINICAL HISTORY:  Dyspnea, cardiac origin suspected;Elevated d-dimer;    TECHNIQUE:  Low dose axial images, sagittal and coronal reformations were obtained from the thoracic inlet to the lung bases following the IV administration of 100 mL of Omnipaque 350.  Contrast timing was optimized to evaluate the pulmonary arteries.  MIP images were performed.    COMPARISON:  None    FINDINGS:  Structures at the base of the neck are unremarkable.  Aorta is non-aneurysmal.  The heart is enlarged without pericardial effusion.  Mild aortic and coronary artery calcification is seen.  No intraluminal filling defects within the pulmonary  arteries to suggest pulmonary thromboembolism.   There is no evidence of mediastinal, axillary, or hilar lymph node enlargement.  Scattered air is seen throughout the esophagus noting presence of a small hiatal hernia    The trachea and bronchi are patent.  The lungs are symmetrically expanded.  Evaluation of the lung parenchyma is slightly limited by respiratory motion artifact.  Minimal atelectasis is seen on the right.  Otherwise no evidence of pulmonary mass, consolidation, or effusion.    The visualized abdominal structures are unremarkable.  Osseous structures demonstrate degenerative change.  Remote healed rib fractures are seen bilaterally.  Extrathoracic soft tissues are unremarkable.                               X-Ray Chest PA And Lateral (Final result)  Result time 07/05/18 21:44:38    Final result by Tad Harrington MD (07/05/18 21:44:38)                 Impression:      Stable cardiomegaly with no acute cardiopulmonary process identified.      Electronically signed by: Tad Harrington MD  Date:    07/05/2018  Time:    21:44             Narrative:    EXAMINATION:  XR CHEST PA AND LATERAL    CLINICAL HISTORY:  Chest Pain;    TECHNIQUE:  PA and lateral views of the chest were performed.    COMPARISON:  01/18/2018.    FINDINGS:  Heart is enlarged but stable in size.  Lungs are symmetrically expanded.  Chronic coarse interstitial lung changes are seen.  No evidence of new focal consolidation, pneumothorax, or significant effusion.  No acute osseous abnormality identified.                                 Medical Decision Making:   Initial Assessment:   This is a 85 y.o female who presents with bilateral leg swelling for 3 days. Patient noticed swelling worsened today and noticed redness in the left ankle. She has associated ROTH, and leg pain. She denies chest pain, nausea, and vomiting. She notes having this kind of swelling before, but  not localized swelling in the ankles. She has a past medical history  of CHF. She notes not drinking a lot of water today.   Clinical Tests:   Lab Tests: Ordered and Reviewed  Radiological Study: Ordered and Reviewed  Medical Tests: Ordered and Reviewed  ED Management:  Course of illness as follows:  1.  Left leg pain: Ultrasound of left lower extremity reveals no DVT.  Patient received morphine for pain control.  Uric acid was sent to screen for gout of the left ankle.  Zosyn was given to treat for cellulitis with a possible bacterial cause of the left lower extremity.  Blood cultures were drawn.  CBC reveals white blood cell count of 9.3, H&H of 10 and 31 and a normal platelet count.  2.  Shortness of breath: D-dimer was elevated at 4110.  CTA of the chest using PE protocol was performed and reveals no evidence of pulmonary embolus.  O2 sats are greater than 94% in emergency department since admission.  Troponin was elevated and patient has a history of NSTEMI, but has denied chest pain since arriving to the emergency department.  EKG shows lateral ischemic changes.  ABG reveals acidemia with a pH of 7.19 and a serum lactate of 10.2.  Serum BNP was 631.    Call made to the hospitalist for transfer for patient secondary to left lower extremity cellulitis and possible NSTEMI.            Scribe Attestation:   Scribe #1: I performed the above scribed service and the documentation accurately describes the services I performed. I attest to the accuracy of the note.    This document was produced by a scribe under my direction and in my presence. I agree with the content of the note and have made any necessary edits.     Dr. Lee    07/06/2018 12:34 AM             Clinical Impression:     1. Edema of left lower extremity    2. Chest pain    3. 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 NSTEMI (non-ST elevated myocardial infarction)    5. Major depressive disorder, single episode        Disposition:   Disposition: Transferred  Condition:  Stable                        Jhony Lee MD  07/17/18 0502

## 2018-07-06 NOTE — NURSING
Call placed to Cath lab, unable to obtain what time patient will be called for angiogram procedure.  Dr. Humphrey paged overhead. Awaiting return call.

## 2018-07-06 NOTE — PLAN OF CARE
07/06/18 1511   Discharge Assessment   Assessment Type Discharge Planning Assessment   Confirmed/corrected address and phone number on facesheet? Yes   Assessment information obtained from? Medical Record     Discharge Assessments (x2) attempted with patient, but patient is off the floor for testing; SW will follow-up for assessment

## 2018-07-06 NOTE — PLAN OF CARE
Problem: Patient Care Overview  Goal: Plan of Care Review  RRT called pt clammy and cold. POX on 4l 95% BP decreased

## 2018-07-06 NOTE — CONSULTS
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     Consults  Subjective:     Chief Complaint:  NSTEMI     HPI:   Magaly Nunes is a 85 y.o. female that (in part)  has a past medical history of Anxiety disorder; Arthritis; Carpal tunnel syndrome; CHF (congestive heart failure); Chronic allergic rhinitis; Chronic pain; CKD stage 3 due to type 2 diabetes mellitus; Constipation - functional; Depression; Diabetes mellitus type II; Hot flash not due to menopause; HTN (hypertension); Hypokalemia; Insomnia; Knee pain, bilateral; Personal history of DVT (deep vein thrombosis); Renovascular hypertension; Severe tricuspid regurgitation; Spinal stenosis; Spinal stenosis of lumbar region; and Vertigo.  has a past surgical history that includes Hysterectomy; Cataract extraction (Bilateral); Eye surgery; Fracture surgery; and ORIF radius & ulna fractures.   Presents to [facility] as a transfer patient from the [place] [address] stand-alone Emergency Department complaining of shortness breath associated with bilateral lower extremity edema and abdominal bloating.  She also reports having chest pain that began approximately 4:30 p.m. lasted roughly 5 min.  It spontaneously resolved prior to arrival.  Radiation of the main bilateral lower extremities.  It worsened with exertion.  Minimally relieved with addition of supplemental oxygen in the ED.  Subacute onset with risks were sent.  Concentration.  Daily frequency.  Denies fever or chills.  No congestion. Positive for nonproductive  cough.  No hemoptysis.  She is compliant with her aspirin and Plavix.     In the emergency department EKG number chest x-ray, cardiac enzymes, and routine laboratory studies were obtained.  Showed multiple abnormalities including an elevated troponin level, elevated BNP, elevated D-dimer, and physical exam findings concerning for possible DVT or arterial thrombosis.  She underwent CT angiography of the chest which was negative as well as lower extremity ultrasound which was also negative for DVT.  Sec troponin was obtained which was markedly elevated compared to the 1st.  Dr. Lee at the [place] [facility] discussed the case with Cardiology who recommended admission at [facility] for further evaluation and treatment. [facility] medicine has been asked to admit.     Peak troponin 7.2  Had CP this AM. NTG paste placed and BP dropped  EKG sinus tachycardia NSSTT changes    Followed by Dr Humphrey    echo: 12-17  CONCLUSIONS     1 - Low normal to mildly depressed left ventricular systolic function (EF 50-55%).     2 - Concentric remodeling.     3 - Impaired LV relaxation, elevated LAP (grade 2 diastolic dysfunction).     4 - Low normal right ventricular systolic function .     5 - Pulmonary hypertension. The estimated PA systolic pressure is 67 mmHg.     6 - Mild to moderate mitral regurgitation.      [facility]: 12/29/17      B. Summary/Post-Operative Diagnosis       Single vessel coronary artery disease.    Possible small branch diagonal likely cuprit for NSTEMI.     Diagnostic:          Patient has a right dominant coronary artery.        - Left Main Coronary Artery:             The LM is normal. There is VIJI 3 flow.     - Left Anterior Descending Artery:             The LAD is normal. There is VIJI 3 flow.     - D4:             The D4 has a 90% stenosis. There is VIJI 3 flow. small vessel   2 mm     - Left Circumflex Artery:             The LCX is normal. There is VIJI 3 flow.     - Right Coronary Artery:              The RCA has luminal irregularities. There is VIJI 3 flow.     NST: 1-18  Impression: ABNORMAL MYOCARDIAL PERFUSION  1. There is evidence for mild myocardial ischemia in the anteroapical wall of the left ventricle.   2. The perfusion scan is free of evidence for myocardial injury.   3. Resting wall motion is physiologic.   4. There is resting LV dysfunction with a reduced ejection fraction of 46 %.   5. The ventricular volumes are normal at rest and stress.   6. The extracardiac distribution of radioactivity is normal.   7. When compared to the previous study from 01/03/2017, mild anteroapical ischemia now present        Past Medical History:   Diagnosis Date    Anxiety disorder     Arthritis 12/28/2017    Carpal tunnel syndrome     CHF (congestive heart failure)     Chronic allergic rhinitis     Chronic pain 10/22/2012    CKD stage 3 due to type 2 diabetes mellitus 2/14/2017    Constipation - functional 4/10/2013    Depression     Diabetes mellitus type II     Hot flash not due to menopause     HTN (hypertension)     Hypokalemia 11/19/2012    Insomnia 4/10/2013    Knee pain, bilateral 7/24/2013    Personal history of DVT (deep vein thrombosis)     Renovascular hypertension 2/14/2017    Severe tricuspid regurgitation 2/14/2017    Spinal stenosis     Dr. Corrales    Spinal stenosis of lumbar region 2/3/2014    Vertigo        Past Surgical History:   Procedure Laterality Date    CATARACT EXTRACTION Bilateral     EYE SURGERY      FRACTURE SURGERY      HYSTERECTOMY      ORIF RADIUS & ULNA FRACTURES         Review of patient's allergies indicates:   Allergen Reactions    Ace inhibitors      Other reaction(s): Unknown    Aciphex  [rabeprazole]      Other reaction(s): Stomach upset    Aspirin      Other reaction(s): Unknown    Bextra  [valdecoxib]      Other reaction(s): Unknown    Cardizem  [diltiazem hcl]      Other reaction(s): Unknown    Clonidine      Other reaction(s): dry mouth.lip  swelling    Mavik  [trandolapril]      Other reaction(s): Unknown    Nsaids (non-steroidal anti-inflammatory drug)      Other reaction(s): black spots on skin    Phenytoin sodium extended      Other reaction(s): Stomach upset    Tramadol      Other reaction(s): stomach pain       No current facility-administered medications on file prior to encounter.      Current Outpatient Prescriptions on File Prior to Encounter   Medication Sig    aspirin 81 MG Chew Take 1 tablet (81 mg total) by mouth once daily.    atorvastatin (LIPITOR) 20 MG tablet     clopidogrel (PLAVIX) 75 mg tablet     DULoxetine (CYMBALTA) 60 MG capsule TAKE 1 CAPSULE(60 MG) BY MOUTH EVERY DAY    furosemide (LASIX) 40 MG tablet Take 1 tablet (40 mg total) by mouth once daily.    gabapentin (NEURONTIN) 100 MG capsule Take 1 capsule (100 mg total) by mouth 3 (three) times daily.    losartan (COZAAR) 100 MG tablet TAKE 1 TABLET BY MOUTH ONCE DAILY    omeprazole (PRILOSEC) 20 MG capsule TAKE 2 CAPSULES BY MOUTH EVERY DAY FOR ACID REFLUX OR STOMACH    potassium chloride SA (K-DUR,KLOR-CON) 20 MEQ tablet Take 1 tablet (20 mEq total) by mouth once daily.    [DISCONTINUED] temazepam (RESTORIL) 30 mg capsule TAKE ONE CAPSULE BY MOUTH EVERY NIGHT AT BEDTIME AS NEEDED FOR INSOMNIA    carvedilol (COREG) 6.25 MG tablet Take 1 tablet (6.25 mg total) by mouth 2 (two) times daily.    HYDROcodone-acetaminophen (NORCO) 5-325 mg per tablet Take 1 tablet by mouth every 6 (six) hours as needed for Pain.    lidocaine-prilocaine (EMLA) cream     LORazepam (ATIVAN) 0.5 MG tablet Take 1 tablet (0.5 mg total) by mouth every 8 (eight) hours as needed for Anxiety. (caution-may cause sleepiness)    SHINGRIX, PF, 50 mcg/0.5 mL injection     tiZANidine 4 mg Cap Take by mouth.     Family History     Problem Relation (Age of Onset)    No Known Problems Mother, Father, Sister, Brother, Maternal Aunt, Maternal Uncle, Paternal Aunt, Paternal Uncle, Maternal  Grandmother, Maternal Grandfather, Paternal Grandmother, Paternal Grandfather        Social History Main Topics    Smoking status: Never Smoker    Smokeless tobacco: Never Used    Alcohol use No    Drug use: No    Sexual activity: No     Review of Systems   Constitution: Negative for decreased appetite.   HENT: Negative for ear discharge.    Eyes: Negative for blurred vision.   Respiratory: Negative for hemoptysis.    Endocrine: Negative for polyphagia.   Hematologic/Lymphatic: Negative for adenopathy.   Skin: Negative for color change.   Musculoskeletal: Negative for joint swelling.   Neurological: Negative for brief paralysis.   Psychiatric/Behavioral: Negative for hallucinations.     Objective:     Vital Signs (Most Recent):  Temp: 96.3 °F (35.7 °C) (07/06/18 0333)  Pulse: 110 (07/06/18 0817)  Resp: 20 (07/06/18 0333)  BP: 102/65 (07/06/18 0817)  SpO2: 100 % (07/06/18 0817) Vital Signs (24h Range):  Temp:  [96.3 °F (35.7 °C)-99.4 °F (37.4 °C)] 96.3 °F (35.7 °C)  Pulse:  [102-130] 110  Resp:  [18-20] 20  SpO2:  [76 %-100 %] 100 %  BP: (102-138)/(55-72) 102/65     Weight: 78.2 kg (172 lb 6.4 oz)  Body mass index is 30.54 kg/m².    SpO2: 100 %  O2 Device (Oxygen Therapy): nasal cannula      Intake/Output Summary (Last 24 hours) at 07/06/18 0906  Last data filed at 07/06/18 0019   Gross per 24 hour   Intake              600 ml   Output                0 ml   Net              600 ml       Lines/Drains/Airways     Peripheral Intravenous Line                 Peripheral IV - Single Lumen 07/05/18 2133 Left Antecubital less than 1 day                Physical Exam   Constitutional: She is oriented to person, place, and time. She appears well-developed and well-nourished.   HENT:   Head: Normocephalic and atraumatic.   Eyes: Conjunctivae are normal. Pupils are equal, round, and reactive to light.   Neck: Normal range of motion. Neck supple.   Cardiovascular: Normal rate, normal heart sounds and intact distal pulses.     Pulmonary/Chest: Effort normal and breath sounds normal.   Abdominal: Soft. Bowel sounds are normal.   Musculoskeletal: Normal range of motion. She exhibits edema.   Neurological: She is alert and oriented to person, place, and time.   Skin: Skin is warm and dry.       Significant Labs: All pertinent lab results from the last 24 hours have been reviewed.    Significant Imaging: Echocardiogram:   2D echo with color flow doppler:   Results for orders placed or performed during the hospital encounter of 12/28/17   2D echo with color flow doppler   Result Value Ref Range    EF 50 55 - 65    Mitral Valve Regurgitation MILD TO MODERATE     Diastolic Dysfunction Yes (A)     Est. PA Systolic Pressure 67.32 (A)     Pericardial Effusion NONE     Tricuspid Valve Regurgitation MODERATE TO SEVERE (A)      Assessment and Plan:     Diabetes mellitus type II             NSTEMI (non-ST elevated myocardial infarction)    Known branch disease in D4 by [facility] 12/2017. Troponin increased to 7.2. Case discussed with Dr Humphrey - plan for repeat [facility] today. Check echo        CKD Stage 3             Chronic diastolic heart failure    Diuresis and afterload reduction as tolerated        PVD (peripheral vascular disease)             Essential hypertension                 VTE Risk Mitigation         Ordered     Place PHILL hose  Until discontinued      07/06/18 0201     IP VTE HIGH RISK PATIENT  Once      07/06/18 0201          Thank you for your consult. I will follow-up with patient. Please contact us if you have any additional questions.    Heraclio Bertrand MD  Cardiology   [facility]

## 2018-07-06 NOTE — NURSING
Patient to Ultrasound. Patient awake, alert, oriented without c/o discomfort. Tele monitoring in progress. No apparent distress noted at this time.

## 2018-07-06 NOTE — PLAN OF CARE
07/06/18 1634   Discharge Assessment   Assessment Type Discharge Planning Assessment   Confirmed/corrected address and phone number on facesheet? Yes   Assessment information obtained from? Patient   Communicated expected length of stay with patient/caregiver no   Prior to hospitilization cognitive status: Alert/Oriented   Prior to hospitalization functional status: Independent;Assistive Equipment  (Started using R-walker Tuesday)   Current cognitive status: Alert/Oriented   Current Functional Status: Independent;Assistive Equipment  (R-Walker)   Facility Arrived From: Home   Lives With alone   Able to Return to Prior Arrangements yes   Is patient able to care for self after discharge? Yes   Who are your caregiver(s) and their phone number(s)? Kelsey-relative: [phone]   Patient's perception of discharge disposition home or selfcare;home health  (Patient may benefit from HH-SN )   Readmission Within The Last 30 Days no previous admission in last 30 days   Patient currently being followed by outpatient case management? No   Patient currently receives any other outside agency services? No   Equipment Currently Used at Home walker, rolling   Do you have any problems affording any of your prescribed medications? No   Is the patient taking medications as prescribed? yes   Does the patient have transportation home? Yes   Transportation Available family or friend will provide   Does the patient receive services at the [facility]? No   Discharge Plan A Home;Home Health  (HH may be beneficial for patient)   Discharge Plan B Other  (TBD)   Patient/Family In Agreement With Plan yes   Does the patient have transportation to healthcare appointments? Yes     Patient reported that she is independent and home alone; a niece, Deborah comes in to assist as needed; patient has recently started using a walker; no current service; no needs anticipated; patient may benefit from HH-SN     PCP: Chris Bangura MD     Extended  Emergency Contact Information  Primary Emergency Contact: PatricioHanhDeborah   [place]  Mobile Phone: [phone]  Relation: Relative  Secondary Emergency Contact: Janeen Patrick   [facility]  Home Phone: [phone]  Mobile Phone: [phone]  Relation: Relative     [facility] [id number] [address] [place] [facility]  [address] [place] [address]  [place] [address]  Phone: [phone] Fax: [phone]    Payor: [employer] MEDICARE / Plan: [employer] MEDICARE HMO / Product Type: Capitation /      1)  Warning signs/symptoms information reviewed with and provided to patient in blue folder  2)  Discharge planning begins upon admission   3)  Discussed and reinforced patient responsibility for managing health care at home including:        a) Acquiring prescribed medications; b) following-through with scheduled follow-up appointments or scheduling those that could not be set; and c) monitoring health at home.

## 2018-07-06 NOTE — HPI
Magaly Nunes is a 85 y.o. female that (in part)  has a past medical history of Anxiety disorder; Arthritis; Carpal tunnel syndrome; CHF (congestive heart failure); Chronic allergic rhinitis; Chronic pain; CKD stage 3 due to type 2 diabetes mellitus; Constipation - functional; Depression; Diabetes mellitus type II; Hot flash not due to menopause; HTN (hypertension); Hypokalemia; Insomnia; Knee pain, bilateral; Personal history of DVT (deep vein thrombosis); Renovascular hypertension; Severe tricuspid regurgitation; Spinal stenosis; Spinal stenosis of lumbar region; and Vertigo.  has a past surgical history that includes Hysterectomy; Cataract extraction (Bilateral); Eye surgery; Fracture surgery; and ORIF radius & ulna fractures.   Presents to [facility] as a transfer patient from the [place] [address] stand-alone Emergency Department complaining of shortness breath associated with bilateral lower extremity edema and abdominal bloating.  She also reports having chest pain that began approximately 4:30 p.m. lasted roughly 5 min.  It spontaneously resolved prior to arrival.  Radiation of the main bilateral lower extremities.  It worsened with exertion.  Minimally relieved with addition of supplemental oxygen in the ED.  Subacute onset with risks were sent.  Concentration.  Daily frequency.  Denies fever or chills.  No congestion. Positive for nonproductive cough.  No hemoptysis.  She is compliant with her aspirin and Plavix.     In the emergency department EKG number chest x-ray, cardiac enzymes, and routine laboratory studies were obtained.  Showed multiple abnormalities including an elevated troponin level, elevated BNP, elevated D-dimer, and physical exam findings concerning for possible DVT or arterial thrombosis.  She underwent CT angiography of the chest which was negative as well as lower extremity ultrasound which was also negative for DVT.  Sec troponin was obtained which was markedly  elevated compared to the 1st.  Dr. Lee at the [place] [facility] discussed the case with Cardiology who recommended admission at [facility] for further evaluation and treatment. Hospital medicine has been asked to admit.     Peak troponin 7.2  Had CP this AM. NTG paste placed and BP dropped  EKG sinus tachycardia NSSTT changes    Followed by Dr Humphrey    echo: 12-17  CONCLUSIONS     1 - Low normal to mildly depressed left ventricular systolic function (EF 50-55%).     2 - Concentric remodeling.     3 - Impaired LV relaxation, elevated LAP (grade 2 diastolic dysfunction).     4 - Low normal right ventricular systolic function .     5 - Pulmonary hypertension. The estimated PA systolic pressure is 67 mmHg.     6 - Mild to moderate mitral regurgitation.      [facility]: 12/29/17      B. Summary/Post-Operative Diagnosis       Single vessel coronary artery disease.    Possible small branch diagonal likely cuprit for NSTEMI.     Diagnostic:          Patient has a right dominant coronary artery.        - Left Main Coronary Artery:             The LM is normal. There is VIJI 3 flow.     - Left Anterior Descending Artery:             The LAD is normal. There is VIJI 3 flow.     - D4:             The D4 has a 90% stenosis. There is VIJI 3 flow. small vessel   2 mm     - Left Circumflex Artery:             The LCX is normal. There is VIJI 3 flow.     - Right Coronary Artery:             The RCA has luminal irregularities. There is VIJI 3 flow.     NST: 1-18  Impression: ABNORMAL MYOCARDIAL PERFUSION  1. There is evidence for mild myocardial ischemia in the anteroapical wall of the left ventricle.   2. The perfusion scan is free of evidence for myocardial injury.   3. Resting wall motion is physiologic.   4. There is resting LV dysfunction with a reduced ejection fraction of 46 %.   5. The ventricular volumes are normal at rest and stress.   6. The extracardiac distribution of radioactivity is normal.   7. When compared to  the previous study from 01/03/2017, mild anteroapical ischemia now present

## 2018-07-06 NOTE — H&P
[facility] Medicine  History & Physical    Patient Name: Magaly Nunes  MRN: [id number]  Admission Date: 07/06/2018  Attending Physician: Seng Oliveira MD, MPH      PCP:     Chris Bangura MD    CC:     Chief Complaint   Patient presents with    Joint Swelling     pt c/o bilaternal ankle swelling starting tuesday night with worse swelling to left ankle and today. reports appt tomorrow    Shortness of Breath     pt c/o sob starting 430pm today prior to chest pain lasting approx 5 mins, cp resolved, denies cp at this time.       HISTORY OF PRESENT ILLNESS:     Magaly Nunes is a 85 y.o. female that (in part)  has a past medical history of Anxiety disorder; Arthritis; Carpal tunnel syndrome; CHF (congestive heart failure); Chronic allergic rhinitis; Chronic pain; CKD stage 3 due to type 2 diabetes mellitus; Constipation - functional; Depression; Diabetes mellitus type II; Hot flash not due to menopause; HTN (hypertension); Hypokalemia; Insomnia; Knee pain, bilateral; Personal history of DVT (deep vein thrombosis); Renovascular hypertension; Severe tricuspid regurgitation; Spinal stenosis; Spinal stenosis of lumbar region; and Vertigo.  has a past surgical history that includes Hysterectomy; Cataract extraction (Bilateral); Eye surgery; Fracture surgery; and ORIF radius & ulna fractures.   Presents to [facility] as a transfer patient from the [place] [address] stand-alone Emergency Department complaining of shortness breath associated with bilateral lower extremity edema and abdominal bloating.  She also reports having chest pain that began approximately 4:30 p.m. lasted roughly 5 min.  It spontaneously resolved prior to arrival.  Radiation of the main bilateral lower extremities.  It worsened with exertion.  Minimally relieved with addition of supplemental oxygen in the ED.  Subacute onset with risks were sent.  Concentration.  Daily frequency.  Denies  fever or chills.  No congestion. Positive for nonproductive cough.  No hemoptysis.  She is compliant with her aspirin and Plavix.    In the emergency department EKG number chest x-ray, cardiac enzymes, and routine laboratory studies were obtained.  Showed multiple abnormalities including an elevated troponin level, elevated BNP, elevated D-dimer, and physical exam findings concerning for possible DVT or arterial thrombosis.  She underwent CT angiography of the chest which was negative as well as lower extremity ultrasound which was also negative for DVT.  Sec troponin was obtained which was markedly elevated compared to the 1st.  Dr. Lee at the [place] [facility] Department discussed the case with Cardiology who recommended admission at [facility] for further evaluation and treatment. [facility] medicine has been asked to admit.       REVIEW OF SYSTEMS:     -- Constitutional:  Denies fatigue and weakness.  No fever or chills.  -- Eyes: No visual changes, diplopia, pain, tearing, blind spots, or discharge.   -- Ears, nose, mouth, throat, and face: No congestion, sore throat, epistaxis, d/c, bleeding gums, neck stiffness masses, or dental issues.  -- Respiratory:  As above in HPI.  -- Cardiovascular:  As above in HPI.   -- Gastrointestinal: No vomiting, abdominal pain, hematemesis, melena, dyspepsia, or change in bowel habits.  -- Genitourinary: No hematuria, dysuria, frequency, urgency, nocturia, polyuria, stones, or incontinence.  -- Integument/breast: No rash, pruritis, pigmentation changes, dryness, or changes in hair  -- Hematologic/lymphatic: No easy bruising or lymphadenopathy.   -- Musculoskeletal:+ bilateral lower extremity edema.  No acute arthralgias, acute myalgias, joint swelling, acute limitations of ROM  -- Neurological: No seizures, headaches, incoordination, paraesthesias, ataxia, vertigo, or tremors.  -- Behavioral/Psych: No auditory or visual hallucinations, depression, or suicidal/homicidal  ideations.  -- Endocrine: No heat or cold intolerance, polydipsia.  Positive for unintentional weight gain  -- Allergy/Immunologic: No recurrent infections or adverse reaction to food, insects, or difficulty breathing.      PAST MEDICAL / SURGICAL HISTORY:     Past Medical History:   Diagnosis Date    Anxiety disorder     Arthritis 12/28/2017    Carpal tunnel syndrome     CHF (congestive heart failure)     Chronic allergic rhinitis     Chronic pain 10/22/2012    CKD stage 3 due to type 2 diabetes mellitus 2/14/2017    Constipation - functional 4/10/2013    Depression     Diabetes mellitus type II     Hot flash not due to menopause     HTN (hypertension)     Hypokalemia 11/19/2012    Insomnia 4/10/2013    Knee pain, bilateral 7/24/2013    Personal history of DVT (deep vein thrombosis)     Renovascular hypertension 2/14/2017    Severe tricuspid regurgitation 2/14/2017    Spinal stenosis     Dr. Corrales    Spinal stenosis of lumbar region 2/3/2014    Vertigo      Past Surgical History:   Procedure Laterality Date    CATARACT EXTRACTION Bilateral     EYE SURGERY      FRACTURE SURGERY      HYSTERECTOMY      ORIF RADIUS & ULNA FRACTURES           FAMILY HISTORY:     Family History   Problem Relation Age of Onset    No Known Problems Mother     No Known Problems Father     No Known Problems Sister     No Known Problems Brother     No Known Problems Maternal Aunt     No Known Problems Maternal Uncle     No Known Problems Paternal Aunt     No Known Problems Paternal Uncle     No Known Problems Maternal Grandmother     No Known Problems Maternal Grandfather     No Known Problems Paternal Grandmother     No Known Problems Paternal Grandfather     Amblyopia Neg Hx     Blindness Neg Hx     Cancer Neg Hx     Diabetes Neg Hx     Glaucoma Neg Hx     Hypertension Neg Hx     Macular degeneration Neg Hx     Retinal detachment Neg Hx     Strabismus Neg Hx     Stroke Neg Hx     Thyroid  disease Neg Hx          SOCIAL HISTORY:     Social History     Social History    Marital status:      Spouse name: N/A    Number of children: N/A    Years of education: N/A     Social History Main Topics    Smoking status: Never Smoker    Smokeless tobacco: Never Used    Alcohol use No    Drug use: No    Sexual activity: No     Other Topics Concern    None     Social History Narrative    .  Lives alone.   and two sons have .  Active.           ALLERGIES:       Review of patient's allergies indicates:   Allergen Reactions    Ace inhibitors      Other reaction(s): Unknown    Aciphex  [rabeprazole]      Other reaction(s): Stomach upset    Aspirin      Other reaction(s): Unknown    Bextra  [valdecoxib]      Other reaction(s): Unknown    Cardizem  [diltiazem hcl]      Other reaction(s): Unknown    Clonidine      Other reaction(s): dry mouth.lip swelling    Mavik  [trandolapril]      Other reaction(s): Unknown    Nsaids (non-steroidal anti-inflammatory drug)      Other reaction(s): black spots on skin    Phenytoin sodium extended      Other reaction(s): Stomach upset    Tramadol      Other reaction(s): stomach pain         HEALTH SCREENING:     Prevnar 13 pneumonia vaccine =  evidence of previous vaccination found in the medical record      HOME MEDICATIONS:     Prior to Admission medications    Medication Sig Start Date End Date Taking? Authorizing Provider   aspirin 81 MG Chew Take 1 tablet (81 mg total) by mouth once daily. 17 Yes Amarjit Nunez MD   atorvastatin (LIPITOR) 20 MG tablet  18  Yes Historical Provider, MD   clopidogrel (PLAVIX) 75 mg tablet  18  Yes Historical Provider, MD   DULoxetine (CYMBALTA) 60 MG capsule TAKE 1 CAPSULE(60 MG) BY MOUTH EVERY DAY 10/31/17  Yes Chris Bangura MD   furosemide (LASIX) 40 MG tablet Take 1 tablet (40 mg total) by mouth once daily. 18  Yes Hammad Humphrey MD   gabapentin (NEURONTIN) 100 MG  capsule Take 1 capsule (100 mg total) by mouth 3 (three) times daily. 6/8/18 6/8/19 Yes Chris Bangura MD   losartan (COZAAR) 100 MG tablet TAKE 1 TABLET BY MOUTH ONCE DAILY 6/11/18  Yes Chris Bangura MD   omeprazole (PRILOSEC) 20 MG capsule TAKE 2 CAPSULES BY MOUTH EVERY DAY FOR ACID REFLUX OR STOMACH 8/22/17  Yes Chris Bangura MD   potassium chloride SA (K-DUR,KLOR-CON) 20 MEQ tablet Take 1 tablet (20 mEq total) by mouth once daily. 6/8/18  Yes Chris Bangura MD   temazepam (RESTORIL) 30 mg capsule TAKE ONE CAPSULE BY MOUTH EVERY NIGHT AT BEDTIME AS NEEDED FOR INSOMNIA 3/9/18  Yes Chris Bangura MD   carvedilol (COREG) 6.25 MG tablet Take 1 tablet (6.25 mg total) by mouth 2 (two) times daily. 12/30/17 3/20/18  Amarjit Nunez MD   HYDROcodone-acetaminophen (NORCO) 5-325 mg per tablet Take 1 tablet by mouth every 6 (six) hours as needed for Pain. 5/17/18   Chris Bangura MD   lidocaine-prilocaine (EMLA) cream  3/13/18   Historical Provider, MD   LORazepam (ATIVAN) 0.5 MG tablet Take 1 tablet (0.5 mg total) by mouth every 8 (eight) hours as needed for Anxiety. (caution-may cause sleepiness) 12/11/17 3/20/18  Chris Bangura MD   SHINGRIX, PF, 50 mcg/0.5 mL injection  5/8/18   Historical Provider, MD   tiZANidine 4 mg Cap Take by mouth.    Historical Provider, MD          HOSPITAL MEDICATIONS:     Scheduled Meds:    aspirin  81 mg Oral Daily    atorvastatin  40 mg Oral Daily    carvedilol  6.25 mg Oral BID    clopidogrel  75 mg Oral Daily    DULoxetine  60 mg Oral Daily    enoxaparin  1 mg/kg Subcutaneous ED 1 Time    furosemide  40 mg Intravenous BID    gabapentin  100 mg Oral TID    losartan  100 mg Oral Daily    nitroGLYCERIN 2% TD oint  2 inch Topical (Top) Q6H    pantoprazole  40 mg Oral Daily    senna-docusate 8.6-50 mg  1 tablet Oral BID    sodium chloride 0.9%  3 mL Intravenous Q8H    spironolactone  25 mg Oral Daily     Continuous Infusions:   PRN  Meds:       PHYSICAL EXAM:     Wt Readings from Last 1 Encounters:   07/06/18 0347 78.2 kg (172 lb 6.4 oz)   07/05/18 2054 74.8 kg (165 lb)     Body mass index is 30.54 kg/m².  Vitals:    07/06/18 0206 07/06/18 0246 07/06/18 0333 07/06/18 0347   BP: 138/66 129/68 118/67    BP Location:   Right arm    Patient Position:   Lying    Pulse:  (!) 117 (!) 116    Resp:  18 20    Temp:  98.8 °F (37.1 °C) 96.3 °F (35.7 °C)    TempSrc:   Oral    SpO2: 100%  96%    Weight:    78.2 kg (172 lb 6.4 oz)   Height:              -- General appearance: well developed. appears stated age   -- Head: normocephalic, atraumatic   -- Eyes: conjunctivae clear. Extraocular muscles intact  -- Nose: Nares normal. Septum midline.   -- Mouth/Throat: lips, mucosa, and tongue normal. no throat erythema.   -- Neck: supple, symmetrical, trachea midline, no JVD and thyroid not grossly enlarged, appears symmetric  -- Lungs:  Bibasilar crackles present. normal respiratory effort. No use of accessory muscles.   -- Chest wall: no tenderness. equal bilateral chest rise   -- Heart:  Rapid rate and regular rhythm. S1, S2 normal.  no click, rub or gallop   -- Abdomen: + dependent edema.  soft, non-tender, non-distended, non-tympanic; bowel sounds normal; no masses  -- Extremities:  Bilateral lower extremity edema.  Left larger than right.     -- Pulses:  Dorsalis pedis pulse is present with Doppler bilaterally.  -- Skin: color normal, texture normal, increased skin turgor and bilateral lower extremities. No rashes or lesions.   -- Neurologic: Normal strength and tone. No focal numbness or weakness. CNII-XII intact. Amanuel coma scale: eyes open spontaneously-4, oriented & converses-5, obeys commands-6.      LABORATORY STUDIES:     Recent Results (from the past 36 hour(s))   POCT CMP    Collection Time: 07/05/18  9:36 PM   Result Value Ref Range    Albumin, POC 3.5 3.3 - 5.5 g/dL    Alkaline Phosphatase,  42 - 141 U/L    ALT (SGPT), POC 19 10 - 47 U/L     AST (SGOT), POC 41 (H) 11 - 38 U/L    POC BUN 15 7 - 22 mg/dL    Calcium, POC 9.5 8.0 - 10.3 mg/dL    POC Chloride 98 (L) 98 - 108 mmol/L    POC Creatinine 1.3 (H) 0.6 - 1.2 mg/dL    POC Glucose 153 (H) 73 - 118 mg/dL    POC Potassium 3.7 3.6 - 5.1 mmol/L    POC Sodium 136 128 - 145 mmol/L    Bilirubin 1.4 0.2 - 1.6 mg/dL    POC TCO2 21 18 - 33 mmol/L    Protein 8.0 6.4 - 8.1 g/dL   ISTAT PROCEDURE    Collection Time: 07/05/18  9:39 PM   Result Value Ref Range    POC PTWBT 15.7 (H) 9.7 - 14.3 sec    POC PTINR 1.3 (H) 0.9 - 1.2    Sample UNK    Troponin ISTAT    Collection Time: 07/05/18  9:41 PM   Result Value Ref Range    POC Cardiac Troponin I 0.31 (H) <0.09 ng/mL    Sample UNK    ISTAT PROCEDURE    Collection Time: 07/05/18  9:54 PM   Result Value Ref Range    POC PH 7.197 (L) 7.35 - 7.45    POC PCO2 46.1 (H) 35 - 45 mmHg    POC PO2 143 (HH) 40 - 60 mmHg    POC HCO3 17.9 (L) 24 - 28 mmol/L    POC BE -10 -2 to 2 mmol/L    POC SATURATED O2 99 95 - 100 %    POC Lactate 10.20 (HH) 0.5 - 2.2 mmol/L    POC TCO2 19 (L) 24 - 29 mmol/L    Sample VENOUS    POCT D Dimer    Collection Time: 07/05/18 10:03 PM   Result Value Ref Range    POC D-DI 4110 (H) 0.0 - 450.0 ng/mL   POCT B-type natriuretic peptide (BNP)    Collection Time: 07/05/18 10:05 PM   Result Value Ref Range    POC B-Type Natriuretic Peptide 631 (H) 0.0 - 100.0 pg/mL   Uric acid    Collection Time: 07/05/18 11:15 PM   Result Value Ref Range    Uric Acid 5.2 2.4 - 5.7 mg/dL   ISTAT PROCEDURE    Collection Time: 07/06/18 12:43 AM   Result Value Ref Range    POC PH 7.432 7.35 - 7.45    POC PCO2 31.4 (L) 35 - 45 mmHg    POC PO2 39 (LL) 40 - 60 mmHg    POC HCO3 20.9 (L) 24 - 28 mmol/L    POC BE -3 -2 to 2 mmol/L    POC SATURATED O2 76 (L) 95 - 100 %    POC Lactate 2.53 (H) 0.5 - 2.2 mmol/L    POC TCO2 22 (L) 24 - 29 mmol/L    Sample VENOUS    Troponin ISTAT    Collection Time: 07/06/18 12:58 AM   Result Value Ref Range    POC Cardiac Troponin I 1.31 (H) <0.09 ng/mL     Sample UNK        Lab Results   Component Value Date    INR 1.0 01/18/2018    INR 1.0 12/28/2017    INR 1.3 (H) 02/14/2017     Lab Results   Component Value Date    HGBA1C 6.1 (H) 05/22/2018     No results for input(s): POCTGLUCOSE in the last 72 hours.        MICROBIOLOGY DATA:       Microbiology x 7d:   Microbiology Results (last 7 days)     Procedure Component Value Units Date/Time    Blood culture [[id number]] Collected:  07/05/18 2255    Order Status:  Sent Specimen:  Blood from Peripheral, Antecubital, Left Updated:  07/06/18 0037    Blood culture [[id number]] Collected:  07/05/18 2305    Order Status:  Sent Specimen:  Blood from Peripheral, Hand, Left Updated:  07/06/18 0036            IMAGING:     Imaging Results          US Lower Extremity Veins Left (Final result)  Result time 07/06/18 00:08:40    Final result by Preston Negro MD (07/06/18 00:08:40)                 Impression:      No evidence of DVT in the left lower extremity.    Probable left Baker's cyst.      Electronically signed by: Preston Negro MD  Date:    07/06/2018  Time:    00:08             Narrative:    EXAMINATION:  US LOWER EXTREMITY VEINS LEFT    CLINICAL HISTORY:  Localized edema    TECHNIQUE:  Duplex and color flow Doppler evaluation and graded compression of the left lower extremity veins was performed.    COMPARISON:  Venous ultrasound, 02/14/2017.    FINDINGS:  The imaged veins of the left lower extremity demonstrate normal gray scale graded compression, color flow and Doppler waveforms, normal respiratory phasicity and augmentation.  There is a focal fluid collection with internal complexity in the left popliteal fossa measuring 5.4 x 2.7 x 3.0 cm.  This finding is overall similar in appearance to the prior exam.                               CTA Chest Non-Coronary (PE Study) (Final result)  Result time 07/05/18 23:42:47    Final result by Tad Harrington MD (07/05/18 23:42:47)                 Impression:      1. No  evidence of PE.  2. No acute intrathoracic abnormalities identified.  3. Cardiomegaly with mild aortic and coronary artery calcific atherosclerosis.      Electronically signed by: Tad Harrington MD  Date:    07/05/2018  Time:    23:42             Narrative:    EXAMINATION:  CTA CHEST NON CORONARY    CLINICAL HISTORY:  Dyspnea, cardiac origin suspected;Elevated d-dimer;    TECHNIQUE:  Low dose axial images, sagittal and coronal reformations were obtained from the thoracic inlet to the lung bases following the IV administration of 100 mL of Omnipaque 350.  Contrast timing was optimized to evaluate the pulmonary arteries.  MIP images were performed.    COMPARISON:  None    FINDINGS:  Structures at the base of the neck are unremarkable.  Aorta is non-aneurysmal.  The heart is enlarged without pericardial effusion.  Mild aortic and coronary artery calcification is seen.  No intraluminal filling defects within the pulmonary arteries to suggest pulmonary thromboembolism.   There is no evidence of mediastinal, axillary, or hilar lymph node enlargement.  Scattered air is seen throughout the esophagus noting presence of a small hiatal hernia    The trachea and bronchi are patent.  The lungs are symmetrically expanded.  Evaluation of the lung parenchyma is slightly limited by respiratory motion artifact.  Minimal atelectasis is seen on the right.  Otherwise no evidence of pulmonary mass, consolidation, or effusion.    The visualized abdominal structures are unremarkable.  Osseous structures demonstrate degenerative change.  Remote healed rib fractures are seen bilaterally.  Extrathoracic soft tissues are unremarkable.                               X-Ray Chest PA And Lateral (Final result)  Result time 07/05/18 21:44:38    Final result by Tad Harrington MD (07/05/18 21:44:38)                 Impression:      Stable cardiomegaly with no acute cardiopulmonary process identified.      Electronically signed by: Tad Harrington  MD  Date:    07/05/2018  Time:    21:44             Narrative:    EXAMINATION:  XR CHEST PA AND LATERAL    CLINICAL HISTORY:  Chest Pain;    TECHNIQUE:  PA and lateral views of the chest were performed.    COMPARISON:  01/18/2018.    FINDINGS:  Heart is enlarged but stable in size.  Lungs are symmetrically expanded.  Chronic coarse interstitial lung changes are seen.  No evidence of new focal consolidation, pneumothorax, or significant effusion.  No acute osseous abnormality identified.                                  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 [I70.429]  Yes     Priority: 1 - High    Edema of left lower extremity [R60.0]  Yes     Priority: 2     NSTEMI (non-ST elevated myocardial infarction) [I21.4]  No     Priority: 2     Elevated d-dimer [R79.89]  Yes     Priority: 3     Elevated brain natriuretic peptide (BNP) level [R79.89]  Yes     Priority: 3     Elevated lactic acid level [R79.89]  Yes     Priority: 3     Diabetes mellitus type II [E11.9]  Yes    CKD Stage 3 [N18.3]  Yes     Chronic    GERD (gastroesophageal reflux disease) [K21.9]  Yes     Chronic    Chronic diastolic heart failure [I50.32]  Yes     Chronic    PVD (peripheral vascular disease) [I73.9]  Yes    Essential hypertension [I10]  Yes     Chronic      Resolved Hospital Problems    Diagnosis Date Resolved POA   No resolved problems to display.         NSTEMI  -   · As evidenced by laboratory studies.  Also with elevated D-dimer and BNP in the setting of CHF and CKD, which may also be contributory to the elevation in her troponin levels.  · Intitial EKG findings shows no evidence of ST-elevation MI   · Initial troponin levels were above normal limits and trending upward  · high  risk for MI;  hypertension, hyperlipidemia, atherosclerosis, aspirin use, age  · Initiate ACS protocol to rule out MI, then explore noncardiac etiology  · Trend cardiac enzymes  · Aspirin  · Beta-blocker   · Statin  · Supplemental oxygen  · nitroglycerine and morphine PRN  · 2D echocardiogram  · TSH, FT3, FT4  · ESR and cardiac specific CRP   · PT, PTT, INR   · Tight glycemic control  · Monitor on telemetry unit  · Consult cardiologist  · Will admit for rule out acute MI and possible further provocative testing for risk stratification.    Acute diastolic CHF exacerbation  · Evidenced by history, elevated BNP, pulmonary edema, peripheral edema  · Provide diuresis w/ IV medication  · Maintain w/ beta-blocker  · ACE inhibitor or ARB if GFR allows and remains stable  · If 1) Patient cannot tolerate ACEi or 2) NYHA class III or above, add spironolactone (or eplerenone) and hydralazine-isosorbide dinitrate   · Daily Weights  · Strict I/O  · Fluid restriction to 1,500cc daily  · Low-sodium cardiac diet  · Obtain 2D echo if <6 months     EF   Date Value Ref Range Status   12/28/2017 50 55 - 65    01/01/2017 45 55 - 65      · Chest X-ray  · Check TSH, albumin, UA, and renal function  · EKG and cardiac enzymes PRN  · DVT prophylaxis w/ pharmacological and/or mechanical measures  · Oxygen supplementation support PRN    Instructions given to patient/family:  Monitor daily weight.  Regular activity within patient's limitations.  Low salt, low fat and low choleterol diet and restrict fluid < 2L per day.  Call MD if SOB, chest pain, weight gain > 2-3 lbs per day and/or 5-6 lbs per week.   No smoking. Annual influenza vaccine required.    Peripheral vascular disease   · Concerned for possible left lower extremity thrombosis; ultrasound negative for DVT.  Monitor closely  · Maintain adequate blood pressure control  · Heart healthy diet  · Aspirin and Plavix  · Statin    Hypertension  · Goal while inpatient is a systolic blood  pressure less than 160mmHg  · BP in acceptable range at this time  · Continue current home regimen with hold parameters  · PRN antihypertensives available    Diabetes mellitus type 2  · BG in acceptable range at this time  · Maintain w/ subcutaneous insulin management order set  · Hold oral diabetic meds  · ADA 1800 kcal diet  · BG goal while in patient is <180mg/dL  · HgA1c = Pending    Chronic kidney disease 3  · Renal dose medications  · Avoid nephrotoxic agents  · Maintain euvolemic state    Gastroesophageal reflux disease   · No acute issues  · Continue current home regimen        VTE Risk Mitigation         Ordered     enoxaparin injection 70 mg  ED 1 Time      07/06/18 0229     Place PHILL hose  Until discontinued      07/06/18 0201     IP VTE HIGH RISK PATIENT  Once      07/06/18 0201            Adult PRN medications available   DVT prophylaxis given       DISPOSITION:     Will admit to the Hospital Medicine service for further evaluation and treatment.    Chart reviewed and updated where applicable.    High Risk Conditions:  Patient has a condition that poses threat to life and bodily function:  Non ST-elevation MI      ===============================================================    Seng Oliveira MD, MPH  Department of Hospital Medicine   [facility]  [phone]  (7pm - 6am)          This note is dictated using Dragon Medical 360 voice recognition software.  There are word recognition mistakes that are occasionally missed on review.

## 2018-07-06 NOTE — NURSING
Tele monitor exhibits 8 beat run of VT per monitor tech. Pt. Asymptomatic.  Tele monitor now exhibits NSR. Dr. Milagro sue. Awaiting return call.

## 2018-07-06 NOTE — CARE UPDATE
Spoke with the patient and Dr. Bertrand.  Troponin elevated likely secondary to demand but with current symptoms need to ensure no progression of CAD since previous catheterization medically managed.  Patient is agreeable to repeat cardiac catheterization.    **Risks/benefits of the procedure were d/w the patient including bleeding, infection, death, mi, arrhythmia, kidney failure, stroke, etc.  Patient understands and consent was placed on the chart.

## 2018-07-07 PROBLEM — R79.89 ELEVATED BRAIN NATRIURETIC PEPTIDE (BNP) LEVEL: Status: RESOLVED | Noted: 2018-07-06 | Resolved: 2018-07-07

## 2018-07-07 PROBLEM — I21.4 NSTEMI (NON-ST ELEVATED MYOCARDIAL INFARCTION): Status: RESOLVED | Noted: 2018-07-05 | Resolved: 2018-07-07

## 2018-07-07 PROBLEM — R79.89 ELEVATED D-DIMER: Status: RESOLVED | Noted: 2018-07-06 | Resolved: 2018-07-07

## 2018-07-07 PROBLEM — R79.89 ELEVATED LACTIC ACID LEVEL: Status: RESOLVED | Noted: 2018-07-06 | Resolved: 2018-07-07

## 2018-07-07 LAB
BASOPHILS # BLD AUTO: 0.01 K/UL
BASOPHILS NFR BLD: 0.2 %
DIASTOLIC DYSFUNCTION: YES
DIFFERENTIAL METHOD: ABNORMAL
EOSINOPHIL # BLD AUTO: 0.1 K/UL
EOSINOPHIL NFR BLD: 1.7 %
ERYTHROCYTE [DISTWIDTH] IN BLOOD BY AUTOMATED COUNT: 13.6 %
ESTIMATED PA SYSTOLIC PRESSURE: 63.35
HCT VFR BLD AUTO: 30.5 %
HGB BLD-MCNC: 9.6 G/DL
INR PPP: 1.1
LYMPHOCYTES # BLD AUTO: 1.4 K/UL
LYMPHOCYTES NFR BLD: 33.6 %
MCH RBC QN AUTO: 26 PG
MCHC RBC AUTO-ENTMCNC: 31.5 G/DL
MCV RBC AUTO: 83 FL
MITRAL VALVE REGURGITATION: ABNORMAL
MONOCYTES # BLD AUTO: 0.6 K/UL
MONOCYTES NFR BLD: 14.1 %
NEUTROPHILS # BLD AUTO: 2.1 K/UL
NEUTROPHILS NFR BLD: 50.4 %
PLATELET # BLD AUTO: 226 K/UL
PMV BLD AUTO: 10.8 FL
POCT GLUCOSE: 164 MG/DL (ref 70–110)
PROTHROMBIN TIME: 11 SEC
RBC # BLD AUTO: 3.69 M/UL
RETIRED EF AND QEF - SEE NOTES: 40 (ref 55–65)
TRICUSPID VALVE REGURGITATION: ABNORMAL
URATE SERPL-MCNC: 6.3 MG/DL
WBC # BLD AUTO: 4.11 K/UL

## 2018-07-07 PROCEDURE — 36415 COLL VENOUS BLD VENIPUNCTURE: CPT

## 2018-07-07 PROCEDURE — 63600175 PHARM REV CODE 636 W HCPCS: Performed by: INTERNAL MEDICINE

## 2018-07-07 PROCEDURE — 99232 SBSQ HOSP IP/OBS MODERATE 35: CPT | Mod: ,,, | Performed by: INTERNAL MEDICINE

## 2018-07-07 PROCEDURE — 25000003 PHARM REV CODE 250: Performed by: HOSPITALIST

## 2018-07-07 PROCEDURE — A4216 STERILE WATER/SALINE, 10 ML: HCPCS | Performed by: HOSPITALIST

## 2018-07-07 PROCEDURE — 27000221 HC OXYGEN, UP TO 24 HOURS

## 2018-07-07 PROCEDURE — 94761 N-INVAS EAR/PLS OXIMETRY MLT: CPT

## 2018-07-07 PROCEDURE — 21400001 HC TELEMETRY ROOM

## 2018-07-07 PROCEDURE — 85610 PROTHROMBIN TIME: CPT

## 2018-07-07 PROCEDURE — 84550 ASSAY OF BLOOD/URIC ACID: CPT

## 2018-07-07 PROCEDURE — 25000003 PHARM REV CODE 250: Performed by: INTERNAL MEDICINE

## 2018-07-07 PROCEDURE — 93306 TTE W/DOPPLER COMPLETE: CPT | Mod: 26,,, | Performed by: INTERNAL MEDICINE

## 2018-07-07 PROCEDURE — 85025 COMPLETE CBC W/AUTO DIFF WBC: CPT

## 2018-07-07 PROCEDURE — 93306 TTE W/DOPPLER COMPLETE: CPT

## 2018-07-07 RX ORDER — COLCHICINE 0.6 MG/1
0.6 TABLET, FILM COATED ORAL 2 TIMES DAILY
Status: DISCONTINUED | OUTPATIENT
Start: 2018-07-07 | End: 2018-07-08 | Stop reason: HOSPADM

## 2018-07-07 RX ORDER — COLCHICINE 0.6 MG/1
0.6 TABLET, FILM COATED ORAL 2 TIMES DAILY
Status: DISCONTINUED | OUTPATIENT
Start: 2018-07-07 | End: 2018-07-07

## 2018-07-07 RX ORDER — METHYLPREDNISOLONE SOD SUCC 125 MG
125 VIAL (EA) INJECTION ONCE
Status: COMPLETED | OUTPATIENT
Start: 2018-07-07 | End: 2018-07-07

## 2018-07-07 RX ORDER — SPIRONOLACTONE 25 MG/1
25 TABLET ORAL DAILY
Qty: 30 TABLET | Refills: 5 | Status: SHIPPED | OUTPATIENT
Start: 2018-07-08 | End: 2018-07-16

## 2018-07-07 RX ADMIN — GABAPENTIN 100 MG: 100 CAPSULE ORAL at 09:07

## 2018-07-07 RX ADMIN — DOCUSATE SODIUM AND SENNOSIDES 1 TABLET: 8.6; 5 TABLET, FILM COATED ORAL at 09:07

## 2018-07-07 RX ADMIN — SODIUM CHLORIDE, PRESERVATIVE FREE 3 ML: 5 INJECTION INTRAVENOUS at 09:07

## 2018-07-07 RX ADMIN — GABAPENTIN 100 MG: 100 CAPSULE ORAL at 03:07

## 2018-07-07 RX ADMIN — ASPIRIN 81 MG 81 MG: 81 TABLET ORAL at 08:07

## 2018-07-07 RX ADMIN — CLOPIDOGREL BISULFATE 75 MG: 75 TABLET ORAL at 08:07

## 2018-07-07 RX ADMIN — CARVEDILOL 6.25 MG: 6.25 TABLET, FILM COATED ORAL at 08:07

## 2018-07-07 RX ADMIN — CARVEDILOL 6.25 MG: 6.25 TABLET, FILM COATED ORAL at 09:07

## 2018-07-07 RX ADMIN — METHYLPREDNISOLONE SODIUM SUCCINATE 125 MG: 125 INJECTION, POWDER, FOR SOLUTION INTRAMUSCULAR; INTRAVENOUS at 03:07

## 2018-07-07 RX ADMIN — COLCHICINE 0.6 MG: 0.6 TABLET, FILM COATED ORAL at 03:07

## 2018-07-07 RX ADMIN — SODIUM CHLORIDE, PRESERVATIVE FREE 3 ML: 5 INJECTION INTRAVENOUS at 03:07

## 2018-07-07 RX ADMIN — COLCHICINE 0.6 MG: 0.6 TABLET, FILM COATED ORAL at 09:07

## 2018-07-07 RX ADMIN — ATORVASTATIN CALCIUM 40 MG: 40 TABLET, FILM COATED ORAL at 08:07

## 2018-07-07 RX ADMIN — SODIUM CHLORIDE, PRESERVATIVE FREE 3 ML: 5 INJECTION INTRAVENOUS at 07:07

## 2018-07-07 RX ADMIN — GABAPENTIN 100 MG: 100 CAPSULE ORAL at 08:07

## 2018-07-07 RX ADMIN — PANTOPRAZOLE SODIUM 40 MG: 40 TABLET, DELAYED RELEASE ORAL at 08:07

## 2018-07-07 RX ADMIN — LOSARTAN POTASSIUM 100 MG: 25 TABLET, FILM COATED ORAL at 08:07

## 2018-07-07 RX ADMIN — SPIRONOLACTONE 25 MG: 25 TABLET ORAL at 08:07

## 2018-07-07 RX ADMIN — DULOXETINE 60 MG: 30 CAPSULE, DELAYED RELEASE ORAL at 08:07

## 2018-07-07 RX ADMIN — DOCUSATE SODIUM AND SENNOSIDES 1 TABLET: 8.6; 5 TABLET, FILM COATED ORAL at 08:07

## 2018-07-07 NOTE — NURSING
Report given to night nurseKelsy. Pt. AAOX3. Respirations even and unlabored on 3L NC. No apparent distress noted at this time.Side rails up x 2.  Vas-band to R radial. Bed alarm set. Call light within patient's reach. Safety measures maintained.

## 2018-07-07 NOTE — HOSPITAL COURSE
Denies CP or SOB. Wants to go home    [facility] 7/6/18  Description of the findings of the procedure: No change since previous angiogram.  There is branch vessel disease in diagonals correlating with previous stress test post-recent non-STEMI.  As no progression of disease in the RCA as well.       Recommendation:  -We'll continue medical management

## 2018-07-07 NOTE — SUBJECTIVE & OBJECTIVE
Interval History:     Review of Systems   Constitution: Negative for decreased appetite.   HENT: Negative for ear discharge.    Eyes: Negative for blurred vision.   Respiratory: Negative for hemoptysis.    Endocrine: Negative for polyphagia.   Hematologic/Lymphatic: Negative for adenopathy.   Skin: Negative for color change.   Musculoskeletal: Negative for joint swelling.   Neurological: Negative for brief paralysis.   Psychiatric/Behavioral: Negative for hallucinations.     Objective:     Vital Signs (Most Recent):  Temp: 98.3 °F (36.8 °C) (07/07/18 0421)  Pulse: 84 (07/07/18 0732)  Resp: 18 (07/07/18 0732)  BP: (!) 125/58 (07/07/18 0732)  SpO2: 99 % (07/07/18 0810) Vital Signs (24h Range):  Temp:  [97.8 °F (36.6 °C)-98.3 °F (36.8 °C)] 98.3 °F (36.8 °C)  Pulse:  [] 84  Resp:  [16-18] 18  SpO2:  [75 %-100 %] 99 %  BP: ()/(52-62) 125/58     Weight: 77.9 kg (171 lb 11.8 oz)  Body mass index is 30.42 kg/m².     SpO2: 99 %  O2 Device (Oxygen Therapy): nasal cannula      Intake/Output Summary (Last 24 hours) at 07/07/18 0913  Last data filed at 07/07/18 0421   Gross per 24 hour   Intake              180 ml   Output                0 ml   Net              180 ml       Lines/Drains/Airways     Peripheral Intravenous Line                 Peripheral IV - Single Lumen 07/06/18 1521 Left Antecubital less than 1 day                Physical Exam   Constitutional: She is oriented to person, place, and time. She appears well-developed and well-nourished.   HENT:   Head: Normocephalic and atraumatic.   Eyes: Conjunctivae are normal. Pupils are equal, round, and reactive to light.   Neck: Normal range of motion. Neck supple.   Cardiovascular: Normal rate, normal heart sounds and intact distal pulses.    Pulmonary/Chest: Effort normal and breath sounds normal.   Abdominal: Soft. Bowel sounds are normal.   Musculoskeletal: Normal range of motion. She exhibits edema.   Neurological: She is alert and oriented to person,  place, and time.   Skin: Skin is warm and dry.       Significant Labs: All pertinent lab results from the last 24 hours have been reviewed.    Significant Imaging: Echocardiogram:   2D echo with color flow doppler:   Results for orders placed or performed during the hospital encounter of 12/28/17   2D echo with color flow doppler   Result Value Ref Range    EF 50 55 - 65    Mitral Valve Regurgitation MILD TO MODERATE     Diastolic Dysfunction Yes (A)     Est. PA Systolic Pressure 67.32 (A)     Pericardial Effusion NONE     Tricuspid Valve Regurgitation MODERATE TO SEVERE (A)

## 2018-07-07 NOTE — HOSPITAL COURSE
The patient was admitted to the hospital on 7/5 with a CC of Chest pain. She has known CAD with known branch disease from an echo 12/17.  The patient bumped her troponin to 7+. Cards was consulted. The patient was taken for LHC on 7/6. Per Dr. Bertrand note: Known branch disease in D4 by [facility] 12/2017.   Repeat LHC with similar branch disease. Continue medical Rx. Ok for d/c from cards stand point. The patient noted on 7/7 that she had some pain and swelling to her L ankle.  Ortho was consulted and believed that this was just inflammatory reaction to underlying OA and does not require any further treatment.  She was given steroids with improvement.  The following day, she felt much better.  She was requesting discharge to home. She will be discharged to home today with close cards follow up. Diet- low NA, ADA 1800 chaim diet. Follow up with Dr. Bertrand in one week. Activity as tolerated.

## 2018-07-07 NOTE — NURSING
Report received from night nurseKelsy. Pt. resting quietly easily aroused per verbal stimuli. Evaluated pt. general condition. O2 per 3L NC. Respirations even  and unlabored. No apparent distress noted.  No verbalization of pain or discomfort. Safety measures maintained.

## 2018-07-07 NOTE — PROGRESS NOTES
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 Wants to go home    [facility] 7/6/18  Description of the findings of the procedure: No change since previous angiogram.  There is branch vessel disease in diagonals correlating with previous stress test post-recent non-STEMI.  As no progression of disease in the RCA as well.       Recommendation:  -We'll continue medical management        Interval History:     Review of Systems   Constitution: Negative for decreased appetite.   HENT: Negative for ear discharge.    Eyes: Negative for blurred vision.   Respiratory: Negative for hemoptysis.    Endocrine: Negative for polyphagia.   Hematologic/Lymphatic: Negative for adenopathy.   Skin: Negative for color change.   Musculoskeletal: Negative for joint swelling.   Neurological: Negative for brief paralysis.   Psychiatric/Behavioral: Negative for hallucinations.     Objective:     Vital Signs (Most Recent):  Temp: 98.3 °F (36.8 °C) (07/07/18 0421)  Pulse: 84 (07/07/18 0732)  Resp: 18 (07/07/18 0732)  BP: (!) 125/58 (07/07/18 0732)  SpO2: 99 % (07/07/18 0810) Vital Signs (24h Range):  Temp:  [97.8 °F (36.6 °C)-98.3 °F (36.8 °C)] 98.3 °F (36.8 °C)  Pulse:  [] 84  Resp:  [16-18] 18  SpO2:  [75 %-100 %] 99 %  BP: ()/(52-62) 125/58     Weight: 77.9 kg (171 lb 11.8 oz)  Body mass index is 30.42 kg/m².     SpO2: 99 %  O2 Device (Oxygen Therapy): nasal cannula      Intake/Output Summary (Last 24 hours) at 07/07/18 0913  Last data filed at 07/07/18 0421   Gross per 24 hour   Intake              180 ml   Output                 0 ml   Net              180 ml       Lines/Drains/Airways     Peripheral Intravenous Line                 Peripheral IV - Single Lumen 07/06/18 1521 Left Antecubital less than 1 day                Physical Exam   Constitutional: She is oriented to person, place, and time. She appears well-developed and well-nourished.   HENT:   Head: Normocephalic and atraumatic.   Eyes: Conjunctivae are normal. Pupils are equal, round, and reactive to light.   Neck: Normal range of motion. Neck supple.   Cardiovascular: Normal rate, normal heart sounds and intact distal pulses.    Pulmonary/Chest: Effort normal and breath sounds normal.   Abdominal: Soft. Bowel sounds are normal.   Musculoskeletal: Normal range of motion. She exhibits edema.   Neurological: She is alert and oriented to person, place, and time.   Skin: Skin is warm and dry.       Significant Labs: All pertinent lab results from the last 24 hours have been reviewed.    Significant Imaging: Echocardiogram:   2D echo with color flow doppler:   Results for orders placed or performed during the hospital encounter of 12/28/17   2D echo with color flow doppler   Result Value Ref Range    EF 50 55 - 65    Mitral Valve Regurgitation MILD TO MODERATE     Diastolic Dysfunction Yes (A)     Est. PA Systolic Pressure 67.32 (A)     Pericardial Effusion NONE     Tricuspid Valve Regurgitation MODERATE TO SEVERE (A)      Assessment and Plan:     Brief HPI:     Diabetes mellitus type II             NSTEMI (non-ST elevated myocardial infarction)    Known branch disease in D4 by [facility] 12/2017. Troponin increased to 7.2.  Repeat [facility] with similar branch disease. Continue medical Rx. Ok for d/c        CKD Stage 3             Chronic diastolic heart failure    Diuresis and afterload reduction as tolerated        PVD (peripheral vascular disease)     Out patient evaluation per Dr Humphrey        Essential hypertension                 VTE Risk Mitigation         Ordered     Place PHILL hose   Until discontinued      07/06/18 0201     IP VTE HIGH RISK PATIENT  Once      07/06/18 0201          Heraclio Bertrand MD  Cardiology  [facility]

## 2018-07-07 NOTE — NURSING
Second attempt to deflate vas-band unsuccessful, pt. began to bleed at puncture site. Vas-band re inflated. Night nurse informed

## 2018-07-07 NOTE — CONSULTS
HISTORY OF PRESENT ILLNESS:  An 85-year-old female, [facility].  We have seen her in the past for osteoarthritis of her knee and foot.    She presented to the Emergency Room last night complaining of pain in the left   foot.  She has swelling.  She is unable to walk.  She had undergone an   ultrasound in the Emergency Room.  This was negative for DVT.  She is admitted.    She has multiple comorbidities such as congestive heart failure, stage III renal   disease secondary to type 2 diabetes, spinal stenosis.    PHYSICAL EXAMINATION:  GENERAL:  The patient is sitting up in her bed.  EXTREMITIES:  She has swelling about the left ankle.  She states it is not as   swollen as it was.  The area is not red or hot.  She is able to move her foot up   and down without pain.    X-rays have been taken of her foot.  There are no fractures or dislocations.    Degenerative changes are present.  I have ordered x-rays of her ankle.    IMPRESSION:  She may have inflammatory reaction with swelling and pain.  It does   not look like she has acute gouty attack.      RLS/IN  dd: 07/07/2018 15:56:09 (CDT)  td: 07/07/2018 17:50:56 (CDT)  Doc ID   #[id number]  Job ID #[id number]    CC:

## 2018-07-07 NOTE — ASSESSMENT & PLAN NOTE
Known branch disease in D4 by [facility] 12/2017. Troponin increased to 7.2.  Repeat C with similar branch disease. Continue medical Rx. Ok for d/c

## 2018-07-07 NOTE — DISCHARGE SUMMARY
[facility] Hospital Medicine  Discharge Summary      Patient Name: Magaly Nunes  MRN: [id number]  Admission Date: 7/5/2018  Hospital Length of Stay: 1 days  Discharge Date and Time:  7/8/18  Attending Physician: Arturo Madrid MD   Discharging Provider: Arturo Madrid MD  Primary Care Provider: Chris Bangura MD      HPI:   Magaly Nunes is a 85 y.o. female that (in part)  has a past medical history of Anxiety disorder; Arthritis; Carpal tunnel syndrome; CHF (congestive heart failure); Chronic allergic rhinitis; Chronic pain; CKD stage 3 due to type 2 diabetes mellitus; Constipation - functional; Depression; Diabetes mellitus type II; Hot flash not due to menopause; HTN (hypertension); Hypokalemia; Insomnia; Knee pain, bilateral; Personal history of DVT (deep vein thrombosis); Renovascular hypertension; Severe tricuspid regurgitation; Spinal stenosis; Spinal stenosis of lumbar region; and Vertigo.  has a past surgical history that includes Hysterectomy; Cataract extraction (Bilateral); Eye surgery; Fracture surgery; and ORIF radius & ulna fractures.   Presents to [facility] as a transfer patient from the [place] [address] stand-alone Emergency Department complaining of shortness breath associated with bilateral lower extremity edema and abdominal bloating.  She also reports having chest pain that began approximately 4:30 p.m. lasted roughly 5 min.  It spontaneously resolved prior to arrival.  Radiation of the main bilateral lower extremities.  It worsened with exertion.  Minimally relieved with addition of supplemental oxygen in the ED.  Subacute onset with risks were sent.  Concentration.  Daily frequency.  Denies fever or chills.  No congestion. Positive for nonproductive cough.  No hemoptysis.  She is compliant with her aspirin and Plavix.    In the emergency department EKG number chest x-ray, cardiac enzymes, and routine laboratory studies were obtained.   Showed multiple abnormalities including an elevated troponin level, elevated BNP, elevated D-dimer, and physical exam findings concerning for possible DVT or arterial thrombosis.  She underwent CT angiography of the chest which was negative as well as lower extremity ultrasound which was also negative for DVT.  Sec troponin was obtained which was markedly elevated compared to the 1st.  Dr. Lee at the [place] [facility] discussed the case with Cardiology who recommended admission at [facility] for further evaluation and treatment. Hospital medicine has been asked to admit.     Procedure(s) (LRB):  Left heart cath (Left)      Hospital Course:   The patient was admitted to the hospital on 7/5 with a CC of Chest pain. She has known CAD with known branch disease from an echo 12/17.  The patient bumped her troponin to 7+. Cards was consulted. The patient was taken for C on 7/6. Per Dr. Bertrand note: Known branch disease in D4 by [facility] 12/2017.   Repeat LHC with similar branch disease. Continue medical Rx. Ok for d/c. The patient's CC of CP resolved. She was requesting discharge to home. She will be discharged to home today with close cards follow up. Diet- low NA, ADA 1800 chaim diet. Follow up with Dr. Bertrand in one week. Activity as tolerated.           Consults:   Consults         Status Ordering Provider     Inpatient consult to Cardiology  Once     Provider:  Hammad Humphrey MD    Acknowledged ARCADIO LEE          No new Assessment & Plan notes have been filed under this hospital service since the last note was generated.  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 [I70.429] 07/06/2018 Yes    Edema of left lower extremity [R60.0] 07/06/2018 Yes    Diabetes mellitus type II [E11.9] 07/06/2018 Yes    CKD Stage 3 [N18.3] 12/28/2017 Yes     Chronic    GERD (gastroesophageal reflux  disease) [K21.9] 12/28/2017 Yes     Chronic    Chronic diastolic heart failure [I50.32] 12/28/2017 Yes     Chronic    PVD (peripheral vascular disease) [I73.9] 05/08/2015 Yes    Essential hypertension [I10] 11/19/2012 Yes     Chronic      Problems Resolved During this Admission:    Diagnosis Date Noted Date Resolved POA    PRINCIPAL PROBLEM:  NSTEMI (non-ST elevated myocardial infarction) [I21.4] 07/05/2018 07/07/2018 No    Elevated d-dimer [R79.89] 07/06/2018 07/07/2018 Yes    Elevated brain natriuretic peptide (BNP) level [R79.89] 07/06/2018 07/07/2018 Yes    Elevated lactic acid level [R79.89] 07/06/2018 07/07/2018 Yes       Discharged Condition: good    Disposition:  To home     Follow Up:  Follow-up Information     Chris Bangura MD In 1 week.    Specialty:  Internal Medicine  Contact information:  [address] [address] [phone]             Heraclio Bertrand MD In 1 week.    Specialty:  Cardiology  Contact information:  [address]  [address]  [facility] [id number] [phone]                 Patient Instructions:   No discharge procedures on file.    Significant Diagnostic Studies:     Pending Diagnostic Studies:     Procedure Component Value Units Date/Time    EKG 12-LEAD [[id number]]     Order Status:  Sent Lab Status:  No result          Medications:  Reconciled Home Medications:      Medication List      START taking these medications    spironolactone 25 MG tablet  Commonly known as:  ALDACTONE  Take 1 tablet (25 mg total) by mouth once daily.  Start taking on:  7/8/2018        CHANGE how you take these medications    temazepam 30 mg capsule  Commonly known as:  RESTORIL  TAKE ONE CAPSULE BY MOUTH AT BEDTIME AS NEEDED FOR INSOMNIA  What changed:  See the new instructions.        CONTINUE taking these medications    aspirin 81 MG Chew  Take 1 tablet (81 mg total) by mouth once daily.     atorvastatin 20 MG tablet  Commonly known as:  LIPITOR     carvedilol 6.25 MG  tablet  Commonly known as:  COREG  Take 1 tablet (6.25 mg total) by mouth 2 (two) times daily.     clopidogrel 75 mg tablet  Commonly known as:  PLAVIX     DULoxetine 60 MG capsule  Commonly known as:  CYMBALTA  TAKE 1 CAPSULE(60 MG) BY MOUTH EVERY DAY     furosemide 40 MG tablet  Commonly known as:  LASIX  Take 1 tablet (40 mg total) by mouth once daily.     gabapentin 100 MG capsule  Commonly known as:  NEURONTIN  Take 1 capsule (100 mg total) by mouth 3 (three) times daily.     HYDROcodone-acetaminophen 5-325 mg per tablet  Commonly known as:  NORCO  Take 1 tablet by mouth every 6 (six) hours as needed for Pain.     lidocaine-prilocaine cream  Commonly known as:  EMLA     LORazepam 0.5 MG tablet  Commonly known as:  ATIVAN  Take 1 tablet (0.5 mg total) by mouth every 8 (eight) hours as needed for Anxiety. (caution-may cause sleepiness)     losartan 100 MG tablet  Commonly known as:  COZAAR  TAKE 1 TABLET BY MOUTH ONCE DAILY     omeprazole 20 MG capsule  Commonly known as:  PRILOSEC  TAKE 2 CAPSULES BY MOUTH EVERY DAY FOR ACID REFLUX OR STOMACH     potassium chloride SA 20 MEQ tablet  Commonly known as:  K-DUR,KLOR-CON  Take 1 tablet (20 mEq total) by mouth once daily.     SHINGRIX (PF) 50 mcg/0.5 mL injection  Generic drug:  varicella-zoster [id number] (PF)     tiZANidine 4 mg Cap  Take by mouth.            Indwelling Lines/Drains at time of discharge:   Lines/Drains/Airways          No matching active lines, drains, or airways          Time spent on the discharge of patient:  < 30  minutes  Patient was seen and examined on the date of discharge and determined to be suitable for discharge.         Arturo Guillen MD  Department of Hospital Medicine  [facility]

## 2018-07-07 NOTE — PROGRESS NOTES
[facility] Hospital Medicine  Progress Note    Patient Name: Magaly Nunes  MRN: [id number]  Patient Class: IP- Inpatient   Admission Date: 7/5/2018  Length of Stay: 1 days  Attending Physician: Arturo Madrid MD  Primary Care Provider: Chris Bangura MD        Subjective:     Principal Problem:NSTEMI (non-ST elevated myocardial infarction)    HPI:  Magaly Nunes is a 85 y.o. female that (in part)  has a past medical history of Anxiety disorder; Arthritis; Carpal tunnel syndrome; CHF (congestive heart failure); Chronic allergic rhinitis; Chronic pain; CKD stage 3 due to type 2 diabetes mellitus; Constipation - functional; Depression; Diabetes mellitus type II; Hot flash not due to menopause; HTN (hypertension); Hypokalemia; Insomnia; Knee pain, bilateral; Personal history of DVT (deep vein thrombosis); Renovascular hypertension; Severe tricuspid regurgitation; Spinal stenosis; Spinal stenosis of lumbar region; and Vertigo.  has a past surgical history that includes Hysterectomy; Cataract extraction (Bilateral); Eye surgery; Fracture surgery; and ORIF radius & ulna fractures.   Presents to [facility] as a transfer patient from the [place] [address] stand-alone Emergency Department complaining of shortness breath associated with bilateral lower extremity edema and abdominal bloating.  She also reports having chest pain that began approximately 4:30 p.m. lasted roughly 5 min.  It spontaneously resolved prior to arrival.  Radiation of the main bilateral lower extremities.  It worsened with exertion.  Minimally relieved with addition of supplemental oxygen in the ED.  Subacute onset with risks were sent.  Concentration.  Daily frequency.  Denies fever or chills.  No congestion. Positive for nonproductive cough.  No hemoptysis.  She is compliant with her aspirin and Plavix.    In the emergency department EKG number chest x-ray, cardiac enzymes, and routine laboratory  studies were obtained.  Showed multiple abnormalities including an elevated troponin level, elevated BNP, elevated D-dimer, and physical exam findings concerning for possible DVT or arterial thrombosis.  She underwent CT angiography of the chest which was negative as well as lower extremity ultrasound which was also negative for DVT.  Sec troponin was obtained which was markedly elevated compared to the 1st.  Dr. Lee at the [place] [facility] discussed the case with Cardiology who recommended admission at [facility] for further evaluation and treatment. Hospital medicine has been asked to admit.     Hospital Course:  The patient was admitted to the hospital on 7/5 with a CC of Chest pain. She has known CAD with known branch disease from an echo 12/17.  The patient bumped her troponin to 7+. Cards was consulted. The patient was taken for C on 7/6. Per Dr. Bertrand note: Known branch disease in D4 by [facility] 12/2017.   Repeat LHC with similar branch disease. Continue medical Rx. Ok for d/c. The patient's CC of CP resolved. She was requesting discharge to home. She will be discharged to home today with close cards follow up. Diet- low NA, ADA 1800 chaim diet. Follow up with Dr. Bertrand in one week. Activity as tolerated.          Interval History: No new issues.     Review of Systems   Constitutional: Negative for activity change.   HENT: Negative for congestion.    Respiratory: Negative for chest tightness and shortness of breath.    Cardiovascular: Negative for chest pain.   Gastrointestinal: Negative for abdominal distention.   Genitourinary: Negative for difficulty urinating.     Objective:     Vital Signs (Most Recent):  Temp: 98.3 °F (36.8 °C) (07/07/18 0421)  Pulse: 86 (07/07/18 0742)  Resp: 18 (07/07/18 0732)  BP: (!) 125/58 (07/07/18 0732)  SpO2: 99 % (07/07/18 0810) Vital Signs (24h Range):  Temp:  [97.8 °F (36.6 °C)-98.3 °F (36.8 °C)] 98.3 °F (36.8 °C)  Pulse:  [] 86  Resp:  [16-18] 18  SpO2:  [75  %-100 %] 99 %  BP: ()/(52-62) 125/58     Weight: 77.9 kg (171 lb 11.8 oz)  Body mass index is 30.42 kg/m².    Intake/Output Summary (Last 24 hours) at 07/07/18 1013  Last data filed at 07/07/18 0421   Gross per 24 hour   Intake              180 ml   Output                0 ml   Net              180 ml      Physical Exam   Constitutional: She is oriented to person, place, and time. She appears well-developed and well-nourished.   HENT:   Head: Normocephalic and atraumatic.   Cardiovascular: Normal rate.    Pulmonary/Chest: Effort normal.   Abdominal: Soft.   Neurological: She is alert and oriented to person, place, and time.       Significant Labs:   CBC:   Recent Labs  Lab 07/06/18  0648 07/07/18  0531   WBC 6.07 4.11   HGB 9.2* 9.6*   HCT 29.1* 30.5*    226     CMP:   Recent Labs  Lab 07/06/18  0648      K 4.1      CO2 23   *   BUN 19   CREATININE 1.2   CALCIUM 9.6   PROT 7.1   ALBUMIN 3.1*   BILITOT 1.5*   ALKPHOS 131   AST 59*   ALT 28   ANIONGAP 12   EGFRNONAA 41*       Significant Imaging:     Assessment/Plan:      * NSTEMI (non-ST elevated myocardial infarction)    Known branch disease in D4 by [facility] 12/2017. Troponin increased to 7.2.  Repeat [facility] with similar branch disease. Continue medical Rx. Ok for d/c             Elevated lactic acid level    No acute issues.           Elevated brain natriuretic peptide (BNP) level    In setting of CKD III           Elevated d-dimer    From Non-stemi           Diabetes mellitus type II    With manifestations of CKD III. Controlled.           Edema of left lower extremity    Ultrasound negative for DVT.         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           GERD (gastroesophageal reflux disease)    Continue home meds          CKD Stage 3    Baseline           Chronic diastolic heart failure    No acute issues.         PVD (peripheral vascular disease)    No new issues.          Essential hypertension    Continue home meds.             VTE Risk Mitigation         Ordered     Place PHILL hose  Until discontinued      07/06/18 0201     IP VTE HIGH RISK PATIENT  Once      07/06/18 0201        Addendum 10:43am  Patient stating that her main reason for coming in was not CP- but her Left foot pain that has not been addressed. PT/ortho consulted. Uric acid. X-ray. Will follow ortho recs. Hold discharge.       Arturo Guillen MD  Department of Hospital Medicine   [facility]

## 2018-07-07 NOTE — PLAN OF CARE
Problem: Pressure Ulcer Risk (Artemio Scale) (Adult,Obstetrics,Pediatric)  Goal: Identify Related Risk Factors and Signs and Symptoms  Related risk factors and signs and symptoms are identified upon initiation of Human Response Clinical Practice Guideline (CPG)   Outcome: Ongoing (interventions implemented as appropriate)   07/07/18 0606   Pressure Ulcer Risk (Artemio Scale)   Related Risk Factors (Pressure Ulcer Risk (Artemio Scale)) age extremes;medication;medical devices

## 2018-07-07 NOTE — PLAN OF CARE
Problem: Fall Risk (Adult)  Intervention: Reduce Risk/Promote Restraint Free Environment   18   Safety Interventions   Environmental Safety Modification room near unit station;clutter free environment maintained;assistive device/personal items within reach   Prevent  Drop/Fall   Safety/Security Measures bed alarm set     Intervention: Review Medications/Identify Contributors to Fall Risk   18   Safety Interventions   Medication Review/Management medications reviewed     Intervention: Patient Rounds   18   Safety Interventions   Patient Rounds placement of personal items at bedside;toileting offered;ID band on;call light in reach;bed wheels locked;bed in low position;clutter free environment maintained;visualized patient     Intervention: Safety Promotion/Fall Prevention   18   Safety Interventions   Safety Promotion/Fall Prevention instructed to call staff for mobility;side rails raised x 2;room near unit station;assistive device/personal item within reach;bed alarm set;diversional activities provided;Fall Risk reviewed with patient/family;Fall Risk signage in place;nonskid shoes/socks when out of bed       Goal: Identify Related Risk Factors and Signs and Symptoms  Related risk factors and signs and symptoms are identified upon initiation of Human Response Clinical Practice Guideline (CPG)   Outcome: Ongoing (interventions implemented as appropriate)   18   Fall Risk   Related Risk Factors (Fall Risk) environment unfamiliar;gait/mobility problems;polypharmacy   Signs and Symptoms (Fall Risk) presence of risk factors     Goal: Absence of Falls  Patient will demonstrate the desired outcomes by discharge/transition of care.   Outcome: Ongoing (interventions implemented as appropriate)   18   Fall Risk (Adult)   Absence of Falls making progress toward outcome       Problem: Diabetes, Type 2 (Adult)  Intervention: Optimize Glycemic Control    07/07/18 1208   Nutrition Interventions   Glycemic Management blood glucose monitoring       Goal: Signs and Symptoms of Listed Potential Problems Will be Absent, Minimized or Managed (Diabetes, Type 2)  Signs and symptoms of listed potential problems will be absent, minimized or managed by discharge/transition of care (reference Diabetes, Type 2 (Adult) CPG).   Outcome: Ongoing (interventions implemented as appropriate)   07/07/18 1208   Diabetes, Type 2   Problems Assessed (Type 2 Diabetes) all   Problems Present (Type 2 Diabetes) hyperglycemia

## 2018-07-07 NOTE — SUBJECTIVE & OBJECTIVE
Interval History: No new issues.     Review of Systems   Constitutional: Negative for activity change.   HENT: Negative for congestion.    Respiratory: Negative for chest tightness and shortness of breath.    Cardiovascular: Negative for chest pain.   Gastrointestinal: Negative for abdominal distention.   Genitourinary: Negative for difficulty urinating.     Objective:     Vital Signs (Most Recent):  Temp: 98.3 °F (36.8 °C) (07/07/18 0421)  Pulse: 86 (07/07/18 0742)  Resp: 18 (07/07/18 0732)  BP: (!) 125/58 (07/07/18 0732)  SpO2: 99 % (07/07/18 0810) Vital Signs (24h Range):  Temp:  [97.8 °F (36.6 °C)-98.3 °F (36.8 °C)] 98.3 °F (36.8 °C)  Pulse:  [] 86  Resp:  [16-18] 18  SpO2:  [75 %-100 %] 99 %  BP: ()/(52-62) 125/58     Weight: 77.9 kg (171 lb 11.8 oz)  Body mass index is 30.42 kg/m².    Intake/Output Summary (Last 24 hours) at 07/07/18 1013  Last data filed at 07/07/18 0421   Gross per 24 hour   Intake              180 ml   Output                0 ml   Net              180 ml      Physical Exam   Constitutional: She is oriented to person, place, and time. She appears well-developed and well-nourished.   HENT:   Head: Normocephalic and atraumatic.   Cardiovascular: Normal rate.    Pulmonary/Chest: Effort normal.   Abdominal: Soft.   Neurological: She is alert and oriented to person, place, and time.       Significant Labs:   CBC:   Recent Labs  Lab 07/06/18  0648 07/07/18  0531   WBC 6.07 4.11   HGB 9.2* 9.6*   HCT 29.1* 30.5*    226     CMP:   Recent Labs  Lab 07/06/18  0648      K 4.1      CO2 23   *   BUN 19   CREATININE 1.2   CALCIUM 9.6   PROT 7.1   ALBUMIN 3.1*   BILITOT 1.5*   ALKPHOS 131   AST 59*   ALT 28   ANIONGAP 12   EGFRNONAA 41*       Significant Imaging:

## 2018-07-07 NOTE — PLAN OF CARE
Problem: Fall Risk (Adult)  Goal: Identify Related Risk Factors and Signs and Symptoms  Related risk factors and signs and symptoms are identified upon initiation of Human Response Clinical Practice Guideline (CPG)   Outcome: Ongoing (interventions implemented as appropriate)   07/07/18 0605   Fall Risk   Related Risk Factors (Fall Risk) age-related changes;bladder function altered;confusion/agitation;culprit medication(s);fatigue/slow reaction;objects hard to reach;polypharmacy   Signs and Symptoms (Fall Risk) presence of risk factors

## 2018-07-07 NOTE — ASSESSMENT & PLAN NOTE
Known branch disease in D4 by [facility] 12/2017. Troponin increased to 7.2.  Repeat C with similar branch disease. Continue medical Rx. Ok for d/c

## 2018-07-08 VITALS
WEIGHT: 175.94 LBS | TEMPERATURE: 98 F | OXYGEN SATURATION: 98 % | BODY MASS INDEX: 31.17 KG/M2 | SYSTOLIC BLOOD PRESSURE: 116 MMHG | DIASTOLIC BLOOD PRESSURE: 57 MMHG | HEIGHT: 63 IN | HEART RATE: 82 BPM | RESPIRATION RATE: 18 BRPM

## 2018-07-08 LAB
BASOPHILS # BLD AUTO: 0 K/UL
BASOPHILS NFR BLD: 0 %
DIFFERENTIAL METHOD: ABNORMAL
EOSINOPHIL # BLD AUTO: 0 K/UL
EOSINOPHIL NFR BLD: 0.2 %
ERYTHROCYTE [DISTWIDTH] IN BLOOD BY AUTOMATED COUNT: 13.4 %
HCT VFR BLD AUTO: 29.2 %
HGB BLD-MCNC: 9.5 G/DL
INR PPP: 1
LYMPHOCYTES # BLD AUTO: 0.7 K/UL
LYMPHOCYTES NFR BLD: 17.2 %
MCH RBC QN AUTO: 26.3 PG
MCHC RBC AUTO-ENTMCNC: 32.5 G/DL
MCV RBC AUTO: 81 FL
MONOCYTES # BLD AUTO: 0.2 K/UL
MONOCYTES NFR BLD: 4.7 %
NEUTROPHILS # BLD AUTO: 3.1 K/UL
NEUTROPHILS NFR BLD: 77.7 %
PLATELET # BLD AUTO: 243 K/UL
PMV BLD AUTO: 10.7 FL
POCT GLUCOSE: 149 MG/DL (ref 70–110)
POCT GLUCOSE: 200 MG/DL (ref 70–110)
PROTHROMBIN TIME: 10.7 SEC
RBC # BLD AUTO: 3.61 M/UL
WBC # BLD AUTO: 4.02 K/UL

## 2018-07-08 PROCEDURE — 85025 COMPLETE CBC W/AUTO DIFF WBC: CPT

## 2018-07-08 PROCEDURE — A4216 STERILE WATER/SALINE, 10 ML: HCPCS | Performed by: HOSPITALIST

## 2018-07-08 PROCEDURE — 36415 COLL VENOUS BLD VENIPUNCTURE: CPT

## 2018-07-08 PROCEDURE — 25000003 PHARM REV CODE 250: Performed by: INTERNAL MEDICINE

## 2018-07-08 PROCEDURE — 25000003 PHARM REV CODE 250: Performed by: HOSPITALIST

## 2018-07-08 PROCEDURE — 85610 PROTHROMBIN TIME: CPT

## 2018-07-08 RX ADMIN — DOCUSATE SODIUM AND SENNOSIDES 1 TABLET: 8.6; 5 TABLET, FILM COATED ORAL at 08:07

## 2018-07-08 RX ADMIN — LOSARTAN POTASSIUM 100 MG: 25 TABLET, FILM COATED ORAL at 08:07

## 2018-07-08 RX ADMIN — CARVEDILOL 6.25 MG: 6.25 TABLET, FILM COATED ORAL at 08:07

## 2018-07-08 RX ADMIN — SPIRONOLACTONE 25 MG: 25 TABLET ORAL at 08:07

## 2018-07-08 RX ADMIN — ATORVASTATIN CALCIUM 40 MG: 40 TABLET, FILM COATED ORAL at 08:07

## 2018-07-08 RX ADMIN — COLCHICINE 0.6 MG: 0.6 TABLET, FILM COATED ORAL at 08:07

## 2018-07-08 RX ADMIN — DULOXETINE 60 MG: 30 CAPSULE, DELAYED RELEASE ORAL at 08:07

## 2018-07-08 RX ADMIN — CLOPIDOGREL BISULFATE 75 MG: 75 TABLET ORAL at 08:07

## 2018-07-08 RX ADMIN — PANTOPRAZOLE SODIUM 40 MG: 40 TABLET, DELAYED RELEASE ORAL at 08:07

## 2018-07-08 RX ADMIN — GABAPENTIN 100 MG: 100 CAPSULE ORAL at 08:07

## 2018-07-08 RX ADMIN — SODIUM CHLORIDE, PRESERVATIVE FREE 3 ML: 5 INJECTION INTRAVENOUS at 05:07

## 2018-07-08 RX ADMIN — ASPIRIN 81 MG 81 MG: 81 TABLET ORAL at 08:07

## 2018-07-08 NOTE — PT/OT/SLP PROGRESS
Physical Therapy      Patient Name:  Magaly Nunes   MRN:  [id number]    PT orders cancelled per MD. Denia Wilkins, PT

## 2018-07-08 NOTE — SUBJECTIVE & OBJECTIVE
Interval History: L ankle much better. Ambulating independently     Review of Systems   Constitutional: Negative for activity change.   HENT: Negative for congestion.    Respiratory: Negative for chest tightness and shortness of breath.    Cardiovascular: Negative for chest pain.   Gastrointestinal: Negative for abdominal distention.   Genitourinary: Negative for difficulty urinating.     Objective:     Vital Signs (Most Recent):  Temp: 97.8 °F (36.6 °C) (07/08/18 0719)  Pulse: 84 (07/08/18 0719)  Resp: 18 (07/08/18 0719)  BP: 126/71 (07/08/18 0719)  SpO2: 97 % (07/08/18 0719) Vital Signs (24h Range):  Temp:  [97.4 °F (36.3 °C)-99.4 °F (37.4 °C)] 97.8 °F (36.6 °C)  Pulse:  [] 84  Resp:  [18] 18  SpO2:  [95 %-98 %] 97 %  BP: (105-138)/(54-71) 126/71     Weight: 79.8 kg (175 lb 14.8 oz)  Body mass index is 31.16 kg/m².    Intake/Output Summary (Last 24 hours) at 07/08/18 1021  Last data filed at 07/07/18 1400   Gross per 24 hour   Intake                0 ml   Output              450 ml   Net             -450 ml      Physical Exam   Constitutional: She is oriented to person, place, and time. She appears well-developed and well-nourished.   HENT:   Head: Normocephalic and atraumatic.   Cardiovascular: Normal rate.    Pulmonary/Chest: Effort normal.   Abdominal: Soft.   Neurological: She is alert and oriented to person, place, and time.       Significant Labs:   BMP: No results for input(s): GLU, NA, K, CL, CO2, BUN, CREATININE, CALCIUM, MG in the last 48 hours.  CBC:   Recent Labs  Lab 07/07/18  0531 07/08/18  0627   WBC 4.11 4.02   HGB 9.6* 9.5*   HCT 30.5* 29.2*    243       Significant Imaging:

## 2018-07-08 NOTE — PROGRESS NOTES
[facility] Hospital Medicine  Progress Note    Patient Name: Magaly Nunes  MRN: [id number]  Patient Class: IP- Inpatient   Admission Date: 7/5/2018  Length of Stay: 2 days  Attending Physician: Arturo Madrid MD  Primary Care Provider: Chris Bangura MD        Subjective:     Principal Problem:NSTEMI (non-ST elevated myocardial infarction)    HPI:  Magaly Nunes is a 85 y.o. female that (in part)  has a past medical history of Anxiety disorder; Arthritis; Carpal tunnel syndrome; CHF (congestive heart failure); Chronic allergic rhinitis; Chronic pain; CKD stage 3 due to type 2 diabetes mellitus; Constipation - functional; Depression; Diabetes mellitus type II; Hot flash not due to menopause; HTN (hypertension); Hypokalemia; Insomnia; Knee pain, bilateral; Personal history of DVT (deep vein thrombosis); Renovascular hypertension; Severe tricuspid regurgitation; Spinal stenosis; Spinal stenosis of lumbar region; and Vertigo.  has a past surgical history that includes Hysterectomy; Cataract extraction (Bilateral); Eye surgery; Fracture surgery; and ORIF radius & ulna fractures.   Presents to [facility] as a transfer patient from the [place] [address] stand-alone Emergency Department complaining of shortness breath associated with bilateral lower extremity edema and abdominal bloating.  She also reports having chest pain that began approximately 4:30 p.m. lasted roughly 5 min.  It spontaneously resolved prior to arrival.  Radiation of the main bilateral lower extremities.  It worsened with exertion.  Minimally relieved with addition of supplemental oxygen in the ED.  Subacute onset with risks were sent.  Concentration.  Daily frequency.  Denies fever or chills.  No congestion. Positive for nonproductive cough.  No hemoptysis.  She is compliant with her aspirin and Plavix.    In the emergency department EKG number chest x-ray, cardiac enzymes, and routine laboratory  studies were obtained.  Showed multiple abnormalities including an elevated troponin level, elevated BNP, elevated D-dimer, and physical exam findings concerning for possible DVT or arterial thrombosis.  She underwent CT angiography of the chest which was negative as well as lower extremity ultrasound which was also negative for DVT.  Sec troponin was obtained which was markedly elevated compared to the 1st.  Dr. Lee at the [place] [facility] discussed the case with Cardiology who [place] [facility] [place] and [place]. Hospital medicine has been asked to admit.     Hospital Course:  The patient was admitted to the hospital on 7/5 with a CC of Chest pain. She has known CAD with known branch disease from an echo 12/17.  The patient bumped her troponin to 7+. Cards was consulted. The patient was taken for C on 7/6. Per Dr. Bertrand note: Known branch disease in D4 by [facility] 12/2017.   Repeat LHC with similar branch disease. Continue medical Rx. Ok for d/c from cards stand point. The patient noted on 7/7 that she had some pain and swelling to her L ankle.  Ortho was consulted and believed that this was just inflammatory reaction to underlying OA and does not require any further treatment.  She was given steroids with improvement.  The following day, she felt much better.  She was requesting discharge to home. She will be discharged to home today with close cards follow up. Diet- low NA, ADA 1800 chaim diet. Follow up with Dr. Bertrand in one week. Activity as tolerated.          Interval History: L ankle much better. Ambulating independently     Review of Systems   Constitutional: Negative for activity change.   HENT: Negative for congestion.    Respiratory: Negative for chest tightness and shortness of breath.    Cardiovascular: Negative for chest pain.   Gastrointestinal: Negative for abdominal distention.   Genitourinary: Negative for difficulty urinating.     Objective:      Vital Signs (Most Recent):  Temp: 97.8 °F (36.6 °C) (07/08/18 0719)  Pulse: 84 (07/08/18 0719)  Resp: 18 (07/08/18 0719)  BP: 126/71 (07/08/18 0719)  SpO2: 97 % (07/08/18 0719) Vital Signs (24h Range):  Temp:  [97.4 °F (36.3 °C)-99.4 °F (37.4 °C)] 97.8 °F (36.6 °C)  Pulse:  [] 84  Resp:  [18] 18  SpO2:  [95 %-98 %] 97 %  BP: (105-138)/(54-71) 126/71     Weight: 79.8 kg (175 lb 14.8 oz)  Body mass index is 31.16 kg/m².    Intake/Output Summary (Last 24 hours) at 07/08/18 1021  Last data filed at 07/07/18 1400   Gross per 24 hour   Intake                0 ml   Output              450 ml   Net             -450 ml      Physical Exam   Constitutional: She is oriented to person, place, and time. She appears well-developed and well-nourished.   HENT:   Head: Normocephalic and atraumatic.   Cardiovascular: Normal rate.    Pulmonary/Chest: Effort normal.   Abdominal: Soft.   Neurological: She is alert and oriented to person, place, and time.       Significant Labs:   BMP: No results for input(s): GLU, NA, K, CL, CO2, BUN, CREATININE, CALCIUM, MG in the last 48 hours.  CBC:   Recent Labs  Lab 07/07/18  0531 07/08/18  0627   WBC 4.11 4.02   HGB 9.6* 9.5*   HCT 30.5* 29.2*    243       Significant Imaging:     Assessment/Plan:      Diabetes mellitus type II    With manifestations of CKD III. Controlled.           Edema of left lower extremity    Ultrasound negative for DVT.         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           GERD (gastroesophageal reflux disease)    Continue home meds          CKD Stage 3    Baseline           Chronic diastolic heart failure    No acute issues.         PVD (peripheral vascular disease)    No new issues.         Essential hypertension    Continue home meds.             VTE Risk Mitigation         Ordered     Place PHILL hose  Until discontinued      07/06/18 0201     IP VTE HIGH RISK PATIENT  Once      07/06/18 0201         Will d/c to home.  L ankle much better. Ambulating.       Arturo Guillen MD  Department of Hospital Medicine   [facility]

## 2018-07-08 NOTE — NURSING
Report given to night nurseColt. Pt. AAOX3. Respirations even and unlabored on room air. No apparent distress noted at this time. Side rails up x 2. Bed alarm set. Call light within patient's reach. Safety measures maintained.

## 2018-07-08 NOTE — PLAN OF CARE
Problem: Fall Risk (Adult)  Intervention: Monitor/Assist with Self Care   07/08/18 1404   Activity   Activity Assistance Provided assistance, 1 person   Daily Care Interventions   Self-Care Promotion BADL personal objects within reach   Functional Level Current   Ambulation 2 - assistive person   Transferring 2 - assistive person   Toileting 2 - assistive person   Bathing 2 - assistive person   Dressing 2 - assistive person   Eating 0 - independent   Communication 0 - understands/communicates without difficulty   Swallowing 0 - swallows foods/liquids without difficulty     Intervention: Reduce Risk/Promote Restraint Free Environment   07/08/18 1404   Safety Interventions   Environmental Safety Modification assistive device/personal items within reach     Intervention: Review Medications/Identify Contributors to Fall Risk   07/08/18 1404   Safety Interventions   Medication Review/Management medications reviewed     Intervention: Patient Rounds   07/08/18 1404   Safety Interventions   Patient Rounds bed in low position;bed wheels locked;call light in reach;placement of personal items at bedside     Intervention: Safety Promotion/Fall Prevention   07/08/18 1404   Safety Interventions   Safety Promotion/Fall Prevention assistive device/personal item within reach;side rails raised x 2     Intervention: Safety Precautions   07/08/18 1404   Safety Interventions   Safety Precautions emergency equipment at bedside       Goal: Identify Related Risk Factors and Signs and Symptoms  Related risk factors and signs and symptoms are identified upon initiation of Human Response Clinical Practice Guideline (CPG)   Outcome: Ongoing (interventions implemented as appropriate)   07/08/18 1404   Fall Risk   Related Risk Factors (Fall Risk) polypharmacy     Goal: Absence of Falls  Patient will demonstrate the desired outcomes by discharge/transition of care.   Outcome: Ongoing (interventions implemented as appropriate)   07/08/18 1404    Fall Risk (Adult)   Absence of Falls making progress toward outcome

## 2018-07-08 NOTE — PLAN OF CARE
Problem: Fall Risk (Adult)  Intervention: Monitor/Assist with Self Care   18 193500   Activity   Activity Assistance Provided --  assistance, 1 person   Functional Level Current   Ambulation 2 - assistive person --    Transferring 2 - assistive person --    Toileting 2 - assistive person --    Bathing 2 - assistive person --    Dressing 2 - assistive person --    Eating 0 - independent --    Communication 0 - understands/communicates without difficulty --    Swallowing 0 - swallows foods/liquids without difficulty --      Intervention: Reduce Risk/Promote Restraint Free Environment   18 0630 18 1208   Safety Interventions   Safety Precautions emergency equipment at bedside --    Safety Interventions   Environmental Safety Modification --  room near unit station;clutter free environment maintained;assistive device/personal items within reach   Prevent  Drop/Fall   Safety/Security Measures --  bed alarm set

## 2018-07-08 NOTE — PROGRESS NOTES
Patient is discharged. Telemetry removed. IV removed, tip intact. Discharge teaching given and understood, printed prescription also given. awaiting transport.

## 2018-07-11 LAB
BACTERIA BLD CULT: NORMAL
BACTERIA BLD CULT: NORMAL

## 2018-07-16 ENCOUNTER — OFFICE VISIT (OUTPATIENT)
Dept: FAMILY MEDICINE | Facility: CLINIC | Age: 83
End: 2018-07-16
Payer: MEDICARE

## 2018-07-16 VITALS
HEIGHT: 63 IN | DIASTOLIC BLOOD PRESSURE: 68 MMHG | OXYGEN SATURATION: 99 % | TEMPERATURE: 98 F | HEART RATE: 105 BPM | BODY MASS INDEX: 30.62 KG/M2 | WEIGHT: 172.81 LBS | SYSTOLIC BLOOD PRESSURE: 142 MMHG

## 2018-07-16 DIAGNOSIS — I25.118 CORONARY ARTERY DISEASE WITH STABLE ANGINA PECTORIS, UNSPECIFIED VESSEL OR LESION TYPE, UNSPECIFIED WHETHER NATIVE OR TRANSPLANTED HEART: Primary | ICD-10-CM

## 2018-07-16 DIAGNOSIS — G89.29 OTHER CHRONIC PAIN: ICD-10-CM

## 2018-07-16 DIAGNOSIS — E11.22 CONTROLLED TYPE 2 DIABETES MELLITUS WITH STAGE 3 CHRONIC KIDNEY DISEASE, WITHOUT LONG-TERM CURRENT USE OF INSULIN: ICD-10-CM

## 2018-07-16 DIAGNOSIS — F39 MOOD DISORDER: ICD-10-CM

## 2018-07-16 DIAGNOSIS — N18.30 CONTROLLED TYPE 2 DIABETES MELLITUS WITH STAGE 3 CHRONIC KIDNEY DISEASE, WITHOUT LONG-TERM CURRENT USE OF INSULIN: ICD-10-CM

## 2018-07-16 DIAGNOSIS — M25.50 POLYARTHRALGIA: ICD-10-CM

## 2018-07-16 DIAGNOSIS — F32.A DEPRESSION, UNSPECIFIED DEPRESSION TYPE: ICD-10-CM

## 2018-07-16 DIAGNOSIS — M25.572 ACUTE LEFT ANKLE PAIN: ICD-10-CM

## 2018-07-16 DIAGNOSIS — I50.42 SYSTOLIC AND DIASTOLIC CHF, CHRONIC: ICD-10-CM

## 2018-07-16 DIAGNOSIS — I10 ESSENTIAL HYPERTENSION: ICD-10-CM

## 2018-07-16 DIAGNOSIS — I27.20 PULMONARY HYPERTENSION: ICD-10-CM

## 2018-07-16 PROCEDURE — 3077F SYST BP >= 140 MM HG: CPT | Mod: CPTII,S$GLB,, | Performed by: INTERNAL MEDICINE

## 2018-07-16 PROCEDURE — 99999 PR PBB SHADOW E&M-EST. PATIENT-LVL III: CPT | Mod: PBBFAC,,, | Performed by: INTERNAL MEDICINE

## 2018-07-16 PROCEDURE — 99499 UNLISTED E&M SERVICE: CPT | Mod: HCNC,S$GLB,, | Performed by: INTERNAL MEDICINE

## 2018-07-16 PROCEDURE — 3078F DIAST BP <80 MM HG: CPT | Mod: CPTII,S$GLB,, | Performed by: INTERNAL MEDICINE

## 2018-07-16 PROCEDURE — 99215 OFFICE O/P EST HI 40 MIN: CPT | Mod: S$GLB,,, | Performed by: INTERNAL MEDICINE

## 2018-07-16 NOTE — PROGRESS NOTES
Chief complaint  ffollow-up from the hospital    85-year-old black female with multiple medical problems.    Checking in 9 minutes late.  Patient recently in the hospital but not exactly sure why she was there.  She went with pain in the left ankle and swelling.  Looks like the workup led her to have a negative DVT and PE workup but that troponin was elevated.  She did not really have an explanation given to her that she was in there for a heart attack and was not aware that she had signs of a heart attack  She did remember having the angiogram.  We reviewed all the hospital records and it looks like her branch disease in D1 and D2 appear to have been nothing new.  She has a 50% RCA lesion as well.  Apparently she complained about the left ankle pain continuing upon her discharge and therefore it appears that orthopedic was consulted and Dr. Gardner did see her in the hospital.  X-ray of the ankle was okay.  On exam today she has signs of the lateral left ankle sprain.  She is able to walk on it without great pain.  She will call orthopedics for an appointment.  We discussed getting an over-the-counter brace but perhaps orthopedics can provide her with a brace but she is walking without significant pain or instability.    One morning at 3 AM she awoke sitting up at the head of her bed.  She felt like she could not move.  She had to call the ambulance.  She was able to get on the floor and scooter on her back to the door to let them in.  She was assisted to the sofa.  She had no other symptoms other than generalized weakness but all of her arms and legs were functional.  She was told her vitals was okay she cannot render the exact low pressure reading.  They observed her for some time and in about an hour she felt better and was able to get up and walk around.  No particular explanation but perhaps she had some relative hypotension.  She does have a means to check her blood pressure at home and encouraged her to  do so if she gets any weakness.      Hospital Course:   The patient was admitted to the hospital on 7/5 with a CC of Chest pain. She has known CAD with known branch disease from an echo 12/17.  The patient bumped her troponin to 7+. Cards was consulted. The patient was taken for C on 7/6. Per Dr. Bertrand note: Known branch disease in D4 by [facility] 12/2017.   Repeat LHC with similar branch disease. Continue medical Rx. Ok for d/c. The patient's CC of CP resolved. She was requesting discharge to home. She will be discharged to home today with close cards follow up. Diet- low NA, ADA 1800 chaim diet. Follow up with Dr. Bertrand in one week. Activity as tolerated  ECHO:  CONCLUSIONS     1 - Mildly to moderately depressed left ventricular systolic function (EF 40-45%) Anteroseptal hypokinesis.     2 - Concentric hypertrophy.     3 - Biatrial enlargement.     4 - Restrictive LV filling pattern, indicating markedly elevated LAP (grade 3 diastolic dysfunction).     5 - Right ventricular enlargement with normal systolic function.     6 - Pulmonary hypertension. The estimated PA systolic pressure is 63 mmHg.     7 - Mild mitral regurgitation.     8 - Mild to moderate tricuspid regurgitation          Review of systems no chest pains or shortness of breath, no syncope or near-syncope, no other new myalgias arthralgias except the left ankle pain.  No significantlower extremity edema    PAST MEDICAL HISTORY:                                                        1. Hypertension.                                                             2. Dyspnea, possibly secondary to pulmonary hypertension. Sleep study             negative. Has seen cardiology. Ultrasound and CT of chest are negative.            PFTs 2002 were essentially normal.                                           3. History of sinus bradycardia.                                             4. Angioedema on Mavik.                                                      5.  Vertigo.                                                                  6. spinal stenosis, by history, 3 epidural injections in the past        per neurosurgery, Dr. Garcia.  Now seeing Dr Leon.                                                                    7. Allergic rhinitis.                                                                                                        8. IBS.                                                                      9. Carpal tunnel syndrome, status post surgery.                              10. Status post cholecystectomy, total hysterectomy, and left ovary                                                         11. History of iron deficiency anemia, normal EGD in 2002 and in 2000.       12. GERD, with hiatal hernia.                                                13. Osteoarthritis of the knees.                                             14. Severe hot flashes despite all forms of therapy and eval by GYN.         15. History of leg pain due to varicosities or spinal stenosis.              16. History of positive JAVID, mild.                                           17. Trigger fingers, with history of injections.                             18. Varicose veins with reflux on ultrasound 2003.                           19. Borderline abdominal aneurysm at 2.5 cm on ultrasound 2003.              20. Positive DSE, EKG portion, but indeterminate overall,2005. Neg nuclear stress '09 And negative nuclear stress test 2012. MILDLY ABNORMAL NUC 2017 EF 38%          21. Mild stenosis of the renal arteries by ultrasound.                       22. Diabetes  23. History of multitrauma 2006, with fracture of the right arm, subsequent  DVT, and has been on Coumadin. She was discharged from the hospital around   July 2006. She did develop anemia, and has had several transfusions during   that stay. She also had pulmonary embolus associated with that DVT.    24. History of  polyarthralgia with positive JAVID. She has been evaluated by [facility]. Seeing Dr Leon now  25. ANXIETY -suspected partial cause of hot flashes  27. DEPRESSION -    28. Vit D def  29.  Mild peripheral vascular disease buy testing but not felt significant to pursue further  30.  Neuropathy, seen by neurology and put on Cymbalta   31.  Systolic CHF -MILDLY ABNORMAL NUC 2017 EF 38%- Dr Humphrey   32.    pulm HTN on ECHO   33.   CAD -D1 branch disease the same as of cath  - med mgmt, EF 40, PAP in 60s.  90-95% blockages in D1 and D2, 50% RCA    ?colonoscopy    SOCIAL HISTORY:  She is  and lives with her spouse who has prostate   cancer.  She does not smoke cigarettes or drink alcohol. 2 sons .      Vitals, as above  Musculoskeletal: She does have pain in the left anterior lateral ankle on inversion.  She has full range of motionof the ankle joint itself without pain.  She can bear weight without pain.  She has trace pretibial edema.  There is really no soft tissue swelling over the dorsal aspect of the foot.  She has the typical varicose veins over the feet that she has had before.  Skin no rashes, warm to touch, no signs of cellulitis. Gait is normal.      Magaly was seen today for hospital follow up.    Diagnoses and all orders for this visit:    Coronary artery disease with stable angina pectoris, unspecified vessel or lesion type, unspecified whether native or transplanted heart, reviewed interval workup and records and explained her the nature of her heart attack, workup and so forth  She was very concerned because it sounded like the nurse gave her the wrong medication but it sounds like the nurse had possibly wished that a medication had been held and explained and reassured to the patient is a multitude of causes for this such as certain medications wishing they could've been held due to subsequent low blood pressure, upcoming procedures and so forth.  I  reassured that if indeed she was given a medication and her she would've been informed of that medication error as it would've been an official report.    Pulmonary hypertension, followed by cardiology, does not have any limiting shortness of breath    Controlled type 2 diabetes mellitus with stage 3 chronic kidney disease, without long-term current use of insulin, chronic and stable    Essential hypertension chronic and stable but needs to monitor for hypotension at home.  She does report she had some diarrhea after starting Aldactone but she probably needs to reassess at that could've been coincidental    Polyarthralgia    Depression, unspecified depression type, continue medications    Other chronic pain, followed by pain management    Mood disorder    Systolic and diastolic CHF, chronic    Acute left ankle pain, on exam today consistent with a left ankle sprain which is probably healing as appropriate.  X-ray report reviewed.  She'll follow-up with Dr. Gardner and hopefully get a brace if needed  All the above issues were reviewed at great length today and she was counseled and reassured.Total time over 45 minutes with over 50% counseling.

## 2018-08-06 ENCOUNTER — INITIAL CONSULT (OUTPATIENT)
Dept: VASCULAR SURGERY | Facility: CLINIC | Age: 83
End: 2018-08-06
Payer: MEDICARE

## 2018-08-06 VITALS
DIASTOLIC BLOOD PRESSURE: 60 MMHG | BODY MASS INDEX: 29.75 KG/M2 | HEART RATE: 110 BPM | WEIGHT: 167.88 LBS | SYSTOLIC BLOOD PRESSURE: 100 MMHG | HEIGHT: 63 IN

## 2018-08-06 DIAGNOSIS — I73.9 PVD (PERIPHERAL VASCULAR DISEASE) WITH CLAUDICATION: Primary | ICD-10-CM

## 2018-08-06 PROCEDURE — 3078F DIAST BP <80 MM HG: CPT | Mod: CPTII,S$GLB,, | Performed by: SURGERY

## 2018-08-06 PROCEDURE — 3074F SYST BP LT 130 MM HG: CPT | Mod: CPTII,S$GLB,, | Performed by: SURGERY

## 2018-08-06 PROCEDURE — 99999 PR PBB SHADOW E&M-EST. PATIENT-LVL III: CPT | Mod: PBBFAC,,, | Performed by: SURGERY

## 2018-08-06 PROCEDURE — 99204 OFFICE O/P NEW MOD 45 MIN: CPT | Mod: S$GLB,,, | Performed by: SURGERY

## 2018-08-06 NOTE — LETTER
August 6, 2018        Chris Bangura MD  [address] [facility]  [place] [address]             [facility]  [address] [facility] [address]  [facility] [address]  Phone: [phone]  Fax: [phone]   Patient: Magaly Nunes   MR Number: [id number]   YOB: 1932   Date of Visit: 8/6/2018       Dear Dr. Bangura:    Thank you for referring Magaly Nunes to me for evaluation. Below are the relevant portions of my assessment and plan of care.            If you have questions, please do not hesitate to call me. I look forward to following Magaly along with you.    Sincerely,      Oswald Bishop MD           CC  No Recipients

## 2018-08-06 NOTE — PROGRESS NOTES
Oswald Bishop MD RPVI                       [facility]                         08/06/2018    HPI:  Magaly Nunes is a 85 y.o. 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  Was seen by Ortho when admitted to hospital 7/2018 and dx with inflammatory etiology of L ankle pain, no DVT and was dx with MI s/p LHC.  Patient states location is distal LLE occurring for 1 year.  Associated signs and symptoms include erythema and edema.  Quality is sharp and severity is 9/10.  Symptoms began one year ago.  Alleviating factors include none.  Worsening factors include pressure and walking.  LLE claudication at 2 blocks. No rest pain or tissue loss.    yes MI  no Stroke  Tobacco use: no    Past Medical History:   Diagnosis Date    Anxiety disorder     Arthritis 12/28/2017    Carpal tunnel syndrome     CHF (congestive heart failure)     Chronic allergic rhinitis     Chronic pain 10/22/2012    CKD stage 3 due to type 2 diabetes mellitus 2/14/2017    Constipation - functional 4/10/2013    Depression     Diabetes mellitus type II     Hot flash not due to  menopause     HTN (hypertension)     Hypokalemia 2012    Insomnia 4/10/2013    Knee pain, bilateral 2013    Personal history of DVT (deep vein thrombosis)     Polyarthralgia 2018    Pulmonary hypertension 2017    Renovascular hypertension 2017    Severe tricuspid regurgitation 2017    Spinal stenosis     Dr. Corrales    Spinal stenosis of lumbar region 2/3/2014    Systolic and diastolic CHF, chronic 2018    Vertigo      Past Surgical History:   Procedure Laterality Date    CATARACT EXTRACTION Bilateral     EYE SURGERY      FRACTURE SURGERY      HYSTERECTOMY      ORIF RADIUS & ULNA FRACTURES       Family History   Problem Relation Age of Onset    No Known Problems Mother     No Known Problems Father     No Known Problems Sister     No Known Problems Brother     No Known Problems Maternal Aunt     No Known Problems Maternal Uncle     No Known Problems Paternal Aunt     No Known Problems Paternal Uncle     No Known Problems Maternal Grandmother     No Known Problems Maternal Grandfather     No Known Problems Paternal Grandmother     No Known Problems Paternal Grandfather     Amblyopia Neg Hx     Blindness Neg Hx     Cancer Neg Hx     Diabetes Neg Hx     Glaucoma Neg Hx     Hypertension Neg Hx     Macular degeneration Neg Hx     Retinal detachment Neg Hx     Strabismus Neg Hx     Stroke Neg Hx     Thyroid disease Neg Hx      Social History     Social History    Marital status:      Spouse name: N/A    Number of children: N/A    Years of education: N/A     Occupational History    Not on file.     Social History Main Topics    Smoking status: Never Smoker    Smokeless tobacco: Never Used    Alcohol use No    Drug use: No    Sexual activity: No     Other Topics Concern    Not on file     Social History Narrative    .  Lives alone.   and two sons have .  Active.         Current Outpatient Prescriptions:     aspirin 81  MG Chew, Take 1 tablet (81 mg total) by mouth once daily., Disp: , Rfl: 0    atorvastatin (LIPITOR) 20 MG tablet, , Disp: , Rfl: 10    clopidogrel (PLAVIX) 75 mg tablet, , Disp: , Rfl: 7    DULoxetine (CYMBALTA) 60 MG capsule, TAKE 1 CAPSULE(60 MG) BY MOUTH EVERY DAY, Disp: 30 capsule, Rfl: 12    furosemide (LASIX) 40 MG tablet, Take 1 tablet (40 mg total) by mouth once daily., Disp: 30 tablet, Rfl: 11    gabapentin (NEURONTIN) 100 MG capsule, Take 1 capsule (100 mg total) by mouth 3 (three) times daily., Disp: 90 capsule, Rfl: 11    HYDROcodone-acetaminophen (NORCO) 5-325 mg per tablet, Take 1 tablet by mouth every 6 (six) hours as needed for Pain., Disp: 60 tablet, Rfl: 0    lidocaine-prilocaine (EMLA) cream, , Disp: , Rfl:     losartan (COZAAR) 100 MG tablet, TAKE 1 TABLET BY MOUTH ONCE DAILY, Disp: 90 tablet, Rfl: 0    omeprazole (PRILOSEC) 20 MG capsule, TAKE 2 CAPSULES BY MOUTH EVERY DAY FOR ACID REFLUX OR STOMACH, Disp: 180 capsule, Rfl: 0    potassium chloride SA (K-DUR,KLOR-CON) 20 MEQ tablet, Take 1 tablet (20 mEq total) by mouth once daily., Disp: 90 tablet, Rfl: 0    SHINGRIX, PF, 50 mcg/0.5 mL injection, , Disp: , Rfl:     temazepam (RESTORIL) 30 mg capsule, TAKE ONE CAPSULE BY MOUTH AT BEDTIME AS NEEDED FOR INSOMNIA, Disp: 30 capsule, Rfl: 5    tiZANidine 4 mg Cap, Take by mouth., Disp: , Rfl:     carvedilol (COREG) 6.25 MG tablet, Take 1 tablet (6.25 mg total) by mouth 2 (two) times daily., Disp: 60 tablet, Rfl: 0    LORazepam (ATIVAN) 0.5 MG tablet, Take 1 tablet (0.5 mg total) by mouth every 8 (eight) hours as needed for Anxiety. (caution-may cause sleepiness), Disp: 60 tablet, Rfl: 3    REVIEW OF SYSTEMS:  General: No fevers or chills; ENT: No sore throat; Allergy and Immunology: no persistent infections; Hematological and Lymphatic: No history of bleeding or easy bruising; Endocrine: negative; Respiratory: no cough, shortness of breath, or wheezing; Cardiovascular: no chest pain or  dyspnea on exertion; Gastrointestinal: no abdominal pain/back, change in bowel habits, or bloody stools; Genito-Urinary: no dysuria, trouble voiding, or hematuria; Musculoskeletal: negative; Neurological: no TIA or stroke symptoms; Psychiatric: no nervousness, anxiety or depression.    PHYSICAL EXAM:   Right Arm BP - Sittin/60 (18 1411)  Left Arm BP - Sittin/60 (18 1411)  Pulse: 110         General appearance:  Alert, well-appearing, and in no distress.  Oriented to person, place, and time                    Neurological: Normal speech, no focal findings noted; CN II - XII grossly intact. RLE with sensation to light touch, LLE with sensation to light touch.            Musculoskeletal: Digits/nail without cyanosis/clubbing.  Strength 5/5 BLE.                    Neck: Supple, no significant adenopathy, no carotid bruit can be auscultated                  Chest:  Clear to auscultation, no wheezes, rales or rhonchi, symmetric air entry. No use of accessory muscles               Cardiac: Normal rate and regular rhythm, S1 and S2 normal            Abdomen: Soft, nontender, nondistended, no masses or organomegaly, no hernia     No rebound tenderness noted; bowel sounds normal     No groin adenopathy      Extremities:   2+ R femoral pulse, 2+ L femoral pulse     1+ R popliteal pulse, 1+ L popliteal pulse     doppler+ R PT pulse, doppler+ L PT pulse     2+ R DP pulse, doppler+ L DP pulse     no RLE edema, no LLE edema    Skin: RLE without ulcer; LLE without ulcer    LAB RESULTS:  No results found for: CBC  Lab Results   Component Value Date    LABPROT 10.7 2018    INR 1.0 2018     Lab Results   Component Value Date     2018    K 4.1 2018     2018    CO2 23 2018     (H) 2018    BUN 19 2018    CREATININE 1.2 2018    CALCIUM 9.6 2018    ANIONGAP 12 2018    EGFRNONAA 41 (A) 2018     Lab Results   Component Value Date     WBC 4.02 07/08/2018    RBC 3.61 (L) 07/08/2018    HGB 9.5 (L) 07/08/2018    HCT 29.2 (L) 07/08/2018    MCV 81 (L) 07/08/2018    MCH 26.3 (L) 07/08/2018    MCHC 32.5 07/08/2018    RDW 13.4 07/08/2018     07/08/2018    MPV 10.7 07/08/2018    GRAN 3.1 07/08/2018    GRAN 77.7 (H) 07/08/2018    LYMPH 0.7 (L) 07/08/2018    LYMPH 17.2 (L) 07/08/2018    MONO 0.2 (L) 07/08/2018    MONO 4.7 07/08/2018    EOS 0.0 07/08/2018    BASO 0.00 07/08/2018    EOSINOPHIL 0.2 07/08/2018    BASOPHIL 0.0 07/08/2018    DIFFMETHOD Automated 07/08/2018     .  Lab Results   Component Value Date    HGBA1C 6.0 (H) 07/06/2018    HGBA1C 6.0 (H) 07/06/2018       IMAGING:  All pertinent imaging has been reviewed and interpreted independently.    -LLE arterial US with tibioperoneal occlusive disease.    IMP/PLAN:  85 y.o. 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    -Will obtain exercise ABIs  -Cont heart healthy lifestyle  -RTC 2 weeks    I spent 30 minutes evaluating this patient and greater than 50% of the time was spent counseling, coordinator care and discussing the plan of care.  All  questions were answered and patient stated understanding with agreement with the above treatment plan.    Oswald Bishop MD RPVI  Vascular and Endovascular Surgery

## 2018-08-06 NOTE — PATIENT INSTRUCTIONS
Taking Aspirin for Atherosclerosis       Aspirin is a medicine often prescribed to treat atherosclerosis. This condition affects your arteries. These are the blood vessels that carry blood away from your heart. Having atherosclerosis means youre at greater risk of a heart attack or stroke. Aspirin can help prevent these from happening.  How atherosclerosis affects your arteries   A fatty material (plaque) can build up in your arteries. This makes it harder for blood to flow through them. A blood clot can then form on the plaque. This may block the artery, cutting off blood flow. This can cause conditions such as coronary artery disease (CAD) and peripheral arterial disease (PAD):  · CAD occurs when plaque builds up in the coronary artery. This artery supplies the heart with oxygen-rich blood.  · PAD occurs when plaque forms in leg arteries.  The same things that cause CAD and PAD can also cause plaque to form in other arteries in your body, such as those in the brain. When plaque occurs in any of these arteries, it raises your risk of heart attack or stroke.  What aspirin does  Aspirin is a blood-thinner (antiplatelet medicine). It helps keep blood clots from forming. This reduces the risk of blockage. Aspirin can be taken daily if you are at high risk of or have already had a heart attack or stroke. It is also used after a procedure called a stent placement. This is when a tiny wire mesh tube, or stent, is placed in an artery to keep it open. Aspirin helps prevent blood clots from forming on the stent.  Taking aspirin safely  Tell your healthcare provider about any medicines you are taking. This includes:  · All prescription medicines  · Over-the-counter medicines  · Herbs, vitamins, and other supplements  Also mention if you have a history of ulcers or bleeding problems. Ask whether you will need to stop taking aspirin before having surgery or dental work. Always take medicines as directed.  Tips for taking  aspirin  · Have a routine. For example, take aspirin with the same meal each day.  · Dont take more than prescribed. A low dose gives the same benefit as a higher one, with a lower risk of side effects.  · Dont skip doses. Aspirin needs to be taken each day to work well.  · Keep track of what you take. A pillbox with days of the week can help, especially if you take several medicines. Or use a list or chart to keep track.  When to call your healthcare provider  Side effects of aspirin are not usually serious. If you do have problems, changing your dose may help. Call your healthcare provider if you have any of the following:  · Excessive bruising (some bruising is normal)  · Nosebleeds, bleeding gums, or other excessive bleeding  · An upset stomach or stomach pain   Date Last Reviewed: 6/1/2016  © 8574-6962 [employer]. [address] [place] [address]. All rights reserved. This information is not intended as a substitute for professional medical care. Always follow your healthcare professional's instructions.        A Walking Program for Peripheral Arterial Disease (PAD)  Peripheral arterial disease (PAD) is a condition where the arteries in the legs are severely damaged. When you have PAD, walking can be painful. So you may start to walk less. Walking less makes your leg muscles weaker. It then becomes harder and more painful to walk. A walking program can break this cycle. This sheet gives you tips on how to get started.     Walking farther without pain  Exercise strengthens leg muscles that are out of shape. It also trains these muscles to work with less oxygen. This helps your leg muscles work better even with reduced blood flow to your legs. When you have PAD, a walking program can be helpful. Your program can:  · Let you walk longer and farther without claudication. This is an ache in your legs during exercise that goes away with rest.  · Let you do more and be more active  · Add to  your overall health and well-being  · Help you control your blood sugar and blood pressure  · Help you become healthier with no risk and at little or no cost  Getting started  Your local hospital, vascular center, or cardiac rehab center may have a special walking program for people with PAD. If so, this is your best option. But if you cant find a program, or its not covered by insurance, you can still walk on your own. Follow these steps at each session:  · Step 1. Start at a pace that lets you walk for 5 to 10 minutes before you start to feel claudication. This feeling is unpleasant, but it doesnt hurt you. Keep going until the pain makes you feel that you need to stop.  · Step 2. Stop and rest for 3 to 5 minutes, just long enough for the pain to go away. You can rest standing or sitting. (Some people like to bring along a cane or a lightweight folding chair.)  · Step 3. Again walk at a pace that lets you walk for 5 to 10 minutes more before you feel pain. This may be slower than your starting pace in step 1. Then rest again.  · Step 4. Repeat this process until youve walked for 45 minutes. This should be about 60 to 80 minutes total, including resting time. You may not be able to do a full 45 minutes at first. Do as much as you can, and increase your time as you improve.  Making the most of your program  · Walk at least 3 times a week. Take no more than 2 days off between sessions. If you stop walking, even for a week or two, you can lose the health benefits of your program.  · Find a good place to walk. A treadmill or a track may be better at first. That way, you wont run the risk of going too far and finding that you cant walk back. Be sure to have a place to walk indoors in bad weather, such as a gym or a mall.  · Wear shoes with sturdy, flexible soles. The heel should fit without slipping. You should have room to wiggle your toes.  · Keep track of how long you walk. A pedometer will show your daily  progress. It can also show how much farther you can walk as time goes by.  · Ask a friend to keep you company. You may enjoy walking with someone else. Or you may want to make your walking sessions a time to relax by yourself.  · Make it fun. Listen to music while you walk and rest. Walk in a favorite park. Get to know the people at the gym, or the folks that you pass on your route. While you rest, you can window-shop, read, or feed the birds. Do whatever will help you enjoy your exercise sessions.  Date Last Reviewed: 6/1/2016 © 2000-2017 [employer]. [address] [place] [address], [place] [address]. All rights reserved. This information is not intended as a substitute for professional medical care. Always follow your healthcare professional's instructions.        High Blood Pressure and Peripheral Arterial Disease (PAD)    Blood pressure measures the force of blood against your artery walls. High blood pressure (hypertension) can harm your arteries. It also puts you at risk for peripheral arterial disease (PAD). PAD is a disease of arteries in the legs. If you have PAD, its likely that arteries in other parts of your body are diseased, too. That puts you at high risk for heart attack and other heart diseases. Read on to learn how high blood pressure can lead to PAD and affect your health.  High blood pressure defined  Your blood pressure is too high if it is 140/90 mmHg or higher.   How can high blood pressure lead to peripheral arterial disease?  Having high blood pressure makes it easier for plaque to form. Plaque is a waxy material made up of cholesterol and other things. It can build up in your artery walls. As plaque builds up, your arteries can become narrowed. This limits blood flow. If high blood pressure isnt controlled, you are more likely to have PAD and other heart problems. But high blood pressure can be controlled with exercise, weight loss, dietary changes, and medicine.  What happens if  blood pressure isnt controlled?  You double your risk of dying from heart disease or stroke each time your blood pressure rises:  · 20 mmHg in the top number  · 10 mmHg in the bottom number  If you have diabetes, high blood pressure increases your risk for diabetes complications.  What happens if blood pressure is controlled?  Lowering your blood pressure and keeping it low can reduce your risk for:  · Stroke  · Heart attack   · Dying from heart disease   · Diabetes complications  Date Last Reviewed: 6/1/2016 © 2000-2017 [employer]. [address] [place] [address]. All rights reserved. This information is not intended as a substitute for professional medical care. Always follow your healthcare professional's instructions.

## 2018-08-09 ENCOUNTER — TELEPHONE (OUTPATIENT)
Dept: FAMILY MEDICINE | Facility: CLINIC | Age: 83
End: 2018-08-09

## 2018-08-09 RX ORDER — PANTOPRAZOLE SODIUM 20 MG/1
40 TABLET, DELAYED RELEASE ORAL DAILY
Qty: 60 TABLET | Refills: 11 | Status: ON HOLD | OUTPATIENT
Start: 2018-08-09 | End: 2019-03-22

## 2018-08-09 NOTE — TELEPHONE ENCOUNTER
----- Message from Jocy King LPN sent at 8/9/2018  8:26 AM CDT -----  Contact: self      ----- Message -----  From: Rhiannon Mosley  Sent: 8/9/2018   8:11 AM  To: Sv[facility] Staff    Pt calling to discuss meds,due to her being prescribed blood thinners, she can not take a certain acid reflux med that is being given to her. She wants something that wont effect her blood thinners. She needs something immediately due to severe heart burn/acid. Please call her at [phone] thanks...

## 2018-08-09 NOTE — TELEPHONE ENCOUNTER
----- Message from Shena catemoose sent at 8/9/2018  3:42 PM CDT -----  Contact: self  Pt calling to check on the status of her previous message. Please call back at [phone]

## 2018-08-21 ENCOUNTER — HOSPITAL ENCOUNTER (OUTPATIENT)
Dept: CARDIOLOGY | Facility: HOSPITAL | Age: 83
Discharge: HOME | End: 2018-08-21
Attending: SURGERY
Payer: MEDICARE

## 2018-08-21 DIAGNOSIS — I73.9 PVD (PERIPHERAL VASCULAR DISEASE) WITH CLAUDICATION: ICD-10-CM

## 2018-08-21 PROCEDURE — 93924 LWR XTR VASC STDY BILAT: CPT | Mod: 26,,, | Performed by: SURGERY

## 2018-08-21 PROCEDURE — 93924 LWR XTR VASC STDY BILAT: CPT

## 2018-08-22 LAB — VASCULAR ANKLE BRACHIAL INDEX (ABI) LEFT: 0.87 (ref 0.9–1.2)

## 2018-08-23 ENCOUNTER — OFFICE VISIT (OUTPATIENT)
Dept: VASCULAR SURGERY | Facility: CLINIC | Age: 83
End: 2018-08-23
Payer: MEDICARE

## 2018-08-23 VITALS
WEIGHT: 169.88 LBS | HEIGHT: 63 IN | BODY MASS INDEX: 30.1 KG/M2 | DIASTOLIC BLOOD PRESSURE: 82 MMHG | SYSTOLIC BLOOD PRESSURE: 140 MMHG | HEART RATE: 74 BPM

## 2018-08-23 DIAGNOSIS — I73.9 PVD (PERIPHERAL VASCULAR DISEASE): Primary | ICD-10-CM

## 2018-08-23 PROCEDURE — 3077F SYST BP >= 140 MM HG: CPT | Mod: CPTII,S$GLB,, | Performed by: SURGERY

## 2018-08-23 PROCEDURE — 99999 PR PBB SHADOW E&M-EST. PATIENT-LVL III: CPT | Mod: PBBFAC,,, | Performed by: SURGERY

## 2018-08-23 PROCEDURE — 3079F DIAST BP 80-89 MM HG: CPT | Mod: CPTII,S$GLB,, | Performed by: SURGERY

## 2018-08-23 PROCEDURE — 99214 OFFICE O/P EST MOD 30 MIN: CPT | Mod: S$GLB,,, | Performed by: SURGERY

## 2018-08-23 NOTE — PROGRESS NOTES
Oswald Bishop MD VI                       [facility]                         08/23/2018    HPI:  Magaly Nunes is a 85 y.o. 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  Was seen by Ortho when admitted to hospital 7/2018 and dx with inflammatory etiology of L ankle pain, no DVT and was dx with MI s/p LHC.  Patient states location is distal LLE occurring for 1 year.  Associated signs and symptoms include erythema and edema.  Quality is sharp and severity is 9/10.  Symptoms began one year ago.  Alleviating factors include none.  Worsening factors include pressure and walking.  LLE claudication at 2 blocks. No rest pain or tissue loss.    yes MI  no Stroke  Tobacco use: no    Interval history: c/o severe L knee pain, worse when at rest and sitting.  Denies claudication.  No BLE wounds.  States had L knee injections in past with improvement.    Past Medical History:   Diagnosis Date    Anxiety disorder     Arthritis 12/28/2017    Carpal tunnel syndrome     CHF (congestive heart failure)     Chronic allergic rhinitis     Chronic pain 10/22/2012     CKD stage 3 due to type 2 diabetes mellitus 2/14/2017    Constipation - functional 4/10/2013    Depression     Diabetes mellitus type II     Hot flash not due to menopause     HTN (hypertension)     Hypokalemia 11/19/2012    Insomnia 4/10/2013    Knee pain, bilateral 7/24/2013    Personal history of DVT (deep vein thrombosis)     Polyarthralgia 7/16/2018    Pulmonary hypertension 2/1/2017    Renovascular hypertension 2/14/2017    Severe tricuspid regurgitation 2/14/2017    Spinal stenosis     Dr. Corrales    Spinal stenosis of lumbar region 2/3/2014    Systolic and diastolic CHF, chronic 7/16/2018    Vertigo      Past Surgical History:   Procedure Laterality Date    CATARACT EXTRACTION Bilateral     EYE SURGERY      FRACTURE SURGERY      HYSTERECTOMY      ORIF RADIUS & ULNA FRACTURES       Family History   Problem Relation Age of Onset    No Known Problems Mother     No Known Problems Father     No Known Problems Sister     No Known Problems Brother     No Known Problems Maternal Aunt     No Known Problems Maternal Uncle     No Known Problems Paternal Aunt     No Known Problems Paternal Uncle     No Known Problems Maternal Grandmother     No Known Problems Maternal Grandfather     No Known Problems Paternal Grandmother     No Known Problems Paternal Grandfather     Amblyopia Neg Hx     Blindness Neg Hx     Cancer Neg Hx     Diabetes Neg Hx     Glaucoma Neg Hx     Hypertension Neg Hx     Macular degeneration Neg Hx     Retinal detachment Neg Hx     Strabismus Neg Hx     Stroke Neg Hx     Thyroid disease Neg Hx      Social History     Socioeconomic History    Marital status:      Spouse name: Not on file    Number of children: Not on file    Years of education: Not on file    Highest education level: Not on file   Social Needs    Financial resource strain: Not on file    Food insecurity - worry: Not on file    Food insecurity - inability: Not on file     Transportation needs - medical: Not on file    Transportation needs - non-medical: Not on file   Occupational History    Not on file   Tobacco Use    Smoking status: Never Smoker    Smokeless tobacco: Never Used   Substance and Sexual Activity    Alcohol use: No    Drug use: No    Sexual activity: No   Other Topics Concern    Not on file   Social History Narrative    .  Lives alone.   and two sons have .  Active.         Current Outpatient Medications:     aspirin 81 MG Chew, Take 1 tablet (81 mg total) by mouth once daily., Disp: , Rfl: 0    atorvastatin (LIPITOR) 20 MG tablet, , Disp: , Rfl: 10    clopidogrel (PLAVIX) 75 mg tablet, , Disp: , Rfl: 7    DULoxetine (CYMBALTA) 60 MG capsule, TAKE 1 CAPSULE(60 MG) BY MOUTH EVERY DAY, Disp: 30 capsule, Rfl: 12    gabapentin (NEURONTIN) 100 MG capsule, Take 1 capsule (100 mg total) by mouth 3 (three) times daily., Disp: 90 capsule, Rfl: 11    losartan (COZAAR) 100 MG tablet, TAKE 1 TABLET BY MOUTH ONCE DAILY, Disp: 90 tablet, Rfl: 0    pantoprazole (PROTONIX) 20 MG tablet, Take 2 tablets (40 mg total) by mouth once daily., Disp: 60 tablet, Rfl: 11    potassium chloride SA (K-DUR,KLOR-CON) 20 MEQ tablet, Take 1 tablet (20 mEq total) by mouth once daily., Disp: 90 tablet, Rfl: 0    temazepam (RESTORIL) 30 mg capsule, TAKE ONE CAPSULE BY MOUTH AT BEDTIME AS NEEDED FOR INSOMNIA, Disp: 30 capsule, Rfl: 5    carvedilol (COREG) 6.25 MG tablet, Take 1 tablet (6.25 mg total) by mouth 2 (two) times daily., Disp: 60 tablet, Rfl: 0    furosemide (LASIX) 40 MG tablet, Take 1 tablet (40 mg total) by mouth once daily., Disp: 30 tablet, Rfl: 11    HYDROcodone-acetaminophen (NORCO) 5-325 mg per tablet, Take 1 tablet by mouth every 6 (six) hours as needed for Pain., Disp: 60 tablet, Rfl: 0    lidocaine-prilocaine (EMLA) cream, , Disp: , Rfl:     LORazepam (ATIVAN) 0.5 MG tablet, Take 1 tablet (0.5 mg total) by mouth every 8 (eight) hours as needed for  Anxiety. (caution-may cause sleepiness), Disp: 60 tablet, Rfl: 3    tiZANidine 4 mg Cap, Take by mouth., Disp: , Rfl:     REVIEW OF SYSTEMS:  General: No fevers or chills; ENT: No sore throat; Allergy and Immunology: no persistent infections; Hematological and Lymphatic: No history of bleeding or easy bruising; Endocrine: negative; Respiratory: no cough, shortness of breath, or wheezing; Cardiovascular: no chest pain or dyspnea on exertion; Gastrointestinal: no abdominal pain/back, change in bowel habits, or bloody stools; Genito-Urinary: no dysuria, trouble voiding, or hematuria; Musculoskeletal: negative; Neurological: no TIA or stroke symptoms; Psychiatric: no nervousness, anxiety or depression.    PHYSICAL EXAM:      Pulse: 74         General appearance:  Alert, well-appearing, and in no distress.  Oriented to person, place, and time                    Neurological: Normal speech, no focal findings noted; CN II - XII grossly intact. RLE with sensation to light touch, LLE with sensation to light touch.            Musculoskeletal: Digits/nail without cyanosis/clubbing.  Strength 5/5 BLE.                    Neck: Supple, no significant adenopathy, no carotid bruit can be auscultated                  Chest:  Clear to auscultation, no wheezes, rales or rhonchi, symmetric air entry. No use of accessory muscles               Cardiac: Normal rate and regular rhythm, S1 and S2 normal            Abdomen: Soft, nontender, nondistended, no masses or organomegaly, no hernia     No rebound tenderness noted; bowel sounds normal     No groin adenopathy      Extremities:   2+ R femoral pulse, 2+ L femoral pulse     1+ R popliteal pulse, 1+ L popliteal pulse     doppler+ R PT pulse, doppler+ L PT pulse     2+ R DP pulse, doppler+ L DP pulse     no RLE edema, no LLE edema    Skin: RLE without ulcer; LLE without ulcer    LAB RESULTS:  No results found for: CBC  Lab Results   Component Value Date    LABPROT 10.7 07/08/2018    INR  1.0 07/08/2018     Lab Results   Component Value Date     07/06/2018    K 4.1 07/06/2018     07/06/2018    CO2 23 07/06/2018     (H) 07/06/2018    BUN 19 07/06/2018    CREATININE 1.2 07/06/2018    CALCIUM 9.6 07/06/2018    ANIONGAP 12 07/06/2018    EGFRNONAA 41 (A) 07/06/2018     Lab Results   Component Value Date    WBC 4.02 07/08/2018    RBC 3.61 (L) 07/08/2018    HGB 9.5 (L) 07/08/2018    HCT 29.2 (L) 07/08/2018    MCV 81 (L) 07/08/2018    MCH 26.3 (L) 07/08/2018    MCHC 32.5 07/08/2018    RDW 13.4 07/08/2018     07/08/2018    MPV 10.7 07/08/2018    GRAN 3.1 07/08/2018    GRAN 77.7 (H) 07/08/2018    LYMPH 0.7 (L) 07/08/2018    LYMPH 17.2 (L) 07/08/2018    MONO 0.2 (L) 07/08/2018    MONO 4.7 07/08/2018    EOS 0.0 07/08/2018    BASO 0.00 07/08/2018    EOSINOPHIL 0.2 07/08/2018    BASOPHIL 0.0 07/08/2018    DIFFMETHOD Automated 07/08/2018     .  Lab Results   Component Value Date    HGBA1C 6.0 (H) 07/06/2018    HGBA1C 6.0 (H) 07/06/2018       IMAGING:  All pertinent imaging has been reviewed and interpreted independently.    -LLE arterial US with tibioperoneal occlusive disease, no hemodynamically significant stenosis.    BECKY 8/2018: 1/0.87, post exercise 0.8/0.8    IMP/PLAN:  85 y.o. 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    -L knee pain likely MSK in etiology, exercise ABIs without hemodynamically significant LLE stenosis  -Rec eval by [facility]  -Cont heart healthy lifestyle, ASA, statin, BP control  -RTC 3 mo    I spent 30 minutes evaluating this patient and greater than 50% of the time was spent counseling, coordinator care and discussing the plan of care.  All questions were answered and patient stated understanding with agreement with the above treatment plan.    Oswald Bishop MD VI  [facility]ascular [facility] End[facility] Surgery

## 2018-08-23 NOTE — PATIENT INSTRUCTIONS
What is Arthritis?  Arthritis is a disease that affects the joints (the parts where bones meet and move). It can affect any joint in your body. There are many types of arthritis, including osteoarthritis and rheumatoid arthrtitis. If your symptoms are mild, medications may be enough to reduce pain and swelling. For more severe arthritis, surgery may be needed to improve the condition of the joint or replace the joint entirely.                  What causes arthritis?  Cartilage is a smooth substance that protects the ends of your bones and provides cushioning. When you have arthritis, this cartilage breaks down and can no longer protect your bones. The bones rub against each other, causing pain and swelling. Over time, bone spurs (small pieces of rough or splintered bone) may develop, and the joint's range of motion can become limited.  Symptoms  Some of the more common symptoms of arthritis include:  · Joint pain and stiffness. Pain and stiffness get worse with long periods of rest or using a joint too long or too hard.  · Joints that have lost normal shape and motion.  · Tender, inflamed joints. They may look red and feel warm.  · Grinding or popping noise with joint movement.   · Feeling tired all the time.  Reducing symptoms  Following a healthy lifestyle by losing weight and exercising can help reduce symptoms of osteoarthritis. Medicines can be very helpful for arthritis.     Date Last Reviewed: 9/10/2015  © 7139-1526 [employer]. [address] [place] [address], [place] [address]. All rights reserved. This information is not intended as a substitute for professional medical care. Always follow your healthcare professional's instructions.        Arthritis: Exercise     Look for exercise classes for arthritis in your community.     Exercise is important to your overall health. It is especially important in people with arthritis. Regular exercise can:  · Keep your heart and blood vessels healthy  · Help  with weight management, or weight loss  · Improve your mood  · Help prevent and manage health problems such as:  ¨ Diabetes  ¨ High blood pressure  ¨ High cholesterol  ¨ Depression  In people with arthritis, it offers all of those benefits and it can:  · Lessen pain and stiffness  · Strengthen muscles that support your joints  · Help you to be able to do the things you enjoy  Exercise and arthritis  Exercise is an important part of any arthritis treatment plan. A complete program consists of the following three types of exercises:  · Aerobic exercises for cardiovascular health and overall fitness.   · Strengthening exercises to build up muscles to help prevent injury and keep joints stable.  · Range-of-motion exercises to keep muscles and joints flexible.  Getting started  Talk with your healthcare provider about what is safe for you. Make sure you:  · Learn how to do exercises properly and safely. Consider talking with a physical therapist or  used to working with people with arthritis.  · Start gradually and build. If you haven't been exercising, start slowly. Don't exercise too hard or too long.  · Create a routine. Set aside specific times for exercise every day.  · Warm up carefully. Take 5 to 10 minutes at the beginning and end of exercising to warm up and cool down. Just do the same exercises at a slower pace for 5 to 10 minutes.  · Work at a comfortable, smooth pace. Move your joints gently to prevent injury.  · Pay attention to your body. Don't exercise a painful or swollen joint; switch to another activity. Follow the 2-hour pain rule: You did too much if your joint or muscle pain lasts 2 hours or more after exercising, or is worse the next day. This doesn't mean you should stop exercising. Just do less.  Aerobic exercise  Aerobic exercise improves overall health and helps control weight. Choose those that don't add extra stress to your joints. For example, walking, swimming, or bicycling.  Most  people should exercise for at least 30 minutes. most days of the week. You don't have to exercise all at once. Try exercising for 10 minutes, 3 times a day, for example.  Strengthening exercises  Strengthening your muscles help to protect your joints and prevent injuries. Try to do strengthening exercises 2 to 3 times a week:  · These exercises can be done with exercise or resistance bands (inexpensive exercise aids that add resistance), or with light weights. Some people use soup cans as weights.  · Isometric exercises are done by tightening the muscles without moving the joint. This may be a good way to strengthen the muscles around a stiff joint.  A physical therapist or  can teach you how to do these exercises.  Range-of-motion (ROM) exercises  Range-of-motion (ROM) exercises allow you to move each of your joints in every way they are intended to move. You should do ROM exercises for each joint 2 to 3 times a day. This will help you maintain full use of all of your joints.  Sample ROM exercises  The following are just a sample of ROM exercises--one for your neck, shoulders, elbows, hips, knees, and ankles. To completely move each joint through its full range of motion, you will have to do a few exercises for each joint. A physical therapist or  can teach you how to do full ROM exercises for each joint.   Repeat each for these exercises 5 to 10 times. Make sure you move slowly:  1. Neck turns. Sit in a straight-backed chair. Look straight ahead. Slowly turn your head to the right, then return it to center. Repeat. Do the same thing, turning your head to the left. Repeat.  2. Shoulder raise. Lie on your back or sit in a chair. Raise one arm over your head, keeping your elbow straight. Keep the arm close to your ear. Return it slowly to your side. Repeat with your other arm.  3. Elbow stretch. Sit in a chair. If you are able, put both arms out to your sides to form a T. Slowly touch your shoulders  with the tips of your fingers. Then return to the T-position. Repeat.  4. Hip stretch. Lie on your back with your legs straight and about 6 inches apart. With your foot flexed, slide your leg out to the side, then slide it back to the starting position. Repeat with your other leg.  5. Knee bend. Sit in a chair with your legs bent at the knees in front of you. Straighten one leg as much as you can, then bring it back to the floor. Repeat this 5 to 10 times. Then do the same thing with the other leg.   6. Ankle stretch. Sit with your feet flat on the floor. Lift your toes off of the floor while your heels stay down. Repeat. Then lift your heels off the floor while your toes stay down. Repeat.  Other exercise  Many other exercise and activities benefit people with arthritis. It is most important to find exercise and activities that you enjoy. You might try:  · Yoga, including chair yoga, helps to keep your joints strong and flexible.   · Jamey Chi, an ancient type of exercise with slow, gentle movements  · Water exercise, including water walking  For more information on exercise for arthritis go to the Arth[employer] website: www.arthritis.org.  Date Last Reviewed: 2/14/2016  © 4910-0386 [employer]. [address] [place] [address], [place] [id number]. All rights reserved. This information is not intended as a substitute for professional medical care. Always follow your healthcare professional's instructions.        Osteoarthritis: Tips for Daily Living     Lift items with both hands.   Making a few changes in your daily life can reduce stress on your joints. This helps protect the joints from further damage.  Your surroundings  Make your home work for you:  · Arrange cupboards, closets, desks, and drawers to reduce reaching and bending.  · Arrange furniture to make it safer and easier to get around.  · Secure or remove rugs, power cords, and other items that might make you slip or trip.  Think ahead  Plan in  "advance:  · Combine errands so that you make fewer trips up and down stairs.  · Break up packages so that you carry less weight with each trip. For example, ask cashiers to use more bags for your groceries.  · If you need help with chores or errands, try to arrange for it in advance.  · If you need to lift something heavy, ask for help.  · Try to use other parts of your body if you have pain in certain joints.  Use whats available  To rest your hands, back, and neck:  · Make sure that knives are sharp.  · Use a "reacher" or grasper to reach and grab.  · Use soap-on-a-rope and a long-handled scrubber in the shower or bath.  To rest your knees, hips, and lower back:  · Wear shoes that feel good, fit well, and provide good support.  · Choose chairs with firm seats and armrests.  Assistive devices  Devices are available to help you:  · In the kitchen, use light-weight dishes, cook and bakeware.  · Attach larger handles to keys.  · Use helpful gripping devices for opening jars.   · For gardening, use a rolling bench to sit on or to hold your tools. Use tools with padded handles.  · In the bathroom, try using grab bars, a raised toilet seat, or a shower seat.  · A cane, brace, or walker may help you walk more easily. Make sure that its properly fitted and that youre trained to use it.  Date Last Reviewed: 2/14/2016 © 2000-2017 [employer]. [address] [place] [address], [place] [address]. All rights reserved. This information is not intended as a substitute for professional medical care. Always follow your healthcare professional's instructions.        Tips for Using Less Salt    Most people with heart problems need to eat less salt (sodium). Reducing the amount of salt you eat may help control your blood pressure. The higher your blood pressure, the greater your risk for heart disease, stroke, blindness, and kidney problems.  At the store  · Make low-salt choices by reading labels carefully. Look for the " total amount of sodium per serving.  · Use more fresh food. Buy more fruits and vegetables. Select lean meats, fish, and poultry.  · Use fewer frozen, canned, and packaged foods which often contain a lot of sodium.  · Use plain frozen vegetables without sauces or toppings. These products are often low- or no-sodium.  · Opt for reduced-sodium or no-salt-added versions of canned vegetables and soups.  In the kitchen  · Don't add salt to food when you're cooking. Season with flavorings such as onion, garlic, pepper, salt-free herbal blends, and lemon or lime juice.  · Use a cookbook containing low-salt recipes. It can give you ideas for tasty meals that are healthy for your heart.  · Sprinkle salt-free herbal blends on vegetables and meat.  · Drain and rinse canned foods, such as canned beans and vegetables, before cooking or eating.  Eating out  · Tell the  you're on a low-salt diet. Ask questions about the menu.  · Order fish, chicken, and meat broiled, baked, poached, or grilled without salt, butter, or breading.  · Use lemon, pepper, and salt-free herb mixes to add flavor.  · Choose plain steamed rice, boiled noodles, and baked or boiled potatoes. Top potatoes with chives and a little sour cream.     Beware! Salt goes by many other names. Limit foods with these words listed as ingredients: salt, sodium, soy sauce, baking soda, baking powder, MSG, monosodium, Na (the chemical symbol for sodium). Some antacids are also high in salt.   Date Last Reviewed: 6/19/2015  © 2084-7917 [employer]. [address] [place] [address], [place] [address]. All rights reserved. This information is not intended as a substitute for professional medical care. Always follow your healthcare professional's instructions.

## 2018-08-23 NOTE — LETTER
August 23, 2018        Chris Bangura MD  [address] [facility]  [place] [address]             [facility]  [address]  [facility] [address]  Phone: [phone]  Fax: [phone]   Patient: Magaly Nunes   MR Number: [id number]   YOB: 1932   Date of Visit: 8/23/2018       Dear Dr. Bangura:    Thank you for referring Magaly Nunes to me for evaluation. Below are the relevant portions of my assessment and plan of care.            If you have questions, please do not hesitate to call me. I look forward to following Magaly along with you.    Sincerely,      Oswald Bishop MD           CC  No Recipients

## 2018-09-05 ENCOUNTER — TELEPHONE (OUTPATIENT)
Dept: FAMILY MEDICINE | Facility: CLINIC | Age: 83
End: 2018-09-05

## 2018-09-05 NOTE — TELEPHONE ENCOUNTER
----- Message from Ngoc Ramon sent at 9/5/2018  2:56 PM CDT -----  Contact: Self   Patient says she need lab orders for her diabetes check up. Please call at  [phone]

## 2018-09-07 ENCOUNTER — TELEPHONE (OUTPATIENT)
Dept: FAMILY MEDICINE | Facility: CLINIC | Age: 83
End: 2018-09-07

## 2018-09-07 DIAGNOSIS — N18.30 CONTROLLED TYPE 2 DIABETES MELLITUS WITH STAGE 3 CHRONIC KIDNEY DISEASE, WITHOUT LONG-TERM CURRENT USE OF INSULIN: ICD-10-CM

## 2018-09-07 DIAGNOSIS — E11.22 CONTROLLED TYPE 2 DIABETES MELLITUS WITH STAGE 3 CHRONIC KIDNEY DISEASE, WITHOUT LONG-TERM CURRENT USE OF INSULIN: ICD-10-CM

## 2018-09-07 DIAGNOSIS — D64.9 ANEMIA, UNSPECIFIED TYPE: ICD-10-CM

## 2018-09-07 DIAGNOSIS — E53.8 B12 DEFICIENCY: ICD-10-CM

## 2018-09-07 DIAGNOSIS — I10 ESSENTIAL HYPERTENSION: Primary | ICD-10-CM

## 2018-09-07 NOTE — TELEPHONE ENCOUNTER
Me        9/5/18 2:59 PM   Note      ----- Message from Ngoc Ramon sent at 9/5/2018  2:56 PM CDT -----  Contact: Self   Patient says she need lab orders for her diabetes check up. Please call at  [phone]

## 2018-09-07 NOTE — TELEPHONE ENCOUNTER
----- Message from Claudia Lockhart sent at 9/7/2018  3:24 PM CDT -----  Contact: self  Pt calling to get an update on lab work being ordered. Please call [phone].

## 2018-09-07 NOTE — TELEPHONE ENCOUNTER
Advise     Sorry for the delay.  Dr. MON had to take some time to review all the previous labs in order what was necessary.  Not fasting.  Will be rechecking the diabetes and the anemia and so forth.

## 2018-09-13 ENCOUNTER — OFFICE VISIT (OUTPATIENT)
Dept: PODIATRY | Facility: CLINIC | Age: 83
End: 2018-09-13
Payer: MEDICARE

## 2018-09-13 ENCOUNTER — LAB VISIT (OUTPATIENT)
Dept: LAB | Facility: HOSPITAL | Age: 83
End: 2018-09-13
Attending: INTERNAL MEDICINE
Payer: MEDICARE

## 2018-09-13 ENCOUNTER — TELEPHONE (OUTPATIENT)
Dept: FAMILY MEDICINE | Facility: CLINIC | Age: 83
End: 2018-09-13

## 2018-09-13 VITALS
BODY MASS INDEX: 29.95 KG/M2 | DIASTOLIC BLOOD PRESSURE: 76 MMHG | SYSTOLIC BLOOD PRESSURE: 123 MMHG | WEIGHT: 169 LBS | HEIGHT: 63 IN

## 2018-09-13 DIAGNOSIS — I10 ESSENTIAL HYPERTENSION: ICD-10-CM

## 2018-09-13 DIAGNOSIS — B35.1 ONYCHOMYCOSIS DUE TO DERMATOPHYTE: ICD-10-CM

## 2018-09-13 DIAGNOSIS — D64.9 ANEMIA, UNSPECIFIED TYPE: ICD-10-CM

## 2018-09-13 DIAGNOSIS — E53.8 B12 DEFICIENCY: ICD-10-CM

## 2018-09-13 DIAGNOSIS — N18.30 CONTROLLED TYPE 2 DIABETES MELLITUS WITH STAGE 3 CHRONIC KIDNEY DISEASE, WITHOUT LONG-TERM CURRENT USE OF INSULIN: ICD-10-CM

## 2018-09-13 DIAGNOSIS — L85.3 XEROSIS OF SKIN: ICD-10-CM

## 2018-09-13 DIAGNOSIS — E11.51 TYPE II DIABETES MELLITUS WITH PERIPHERAL CIRCULATORY DISORDER: Primary | ICD-10-CM

## 2018-09-13 DIAGNOSIS — E11.22 CONTROLLED TYPE 2 DIABETES MELLITUS WITH STAGE 3 CHRONIC KIDNEY DISEASE, WITHOUT LONG-TERM CURRENT USE OF INSULIN: ICD-10-CM

## 2018-09-13 LAB
ALBUMIN SERPL BCP-MCNC: 4.3 G/DL
ALP SERPL-CCNC: 107 U/L
ALT SERPL W/O P-5'-P-CCNC: 17 U/L
ANION GAP SERPL CALC-SCNC: 11 MMOL/L
AST SERPL-CCNC: 26 U/L
BASOPHILS # BLD AUTO: 0 K/UL
BASOPHILS NFR BLD: 0 %
BILIRUB SERPL-MCNC: 0.8 MG/DL
BUN SERPL-MCNC: 27 MG/DL
CALCIUM SERPL-MCNC: 10.1 MG/DL
CHLORIDE SERPL-SCNC: 103 MMOL/L
CO2 SERPL-SCNC: 25 MMOL/L
CREAT SERPL-MCNC: 1 MG/DL
DIFFERENTIAL METHOD: ABNORMAL
EOSINOPHIL # BLD AUTO: 0 K/UL
EOSINOPHIL NFR BLD: 0 %
ERYTHROCYTE [DISTWIDTH] IN BLOOD BY AUTOMATED COUNT: 14.4 %
EST. GFR  (AFRICAN AMERICAN): 59.4 ML/MIN/1.73 M^2
EST. GFR  (NON AFRICAN AMERICAN): 51.5 ML/MIN/1.73 M^2
ESTIMATED AVG GLUCOSE: 126 MG/DL
FERRITIN SERPL-MCNC: 120 NG/ML
GLUCOSE SERPL-MCNC: 97 MG/DL
HBA1C MFR BLD HPLC: 6 %
HCT VFR BLD AUTO: 37.5 %
HGB BLD-MCNC: 11.5 G/DL
IMM GRANULOCYTES # BLD AUTO: 0.04 K/UL
IMM GRANULOCYTES NFR BLD AUTO: 0.4 %
IRON SERPL-MCNC: 64 UG/DL
LYMPHOCYTES # BLD AUTO: 1.4 K/UL
LYMPHOCYTES NFR BLD: 14.3 %
MCH RBC QN AUTO: 26.1 PG
MCHC RBC AUTO-ENTMCNC: 30.7 G/DL
MCV RBC AUTO: 85 FL
MONOCYTES # BLD AUTO: 0.9 K/UL
MONOCYTES NFR BLD: 9.6 %
NEUTROPHILS # BLD AUTO: 7.2 K/UL
NEUTROPHILS NFR BLD: 75.7 %
NRBC BLD-RTO: 0 /100 WBC
PLATELET # BLD AUTO: 267 K/UL
PMV BLD AUTO: 10.7 FL
POTASSIUM SERPL-SCNC: 4.6 MMOL/L
PROT SERPL-MCNC: 8.1 G/DL
RBC # BLD AUTO: 4.4 M/UL
SATURATED IRON: 15 %
SODIUM SERPL-SCNC: 139 MMOL/L
TOTAL IRON BINDING CAPACITY: 416 UG/DL
TRANSFERRIN SERPL-MCNC: 281 MG/DL
VIT B12 SERPL-MCNC: 341 PG/ML
WBC # BLD AUTO: 9.52 K/UL

## 2018-09-13 PROCEDURE — 3078F DIAST BP <80 MM HG: CPT | Mod: CPTII,,, | Performed by: PODIATRIST

## 2018-09-13 PROCEDURE — 85025 COMPLETE CBC W/AUTO DIFF WBC: CPT

## 2018-09-13 PROCEDURE — 83036 HEMOGLOBIN GLYCOSYLATED A1C: CPT

## 2018-09-13 PROCEDURE — 99213 OFFICE O/P EST LOW 20 MIN: CPT | Mod: S$PBB,25,, | Performed by: PODIATRIST

## 2018-09-13 PROCEDURE — 99213 OFFICE O/P EST LOW 20 MIN: CPT | Mod: PBBFAC,PO | Performed by: PODIATRIST

## 2018-09-13 PROCEDURE — 80053 COMPREHEN METABOLIC PANEL: CPT

## 2018-09-13 PROCEDURE — 99999 PR PBB SHADOW E&M-EST. PATIENT-LVL III: CPT | Mod: PBBFAC,,, | Performed by: PODIATRIST

## 2018-09-13 PROCEDURE — 82607 VITAMIN B-12: CPT

## 2018-09-13 PROCEDURE — 1101F PT FALLS ASSESS-DOCD LE1/YR: CPT | Mod: CPTII,,, | Performed by: PODIATRIST

## 2018-09-13 PROCEDURE — 82728 ASSAY OF FERRITIN: CPT

## 2018-09-13 PROCEDURE — 36415 COLL VENOUS BLD VENIPUNCTURE: CPT | Mod: PO

## 2018-09-13 PROCEDURE — 3074F SYST BP LT 130 MM HG: CPT | Mod: CPTII,,, | Performed by: PODIATRIST

## 2018-09-13 PROCEDURE — 11721 DEBRIDE NAIL 6 OR MORE: CPT | Mod: S$PBB,Q9,, | Performed by: PODIATRIST

## 2018-09-13 PROCEDURE — 83540 ASSAY OF IRON: CPT

## 2018-09-13 PROCEDURE — 11721 DEBRIDE NAIL 6 OR MORE: CPT | Mod: PBBFAC,PO

## 2018-09-13 RX ORDER — POTASSIUM CHLORIDE 20 MEQ/1
TABLET, EXTENDED RELEASE ORAL
Qty: 90 TABLET | Refills: 0 | Status: SHIPPED | OUTPATIENT
Start: 2018-09-13 | End: 2018-12-07 | Stop reason: SDUPTHER

## 2018-09-13 RX ORDER — AMMONIUM LACTATE 12 G/100G
1 CREAM TOPICAL ONCE
Qty: 1 BOTTLE | Refills: 0 | Status: SHIPPED | OUTPATIENT
Start: 2018-09-13 | End: 2018-09-13

## 2018-09-13 NOTE — TELEPHONE ENCOUNTER
----- Message from Obi Guadalupe sent at 9/13/2018  3:17 PM CDT -----  Contact: Miracle Gibson/[phone]  Miracle Gibson would like to get specific directions for the medication  ammonium lactate 12 % Cream    Thank you

## 2018-09-13 NOTE — TELEPHONE ENCOUNTER
Instruction per Dr MON:  Apply a thin layer of  medication to the affected areas of the skin twice daily

## 2018-09-15 NOTE — PROGRESS NOTES
Subjective:      Patient ID: Magaly Nunes is a 85 y.o. female.    Chief Complaint: Diabetes Mellitus (pain bilateral feet PCP Dr Bangura); Diabetic Foot Exam; and Nail Care    Magaly is a 85 y.o. female who presents to the clinic for evaluation and treatment of high risk feet. Magaly has a past medical history of Anxiety disorder, Arthritis (12/28/2017), Carpal tunnel syndrome, CHF (congestive heart failure), Chronic allergic rhinitis, Chronic pain (10/22/2012), CKD stage 3 due to type 2 diabetes mellitus (2/14/2017), Constipation - functional (4/10/2013), Depression, Diabetes mellitus type II, Hot flash not due to menopause, HTN (hypertension), Hypokalemia (11/19/2012), Insomnia (4/10/2013), Knee pain, bilateral (7/24/2013), Personal history of DVT (deep vein thrombosis), Polyarthralgia (7/16/2018), Pulmonary hypertension (2/1/2017), Renovascular hypertension (2/14/2017), Severe tricuspid regurgitation (2/14/2017), Spinal stenosis, Spinal stenosis of lumbar region (2/3/2014), Systolic and diastolic CHF, chronic (7/16/2018), and Vertigo. The patient's chief complaint is follow up 3 month DM foot exam. Relates painful thick toenails on both feet especially both great toenails which hurt her especially in shoes. Also complains of dry skin.  This patient has documented high risk feet requiring routine maintenance secondary to diabetes mellitis and those secondary complications of diabetes, as mentioned.        PCP: Chris Bangura MD    Date Last Seen by PCP:  Chief Complaint   Patient presents with    Diabetes Mellitus     pain bilateral feet PCP Dr Bangura    Diabetic Foot Exam    Nail Care     Current shoe gear:  sandals    Hemoglobin A1C   Date Value Ref Range Status   09/13/2018 6.0 (H) 4.0 - 5.6 % Final     Comment:     ADA Screening Guidelines:  5.7-6.4%  Consistent with prediabetes  >or=6.5%  Consistent with diabetes  High levels of fetal hemoglobin interfere with the HbA1C  assay. Heterozygous  hemoglobin variants (HbS, HgC, etc)do  not significantly interfere with this assay.   However, presence of multiple variants may affect accuracy.     07/06/2018 6.0 (H) 4.0 - 5.6 % Final     Comment:     ADA Screening Guidelines:  5.7-6.4%  Consistent with prediabetes  >or=6.5%  Consistent with diabetes  High levels of fetal hemoglobin interfere with the HbA1C  assay. Heterozygous hemoglobin variants (HbS, HgC, etc)do  not significantly interfere with this assay.   However, presence of multiple variants may affect accuracy.     07/06/2018 6.0 (H) 4.0 - 5.6 % Final     Comment:     ADA Screening Guidelines:  5.7-6.4%  Consistent with prediabetes  >or=6.5%  Consistent with diabetes  High levels of fetal hemoglobin interfere with the HbA1C  assay. Heterozygous hemoglobin variants (HbS, HgC, etc)do  not significantly interfere with this assay.   However, presence of multiple variants may affect accuracy.       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  0    atorvastatin (LIPITOR) 20 MG tablet   10    clopidogrel  (PLAVIX) 75 mg tablet   7    DULoxetine (CYMBALTA) 60 MG capsule TAKE 1 CAPSULE(60 MG) BY MOUTH EVERY DAY 30 capsule 12    furosemide (LASIX) 40 MG tablet Take 1 tablet (40 mg total) by mouth once daily. 30 tablet 11    gabapentin (NEURONTIN) 100 MG capsule Take 1 capsule (100 mg total) by mouth 3 (three) times daily. 90 capsule 11    HYDROcodone-acetaminophen (NORCO) 5-325 mg per tablet Take 1 tablet by mouth every 6 (six) hours as needed for Pain. 60 tablet 0    lidocaine-prilocaine (EMLA) cream       losartan (COZAAR) 100 MG tablet TAKE 1 TABLET BY MOUTH ONCE DAILY 90 tablet 0    pantoprazole (PROTONIX) 20 MG tablet Take 2 tablets (40 mg total) by mouth once daily. 60 tablet 11    temazepam (RESTORIL) 30 mg capsule TAKE ONE CAPSULE BY MOUTH AT BEDTIME AS NEEDED FOR INSOMNIA 30 capsule 5    tiZANidine 4 mg Cap Take by mouth.      carvedilol (COREG) 6.25 MG tablet Take 1 tablet (6.25 mg total) by mouth 2 (two) times daily. 60 tablet 0    LORazepam (ATIVAN) 0.5 MG tablet Take 1 tablet (0.5 mg total) by mouth every 8 (eight) hours as needed for Anxiety. (caution-may cause sleepiness) 60 tablet 3     No current facility-administered medications on file prior to visit.      Review of patient's allergies indicates:   Allergen Reactions    Ace inhibitors      Other reaction(s): Unknown    Aspirin      Other reaction(s): Unknown    Aciphex  [rabeprazole]      Other reaction(s): Stomach upset    Bextra  [valdecoxib]      Other reaction(s): Unknown    Clonidine      Other reaction(s): dry mouth.lip swelling    Cardizem  [diltiazem hcl]      Other reaction(s): Unknown    Mavik  [trandolapril]      Other reaction(s): Unknown    Nsaids (non-steroidal anti-inflammatory drug)      Other reaction(s): black spots on skin    Phenytoin sodium extended      Other reaction(s): Stomach upset    Tramadol      Other reaction(s): stomach pain     Past Surgical History:   Procedure Laterality Date    CATARACT  EXTRACTION Bilateral     EYE SURGERY      FRACTURE SURGERY      HYSTERECTOMY      ORIF RADIUS & ULNA FRACTURES       Family History   Problem Relation Age of Onset    No Known Problems Mother     No Known Problems Father     No Known Problems Sister     No Known Problems Brother     No Known Problems Maternal Aunt     No Known Problems Maternal Uncle     No Known Problems Paternal Aunt     No Known Problems Paternal Uncle     No Known Problems Maternal Grandmother     No Known Problems Maternal Grandfather     No Known Problems Paternal Grandmother     No Known Problems Paternal Grandfather     Amblyopia Neg Hx     Blindness Neg Hx     Cancer Neg Hx     Diabetes Neg Hx     Glaucoma Neg Hx     Hypertension Neg Hx     Macular degeneration Neg Hx     Retinal detachment Neg Hx     Strabismus Neg Hx     Stroke Neg Hx     Thyroid disease Neg Hx      Social History     Socioeconomic History    Marital status:      Spouse name: Not on file    Number of children: Not on file    Years of education: Not on file    Highest education level: Not on file   Social Needs    Financial resource strain: Not on file    Food insecurity - worry: Not on file    Food insecurity - inability: Not on file    Transportation needs - medical: Not on file    Transportation needs - non-medical: Not on file   Occupational History    Not on file   Tobacco Use    Smoking status: Never Smoker    Smokeless tobacco: Never Used   Substance and Sexual Activity    Alcohol use: No    Drug use: No    Sexual activity: No   Other Topics Concern    Not on file   Social History Narrative    .  Lives alone.   and two sons have .  Active.         Review of Systems   Constitution: Positive for weight loss (intentional, 22 lbs). Negative for chills, fever and weakness.   Cardiovascular: Positive for leg swelling. Negative for chest pain and claudication.   Respiratory: Negative for cough and shortness  "of breath.    Skin: Positive for dry skin and nail changes. Negative for itching and rash.   Musculoskeletal: Positive for arthritis, back pain, joint pain, joint swelling and muscle weakness. Negative for falls.        Walks with cane secondary to back pain    Gastrointestinal: Negative for diarrhea, nausea and vomiting.   Neurological: Positive for numbness and paresthesias. Negative for tremors.   Psychiatric/Behavioral: Negative for altered mental status and hallucinations.         Objective:       Vitals:    09/13/18 1404   BP: 123/76   Weight: 76.7 kg (169 lb)   Height: 5' 3" (1.6 m)   PainSc:   8       Physical Exam   Constitutional:   General: Pt. is well-developed, well-nourished, appears stated age, in no acute distress, alert and oriented x 3. No evidence of depression, anxiety, or agitation. Calm, cooperative, and communicative. Appropriate interactions and affect.       Cardiovascular:   Pulses:       Dorsalis pedis pulses are 2+ on the right side, and 2+ on the left side.        Posterior tibial pulses are 1+ on the right side, and 1+ on the left side.   There is absent digital hair. Skin is atrophic, shiny Hyperpigmented b/l LEs. Cool to touch distal foot b/l.    Musculoskeletal:        Right foot: There is normal range of motion.        Left foot: There is normal range of motion.   Adequate joint range of motion without pain, limitation, nor crepitation Bilateral feet and ankle joints. Muscle strength is 5/5 in all groups bilaterally.    Cane assisted gait    Patient has hammertoes of digits 2-5 bilateral partially reducible without symptom today.    Visible and palpable bunion without pain at dorsomedial 1st metatarsal head right and left.  Hallux abducted right and left partially reducible, tracks laterally without being track bound.  No ecchymosis, erythema, edema, or cardinal signs infection or signs of trauma same foot.    Fat pad atrophy to heels and met heads bilateral    Pain to b/l borders " of b/l hallux with palpation. Pain to direct palpation and lateral squeeze of left heel hyperkeratotic lesions.    Neurological: A sensory deficit is present.   [place] 5.07 monofilamant testing is diminished John feet. Sharp/dull sensation diminished Bilaterally. Light touch absent Bilaterally.    Paresthesias, and hyperesthesia bilateral feet at toes with no clearly identified trigger or source.         Skin: Skin is warm, dry and intact. No abrasion, no ecchymosis, no lesion and no rash noted. She is not diaphoretic. No cyanosis. No pallor. Nails show no clubbing.   Toenails 1-5 bilaterally thickened with yellow/brown discoloration and subungual debris. B/l borders of b/l hallux moderately incurvated without wound/infection. Pain to touch. No erythema.     hyperpigmentation to dorsal feet john    Interdigital Spaces clean, dry and without evidence of break in skin integrity    Xerosis noted to B/L plantar foot    Psychiatric: She has a normal mood and affect. Her speech is normal.   Nursing note and vitals reviewed.        Assessment:       Encounter Diagnoses   Name Primary?    Type II diabetes mellitus with peripheral circulatory disorder Yes    Onychomycosis due to dermatophyte     Xerosis of skin          Plan:       Magaly was seen today for diabetes mellitus, diabetic foot exam and nail care.    Diagnoses and all orders for this visit:    Type II diabetes mellitus with peripheral circulatory disorder    Onychomycosis due to dermatophyte    Xerosis of skin    Other orders  -     ammonium lactate 12 % Crea; Apply 1 g topically once. for 1 dose      I counseled the patient on her conditions, their implications and medical management.     With patient's permission, nails were aggressively reduced and debrided 1,2,3,4, 5 R and 1,2, 3,4,5 L and filed to their soft tissue attachment mechanically and with electric , removing all offending nail and debris.  Patient tolerated this well and no blood  was drawn. Patient reports relief following the procedure.     She will continue to monitor the areas daily, inspect her feet, wear protective shoe gear when ambulatory, moisturizer to maintain skin integrity and follow in this office in approximately 3-4 months, sooner p.r.n.    Long discussion with patient regarding appropriate, supportive and comfortable shoes. Recommended SAS/Easy Spirit style shoe brands with adequate arch supports to alleviate abnormal pressure and improve stability of foot while walking. Avoid flat shoes and barefoot walking as these will exacerbate or worsen symptoms.     Discussed proper and consistent elevation of lower extremities, above the level of the heart, while at rest, to help control/improve edema.     Discussed importance of daily moisturizer to the feet such as  Amlactin, prescription sent       RTC 3 months call sooner if any problems arise.     Lexy Gutierrez DPM

## 2018-09-18 ENCOUNTER — TELEPHONE (OUTPATIENT)
Dept: FAMILY MEDICINE | Facility: CLINIC | Age: 83
End: 2018-09-18

## 2018-09-18 RX ORDER — ESOMEPRAZOLE MAGNESIUM 40 MG/1
40 CAPSULE, DELAYED RELEASE ORAL
Qty: 30 CAPSULE | Refills: 11 | Status: ON HOLD | OUTPATIENT
Start: 2018-09-18 | End: 2019-05-09 | Stop reason: HOSPADM

## 2018-09-18 NOTE — TELEPHONE ENCOUNTER
Stated, she has a history of GERD. Was given Omeprazole (discontinued for bleeding). Medication changed to Pantoprazole (stopped taking causes patient to be very nauseous). Requesting another alternative medication. ALSO, patient is requesting her lab results. Patient is not active on [facility]. Reviewed chart. Results are not posted.

## 2018-09-18 NOTE — TELEPHONE ENCOUNTER
Reassure patient there really is no reason that she cannot use the omeprazole.  It does not cause any bleeding.  She can restart if she has some at home    If not wanting to try omeprazole, We can try Nexium if it is covered on the insurance and I will send that.      Regarding results, the blood count repeated looks much better and all of the B12 and iron levels are normal.    The A1c diabetes test is very good at 6.0.  Kidneys and liver and electrolytes all okay.    Repeat the diabetes test again in January or February

## 2018-09-18 NOTE — TELEPHONE ENCOUNTER
----- Message from Francesca Pacheco sent at 9/17/2018 10:19 AM CDT -----  Contact: Self   Pt calling to speak to a nurse regarding health. [phone].

## 2018-09-20 DIAGNOSIS — I10 ESSENTIAL HYPERTENSION: ICD-10-CM

## 2018-09-21 RX ORDER — LOSARTAN POTASSIUM 100 MG/1
TABLET ORAL
Qty: 90 TABLET | Refills: 0 | Status: SHIPPED | OUTPATIENT
Start: 2018-09-21 | End: 2018-12-11 | Stop reason: SDUPTHER

## 2018-11-15 RX ORDER — ATORVASTATIN CALCIUM 20 MG/1
20 TABLET, FILM COATED ORAL DAILY
Qty: 30 TABLET | Refills: 2 | Status: ON HOLD | OUTPATIENT
Start: 2018-11-15 | End: 2019-05-10 | Stop reason: SDUPTHER

## 2018-12-07 DIAGNOSIS — M79.2 NEUROPATHIC PAIN: ICD-10-CM

## 2018-12-07 RX ORDER — DULOXETIN HYDROCHLORIDE 60 MG/1
CAPSULE, DELAYED RELEASE ORAL
Qty: 30 CAPSULE | Refills: 12 | Status: SHIPPED | OUTPATIENT
Start: 2018-12-07

## 2018-12-07 RX ORDER — POTASSIUM CHLORIDE 20 MEQ/1
TABLET, EXTENDED RELEASE ORAL
Qty: 90 TABLET | Refills: 12 | Status: SHIPPED | OUTPATIENT
Start: 2018-12-07

## 2018-12-11 DIAGNOSIS — I10 ESSENTIAL HYPERTENSION: ICD-10-CM

## 2018-12-11 RX ORDER — LOSARTAN POTASSIUM 100 MG/1
TABLET ORAL
Qty: 90 TABLET | Refills: 12 | Status: ON HOLD | OUTPATIENT
Start: 2018-12-11 | End: 2019-05-09 | Stop reason: HOSPADM

## 2018-12-20 ENCOUNTER — OFFICE VISIT (OUTPATIENT)
Dept: VASCULAR SURGERY | Facility: CLINIC | Age: 83
End: 2018-12-20
Payer: MEDICARE

## 2018-12-20 ENCOUNTER — HOSPITAL ENCOUNTER (OUTPATIENT)
Dept: CARDIOLOGY | Facility: HOSPITAL | Age: 83
Discharge: HOME | End: 2018-12-20
Attending: SURGERY
Payer: MEDICARE

## 2018-12-20 VITALS
BODY MASS INDEX: 29.95 KG/M2 | HEIGHT: 63 IN | DIASTOLIC BLOOD PRESSURE: 64 MMHG | SYSTOLIC BLOOD PRESSURE: 140 MMHG | WEIGHT: 169.06 LBS

## 2018-12-20 DIAGNOSIS — I73.9 PVD (PERIPHERAL VASCULAR DISEASE): ICD-10-CM

## 2018-12-20 DIAGNOSIS — I87.303 VENOUS HYPERTENSION OF BOTH LOWER EXTREMITIES: ICD-10-CM

## 2018-12-20 LAB
IMMEDIATE ARM BP: 132 MMHG
IMMEDIATE LEFT ABI: 0.95
IMMEDIATE LEFT TIBIAL: 125 MMHG
IMMEDIATE RIGHT ABI: 0.86
IMMEDIATE RIGHT TIBIAL: 114 MMHG
LEFT ABI: 1.03
LEFT ARM BP: 140 MMHG
LEFT DORSALIS PEDIS: 144 MMHG
LEFT POSTERIOR TIBIAL: 116 MMHG
LEFT TBI: 0
LEFT TOE PRESSURE: 0 MMHG
RIGHT ABI: 1.03
RIGHT ARM BP: 132 MMHG
RIGHT DORSALIS PEDIS: 144 MMHG
RIGHT POSTERIOR TIBIAL: 82 MMHG
RIGHT TBI: 0
RIGHT TOE PRESSURE: 0 MMHG
TOE RAISES: 20

## 2018-12-20 PROCEDURE — 1101F PT FALLS ASSESS-DOCD LE1/YR: CPT | Mod: CPTII,HCNC,S$GLB, | Performed by: SURGERY

## 2018-12-20 PROCEDURE — 93924 LWR XTR VASC STDY BILAT: CPT | Mod: HCNC

## 2018-12-20 PROCEDURE — 93924 LWR XTR VASC STDY BILAT: CPT | Mod: 26,HCNC,, | Performed by: SURGERY

## 2018-12-20 PROCEDURE — 99214 OFFICE O/P EST MOD 30 MIN: CPT | Mod: HCNC,S$GLB,, | Performed by: SURGERY

## 2018-12-20 PROCEDURE — 99999 PR PBB SHADOW E&M-EST. PATIENT-LVL III: CPT | Mod: PBBFAC,HCNC,, | Performed by: SURGERY

## 2018-12-20 NOTE — PATIENT INSTRUCTIONS
What is Arthritis?  Arthritis is a disease that affects the joints (the parts where bones meet and move). It can affect any joint in your body. There are many types of arthritis, including osteoarthritis and rheumatoid arthrtitis. If your symptoms are mild, medications may be enough to reduce pain and swelling. For more severe arthritis, surgery may be needed to improve the condition of the joint or replace the joint entirely.                  What causes arthritis?  Cartilage is a smooth substance that protects the ends of your bones and provides cushioning. When you have arthritis, this cartilage breaks down and can no longer protect your bones. The bones rub against each other, causing pain and swelling. Over time, bone spurs (small pieces of rough or splintered bone) may develop, and the joint's range of motion can become limited.  Symptoms  Some of the more common symptoms of arthritis include:  · Joint pain and stiffness. Pain and stiffness get worse with long periods of rest or using a joint too long or too hard.  · Joints that have lost normal shape and motion.  · Tender, inflamed joints. They may look red and feel warm.  · Grinding or popping noise with joint movement.   · Feeling tired all the time.  Reducing symptoms  Following a healthy lifestyle by losing weight and exercising can help reduce symptoms of osteoarthritis. Medicines can be very helpful for arthritis.     Date Last Reviewed: 9/10/2015  © 8102-0127 [employer]. [address] [place] [address], [place] [address]. All rights reserved. This information is not intended as a substitute for professional medical care. Always follow your healthcare professional's instructions.        Arthritis: Exercise     Look for exercise classes for arthritis in your community.     Exercise is important to your overall health. It is especially important in people with arthritis. Regular exercise can:  · Keep your heart and blood vessels healthy  · Help  with weight management, or weight loss  · Improve your mood  · Help prevent and manage health problems such as:  ¨ Diabetes  ¨ High blood pressure  ¨ High cholesterol  ¨ Depression  In people with arthritis, it offers all of those benefits and it can:  · Lessen pain and stiffness  · Strengthen muscles that support your joints  · Help you to be able to do the things you enjoy  Exercise and arthritis  Exercise is an important part of any arthritis treatment plan. A complete program consists of the following three types of exercises:  · Aerobic exercises for cardiovascular health and overall fitness.   · Strengthening exercises to build up muscles to help prevent injury and keep joints stable.  · Range-of-motion exercises to keep muscles and joints flexible.  Getting started  Talk with your healthcare provider about what is safe for you. Make sure you:  · Learn how to do exercises properly and safely. Consider talking with a physical therapist or  used to working with people with arthritis.  · Start gradually and build. If you haven't been exercising, start slowly. Don't exercise too hard or too long.  · Create a routine. Set aside specific times for exercise every day.  · Warm up carefully. Take 5 to 10 minutes at the beginning and end of exercising to warm up and cool down. Just do the same exercises at a slower pace for 5 to 10 minutes.  · Work at a comfortable, smooth pace. Move your joints gently to prevent injury.  · Pay attention to your body. Don't exercise a painful or swollen joint; switch to another activity. Follow the 2-hour pain rule: You did too much if your joint or muscle pain lasts 2 hours or more after exercising, or is worse the next day. This doesn't mean you should stop exercising. Just do less.  Aerobic exercise  Aerobic exercise improves overall health and helps control weight. Choose those that don't add extra stress to your joints. For example, walking, swimming, or bicycling.  Most  people should exercise for at least 30 minutes. most days of the week. You don't have to exercise all at once. Try exercising for 10 minutes, 3 times a day, for example.  Strengthening exercises  Strengthening your muscles help to protect your joints and prevent injuries. Try to do strengthening exercises 2 to 3 times a week:  · These exercises can be done with exercise or resistance bands (inexpensive exercise aids that add resistance), or with light weights. Some people use soup cans as weights.  · Isometric exercises are done by tightening the muscles without moving the joint. This may be a good way to strengthen the muscles around a stiff joint.  A physical therapist or  can teach you how to do these exercises.  Range-of-motion (ROM) exercises  Range-of-motion (ROM) exercises allow you to move each of your joints in every way they are intended to move. You should do ROM exercises for each joint 2 to 3 times a day. This will help you maintain full use of all of your joints.  Sample ROM exercises  The following are just a sample of ROM exercises--one for your neck, shoulders, elbows, hips, knees, and ankles. To completely move each joint through its full range of motion, you will have to do a few exercises for each joint. A physical therapist or  can teach you how to do full ROM exercises for each joint.   Repeat each for these exercises 5 to 10 times. Make sure you move slowly:  1. Neck turns. Sit in a straight-backed chair. Look straight ahead. Slowly turn your head to the right, then return it to center. Repeat. Do the same thing, turning your head to the left. Repeat.  2. Shoulder raise. Lie on your back or sit in a chair. Raise one arm over your head, keeping your elbow straight. Keep the arm close to your ear. Return it slowly to your side. Repeat with your other arm.  3. Elbow stretch. Sit in a chair. If you are able, put both arms out to your sides to form a T. Slowly touch your shoulders  with the tips of your fingers. Then return to the T-position. Repeat.  4. Hip stretch. Lie on your back with your legs straight and about 6 inches apart. With your foot flexed, slide your leg out to the side, then slide it back to the starting position. Repeat with your other leg.  5. Knee bend. Sit in a chair with your legs bent at the knees in front of you. Straighten one leg as much as you can, then bring it back to the floor. Repeat this 5 to 10 times. Then do the same thing with the other leg.   6. Ankle stretch. Sit with your feet flat on the floor. Lift your toes off of the floor while your heels stay down. Repeat. Then lift your heels off the floor while your toes stay down. Repeat.  Other exercise  Many other exercise and activities benefit people with arthritis. It is most important to find exercise and activities that you enjoy. You might try:  · Yoga, including chair yoga, helps to keep your joints strong and flexible.   · Jamey Chi, an ancient type of exercise with slow, gentle movements  · Water exercise, including water walking  For more information on exercise for arthritis go to the Arth[employer] website: www.arthritis.org.  Date Last Reviewed: 2/14/2016  © 6587-0689 [employer]. [address] [place] [address], [place] [address]. All rights reserved. This information is not intended as a substitute for professional medical care. Always follow your healthcare professional's instructions.

## 2018-12-20 NOTE — PROGRESS NOTES
Oswald Bishop MD VI                       [facility]                         12/20/2018    HPI:  Magaly Nunes is a 86 y.o. 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  Was seen by Ortho when admitted to hospital 7/2018 and dx with inflammatory etiology of L ankle pain, no DVT and was dx with MI s/p LHC.  Patient states location is distal LLE occurring for 1 year.  Associated signs and symptoms include erythema and edema.  Quality is sharp and severity is 9/10.  Symptoms began one year ago.  Alleviating factors include none.  Worsening factors include pressure and walking.  LLE claudication at 2 blocks. No rest pain or tissue loss.    yes MI  no Stroke  Tobacco use: no    8/30/18: c/o severe L knee pain, worse when at rest and sitting.  Denies claudication.  No BLE wounds.  States had L knee injections in past with improvement.    Interval history:  States L knee pain has worsened, was unable to make her Ortho appt.  Denies claudication.  No BLE wounds.  States pain occurs at rest and is worst at night, cannot sleep.    Past Medical History:    Diagnosis Date    Anxiety disorder     Arthritis 12/28/2017    Carpal tunnel syndrome     CHF (congestive heart failure)     Chronic allergic rhinitis     Chronic pain 10/22/2012    CKD stage 3 due to type 2 diabetes mellitus 2/14/2017    Constipation - functional 4/10/2013    Depression     Diabetes mellitus type II     Hot flash not due to menopause     HTN (hypertension)     Hypokalemia 11/19/2012    Insomnia 4/10/2013    Knee pain, bilateral 7/24/2013    Personal history of DVT (deep vein thrombosis)     Polyarthralgia 7/16/2018    Pulmonary hypertension 2/1/2017    Renovascular hypertension 2/14/2017    Severe tricuspid regurgitation 2/14/2017    Spinal stenosis     Dr. Corrales    Spinal stenosis of lumbar region 2/3/2014    Systolic and diastolic CHF, chronic 7/16/2018    Vertigo      Past Surgical History:   Procedure Laterality Date    CATARACT EXTRACTION Bilateral     EYE SURGERY      FRACTURE SURGERY      HYSTERECTOMY      ORIF RADIUS & ULNA FRACTURES       Family History   Problem Relation Age of Onset    No Known Problems Mother     No Known Problems Father     No Known Problems Sister     No Known Problems Brother     No Known Problems Maternal Aunt     No Known Problems Maternal Uncle     No Known Problems Paternal Aunt     No Known Problems Paternal Uncle     No Known Problems Maternal Grandmother     No Known Problems Maternal Grandfather     No Known Problems Paternal Grandmother     No Known Problems Paternal Grandfather     Amblyopia Neg Hx     Blindness Neg Hx     Cancer Neg Hx     Diabetes Neg Hx     Glaucoma Neg Hx     Hypertension Neg Hx     Macular degeneration Neg Hx     Retinal detachment Neg Hx     Strabismus Neg Hx     Stroke Neg Hx     Thyroid disease Neg Hx      Social History     Socioeconomic History    Marital status:      Spouse name: Not on file    Number of children: Not on file    Years of education: Not on file     Highest education level: Not on file   Social Needs    Financial resource strain: Not on file    Food insecurity - worry: Not on file    Food insecurity - inability: Not on file    Transportation needs - medical: Not on file    Transportation needs - non-medical: Not on file   Occupational History    Not on file   Tobacco Use    Smoking status: Never Smoker    Smokeless tobacco: Never Used   Substance and Sexual Activity    Alcohol use: No    Drug use: No    Sexual activity: No   Other Topics Concern    Not on file   Social History Narrative    .  Lives alone.   and two sons have .  Active.         Current Outpatient Medications:     aspirin 81 MG Chew, Take 1 tablet (81 mg total) by mouth once daily., Disp: , Rfl: 0    atorvastatin (LIPITOR) 20 MG tablet, Take 1 tablet (20 mg total) by mouth once daily., Disp: 30 tablet, Rfl: 2    clopidogrel (PLAVIX) 75 mg tablet, , Disp: , Rfl: 7    DULoxetine (CYMBALTA) 60 MG capsule, TAKE 1 CAPSULE(60 MG) BY MOUTH EVERY DAY, Disp: 30 capsule, Rfl: 12    esomeprazole (NEXIUM) 40 MG capsule, Take 1 capsule (40 mg total) by mouth before breakfast., Disp: 30 capsule, Rfl: 11    furosemide (LASIX) 40 MG tablet, Take 1 tablet (40 mg total) by mouth once daily., Disp: 30 tablet, Rfl: 11    gabapentin (NEURONTIN) 100 MG capsule, Take 1 capsule (100 mg total) by mouth 3 (three) times daily., Disp: 90 capsule, Rfl: 11    HYDROcodone-acetaminophen (NORCO) 5-325 mg per tablet, Take 1 tablet by mouth every 6 (six) hours as needed for Pain., Disp: 60 tablet, Rfl: 0    losartan (COZAAR) 100 MG tablet, TAKE 1 TABLET BY MOUTH ONCE DAILY, Disp: 90 tablet, Rfl: 12    pantoprazole (PROTONIX) 20 MG tablet, Take 2 tablets (40 mg total) by mouth once daily., Disp: 60 tablet, Rfl: 11    potassium chloride SA (K-DUR,KLOR-CON) 20 MEQ tablet, TAKE 1 TABLET(20 MEQ) BY MOUTH EVERY DAY, Disp: 90 tablet, Rfl: 12    temazepam (RESTORIL) 30 mg capsule, TAKE ONE CAPSULE  BY MOUTH AT BEDTIME AS NEEDED FOR INSOMNIA, Disp: 30 capsule, Rfl: 5    tiZANidine 4 mg Cap, Take by mouth., Disp: , Rfl:     carvedilol (COREG) 6.25 MG tablet, Take 1 tablet (6.25 mg total) by mouth 2 (two) times daily., Disp: 60 tablet, Rfl: 0    lidocaine-prilocaine (EMLA) cream, , Disp: , Rfl:     LORazepam (ATIVAN) 0.5 MG tablet, Take 1 tablet (0.5 mg total) by mouth every 8 (eight) hours as needed for Anxiety. (caution-may cause sleepiness), Disp: 60 tablet, Rfl: 3    REVIEW OF SYSTEMS:  General: No fevers or chills; ENT: No sore throat; Allergy and Immunology: no persistent infections; Hematological and Lymphatic: No history of bleeding or easy bruising; Endocrine: negative; Respiratory: no cough, shortness of breath, or wheezing; Cardiovascular: no chest pain or dyspnea on exertion; Gastrointestinal: no abdominal pain/back, change in bowel habits, or bloody stools; Genito-Urinary: no dysuria, trouble voiding, or hematuria; Musculoskeletal: negative; Neurological: no TIA or stroke symptoms; Psychiatric: no nervousness, anxiety or depression.    PHYSICAL EXAM:                General appearance:  Alert, well-appearing, and in no distress.  Oriented to person, place, and time                    Neurological: Normal speech, no focal findings noted; CN II - XII grossly intact. RLE with sensation to light touch, LLE with sensation to light touch.            Musculoskeletal: Digits/nail without cyanosis/clubbing.  Strength 5/5 BLE.                    Neck: Supple, no significant adenopathy, no carotid bruit can be auscultated                  Chest:  Clear to auscultation, no wheezes, rales or rhonchi, symmetric air entry. No use of accessory muscles               Cardiac: Normal rate and regular rhythm, S1 and S2 normal            Abdomen: Soft, nontender, nondistended, no masses or organomegaly, no hernia     No rebound tenderness noted; bowel sounds normal     No groin adenopathy      Extremities:   2+ R  femoral pulse, 2+ L femoral pulse     1+ R popliteal pulse, 1+ L popliteal pulse     doppler+ R PT pulse, doppler+ L PT pulse     2+ R DP pulse, 1+ L DP pulse     no RLE edema, no LLE edema    Skin: RLE without ulcer; LLE without ulcer    LAB RESULTS:  No results found for: CBC  Lab Results   Component Value Date    LABPROT 10.7 07/08/2018    INR 1.0 07/08/2018     Lab Results   Component Value Date     09/13/2018    K 4.6 09/13/2018     09/13/2018    CO2 25 09/13/2018    GLU 97 09/13/2018    BUN 27 (H) 09/13/2018    CREATININE 1.0 09/13/2018    CALCIUM 10.1 09/13/2018    ANIONGAP 11 09/13/2018    EGFRNONAA 51.5 (A) 09/13/2018     Lab Results   Component Value Date    WBC 9.52 09/13/2018    RBC 4.40 09/13/2018    HGB 11.5 (L) 09/13/2018    HCT 37.5 09/13/2018    MCV 85 09/13/2018    MCH 26.1 (L) 09/13/2018    MCHC 30.7 (L) 09/13/2018    RDW 14.4 09/13/2018     09/13/2018    MPV 10.7 09/13/2018    GRAN 7.2 09/13/2018    GRAN 75.7 (H) 09/13/2018    LYMPH 1.4 09/13/2018    LYMPH 14.3 (L) 09/13/2018    MONO 0.9 09/13/2018    MONO 9.6 09/13/2018    EOS 0.0 09/13/2018    BASO 0.00 09/13/2018    EOSINOPHIL 0.0 09/13/2018    BASOPHIL 0.0 09/13/2018    DIFFMETHOD Automated 09/13/2018     .  Lab Results   Component Value Date    HGBA1C 6.0 (H) 09/13/2018       IMAGING:  All pertinent imaging has been reviewed and interpreted independently.    -LLE arterial US with tibioperoneal occlusive disease, no hemodynamically significant stenosis.    BECKY 8/2018: 1/0.87, post exercise 0.8/0.8    BECKY 12/20/18: BECKY 1/1, post exercise 0.86/0.95    IMP/PLAN:  86 y.o. 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    -L knee pain likely MSK in etiology, exercise ABIs without hemodynamically significant LLE stenosis  -Rec eval by [facility] of L knee pain and Baker's cyst  -Cont heart healthy lifestyle, ASA, statin, BP control  -BLE edema - recommend compression with Rx stockings, elevation, dietary changes associated with water and sodium intake discussed at length with patient  -RTC prn    I spent 20 minutes evaluating this patient and greater than 50% of the time was spent counseling, coordinator care and discussing the plan of care.  All questions were answered and patient stated understanding with agreement with the above treatment plan.    Oswald Bishop MD VI  [facility]

## 2018-12-20 NOTE — LETTER
December 20, 2018        Chris Bangura MD  [address] [facility]  [place] [address]             [facility]  [address] [facility] [address]  [facility] [address]  Phone: [phone]  Fax: [phone]   Patient: Magaly Nunes   MR Number: [id number]   YOB: 1932   Date of Visit: 12/20/2018       Dear Dr. Bangura:    Thank you for referring Magaly Nunes to me for evaluation. Below are the relevant portions of my assessment and plan of care.            If you have questions, please do not hesitate to call me. I look forward to following Magaly along with you.    Sincerely,      Oswald Bishop MD           CC  No Recipients

## 2018-12-28 ENCOUNTER — OUTPATIENT CASE MANAGEMENT (OUTPATIENT)
Dept: ADMINISTRATIVE | Facility: OTHER | Age: 83
End: 2018-12-28

## 2018-12-28 NOTE — PROGRESS NOTES
The following patient has been assigned to Janice Arias RN with Outpatient Complex Care Management for high risk screening.    Reason: High Risk recently seen in clinic Report    Please contact [facility] at [phone] with any questions.    Thank you,    Rosalba Prince, [facility]patient [facility] Mgmt.  [phone]

## 2019-01-02 ENCOUNTER — TELEPHONE (OUTPATIENT)
Dept: FAMILY MEDICINE | Facility: CLINIC | Age: 84
End: 2019-01-02

## 2019-01-02 NOTE — TELEPHONE ENCOUNTER
----- Message from Shena Prado sent at 1/2/2019  2:40 PM CST -----  Contact: self - [phone]  Pt asking for a call to ask if it is time for her to have lab work completed.

## 2019-01-07 ENCOUNTER — TELEPHONE (OUTPATIENT)
Dept: FAMILY MEDICINE | Facility: CLINIC | Age: 84
End: 2019-01-07

## 2019-01-07 ENCOUNTER — OUTPATIENT CASE MANAGEMENT (OUTPATIENT)
Dept: ADMINISTRATIVE | Facility: OTHER | Age: 84
End: 2019-01-07

## 2019-01-07 RX ORDER — CLOPIDOGREL BISULFATE 75 MG/1
TABLET ORAL
Qty: 30 TABLET | Refills: 0 | Status: SHIPPED | OUTPATIENT
Start: 2019-01-07 | End: 2019-01-29 | Stop reason: SDUPTHER

## 2019-01-07 NOTE — PATIENT INSTRUCTIONS
Left- or Right- Side Congestive Heart Failure (CHF)    The heart is a large muscle. It is a pump that circulates blood throughout the body. Blood carries oxygen to all of the organs, including the brain, muscles, and skin. After your body takes the oxygen out of the blood, the blood returns to the heart. The right side of the heart collects the blood from the body and pumps it to the lungs. In the lungs, it gets fresh oxygen and gives up carbon dioxide. The oxygen-rich blood from the lungs then returns to the left side of the heart, where it is pumped back out to the rest of your body, starting the process all over.  Congestive heart failure (CHF) occurs when the heart muscle is weakened. This affects the pumping action of the heart. Heart failure can affect the right side of the heart or the left side. But heart failure may affect not only the right side of the heart or only the left side. Although it may have started on one side, it can and often eventually does affect both sides.  Right-side heart failure  When the right side of the heart is weakened, it cant handle the blood it is getting from the rest of the body. This blood returns to the heart through veins. When too much pressure builds up in the veins, fluid leaks out into the tissues. Gravity then causes that fluid to move to those parts of the body that are the lowest. So one of the first symptoms of right-side CHF can include swelling in the feet and ankles. If the condition gets worse, the swelling can even go up past the knees. Sometimes it gets so severe, the liver can get congested as well.  Left-side heart failure  When the left side of the heart is weakened, it cant handle the blood it gets from the lungs. Pressure then builds up in the veins of the lungs, causing fluid to leak into the lung tissues. This may cause CHF and pulmonary edema. This causes you to feel short of breath, weak, or dizzy. These symptoms are often worse with exertion,  such as when climbing stairs or walking up hills. Lying with your head flat is uncomfortable and can make your breathing worse. This may make sleeping difficult. You may need to use extra pillows to elevate your upper body to sleep well. The same is true when just resting during the daytime.  There are many causes of heart failure including:  · Coronary artery disease  · Past heart attack (also known as acute myocardial infarction, or AMI)  · High blood pressure  · Damaged heart valve  · Diabetes  · Obesity  · Cigarette smoking  · Alcohol abuse  Heart failure is a chronic condition. There is no cure. The purpose of medical treatment is to improve the pumping action of the heart. The main way to do this is to remove excess water from the body. A number of medicines can help reach this goal, improve symptoms, and prevent the heart from becoming weaker. Sometimes, heart failure can become so severe that a device is placed in the heart to help with pumping. Another major goal is to better treat the causes of heart failure, such as diabetes and high blood pressure, by making changes in your lifestyle and maximizing medical control when needed.  Home care  Follow these guidelines when caring for yourself at home:  · Check your weight every day. This is very important because a sudden increase in weight gain could mean worsening heart failure. Keep these things in mind:  ¨ Use the same scale every day.  ¨ Weigh yourself at the same time every day.  ¨ Make sure the scale is on a hard floor surface, not on a rug or carpet.  ¨ Keep a record of your weight every day so your healthcare provider can see it. If you are not given a log sheet for this, keep a separate journal for this purpose.   · Cut back on the amount of salt (sodium) you eat. Follow your healthcare provider's recommendation on how much salt or sodium you should have each day.  ¨ Avoid high-salt foods. These include olives, pickles, smoked meats, salted potato  chips, and most prepared foods.  ¨ Don't add salt to your food at the table. Use only small amounts of salt when cooking.  ¨ Read the labels carefully on food packages to learn how much salt or sodium is in each serving in the package. Remember, a can or package of food may contain more than 1 serving. So if you eat all the food in the package, you may be getting more salt than you think.  · Follow your healthcare provider's recommendations about how much fluid you should have. Be aware that some foods, such as soup, pudding, and juicy fruits like oranges or melons, contain liquid. You'll need to count the liquid in those foods as part of your daily fluid intake. Your provider can help you with this.  · Stop smoking.  · Cut back on how much alcohol you drink.  · Lose weight if you are overweight. The excess weight adds a lot of stress on the workload of the heart.  · Stay active. Talk with your provider about an exercise program that is safe for your heart.  · Keep your feet elevated to reduce swelling. Ask your provider about support hose as a preventive treatment for daytime leg swelling.  Besides taking your medicine as instructed, an important part of treatment is lifestyle changes. These include diet, physical activity, stopping smoking, and weight control.  Improve your diet by including more fresh foods, cutting back on how much sugar and saturated fat you eat, and eating fewer processed foods and less salt.  Follow-up care  Follow up with your healthcare provider, or as advised.  Make sure to keep any appointments that were made for you. These can help better control your congestive heart failure. You will need to follow up with your provider on a routine basis to make sure your heart failure is well managed.  If an X-ray, ECG, or other tests were done, you will be told of any new findings that may affect your care.  Call 911  Call 911 if you:  · Become severely short of breath  · Feel lightheaded, or feel  like you might pass out or faint  · Have chest pain or discomfort that is different than usual, the medicines your doctor told you to use for this don't help, or the pain lasts longer than 10 to 15 minutes  · You suddenly develop a rapid heart rate  When to seek medical advice  The following may be signs that your heart failure is getting worse. Call your healthcare provider right away if any of these happen:  · Sudden weight gain. This means 3 or more pounds in one day, or 5 or more pounds in 1 week.  · Trouble breathing not related to being active  · New or increased swelling of your legs or ankles  · Swelling or pain in your abdomen  · Breathing trouble at night. This means waking up short of breath or needing more pillows to breathe.  · Frequent coughing that doesnt go away  · Feeling much more tired than usual  Date Last Reviewed: 1/4/2016  © 7147-1796 [employer]. [address] [place] [address], [place] [address]. All rights reserved. This information is not intended as a substitute for professional medical care. Always follow your healthcare professional's instructions.

## 2019-01-07 NOTE — TELEPHONE ENCOUNTER
----- Message from Janice Arias RN sent at 1/7/2019  1:37 PM CST -----  Contact: Heather Arias RN OPCM  Good Afternoon ,    The above pt was enrolled in Outpatient Care Management today. The med rec revealed the following discrepancies:    Aspirin 81 mg -taking 1 x /day  EMLA cream - not  Using  Gabapentin 100mg 3x/day - not taking  Nexium 40 mg before breakfast - not taking  Norco 5 325 q 6 hr PRN pain - not taking  Protonix 20 mg 2 tablets 1x/day - not taking  Restoril 30 mg PRN insomnia -not taking  Tizanidine 4 mg - not taking    This is just FYI as we cannot adjust the MAR.   Thank you for your time and attention in this matter.    Best Regards,    Heather NIX ([place]) BSN RN   Outpatient Care Management  [phone]

## 2019-01-07 NOTE — PROGRESS NOTES
"Summary:  Spoke with pt regarding enrollment in OPCM -pt agreed to enroll.  Pt stated that she does have hypertension and she takes her B/P every now and then - stating she has a working cuff.  Pt states she has a bad cold right now and did sound congested.   Pt reports that she perceives her biggest issue right now is the swelling in the legs. Pt recognizes that she does  Have CHF and reports that she tries to follow a low salt diet - stating that she does not add an salt to her cooking.  Pt reports that she does read labels- Pt reports that she does have a working scale at her home. Pt states that legs are not swollen at present and she has not been weighing daily.  Pt reports that ever since October all she tends to want to do is "sit around" - Pt states she cannot sleep at night because of the pain in her legs.  Pt is alert and oriented x 3 - reports she lives alone and is considering an assisted living center but is not sure there is one near her Pt reports that she recently fell when coming out of the bathroom - pt claims she is not sure how it happened where she slipped or tripped over the back of her slipper - pt stated that she was afraid to get up for a while and also that she has a falls alert bracelet but did not use it    Interventions:  Encouraged pt to take B/P every day and record  Mailed B/P and weight logs - encouraged pt to weigh daily and record.  Mailed Fall prevention and CHF educational info  Discussed foods high in sodium and reading labels - pt says she does  Messaged PCP regarding Med rec discrepancies  Mailed pill box  Mailed the OTC [employer] catalog for 2019   [facility]      Plan:  Follow-up on receipt of educational materials  Monitor compliance with B/P and weight logs  Assess for compliance and retention with weight, diet and B/P  Assess for further educational opportunities    Todays OPCM Self-Management Care Plan was developed with the patients/caregivers input and " was based on identified barriers from todays assessment.  Goals were written today with the patient/caregiver and the patient has agreed to work towards these goals to improve his/her overall well-being. Patient verbalized understanding of the care plan, goals, and all of today's instructions. Encouraged patient/caregiver to communicate with his/her physician and health care team about health conditions and the treatment plan.  Provided my contact information today and encouraged patient/caregiver to call me with any questions as needed.

## 2019-01-12 NOTE — TELEPHONE ENCOUNTER
Call pt-- Dr ELIESER rogel and looks like labs wouls be dood in March --everything in Sept was good

## 2019-01-14 ENCOUNTER — OUTPATIENT CASE MANAGEMENT (OUTPATIENT)
Dept: ADMINISTRATIVE | Facility: OTHER | Age: 84
End: 2019-01-14

## 2019-01-14 NOTE — PROGRESS NOTES
Summary:  1st missed follow-up attempt    Interventions:  None - unable to leave a message - no answer    Plan:  Follow-up Friday 1-18

## 2019-01-16 ENCOUNTER — OUTPATIENT CASE MANAGEMENT (OUTPATIENT)
Dept: ADMINISTRATIVE | Facility: OTHER | Age: 84
End: 2019-01-16

## 2019-01-16 ENCOUNTER — TELEPHONE (OUTPATIENT)
Dept: FAMILY MEDICINE | Facility: CLINIC | Age: 84
End: 2019-01-16

## 2019-01-16 NOTE — PROGRESS NOTES
Summary:  LCSW received a referral from OPCM-RNHeather for housing. Sw assessment was completed with patient. Pt stated she is unsure if she will move into an assisted living facility. However, pt stated she is truly thinking about options and is reviewing it with family. Pt currently has a [employer] life alert button. Pt stated she has support available with her niece, Deborah and surrogate son Jas who visits daily. Pt stated she is in a lot of pain and is trying to schedule an appointment with her ortho specialist for her knee pain. Pt also stated she is having trouble sleeping at night and wants to try to see her PCP for other options. Pt is interested in information for home delivered meals. She does not want Meals on Wheels; however, she is interested in healthy course meals. Pt denied other barriers to be addressed at this time. LCSW will follow up with patient within 2 weeks.     Interventions:  Food resource - provided information on [employer] meals and [employer]  Scheduled a sooner appointment with PCP; collaborated with PCP regarding trouble sleeping via in basket    Plan:  Confirm receipt of mailing  Discuss if made contact with with [employer] or Healthy course meals  Explore housing options - if decided to remain home or possible transition elsewhere.    Today[facility] Self-Management Care Plan was developed with the patients/caregivers input and was based on identified barriers from todays assessment.  Goals were written today with the patient/caregiver and the patient has agreed to work towards these goals to improve his/her overall well-being. Patient verbalized understanding of the care plan, goals, and all of today's instructions. Encouraged patient/caregiver to communicate with his/her physician and health care team about health conditions and the treatment plan.  Provided my contact information today and encouraged patient/caregiver to call me with any questions as needed.

## 2019-01-16 NOTE — LETTER
January 16, 2019    Magaly Nunes  [address] [address]             [facility] [address] [place] [address] Dear Ms. Nunse:    I am writing from the Outpatient Complex Care Management Department at [facility].  It was a pleasure speaking with you earlier today.  Per our conversation, enclosed are some resource information that may be of benefit. If you have any questions, please give me a call.     I can be reached at [phone] Monday through Friday from 8:00am to 4:30pm.  [facility] also has a program with a nurse available 24/7 to answer questions or provide medical advice.  [facility] can be reached at [phone].    Sincerely,       Celia Campbell LCSW    Outpatient Complex Care Management

## 2019-01-16 NOTE — TELEPHONE ENCOUNTER
----- Message from Santy Campbell LCSW sent at 1/16/2019  2:12 PM CST -----  Dr. Bangura:    I spoke with this patient today. She reported having difficulty sleeping at night; this has been going on for the past 2-3 weeks. She finds herself laying in bed at a decent hour; however, she is not going to sleep until 5-6am and finds herself sleeping throughout the day. She is hopeful to meeting with you soon regarding her sleep patterns and discuss options. I was able to get an appointment scheduled prior to her 6 month follow up.     Please advise patient.     Thank you!  Celia Campbell LCSW  [id number]  Outpatient Complex Care Management

## 2019-01-17 ENCOUNTER — PES CALL (OUTPATIENT)
Dept: ADMINISTRATIVE | Facility: CLINIC | Age: 84
End: 2019-01-17

## 2019-01-18 ENCOUNTER — OUTPATIENT CASE MANAGEMENT (OUTPATIENT)
Dept: ADMINISTRATIVE | Facility: OTHER | Age: 84
End: 2019-01-18

## 2019-01-18 NOTE — LETTER
January 18, 2019    Magaly Nunes  [address] [address]             [facility] [address] [place] [address] Dear Ms. Nunes,    I work with [facility]'s Outpatient Case Management Department. I have been unsuccessful at reaching you to follow-up to see how you have been doing. Please call me back at your earliest convenience to discuss your health care needs.      I can be reached at [phone] from 8:00AM to 4:30 PM on Monday thru Friday. [facility] also has a program where a nurse is available 24/7 to answer questions or provide medical advice, their number is [phone].    Thanks,        Heather ([place]) Hugo NIX BSN RN  Outpatient Case Management

## 2019-01-18 NOTE — PROGRESS NOTES
Summary:  2nd missed follow-up attempt    Interventions:  Unable to leave a message - no answer or voicemail  Letter sent with contact info for follow-up via US Mail    Plan:  Follow-up next week -1-24-Thursday

## 2019-01-22 ENCOUNTER — OFFICE VISIT (OUTPATIENT)
Dept: CARDIOLOGY | Facility: CLINIC | Age: 84
End: 2019-01-22
Payer: MEDICARE

## 2019-01-22 VITALS
SYSTOLIC BLOOD PRESSURE: 136 MMHG | BODY MASS INDEX: 29.68 KG/M2 | DIASTOLIC BLOOD PRESSURE: 84 MMHG | RESPIRATION RATE: 16 BRPM | HEART RATE: 107 BPM | OXYGEN SATURATION: 96 % | WEIGHT: 167.56 LBS

## 2019-01-22 DIAGNOSIS — I25.10 CORONARY ARTERY DISEASE INVOLVING NATIVE CORONARY ARTERY OF NATIVE HEART WITHOUT ANGINA PECTORIS: Primary | ICD-10-CM

## 2019-01-22 DIAGNOSIS — I73.9 PVD (PERIPHERAL VASCULAR DISEASE) WITH CLAUDICATION: ICD-10-CM

## 2019-01-22 DIAGNOSIS — I70.422 ATHEROSCLEROSIS OF AUTOLOGOUS VEIN BYPASS GRAFT OF LEFT LOWER EXTREMITY WITH REST PAIN: ICD-10-CM

## 2019-01-22 DIAGNOSIS — F32.0 CURRENT MILD EPISODE OF MAJOR DEPRESSIVE DISORDER WITHOUT PRIOR EPISODE: ICD-10-CM

## 2019-01-22 DIAGNOSIS — R60.0 EDEMA OF LEFT LOWER EXTREMITY: ICD-10-CM

## 2019-01-22 DIAGNOSIS — N18.30 CKD (CHRONIC KIDNEY DISEASE) STAGE 3, GFR 30-59 ML/MIN: ICD-10-CM

## 2019-01-22 DIAGNOSIS — I25.10 CAD (CORONARY ARTERY DISEASE): ICD-10-CM

## 2019-01-22 DIAGNOSIS — I21.4 NSTEMI (NON-ST ELEVATED MYOCARDIAL INFARCTION): ICD-10-CM

## 2019-01-22 DIAGNOSIS — E11.00 TYPE 2 DIABETES MELLITUS WITH HYPEROSMOLARITY WITHOUT COMA, WITHOUT LONG-TERM CURRENT USE OF INSULIN: ICD-10-CM

## 2019-01-22 DIAGNOSIS — I10 ESSENTIAL HYPERTENSION: ICD-10-CM

## 2019-01-22 DIAGNOSIS — E78.5 DYSLIPIDEMIA: ICD-10-CM

## 2019-01-22 PROCEDURE — 93000 EKG 12-LEAD: ICD-10-PCS | Mod: HCNC,S$GLB,, | Performed by: INTERNAL MEDICINE

## 2019-01-22 PROCEDURE — 99499 RISK ADDL DX/OHS AUDIT: ICD-10-PCS | Mod: HCNC,S$GLB,, | Performed by: INTERNAL MEDICINE

## 2019-01-22 PROCEDURE — 99214 OFFICE O/P EST MOD 30 MIN: CPT | Mod: HCNC,S$GLB,, | Performed by: INTERNAL MEDICINE

## 2019-01-22 PROCEDURE — 99999 PR PBB SHADOW E&M-EST. PATIENT-LVL III: ICD-10-PCS | Mod: PBBFAC,HCNC,, | Performed by: INTERNAL MEDICINE

## 2019-01-22 PROCEDURE — 99214 PR OFFICE/OUTPT VISIT, EST, LEVL IV, 30-39 MIN: ICD-10-PCS | Mod: HCNC,S$GLB,, | Performed by: INTERNAL MEDICINE

## 2019-01-22 PROCEDURE — 1101F PT FALLS ASSESS-DOCD LE1/YR: CPT | Mod: CPTII,HCNC,S$GLB, | Performed by: INTERNAL MEDICINE

## 2019-01-22 PROCEDURE — 99999 PR PBB SHADOW E&M-EST. PATIENT-LVL III: CPT | Mod: PBBFAC,HCNC,, | Performed by: INTERNAL MEDICINE

## 2019-01-22 PROCEDURE — 93000 ELECTROCARDIOGRAM COMPLETE: CPT | Mod: HCNC,S$GLB,, | Performed by: INTERNAL MEDICINE

## 2019-01-22 PROCEDURE — 99499 UNLISTED E&M SERVICE: CPT | Mod: HCNC,S$GLB,, | Performed by: INTERNAL MEDICINE

## 2019-01-22 PROCEDURE — 1101F PR PT FALLS ASSESS DOC 0-1 FALLS W/OUT INJ PAST YR: ICD-10-PCS | Mod: CPTII,HCNC,S$GLB, | Performed by: INTERNAL MEDICINE

## 2019-01-22 NOTE — PROGRESS NOTES
Subjective:    Patient ID:  Magaly Nunes is a 86 y.o. female who presents for follow-up of CAD F/U (6 mo )      Congestive Heart Failure      previous history:  Here for follow-up of coronary artery disease.  She denies any worsening cardiopulmonary complaints.  She couldn't get into cardiac rehabilitation but his been doing exercises with his couple of lady's in her neighborhood.  She also goes to the mall and walks around.  She's mainly limited by significant arthritis all throughout her body.  She denies any PND, orthopnea or lower edema.  She's not expressing dizziness, presyncope or syncope.  She's agreeable to reschedule her peripheral stuff and follows with podiatry as well.    Today:  Here follow-up coronary artery disease.  She is very tearful and says that she had recent worsening of lower extremity swelling after being given ammonium lactate cream by Podiatry.  She says she has been compliant with sodium restriction.  She mainly spend the holidays alone and lives by herself at the age of 86.  She complains pain in her left foot that limits her from activity.  She is of the compression stockings were too tight at this point.  She denies any PND or orthopnea.  She has not experiencing dizziness, presyncope or syncope.      Review of Systems   Constitution: Negative.   HENT: Negative.    Eyes: Negative.    Cardiovascular: Positive for leg swelling. Negative for dyspnea on exertion, irregular heartbeat, orthopnea, paroxysmal nocturnal dyspnea and syncope.   Skin: Negative.    Musculoskeletal: Positive for arthritis.   Gastrointestinal: Negative for constipation and diarrhea.   Genitourinary: Negative for dysuria.   Neurological: Negative.    Psychiatric/Behavioral: Negative.         Objective:    Physical Exam   Constitutional: She is oriented to person, place, and time. She appears well-developed and well-nourished.   HENT:   Head: Normocephalic and atraumatic.   Eyes: Conjunctivae and EOM are normal.  Pupils are equal, round, and reactive to light.   Neck: Normal range of motion. Neck supple. No thyromegaly present.   Cardiovascular: Normal rate and regular rhythm.   No murmur heard.  Pulmonary/Chest: Effort normal and breath sounds normal. No respiratory distress.   Abdominal: Soft. Bowel sounds are normal.   Musculoskeletal: She exhibits edema.   Neurological: She is alert and oriented to person, place, and time.   Skin: Skin is warm and dry.   Psychiatric: She has a normal mood and affect. Her behavior is normal.       echo: 12-17  CONCLUSIONS     1 - Low normal to mildly depressed left ventricular systolic function (EF 50-55%).     2 - Concentric remodeling.     3 - Impaired LV relaxation, elevated LAP (grade 2 diastolic dysfunction).     4 - Low normal right ventricular systolic function .     5 - Pulmonary hypertension. The estimated PA systolic pressure is 67 mmHg.     6 - Mild to moderate mitral regurgitation.     [facility]:     B. Summary/Post-Operative Diagnosis       Single vessel coronary artery disease.    Possible small branch diagonal likely cuprit for NSTEMI.    Diagnostic:          Patient has a right dominant coronary artery.        - Left Main Coronary Artery:             The LM is normal. There is VIJI 3 flow.     - Left Anterior Descending Artery:             The LAD is normal. There is VIJI 3 flow.     - D4:             The D4 has a 90% stenosis. There is VIJI 3 flow. small vessel   2 mm     - Left Circumflex Artery:             The LCX is normal. There is VIJI 3 flow.     - Right Coronary Artery:             The RCA has luminal irregularities. There is VIJI 3 flow.    NST: 1-18  Impression: ABNORMAL MYOCARDIAL PERFUSION  1. There is evidence for mild myocardial ischemia in the anteroapical wall of the left ventricle.   2. The perfusion scan is free of evidence for myocardial injury.   3. Resting wall motion is physiologic.   4. There is resting LV dysfunction with a reduced ejection fraction of  46 %.   5. The ventricular volumes are normal at rest and stress.   6. The extracardiac distribution of radioactivity is normal.   7. When compared to the previous study from 01/03/2017, mild anteroapical ischemia now present    BECKY was stress:  8-18  CONCLUSIONS   Right:  The exercise component of this study demonstrates peripheral arterial disease in the right extremity that may account for symptoms if present.     Left:  Exercise study confirms peripheral arterial disease in the left extremity with some collateral flow during exercise.     1. Right lower extremity pressures and waveforms indicate mild arterial occlusive disease above the level of the ankle.  Toe pressures indicate severe arterial occlusive disease.  Exercise BECKY shows hemodynamically significant arterial occlusive disease.  2. Left lower extremity pressures and waveforms indicate mild arterial occlusive disease above the level of the ankle.  Toe pressures indicate severe arterial occlusive disease.  Exercise BECKY shows hemodynamically significant arterial occlusive disease.    LDL-56   7-18  Hemoglobin A1c-6    Assessment:       1. Coronary artery disease involving native coronary artery of native heart without angina pectoris    2. 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 Edema of left lower extremity    4. PVD (peripheral vascular disease) with claudication    5. NSTEMI (non-ST elevated myocardial infarction)    6. Current mild episode of major depressive disorder without prior episode    7. Essential hypertension    8. CKD Stage 3    9. Type 2 diabetes mellitus with hyperosmolarity without coma, without long-term current use of insulin    10. Dyslipidemia         Plan:       -Continue med therapy  -Non-STEMI without revascularization, mild defect discussed options will plan for medical therapy which correlates with known diagonal disease which is small vessel  -Lasix increased to 80 daily  times 3-5 days then call us  -Compression stockings  -Salt restriction  -Exercise as tolerated  -PAD/venous insufficiency now followed by Dr. Bishop      Return to clinic in 6 mos * will call in 1 week to see how she is doing and consider metolazone versus ER for IV diuresis

## 2019-01-22 NOTE — PROGRESS NOTES
Patient, Magaly Nunes (MRN #[id number]), presented with a recent Estimated PA Systolic Pressure greater than 40 mmHG consistent with the definition of pulmonary hypertension (ICD10 - I27.0).    Est. PA Systolic Pressure   Date Value Ref Range Status   07/07/2018 63.35 (A)       The patient's pulmonary hypertension was monitored, evaluated, addressed and/or treated. This addendum to the medical record is made on 01/22/2019.

## 2019-01-24 ENCOUNTER — OUTPATIENT CASE MANAGEMENT (OUTPATIENT)
Dept: ADMINISTRATIVE | Facility: OTHER | Age: 84
End: 2019-01-24

## 2019-01-24 NOTE — PROGRESS NOTES
Summary:  Spoke with pt regarding follow-up  - pt states that her leg swelling goes down at night and when she gets up in the morning it swells up - Pt states she feels a little down today - stating that she goes to the doctors and they do nothing about her legs swelling- states they give you the wrong medicine and it doesn't work - but she said she didn't realize she was seeing the heart doctor and it didn't tell her anything about her legs.  Pt  States she sees the podiatrist next week - pt likes her PCP but did not like her experience on Tuesday - states it's disappointing when you go to the doctor and they don't tell you jennifer thing and then you get a bill - pt states she does not give her any satisfaction. - when reviewing pt instructions in notes regarding Lasix and calling the doctor in 3-5 days -pt became upset and stated she was not told that pt thought that she was to continue the Lasix for up to 5 days then call cardiologists - pt stated she is hurting and every time she goes to the doctor she comes home still hurting. Pt also states that doctors don't tell her what they are doing - sent her to the ER for her legs but it was really her heart - states they didn't tell her. Pt states she did not ask the doctor all her questions because she was crying - pt remember instructions about not putting lotion on her legs - pt reports she is still 165 lbs-states that she had not yet weighed today - Pt states that she continues to follow- a low salt diet and reads her labels.Pt states that dania has also been talking to her  because she is frustrated and by herself - stating this is the longest she has been down with her legs.  Pt states her shoes often don't fit - however pt did stated her legs were a little bit better today - pt reports she started the 80 mg yesterday on the 23rd. Pt denies any falls or SOB    Interventions:  Reviewed pt instructions/MD plan in notes with pt  Instructed pt to only increased the  Lasix to 80 mg for up to 5 days  and then call the cardiologist as in MD plan -pt instructed to call cardiologist on Monday - pt verbalized understanding  Encouraged pt to call cardiologist on Saturday and let him know about Lasix and swelling  Instructed pt to weight daily and if gains more than 5 lbs in 7 days to let PCP know  Instructed pt to continue on a low-salt diet  Discussed CHF and how it can affect the swelling in pt's legs    Plan:  Follow-up with pt next week regarding Lasix, follow-up on cardiologist  Assess for retention and complaince      Todays [facility] Self-Management Care Plan was developed with the patients/caregivers input and was based on identified barriers from todays assessment.  Goals were written today with the patient/caregiver and the patient has agreed to work towards these goals to improve his/her overall well-being. Patient verbalized understanding of the care plan, goals, and all of today's instructions. Encouraged patient/caregiver to communicate with his/her physician and health care team about health conditions and the treatment plan.  Provided my contact information today and encouraged patient/caregiver to call me with any questions as needed.

## 2019-01-29 ENCOUNTER — OUTPATIENT CASE MANAGEMENT (OUTPATIENT)
Dept: ADMINISTRATIVE | Facility: OTHER | Age: 84
End: 2019-01-29

## 2019-01-29 RX ORDER — CLOPIDOGREL BISULFATE 75 MG/1
TABLET ORAL
Qty: 30 TABLET | Refills: 5 | Status: SHIPPED | OUTPATIENT
Start: 2019-01-29

## 2019-01-29 RX ORDER — METOLAZONE 2.5 MG/1
2.5 TABLET ORAL DAILY PRN
Qty: 30 TABLET | Refills: 0 | Status: ON HOLD | OUTPATIENT
Start: 2019-01-29 | End: 2019-03-22

## 2019-01-29 NOTE — PROGRESS NOTES
Summary:  LCSW contacted pt for follow up. Pt stated she is doing fair. She is continuing to maintain fluid in her legs. She saw her orthopedic specialist on yesterday who gave her an injection in her knees. Pt stated she has to make an appointment with her Dermatologist for her skin rash. Pt confirmed receipt of mailing but admits to not reviewing/completing items due to the pain she's been in recently. No other concerns were voiced.     Interventions:  Collaborated with cardiologist regarding continuing to maintain fluids  Confirmed receipt of mailing    Plan:  Discuss if made contact with with [employer] or Healthy course meals  Explore housing options - if decided to remain home or possible transition elsewhere.    Todays [facility] Self-Management Care Plan was developed with the patients/caregivers input and was based on identified barriers from todays assessment.  Goals were written today with the patient/caregiver and the patient has agreed to work towards these goals to improve his/her overall well-being. Patient verbalized understanding of the care plan, goals, and all of today's instructions. Encouraged patient/caregiver to communicate with his/her physician and health care team about health conditions and the treatment plan.  Provided my contact information today and encouraged patient/caregiver to call me with any questions as needed.

## 2019-01-31 ENCOUNTER — TELEPHONE (OUTPATIENT)
Dept: FAMILY MEDICINE | Facility: CLINIC | Age: 84
End: 2019-01-31

## 2019-01-31 ENCOUNTER — TELEPHONE (OUTPATIENT)
Dept: CARDIOLOGY | Facility: CLINIC | Age: 84
End: 2019-01-31

## 2019-01-31 ENCOUNTER — OUTPATIENT CASE MANAGEMENT (OUTPATIENT)
Dept: ADMINISTRATIVE | Facility: OTHER | Age: 84
End: 2019-01-31

## 2019-01-31 NOTE — TELEPHONE ENCOUNTER
Called pt, no answer,  Will attempt to call in the AM     EF        ----- Message from Janice Arias RN sent at 1/31/2019  3:36 PM CST -----  Contact: Heather Arias RN \[facility]\""  Good Afternoon,    I just got off the phone with the above pt for a follow-up.  The pt stated she didn't know what was going on - stated she was supposed to get 3 fluid tablets   2.5 mg of some medication - she did not know -stated her niece went to the pharmacy to  the medication and that it was 75mg tablets (again did not know) and that they would cost $100 for 3 tablets because she could not get them till 2-6-19.  Pt stated again she doesn't know what is going on but decided to start taking her previous fluid tablets - Lasix (40 mg) 2x a day.  I would guess she has been doing this for a couple of days but reports she legs have gone down but she is very very tired.    This is FYI - please reach out to the patient as she seems confused as to what was ordered mg wise, cost  - instructed her when changing medications to always let the PCP or doctor know and let her know I would let you know.  Thank you for your time and attention in this matter.    Best Regards,    Heather Hightower) Hugo NIX BSN RN   Outpatient Care Management  [phone]

## 2019-01-31 NOTE — PROGRESS NOTES
Summary:  Spoke with pt regarding follow-up pt stated that she didn't get the medication - fluid pills - pt states that she was told that she was supposed to get medication - pt could not state what she she was suppose to take - stated she doesn't know what was going on - but she could not afford the 3 pills ordered which pt stated was 2.5mg but said her niece stated it was 75 mg pills that would be $100.  Pt stated she went back to taking her Lasix 40 mg tablet  - but has been taking two daily and her legs have gone down a lot - but that she is feeling very tired.  Pt states she doesn't know what was going on - so she just decided to go back to her old fluid pills - pt states that the doctor told her that she could take 2 tablets a day. Pt reports there B/P is 135/84    Interventions:  Messaged PCP regarding medication confusion and pt deciding to take her previous fluid pills  Encouraged pt to not change her medications without letting PCP know    Plan:  Follow-up with pt tomorrow regarding medications and fluid     Todays [facility] Self-Management Care Plan was developed with the patients/caregivers input and was based on identified barriers from todays assessment.  Goals were written today with the patient/caregiver and the patient has agreed to work towards these goals to improve his/her overall well-being. Patient verbalized understanding of the care plan, goals, and all of today's instructions. Encouraged patient/caregiver to communicate with his/her physician and health care team about health conditions and the treatment plan.  Provided my contact information today and encouraged patient/caregiver to call me with any questions as needed.

## 2019-02-01 ENCOUNTER — OUTPATIENT CASE MANAGEMENT (OUTPATIENT)
Dept: ADMINISTRATIVE | Facility: OTHER | Age: 84
End: 2019-02-01

## 2019-02-01 NOTE — PROGRESS NOTES
Summary:  Spoke with pt regarding follow-up medication yesterday - pt states that she still has a rash on legs and states that the - pt reports that the fluid in her legs is gone down  and even her ankles were slimmer  - Pt state that she had gotten up earlier and had eaten her breakfast - Pt states she feels much better today - reports that her B/P was 135/84 -same as yesterday.  Pt states she is gong to enjoy the warmer wether this week-end.   Pt admits she was upset yesterday because of being unsure regarding the medication - Explained OPCM had sent a message to her PCP and cardiologist yesterday - asking for clarification - pt expressed relief.Pt denies any falls or SOB    Interventions:  Informed pt that cardiologist was planning to call today to clarify per PCP's note in the cart  Praised pt for taking and recording B/P     Plan:  Follow-up next week on medication correction  Assess for further educational opportunities    Todays [facility] Self-Management Care Plan was developed with the patients/caregivers input and was based on identified barriers from todays assessment.  Goals were written today with the patient/caregiver and the patient has agreed to work towards these goals to improve his/her overall well-being. Patient verbalized understanding of the care plan, goals, and all of today's instructions. Encouraged patient/caregiver to communicate with his/her physician and health care team about health conditions and the treatment plan.  Provided my contact information today and encouraged patient/caregiver to call me with any questions as needed.

## 2019-02-01 NOTE — TELEPHONE ENCOUNTER
----- Message from Janice Arias RN sent at 1/31/2019  3:36 PM CST -----  Contact: Heather Arias RN [facility]  Good Afternoon,    I just got off the phone with the above pt for a follow-up.  The pt stated she didn't know what was going on - stated she was supposed to get 3 fluid tablets   2.5 mg of some medication - she did not know -stated her niece went to the pharmacy to  the medication and that it was 75mg tablets (again did not know) and that they would cost $100 for 3 tablets because she could not get them till 2-6-19.  Pt stated again she doesn't know what is going on but decided to start taking her previous fluid tablets - Lasix (40 mg) 2x a day.  I would guess she has been doing this for a couple of days but reports she legs have gone down but she is very very tired.    This is FYI - please reach out to the patient as she seems confused as to what was ordered mg wise, cost  - instructed her when changing medications to always let the PCP or doctor know and let her know I would let you know.  Thank you for your time and attention in this matter.    Best Regards,    Heather ([place]) Hugo NIX BSN RN   Outpatient Care Management  [phone]

## 2019-02-01 NOTE — TELEPHONE ENCOUNTER
Chart reviewed, looks like cardiology department will try to call patient in the morning.  My summary of events is as follows it appears......    Please give patient a call in the morning as you have planned    Looks like he recently saw her cardiologist who advised her to increase the Lasix from 40 mg a day to 80 mg a day just for a couple of days BUT THEN ALSO LOOKS LIKE HE GAVE HER A PRESCRIPTION ON 01/29 THE PRESCRIPTION FOR THE METOLAZONE 2.5 MG TO TAKE WITH THE LASIX FOR JUST 3 DAYS        Please clarify and ask patient what she has been doing lately with the Lasix.  Did she take a double dose of Lasix, 80 mg, for a couple of days?

## 2019-02-05 ENCOUNTER — OUTPATIENT CASE MANAGEMENT (OUTPATIENT)
Dept: ADMINISTRATIVE | Facility: OTHER | Age: 84
End: 2019-02-05

## 2019-02-05 NOTE — PROGRESS NOTES
Summary:  LCSW contacted pt for follow up. PT stated she is not feeling at her best today. She was preparing to get herself ready for her 11:30am, Dermatology appointment. PT stated if she continues to feel bad then she will get herself to the ED. LCSW will check up on patient later this week.     Interventions:  Encourage compliance and communication with medical providers    Plan:  Discuss if made contact with with [employer] or Healthy course meals  Explore housing options - if decided to remain home or possible transition elsewhere.    Todays [facility] Self-Management Care Plan was developed with the patients/caregivers input and was based on identified barriers from todays assessment.  Goals were written today with the patient/caregiver and the patient has agreed to work towards these goals to improve his/her overall well-being. Patient verbalized understanding of the care plan, goals, and all of today's instructions. Encouraged patient/caregiver to communicate with his/her physician and health care team about health conditions and the treatment plan.  Provided my contact information today and encouraged patient/caregiver to call me with any questions as needed.

## 2019-02-07 ENCOUNTER — OUTPATIENT CASE MANAGEMENT (OUTPATIENT)
Dept: ADMINISTRATIVE | Facility: OTHER | Age: 84
End: 2019-02-07

## 2019-02-07 ENCOUNTER — TELEPHONE (OUTPATIENT)
Dept: CARDIOLOGY | Facility: CLINIC | Age: 84
End: 2019-02-07

## 2019-02-07 NOTE — PROGRESS NOTES
Summary:  Spoke with pt regarding follow-up - pt states that she was supposed to see the doctors on Monday and Tuesday and she was unable to see them because she was feeling bad - pt states that she is getting to the point she is discussed with her health.  Pt states she now plans to catch a cab.  Pt states that she washes the rash on her leg with dial soap and the rash is healing.Pt states that she was out of it yesterday and she just wanted to sleep.  Pt did not tell LCSW about her transportation issues at the beginning of this week.  Pt states that she is busy taking her medication.  Pt states that her leg swells. Pt states that she continues to take one Lasix a day.  Pt states that she has a granddaughter in [facility] and she wants the pt to come out there to stay with her. Pt states she feels like the doctors are not doing any thing for -pt states the heart doctor didn't say anything about her legs and follow-up- pt states the doctors will not explain things to her - pt kept repeating the same things  - pt asked why they don't tell her to come back -confused about what test happened with the vascular surgery  - pt states that she hurts all over-After explanations -pt did say OPCM RN was helpful    Interventions:  Reviewed appts. With pt  Explained that if the doctor told her do not put anything on the rash and sis not make another appointment - he did not think  Pt would have to see him again  Called LCSW regarding pt crying and transportation issue  Explained the difference in Vascular surgery doctor and cardiologist - pt thought they worked together   Explained test done by vascular surgeon - pt thought she needed to go back to  him    Plan:  Follow-up next week  Have LCSW call pt and assess    Todays OPCM Self-Management Care Plan was developed with the patients/caregivers input and was based on identified barriers from todays assessment.  Goals were written today with the patient/caregiver and the  patient has agreed to work towards these goals to improve his/her overall well-being. Patient verbalized understanding of the care plan, goals, and all of today's instructions. Encouraged patient/caregiver to communicate with his/her physician and health care team about health conditions and the treatment plan.  Provided my contact information today and encouraged patient/caregiver to call me with any questions as needed.

## 2019-02-07 NOTE — TELEPHONE ENCOUNTER
Called pt no answer     ef        ----- Message from Santy Campbell LCSW sent at 2/7/2019  2:00 PM CST -----  Dr. Humphrey:    I spoke with this patient today for follow up in Outpatient Case Management. She continues to report having swelling in her legs. She is only taking the Lasix once a day. I see on 1/29, you prescribed her Metolazone, but that Rx was not sent to her pharmacy. She is unsure of what to do and is asking if you or staff can contact her for further guidance. She denies any shortness in breath or chest pains. She keeps her feet alleviated as best as she can and is careful of her diet.     Please advise patient of your recommendations.    Thank you!  Celia Campbell LCSW  [id number]  Outpatient Complex Care Management

## 2019-02-07 NOTE — PROGRESS NOTES
Summary:  Case consult with OPCM-RN. Pt stated she is so confused on what to do and wants guidance from her cardiologist. Her surrogate son, Jas, did not show up to transport her to [facility] on Tuesday. However, yesterday she missed her appointment with PCP because she was feeling bad. Pt stated she wants further clarification on what to do for managing her health. Pt desires to continue to stay in her home, but made reference to maybe one day to visit her granddaughter who resides in [place]. Pt desires SOB or CP; but reports to continue having swelling in her legs. No other concerns were voiced at this time. LCSW will follow up with pt on tomorrow.     Interventions:  Collaborate with OPCM-RN  Collaborate with Cardio      Plan:  Discuss if made contact with with [employer] or Healthy course meals    Todays [facility] Self-Management Care Plan was developed with the patients/caregivers input and was based on identified barriers from todays assessment.  Goals were written today with the patient/caregiver and the patient has agreed to work towards these goals to improve his/her overall well-being. Patient verbalized understanding of the care plan, goals, and all of today's instructions. Encouraged patient/caregiver to communicate with his/her physician and health care team about health conditions and the treatment plan.  Provided my contact information today and encouraged patient/caregiver to call me with any questions as needed.

## 2019-02-08 ENCOUNTER — TELEPHONE (OUTPATIENT)
Dept: CARDIOLOGY | Facility: CLINIC | Age: 84
End: 2019-02-08

## 2019-02-08 NOTE — TELEPHONE ENCOUNTER
Spoke with pt, says shes doing ok, feet swell when she walks to long, adv to see podietry, and derm   ef    ----- Message from Eda Burk sent at 2/8/2019  8:14 AM CST -----  Contact: Self/ [phone]  Pt returning call to office. Thank you.

## 2019-02-12 ENCOUNTER — OUTPATIENT CASE MANAGEMENT (OUTPATIENT)
Dept: ADMINISTRATIVE | Facility: OTHER | Age: 84
End: 2019-02-12

## 2019-02-12 NOTE — PROGRESS NOTES
Summary:  LCSW contacted pt for follow-up. PT stated she is doing fair. She visited the dermatologist on yesterday and was prescribed a cream. Pt stated she is having a little trouble breathing this morning but believes it is due to her doing too much around the house. However, she is taking a break and is noticing her breathing is getting better. Pt is aware of when to contact medical providers for follow up. Pt stated she has the information on the [employer] and healthy course meals; however, she has not made contact with them. She plans to do so within the next few days. No other concerns were voiced; LCSW will follow up with pt within 2 weeks. '    Interventions:  Collaborated with OPCM-RN  Food resources - encouraged making contact with providers; encouraged family involvement in care  Encouraged to seek medical intervention if s/s worsen.    Plan:  Re-assess for further needs    Today[facility] Self-Management Care Plan was developed with the patients/caregivers input and was based on identified barriers from todays assessment.  Goals were written today with the patient/caregiver and the patient has agreed to work towards these goals to improve his/her overall well-being. Patient verbalized understanding of the care plan, goals, and all of today's instructions. Encouraged patient/caregiver to communicate with his/her physician and health care team about health conditions and the treatment plan.  Provided my contact information today and encouraged patient/caregiver to call me with any questions as needed.

## 2019-02-14 ENCOUNTER — OUTPATIENT CASE MANAGEMENT (OUTPATIENT)
Dept: ADMINISTRATIVE | Facility: OTHER | Age: 84
End: 2019-02-14

## 2019-02-14 NOTE — PROGRESS NOTES
Summary:  Spoke with pt regarding follow-up - pt stated she was not feeling well today - states her stomach hurts and leges are swelling up and down but pt did report that her rash is gone - pt denies any SOB - pt states that she had only oatmeal and peaches today.  Pt states that her stomach hurts - reports she feels a little dizzy - states also she has been sitting on the sofa.  Pt states that she is trying to decide if she needs to go because she feels bad. Pt reports that her niece is coming back over after work at 4:30 to check on her and see if she needs to go to Urgent care.Asked pt to take B/P - pt asked to take B/P and call OPCM back    Interventions:  Instructed pt to take B/P whenever she felt dizzy   Encourage pt that if she felt that bad she should go to Urgent Care  Encouraged pt to take B/P   Called pt back 2 x's  to see what B/P was when pt did not call- no answer      Plan:  Follow-up tomorrow on B/P with pt.    Todays OPCM Self-Management Care Plan was developed with the patients/caregivers input and was based on identified barriers from todays assessment.  Goals were written today with the patient/caregiver and the patient has agreed to work towards these goals to improve his/her overall well-being. Patient verbalized understanding of the care plan, goals, and all of today's instructions. Encouraged patient/caregiver to communicate with his/her physician and health care team about health conditions and the treatment plan.  Provided my contact information today and encouraged patient/caregiver to call me with any questions as needed.

## 2019-02-15 ENCOUNTER — OUTPATIENT CASE MANAGEMENT (OUTPATIENT)
Dept: ADMINISTRATIVE | Facility: OTHER | Age: 84
End: 2019-02-15

## 2019-02-15 NOTE — PROGRESS NOTES
Summary:hat  Spoke  with regarding follow-up -pt states that she feels much better today reports that she took a fluid pill and is the bathroom al the time. When asked B/P pt stated she had not taken it yet-pt states that her B/P was 137/60 - pt states that she continues on a low salt diet - pt states her legs are swollen today but she has been up for a wile but sheis going to lie down when hangs up.  Pt states today the R leg is worse than the left - pt states they hurt now but last week they didn't -pt states though her stomach and her breathing are much better today.Pt says she is on a low salt diet.  Pt also reports that if her legs are still swollwn and hurting on Monday she will call PCP.the patient the patient  Pt is going to rest    Interventions:  Asked pt to take B/P -OPCMN RN will wait  Encouraged pt to rest and put her legs up    Plan:  Follow-up next week on B/P  Assess nutritional habits and salt intake    Todays OPCM Self-Management Care Plan was developed with the patients/caregivers input and was based on identified barriers from todays assessment.  Goals were written today with the patient/caregiver and the patient has agreed to work towards these goals to improve his/her overall well-being. Patient verbalized understanding of the care plan, goals, and all of today's instructions. Encouraged patient/caregiver to communicate with his/her physician and health care team about health conditions and the treatment plan.  Provided my contact information today and encouraged patient/caregiver to call me with any questions as needed.

## 2019-02-22 ENCOUNTER — OUTPATIENT CASE MANAGEMENT (OUTPATIENT)
Dept: ADMINISTRATIVE | Facility: OTHER | Age: 84
End: 2019-02-22

## 2019-02-22 NOTE — PROGRESS NOTES
Summary:  Spoke with pt  regarding follow-up -pt states that she is not feeling up to par - going to talk PCP next Friday about her health.  Want to tell him that's very important - state she has been thinking about it and thinking about it -states that her health is not too good - didn't check it today because her health is not that good - Pt states that the only relative here is her nieces and her brother-in-law's on her  side. Pt states that her knees are still swollen - r one is worse than the left - pt states she uses a cold compress and in the morning it goes down and in the evening the swelling comes back - Pt states that her rash is gone but she wants to ask him about her pigmentation - states she is going to call him on Monday.  Pt denies any falls or SOB since last conversation.. Pt states that she doesn't know she doesn't feel like herself anymore but states she has a good appetite  claims to eating 3 meals a day with a snack in between - pt did not remember talking to the LCSW but then when given dates she stated that the OPCM RN abd LCSW sounds alike - pt stated she had us confused.  Pt kept stating she doesn't like the feeling coming over her - worried about her health  - pt states she is thinking about going away to a hotel but is scared - kept saying she doesn't like the feeling - Pt reports that she is supposed to get her hair done tomorrow but she doesn't feel like it - but then then said she might.  Pt thanked OPCM RN for listening to her     Interventions:  Provided support  Listened to pt - as she talked and repeated that she had an important thing to discuss  Encouraged pt to speak with PCP about her feelings  Encouraged pt to try to get out tomorrow and go and gt her hair done  Reviewed Pt's upcoming appt's    Plan:  Follow-up on 3-4 after pt's appr with PCP on 3-1  Assess nutritionall habits    Todays [facility] Self-Management Care Plan was developed with the patients/caregivers input  and was based on identified barriers from todays assessment.  Goals were written today with the patient/caregiver and the patient has agreed to work towards these goals to improve his/her overall well-being. Patient verbalized understanding of the care plan, goals, and all of today's instructions. Encouraged patient/caregiver to communicate with his/her physician and health care team about health conditions and the treatment plan.  Provided my contact information today and encouraged patient/caregiver to call me with any questions as needed.

## 2019-02-26 ENCOUNTER — OUTPATIENT CASE MANAGEMENT (OUTPATIENT)
Dept: ADMINISTRATIVE | Facility: OTHER | Age: 84
End: 2019-02-26

## 2019-02-26 NOTE — PROGRESS NOTES
"Summary:  LCSW contacted pt for follow up. PT stated she is just feeing tired and not feeling well. Pt continued to state that she is holding "onto this sickness" ever since her  passed away 5 years ago. Pt did not want to disclose what the "sickness" was; however, she wants to discuss it with her PCP on Friday. Pt reports that her family friend/surrogate son, Jas Patricio will be coming over to her home later today to pay the landscapers and she may ask him to go the hospital or to Urgent Care. PT stated she is fearful of getting admitted but did not disclose why she feels that way. Pt also wanted to update her emergency contacts with the removal of her niece, Janeen and including Jas to her ICE contacts. No other concerns were voiced at this time.      Interventions:  Encourage communication with providers    Plan:  Re-assess for further needs    Todays OPCM Self-Management Care Plan was developed with the patients/caregivers input and was based on identified barriers from todays assessment.  Goals were written today with the patient/caregiver and the patient has agreed to work towards these goals to improve his/her overall well-being. Patient verbalized understanding of the care plan, goals, and all of today's instructions. Encouraged patient/caregiver to communicate with his/her physician and health care team about health conditions and the treatment plan.  Provided my contact information today and encouraged patient/caregiver to call me with any questions as needed.  "

## 2019-03-01 ENCOUNTER — LAB VISIT (OUTPATIENT)
Dept: LAB | Facility: HOSPITAL | Age: 84
End: 2019-03-01
Attending: INTERNAL MEDICINE
Payer: MEDICARE

## 2019-03-01 ENCOUNTER — OFFICE VISIT (OUTPATIENT)
Dept: FAMILY MEDICINE | Facility: CLINIC | Age: 84
End: 2019-03-01
Payer: MEDICARE

## 2019-03-01 VITALS — WEIGHT: 178.56 LBS | BODY MASS INDEX: 31.63 KG/M2

## 2019-03-01 DIAGNOSIS — E11.22 CONTROLLED TYPE 2 DIABETES MELLITUS WITH STAGE 3 CHRONIC KIDNEY DISEASE, WITHOUT LONG-TERM CURRENT USE OF INSULIN: Primary | ICD-10-CM

## 2019-03-01 DIAGNOSIS — N18.30 CKD STAGE 3 DUE TO TYPE 2 DIABETES MELLITUS: ICD-10-CM

## 2019-03-01 DIAGNOSIS — N18.30 CONTROLLED TYPE 2 DIABETES MELLITUS WITH STAGE 3 CHRONIC KIDNEY DISEASE, WITHOUT LONG-TERM CURRENT USE OF INSULIN: ICD-10-CM

## 2019-03-01 DIAGNOSIS — I70.422 ATHEROSCLEROSIS OF AUTOLOGOUS VEIN BYPASS GRAFT OF LEFT LOWER EXTREMITY WITH REST PAIN: ICD-10-CM

## 2019-03-01 DIAGNOSIS — M25.50 POLYARTHRALGIA: ICD-10-CM

## 2019-03-01 DIAGNOSIS — I25.118 CORONARY ARTERY DISEASE WITH STABLE ANGINA PECTORIS, UNSPECIFIED VESSEL OR LESION TYPE, UNSPECIFIED WHETHER NATIVE OR TRANSPLANTED HEART: ICD-10-CM

## 2019-03-01 DIAGNOSIS — I10 ESSENTIAL HYPERTENSION: ICD-10-CM

## 2019-03-01 DIAGNOSIS — F41.9 ANXIETY DISORDER, UNSPECIFIED TYPE: ICD-10-CM

## 2019-03-01 DIAGNOSIS — E11.22 CKD STAGE 3 DUE TO TYPE 2 DIABETES MELLITUS: ICD-10-CM

## 2019-03-01 DIAGNOSIS — E55.9 VITAMIN D DEFICIENCY DISEASE: ICD-10-CM

## 2019-03-01 DIAGNOSIS — I70.0 AORTIC ATHEROSCLEROSIS: ICD-10-CM

## 2019-03-01 DIAGNOSIS — N18.30 CONTROLLED TYPE 2 DIABETES MELLITUS WITH STAGE 3 CHRONIC KIDNEY DISEASE, WITHOUT LONG-TERM CURRENT USE OF INSULIN: Primary | ICD-10-CM

## 2019-03-01 DIAGNOSIS — E11.22 CONTROLLED TYPE 2 DIABETES MELLITUS WITH STAGE 3 CHRONIC KIDNEY DISEASE, WITHOUT LONG-TERM CURRENT USE OF INSULIN: ICD-10-CM

## 2019-03-01 DIAGNOSIS — F32.A DEPRESSION, UNSPECIFIED DEPRESSION TYPE: ICD-10-CM

## 2019-03-01 DIAGNOSIS — D64.9 ANEMIA, UNSPECIFIED TYPE: ICD-10-CM

## 2019-03-01 DIAGNOSIS — F39 MOOD DISORDER: ICD-10-CM

## 2019-03-01 DIAGNOSIS — I50.42 SYSTOLIC AND DIASTOLIC CHF, CHRONIC: ICD-10-CM

## 2019-03-01 DIAGNOSIS — I27.20 PULMONARY HYPERTENSION: ICD-10-CM

## 2019-03-01 LAB
ALBUMIN SERPL BCP-MCNC: 3.7 G/DL
ALP SERPL-CCNC: 133 U/L
ALT SERPL W/O P-5'-P-CCNC: 17 U/L
ANION GAP SERPL CALC-SCNC: 11 MMOL/L
AST SERPL-CCNC: 35 U/L
BASOPHILS # BLD AUTO: 0.04 K/UL
BASOPHILS NFR BLD: 0.9 %
BILIRUB SERPL-MCNC: 1.2 MG/DL
BUN SERPL-MCNC: 12 MG/DL
CALCIUM SERPL-MCNC: 9.5 MG/DL
CHLORIDE SERPL-SCNC: 103 MMOL/L
CO2 SERPL-SCNC: 23 MMOL/L
CREAT SERPL-MCNC: 1 MG/DL
DIFFERENTIAL METHOD: ABNORMAL
EOSINOPHIL # BLD AUTO: 0 K/UL
EOSINOPHIL NFR BLD: 0.9 %
ERYTHROCYTE [DISTWIDTH] IN BLOOD BY AUTOMATED COUNT: 16.3 %
EST. GFR  (AFRICAN AMERICAN): 58.9 ML/MIN/1.73 M^2
EST. GFR  (NON AFRICAN AMERICAN): 51.1 ML/MIN/1.73 M^2
ESTIMATED AVG GLUCOSE: 137 MG/DL
GLUCOSE SERPL-MCNC: 89 MG/DL
HBA1C MFR BLD HPLC: 6.4 %
HCT VFR BLD AUTO: 38.4 %
HGB BLD-MCNC: 11 G/DL
IMM GRANULOCYTES # BLD AUTO: 0.01 K/UL
IMM GRANULOCYTES NFR BLD AUTO: 0.2 %
LYMPHOCYTES # BLD AUTO: 1.7 K/UL
LYMPHOCYTES NFR BLD: 37.7 %
MCH RBC QN AUTO: 25 PG
MCHC RBC AUTO-ENTMCNC: 28.6 G/DL
MCV RBC AUTO: 87 FL
MONOCYTES # BLD AUTO: 0.8 K/UL
MONOCYTES NFR BLD: 17 %
NEUTROPHILS # BLD AUTO: 2 K/UL
NEUTROPHILS NFR BLD: 43.3 %
NRBC BLD-RTO: 0 /100 WBC
PLATELET # BLD AUTO: 218 K/UL
PMV BLD AUTO: 10.6 FL
POTASSIUM SERPL-SCNC: 4.4 MMOL/L
PROT SERPL-MCNC: 7.4 G/DL
RBC # BLD AUTO: 4.4 M/UL
SODIUM SERPL-SCNC: 137 MMOL/L
WBC # BLD AUTO: 4.53 K/UL

## 2019-03-01 PROCEDURE — 99999 PR PBB SHADOW E&M-EST. PATIENT-LVL I: CPT | Mod: PBBFAC,HCNC,, | Performed by: INTERNAL MEDICINE

## 2019-03-01 PROCEDURE — 99999 PR PBB SHADOW E&M-EST. PATIENT-LVL I: ICD-10-PCS | Mod: PBBFAC,HCNC,, | Performed by: INTERNAL MEDICINE

## 2019-03-01 PROCEDURE — 99214 OFFICE O/P EST MOD 30 MIN: CPT | Mod: HCNC,S$GLB,, | Performed by: INTERNAL MEDICINE

## 2019-03-01 PROCEDURE — 36415 COLL VENOUS BLD VENIPUNCTURE: CPT | Mod: HCNC,PO

## 2019-03-01 PROCEDURE — 99499 UNLISTED E&M SERVICE: CPT | Mod: HCNC,S$GLB,, | Performed by: INTERNAL MEDICINE

## 2019-03-01 PROCEDURE — 1101F PR PT FALLS ASSESS DOC 0-1 FALLS W/OUT INJ PAST YR: ICD-10-PCS | Mod: HCNC,CPTII,S$GLB, | Performed by: INTERNAL MEDICINE

## 2019-03-01 PROCEDURE — 99499 RISK ADDL DX/OHS AUDIT: ICD-10-PCS | Mod: HCNC,S$GLB,, | Performed by: INTERNAL MEDICINE

## 2019-03-01 PROCEDURE — 99214 PR OFFICE/OUTPT VISIT, EST, LEVL IV, 30-39 MIN: ICD-10-PCS | Mod: HCNC,S$GLB,, | Performed by: INTERNAL MEDICINE

## 2019-03-01 PROCEDURE — 1101F PT FALLS ASSESS-DOCD LE1/YR: CPT | Mod: HCNC,CPTII,S$GLB, | Performed by: INTERNAL MEDICINE

## 2019-03-01 PROCEDURE — 80053 COMPREHEN METABOLIC PANEL: CPT | Mod: HCNC

## 2019-03-01 PROCEDURE — 83036 HEMOGLOBIN GLYCOSYLATED A1C: CPT | Mod: HCNC

## 2019-03-01 PROCEDURE — 85025 COMPLETE CBC W/AUTO DIFF WBC: CPT | Mod: HCNC

## 2019-03-01 NOTE — PROGRESS NOTES
Chief complaint  follow-up     86-year-old black female with multiple medical problems.    Checking in 9 minutes late prior. Over 12 min lae this appt    Here for a general checkup.  Her last A1c was 6.0 in September.  She is followed by outside pain management.  She was already told to increase her gabapentin.  She has been seen by vascular and Cardiology review those notes.  She gets a cough and a tickle in the throat that does not bother heard at night.  She is not on an ACE-inhibitor.  We discussed using Claritin or for what is probably allergies.  Counseled at length and reviewed all recent issues.Total time over 25 minutes with over 50% counseling.            Hospital Course:   The patient was admitted to the hospital on 7/5 with a CC of Chest pain. She has known CAD with known branch disease from an echo 12/17.  The patient bumped her troponin to 7+. Cards was consulted. The patient was taken for LHC on 7/6. Per Dr. Bertrand note: Known branch disease in D4 by LHC 12/2017.   Repeat LHC with similar branch disease. Continue medical Rx. Ok for d/c. The patient's CC of CP resolved. She was requesting discharge to home. She will be discharged to home today with close cards follow up. Diet- low NA, ADA 1800 chaim diet. Follow up with Dr. Bertrand in one week. Activity as tolerated  ECHO:  CONCLUSIONS     1 - Mildly to moderately depressed left ventricular systolic function (EF 40-45%) Anteroseptal hypokinesis.     2 - Concentric hypertrophy.     3 - Biatrial enlargement.     4 - Restrictive LV filling pattern, indicating markedly elevated LAP (grade 3 diastolic dysfunction).     5 - Right ventricular enlargement with normal systolic function.     6 - Pulmonary hypertension. The estimated PA systolic pressure is 63 mmHg.     7 - Mild mitral regurgitation.     8 - Mild to moderate tricuspid regurgitation          Review of systems no chest pains or shortness of breath, no syncope or near-syncope, no other new myalgias  arthralgias except the left ankle pain.  No significantlower extremity edema    PAST MEDICAL HISTORY:                                                        1. Hypertension.                                                             2. Dyspnea, possibly secondary to pulmonary hypertension. Sleep study             negative. Has seen cardiology. Ultrasound and CT of chest are negative.            PFTs 2002 were essentially normal.                                           3. History of sinus bradycardia.                                             4. Angioedema on Mavik.                                                      5. Vertigo.                                                                  6. spinal stenosis, by history, 3 epidural injections in the past        per neurosurgery, Dr. Garcia.  Now seeing Dr Leon.                                                                    7. Allergic rhinitis.                                                                                                        8. IBS.                                                                      9. Carpal tunnel syndrome, status post surgery.                              10. Status post cholecystectomy, total hysterectomy, and left ovary                                                         11. History of iron deficiency anemia, normal EGD in 2002 and in 2000.       12. GERD, with hiatal hernia.                                                13. Osteoarthritis of the knees.                                             14. Severe hot flashes despite all forms of therapy and eval by GYN.         15. History of leg pain due to varicosities or spinal stenosis.              16. History of positive JAVID, mild.                                           17. Trigger fingers, with history of injections.                             18. Varicose veins with reflux on ultrasound 2003.                           19. Borderline abdominal  aneurysm at 2.5 cm on ultrasound .              20. Positive DSE, EKG portion, but indeterminate overall,. Neg nuclear stress ' And negative nuclear stress test . MILDLY ABNORMAL NUC 2017 EF 38%          21. Mild stenosis of the renal arteries by ultrasound.                       22. Diabetes  23. History of multitrauma , with fracture of the right arm, subsequent  DVT, and has been on Coumadin. She was discharged from the hospital around   2006. She did develop anemia, and has had several transfusions during   that stay. She also had pulmonary embolus associated with that DVT.    24. History of polyarthralgia with positive JAVID. She has been evaluated by [facility]. Seeing Dr Leon now  25. ANXIETY -suspected partial cause of hot flashes  27. DEPRESSION -    28. Vit D def  29.  Mild peripheral vascular disease buy testing but not felt significant to pursue further  30.  Neuropathy, seen by neurology and put on Cymbalta   31.  Systolic CHF -MILDLY ABNORMAL NUC 2017 EF 38%- Dr Humphrey   32.    pulm HTN on ECHO   33.   CAD -D1 branch disease the same as of cath  - med mgmt, EF 40, PAP in 60s.  90-95% blockages in D1 and D2, 50% RCA    ?colonoscopy    SOCIAL HISTORY:  She is  and lives with her spouse who has prostate   cancer.  She does not smoke cigarettes or drink alcohol. 2 sons .      Vitals, as above    Gen: no distress  EYES: conjunctiva clear, non-icteric, PERRL  ENT: nose clear, nasal mucosa normal, oropharynx clear and moist, teeth good  NECK:supple, thyroid non-palpable  RESP: effort is good, lungs clear  CV: heart RRR w/o murmur, gallops or rubs; no carotid bruits, trace pretibial edema  GI: abdomen soft, non-distended, non-tender,  MS: gait normal, no clubbing or cyanosis of the digits  SKIN: no rashes, warm to touch      Diagnoses and all orders for this visit:    Controlled type 2 diabetes mellitus with stage 3 chronic kidney  disease, without long-term current use of insulin  -     Comprehensive metabolic panel; Future  -     Hemoglobin A1c; Future  -     CBC auto differential; Future    Essential hypertension, chronic and stable    Anemia, unspecified type, reassess    Polyarthralgia    Pulmonary hypertension    Depression, unspecified depression type, apparently still on meds    Mood disorder    Coronary artery disease with stable angina pectoris, unspecified vessel or lesion type, unspecified whether native or transplanted heart    Systolic and diastolic CHF, chronic    Vitamin D deficiency disease    Aortic atherosclerosis, noted    CKD stage 3 due to type 2 diabetes mellitus, reassess    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

## 2019-03-04 ENCOUNTER — OUTPATIENT CASE MANAGEMENT (OUTPATIENT)
Dept: ADMINISTRATIVE | Facility: OTHER | Age: 84
End: 2019-03-04

## 2019-03-04 NOTE — PROGRESS NOTES
Summary:  1st missed follow-up    Interventions:  No answer unable to leave a message    Plan:  Follow-up next week on PCP aappt on 3-1 if no return call

## 2019-03-08 ENCOUNTER — OUTPATIENT CASE MANAGEMENT (OUTPATIENT)
Dept: ADMINISTRATIVE | Facility: OTHER | Age: 84
End: 2019-03-08

## 2019-03-08 ENCOUNTER — TELEPHONE (OUTPATIENT)
Dept: FAMILY MEDICINE | Facility: CLINIC | Age: 84
End: 2019-03-08

## 2019-03-08 NOTE — PROGRESS NOTES
Summary:  LCSW contacted pt for follow up. PT stated she is doing fair. She is having some pain and swelling in her toes. PT stated if she begins to feel worse then she'll visit ED/UC. PT asked for a message to be sent to PCP for the results of her lab work. No other concerns were voiced at this time. LCSW will follow up with pt within 2 weeks.     Interventions:  Collaborated with PCP  Collaborated with OPCM-RN    Plan:  Re-assess for further needs      Todays [facility] Self-Management Care Plan was developed with the patients/caregivers input and was based on identified barriers from todays assessment.  Goals were written today with the patient/caregiver and the patient has agreed to work towards these goals to improve his/her overall well-being. Patient verbalized understanding of the care plan, goals, and all of today's instructions. Encouraged patient/caregiver to communicate with his/her physician and health care team about health conditions and the treatment plan.  Provided my contact information today and encouraged patient/caregiver to call me with any questions as needed.

## 2019-03-08 NOTE — TELEPHONE ENCOUNTER
----- Message from Santy Campbell LCSW sent at 3/8/2019 11:13 AM CST -----  Dr. Bangura:    I just spoke with this patient for follow up with Outpatient Case Management. She is asking for a call from you or staff regarding the results of her result lab work.     Thank you for your assistance in meeting this patient's healthcare needs.     Thank you!  Celia Campbell LCSW  [id number]  Outpatient Complex Care Management

## 2019-03-08 NOTE — TELEPHONE ENCOUNTER
Blood counts are good.    Her A1c is 6.4% - this is consistent with prediabetes and averaging about the same for her. I advise low carb/sugar diet.     Her electrolytes and liver function are excellent.    Her kidney function is slightly declined, but better than it has been in the past. Its stable. Continue to monitor sugar intake and blood pressure for optimal kidney function.

## 2019-03-12 ENCOUNTER — OUTPATIENT CASE MANAGEMENT (OUTPATIENT)
Dept: ADMINISTRATIVE | Facility: OTHER | Age: 84
End: 2019-03-12

## 2019-03-12 NOTE — PROGRESS NOTES
Summary:  Spoke with pt regarding follow-up -pt states that she is going back to the dermatologist because she thinks the rash is coming up on her left leg -  Pt states her leg is dry and wants to know is she should use the cream again. Pt states she couldn't sleep last night so she just got up and had not taken her B/P yet today because she just got up.  Pt states she knows it is not high but if she takes its and it is high she states she knows what to do - Pt states she took her fluid pill just now when she got up. asked pt about her diet but pt states she doesn't think she can diet any more than she does.  Pt states she eats in the morning corn flakes , grits, and oatmeal.  Pt also states she does not like to cook either.  Pt states she not called [facility] yet- pt states that she is feeling better but her feet and legs still swell - Pt denies any falls or SOB    Interventions:  Encouraged pt to take and record B/P daily  Discussed carbohydrates with pt - pt pt kept focusing on meat - will re-visit  Encouraged pt to rest and put feet up when swollen    Plan:  Follow-up on pt calling [facility]  Follow-up on pt -taking and recording B/P   Assess nutritional habits on next call- Pt's A1C is up - PCP recommending a low sugar -low carb.      Todays OPCM Self-Management Care Plan was developed with the patients/caregivers input and was based on identified barriers from todays assessment.  Goals were written today with the patient/caregiver and the patient has agreed to work towards these goals to improve his/her overall well-being. Patient verbalized understanding of the care plan, goals, and all of today's instructions. Encouraged patient/caregiver to communicate with his/her physician and health care team about health conditions and the treatment plan.  Provided my contact information today and encouraged patient/caregiver to call me with any questions as needed.

## 2019-03-19 ENCOUNTER — OUTPATIENT CASE MANAGEMENT (OUTPATIENT)
Dept: ADMINISTRATIVE | Facility: OTHER | Age: 84
End: 2019-03-19

## 2019-03-19 ENCOUNTER — HOSPITAL ENCOUNTER (INPATIENT)
Facility: HOSPITAL | Age: 84
LOS: 3 days | Discharge: HOME | DRG: 308 | End: 2019-03-22
Attending: EMERGENCY MEDICINE | Admitting: INTERNAL MEDICINE
Payer: MEDICARE

## 2019-03-19 DIAGNOSIS — R60.0 EDEMA OF LEFT LOWER EXTREMITY: ICD-10-CM

## 2019-03-19 DIAGNOSIS — I49.9 ARRHYTHMIA: ICD-10-CM

## 2019-03-19 DIAGNOSIS — K59.04 FUNCTIONAL CONSTIPATION: ICD-10-CM

## 2019-03-19 DIAGNOSIS — F39 MOOD DISORDER: ICD-10-CM

## 2019-03-19 DIAGNOSIS — I10 ESSENTIAL HYPERTENSION: ICD-10-CM

## 2019-03-19 DIAGNOSIS — M25.50 POLYARTHRALGIA: ICD-10-CM

## 2019-03-19 DIAGNOSIS — N18.30 CKD (CHRONIC KIDNEY DISEASE) STAGE 3, GFR 30-59 ML/MIN: Chronic | ICD-10-CM

## 2019-03-19 DIAGNOSIS — I70.422 ATHEROSCLEROSIS OF AUTOLOGOUS VEIN BYPASS GRAFT OF LEFT LOWER EXTREMITY WITH REST PAIN: ICD-10-CM

## 2019-03-19 DIAGNOSIS — I73.9 PVD (PERIPHERAL VASCULAR DISEASE): ICD-10-CM

## 2019-03-19 DIAGNOSIS — E11.00 TYPE 2 DIABETES MELLITUS WITH HYPEROSMOLARITY WITHOUT COMA, WITHOUT LONG-TERM CURRENT USE OF INSULIN: ICD-10-CM

## 2019-03-19 DIAGNOSIS — I50.32 CHRONIC DIASTOLIC HEART FAILURE: ICD-10-CM

## 2019-03-19 DIAGNOSIS — I07.1 SEVERE TRICUSPID REGURGITATION: ICD-10-CM

## 2019-03-19 DIAGNOSIS — I27.20 PULMONARY HYPERTENSION: ICD-10-CM

## 2019-03-19 DIAGNOSIS — G89.29 OTHER CHRONIC PAIN: ICD-10-CM

## 2019-03-19 DIAGNOSIS — I48.91 A-FIB: ICD-10-CM

## 2019-03-19 DIAGNOSIS — R07.9 CHEST PAIN: ICD-10-CM

## 2019-03-19 DIAGNOSIS — I70.0 AORTIC ATHEROSCLEROSIS: ICD-10-CM

## 2019-03-19 DIAGNOSIS — E55.9 VITAMIN D DEFICIENCY DISEASE: ICD-10-CM

## 2019-03-19 DIAGNOSIS — F33.1 DEPRESSION, MAJOR, RECURRENT, MODERATE: ICD-10-CM

## 2019-03-19 DIAGNOSIS — K21.9 GASTROESOPHAGEAL REFLUX DISEASE, ESOPHAGITIS PRESENCE NOT SPECIFIED: ICD-10-CM

## 2019-03-19 DIAGNOSIS — I50.42 SYSTOLIC AND DIASTOLIC CHF, CHRONIC: ICD-10-CM

## 2019-03-19 DIAGNOSIS — M71.22 SYNOVIAL CYST OF POPLITEAL SPACE (BAKER), LEFT KNEE: ICD-10-CM

## 2019-03-19 DIAGNOSIS — F41.9 ANXIETY DISORDER, UNSPECIFIED TYPE: ICD-10-CM

## 2019-03-19 DIAGNOSIS — N18.30 CKD (CHRONIC KIDNEY DISEASE) STAGE 3, GFR 30-59 ML/MIN: ICD-10-CM

## 2019-03-19 DIAGNOSIS — F32.A DEPRESSION, UNSPECIFIED DEPRESSION TYPE: Primary | ICD-10-CM

## 2019-03-19 PROBLEM — R79.89 ELEVATED BRAIN NATRIURETIC PEPTIDE (BNP) LEVEL: Status: ACTIVE | Noted: 2019-03-19

## 2019-03-19 PROBLEM — I50.33 ACUTE ON CHRONIC DIASTOLIC HEART FAILURE: Status: ACTIVE | Noted: 2017-12-28

## 2019-03-19 PROBLEM — I48.19 PERSISTENT ATRIAL FIBRILLATION: Status: ACTIVE | Noted: 2019-03-19

## 2019-03-19 PROBLEM — R79.89 ELEVATED TROPONIN I LEVEL: Status: ACTIVE | Noted: 2019-03-19

## 2019-03-19 PROBLEM — D72.819 LEUKOPENIA: Status: ACTIVE | Noted: 2019-03-19

## 2019-03-19 PROBLEM — R17 SERUM TOTAL BILIRUBIN ELEVATED: Status: ACTIVE | Noted: 2019-03-19

## 2019-03-19 LAB
ALBUMIN SERPL BCP-MCNC: 3.5 G/DL
ALP SERPL-CCNC: 138 U/L
ALT SERPL W/O P-5'-P-CCNC: 25 U/L
ANION GAP SERPL CALC-SCNC: 11 MMOL/L
ANION GAP SERPL CALC-SCNC: 9 MMOL/L
AORTIC ROOT ANNULUS: 2.51 CM
AORTIC VALVE CUSP SEPERATION: 1.44 CM
APTT BLDCRRT: 29.5 SEC
ASCENDING AORTA: 3.3 CM
AST SERPL-CCNC: 44 U/L
AV INDEX (PROSTH): 0.64
AV MEAN GRADIENT: 4.46 MMHG
AV PEAK GRADIENT: 7.62 MMHG
AV VALVE AREA: 2.27 CM2
AV VELOCITY RATIO: 0.81
BACTERIA #/AREA URNS HPF: ABNORMAL /HPF
BASOPHILS # BLD AUTO: 0.01 K/UL
BASOPHILS NFR BLD: 0.3 %
BILIRUB SERPL-MCNC: 1.4 MG/DL
BILIRUB UR QL STRIP: NEGATIVE
BNP SERPL-MCNC: 1925 PG/ML
BSA FOR ECHO PROCEDURE: 1.85 M2
BUN SERPL-MCNC: 19 MG/DL
BUN SERPL-MCNC: 20 MG/DL
CALCIUM SERPL-MCNC: 9.8 MG/DL
CALCIUM SERPL-MCNC: 9.8 MG/DL
CHLORIDE SERPL-SCNC: 105 MMOL/L
CHLORIDE SERPL-SCNC: 109 MMOL/L
CLARITY UR: CLEAR
CO2 SERPL-SCNC: 21 MMOL/L
CO2 SERPL-SCNC: 28 MMOL/L
COLOR UR: ABNORMAL
CREAT SERPL-MCNC: 1.3 MG/DL
CREAT SERPL-MCNC: 1.6 MG/DL
CV ECHO LV RWT: 0.5 CM
DIFFERENTIAL METHOD: ABNORMAL
DOP CALC AO PEAK VEL: 1.38 M/S
DOP CALC AO VTI: 25.03 CM
DOP CALC LVOT AREA: 3.56 CM2
DOP CALC LVOT DIAMETER: 2.13 CM
DOP CALC LVOT PEAK VEL: 1.12 M/S
DOP CALC LVOT STROKE VOLUME: 56.84 CM3
DOP CALCLVOT PEAK VEL VTI: 15.96 CM
E WAVE DECELERATION TIME: 162.31 MSEC
E/A RATIO: 3.81
ECHO LV POSTERIOR WALL: 1.21 CM (ref 0.6–1.1)
EOSINOPHIL # BLD AUTO: 0 K/UL
EOSINOPHIL NFR BLD: 0.6 %
ERYTHROCYTE [DISTWIDTH] IN BLOOD BY AUTOMATED COUNT: 17 %
ERYTHROCYTE [DISTWIDTH] IN BLOOD BY AUTOMATED COUNT: 17.1 %
EST. GFR  (AFRICAN AMERICAN): 33 ML/MIN/1.73 M^2
EST. GFR  (AFRICAN AMERICAN): 43 ML/MIN/1.73 M^2
EST. GFR  (NON AFRICAN AMERICAN): 29 ML/MIN/1.73 M^2
EST. GFR  (NON AFRICAN AMERICAN): 37 ML/MIN/1.73 M^2
FRACTIONAL SHORTENING: 14 % (ref 28–44)
GLUCOSE SERPL-MCNC: 112 MG/DL (ref 70–110)
GLUCOSE SERPL-MCNC: 117 MG/DL
GLUCOSE SERPL-MCNC: 97 MG/DL
GLUCOSE UR QL STRIP: NEGATIVE
HCT VFR BLD AUTO: 35.1 %
HCT VFR BLD AUTO: 36.6 %
HGB BLD-MCNC: 10.5 G/DL
HGB BLD-MCNC: 11.1 G/DL
HGB UR QL STRIP: ABNORMAL
HYALINE CASTS #/AREA URNS LPF: 4 /LPF
INR PPP: 1.4
INR PPP: 1.5
INTERVENTRICULAR SEPTUM: 1.22 CM (ref 0.6–1.1)
IVRT: 0.05 MSEC
KETONES UR QL STRIP: NEGATIVE
LA MAJOR: 6.33 CM
LA MINOR: 5.97 CM
LA WIDTH: 5.52 CM
LEFT ATRIUM SIZE: 5.29 CM
LEFT ATRIUM VOLUME INDEX: 84.2 ML/M2
LEFT ATRIUM VOLUME: 152.52 CM3
LEFT INTERNAL DIMENSION IN SYSTOLE: 4.18 CM (ref 2.1–4)
LEFT VENTRICLE DIASTOLIC VOLUME INDEX: 61.57 ML/M2
LEFT VENTRICLE DIASTOLIC VOLUME: 111.55 ML
LEFT VENTRICLE MASS INDEX: 126.3 G/M2
LEFT VENTRICLE SYSTOLIC VOLUME INDEX: 42.9 ML/M2
LEFT VENTRICLE SYSTOLIC VOLUME: 77.81 ML
LEFT VENTRICULAR INTERNAL DIMENSION IN DIASTOLE: 4.88 CM (ref 3.5–6)
LEFT VENTRICULAR MASS: 228.9 G
LEUKOCYTE ESTERASE UR QL STRIP: ABNORMAL
LYMPHOCYTES # BLD AUTO: 1.1 K/UL
LYMPHOCYTES NFR BLD: 30 %
MAGNESIUM SERPL-MCNC: 1.5 MG/DL
MCH RBC QN AUTO: 24.6 PG
MCH RBC QN AUTO: 24.9 PG
MCHC RBC AUTO-ENTMCNC: 29.9 G/DL
MCHC RBC AUTO-ENTMCNC: 30.3 G/DL
MCV RBC AUTO: 82 FL
MCV RBC AUTO: 82 FL
MICROSCOPIC COMMENT: ABNORMAL
MONOCYTES # BLD AUTO: 0.6 K/UL
MONOCYTES NFR BLD: 16 %
MV PEAK A VEL: 0.43 M/S
MV PEAK E VEL: 1.64 M/S
NEUTROPHILS # BLD AUTO: 1.9 K/UL
NEUTROPHILS NFR BLD: 53.1 %
NITRITE UR QL STRIP: NEGATIVE
PH UR STRIP: 5 [PH] (ref 5–8)
PISA TR MAX VEL: 4.19 M/S
PLATELET # BLD AUTO: 199 K/UL
PLATELET # BLD AUTO: 227 K/UL
PMV BLD AUTO: 10.9 FL
PMV BLD AUTO: 11.2 FL
POCT GLUCOSE: 108 MG/DL (ref 70–110)
POCT GLUCOSE: 112 MG/DL (ref 70–110)
POTASSIUM SERPL-SCNC: 4 MMOL/L
POTASSIUM SERPL-SCNC: 4.5 MMOL/L
PROT SERPL-MCNC: 7 G/DL
PROT UR QL STRIP: ABNORMAL
PROTHROMBIN TIME: 14.7 SEC
PROTHROMBIN TIME: 15.4 SEC
PV PEAK VELOCITY: 0.67 CM/S
RA MAJOR: 6 CM
RA PRESSURE: 8 MMHG
RA WIDTH: 5.15 CM
RBC # BLD AUTO: 4.27 M/UL
RBC # BLD AUTO: 4.45 M/UL
RBC #/AREA URNS HPF: 4 /HPF (ref 0–4)
RIGHT VENTRICULAR END-DIASTOLIC DIMENSION: 4 CM
RV TISSUE DOPPLER FREE WALL SYSTOLIC VELOCITY 1 (APICAL 4 CHAMBER VIEW): 6.68 M/S
SINUS: 2.55 CM
SODIUM SERPL-SCNC: 141 MMOL/L
SODIUM SERPL-SCNC: 142 MMOL/L
SP GR UR STRIP: 1 (ref 1–1.03)
STJ: 2.39 CM
TR MAX PG: 70.22 MMHG
TRICUSPID ANNULAR PLANE SYSTOLIC EXCURSION: 1.03 CM
TROPONIN I SERPL DL<=0.01 NG/ML-MCNC: 0.08 NG/ML
TROPONIN I SERPL DL<=0.01 NG/ML-MCNC: 0.08 NG/ML
TROPONIN I SERPL DL<=0.01 NG/ML-MCNC: 0.09 NG/ML
TROPONIN I SERPL DL<=0.01 NG/ML-MCNC: 0.09 NG/ML
TV REST PULMONARY ARTERY PRESSURE: 78 MMHG
URN SPEC COLLECT METH UR: ABNORMAL
UROBILINOGEN UR STRIP-ACNC: NEGATIVE EU/DL
WBC # BLD AUTO: 3.63 K/UL
WBC # BLD AUTO: 4.06 K/UL
WBC #/AREA URNS HPF: 8 /HPF (ref 0–5)

## 2019-03-19 PROCEDURE — 85730 THROMBOPLASTIN TIME PARTIAL: CPT | Mod: HCNC

## 2019-03-19 PROCEDURE — 99223 1ST HOSP IP/OBS HIGH 75: CPT | Mod: 25,HCNC,, | Performed by: INTERNAL MEDICINE

## 2019-03-19 PROCEDURE — 85610 PROTHROMBIN TIME: CPT | Mod: 91,HCNC

## 2019-03-19 PROCEDURE — 84484 ASSAY OF TROPONIN QUANT: CPT | Mod: 91,HCNC

## 2019-03-19 PROCEDURE — 83880 ASSAY OF NATRIURETIC PEPTIDE: CPT | Mod: HCNC

## 2019-03-19 PROCEDURE — 85025 COMPLETE CBC W/AUTO DIFF WBC: CPT | Mod: HCNC

## 2019-03-19 PROCEDURE — 85610 PROTHROMBIN TIME: CPT | Mod: HCNC

## 2019-03-19 PROCEDURE — 25000003 PHARM REV CODE 250: Mod: HCNC | Performed by: INTERNAL MEDICINE

## 2019-03-19 PROCEDURE — 83735 ASSAY OF MAGNESIUM: CPT | Mod: HCNC

## 2019-03-19 PROCEDURE — 81000 URINALYSIS NONAUTO W/SCOPE: CPT | Mod: HCNC

## 2019-03-19 PROCEDURE — 99285 EMERGENCY DEPT VISIT HI MDM: CPT | Mod: 25,HCNC

## 2019-03-19 PROCEDURE — 84484 ASSAY OF TROPONIN QUANT: CPT | Mod: HCNC

## 2019-03-19 PROCEDURE — 93010 EKG 12-LEAD: ICD-10-PCS | Mod: HCNC,,, | Performed by: INTERNAL MEDICINE

## 2019-03-19 PROCEDURE — 25500020 PHARM REV CODE 255: Mod: HCNC | Performed by: EMERGENCY MEDICINE

## 2019-03-19 PROCEDURE — 80048 BASIC METABOLIC PNL TOTAL CA: CPT | Mod: HCNC

## 2019-03-19 PROCEDURE — 21400001 HC TELEMETRY ROOM: Mod: HCNC

## 2019-03-19 PROCEDURE — 63600175 PHARM REV CODE 636 W HCPCS: Mod: HCNC | Performed by: EMERGENCY MEDICINE

## 2019-03-19 PROCEDURE — 82962 GLUCOSE BLOOD TEST: CPT | Mod: HCNC

## 2019-03-19 PROCEDURE — 93010 ELECTROCARDIOGRAM REPORT: CPT | Mod: HCNC,,, | Performed by: INTERNAL MEDICINE

## 2019-03-19 PROCEDURE — 36415 COLL VENOUS BLD VENIPUNCTURE: CPT | Mod: HCNC

## 2019-03-19 PROCEDURE — 80053 COMPREHEN METABOLIC PANEL: CPT | Mod: HCNC

## 2019-03-19 PROCEDURE — 25000003 PHARM REV CODE 250: Mod: HCNC | Performed by: EMERGENCY MEDICINE

## 2019-03-19 PROCEDURE — 85027 COMPLETE CBC AUTOMATED: CPT | Mod: HCNC

## 2019-03-19 PROCEDURE — 93005 ELECTROCARDIOGRAM TRACING: CPT | Mod: HCNC

## 2019-03-19 PROCEDURE — 99223 PR INITIAL HOSPITAL CARE,LEVL III: ICD-10-PCS | Mod: 25,HCNC,, | Performed by: INTERNAL MEDICINE

## 2019-03-19 RX ORDER — SPIRONOLACTONE 25 MG/1
25 TABLET ORAL DAILY
Status: DISCONTINUED | OUTPATIENT
Start: 2019-03-20 | End: 2019-03-20

## 2019-03-19 RX ORDER — AMIODARONE HYDROCHLORIDE 200 MG/1
400 TABLET ORAL 2 TIMES DAILY
Status: DISCONTINUED | OUTPATIENT
Start: 2019-03-19 | End: 2019-03-22 | Stop reason: HOSPADM

## 2019-03-19 RX ORDER — SODIUM CHLORIDE 0.9 % (FLUSH) 0.9 %
5 SYRINGE (ML) INJECTION
Status: DISCONTINUED | OUTPATIENT
Start: 2019-03-19 | End: 2019-03-22 | Stop reason: HOSPADM

## 2019-03-19 RX ORDER — FUROSEMIDE 10 MG/ML
40 INJECTION INTRAMUSCULAR; INTRAVENOUS 2 TIMES DAILY
Status: CANCELLED | OUTPATIENT
Start: 2019-03-19

## 2019-03-19 RX ORDER — LOSARTAN POTASSIUM 25 MG/1
100 TABLET ORAL DAILY
Status: DISCONTINUED | OUTPATIENT
Start: 2019-03-20 | End: 2019-03-22

## 2019-03-19 RX ORDER — OMEPRAZOLE 20 MG/1
20 CAPSULE, DELAYED RELEASE ORAL DAILY
Status: ON HOLD | COMMUNITY
End: 2019-03-22

## 2019-03-19 RX ORDER — ATORVASTATIN CALCIUM 10 MG/1
20 TABLET, FILM COATED ORAL DAILY
Status: DISCONTINUED | OUTPATIENT
Start: 2019-03-19 | End: 2019-03-22 | Stop reason: HOSPADM

## 2019-03-19 RX ORDER — ASPIRIN 325 MG
325 TABLET ORAL
Status: COMPLETED | OUTPATIENT
Start: 2019-03-19 | End: 2019-03-19

## 2019-03-19 RX ORDER — SPIRONOLACTONE 25 MG/1
25 TABLET ORAL DAILY
Status: ON HOLD | COMMUNITY
End: 2019-05-09 | Stop reason: HOSPADM

## 2019-03-19 RX ORDER — CLOPIDOGREL BISULFATE 75 MG/1
75 TABLET ORAL DAILY
Status: DISCONTINUED | OUTPATIENT
Start: 2019-03-19 | End: 2019-03-22 | Stop reason: HOSPADM

## 2019-03-19 RX ORDER — ATORVASTATIN CALCIUM 10 MG/1
20 TABLET, FILM COATED ORAL DAILY
Status: DISCONTINUED | OUTPATIENT
Start: 2019-03-20 | End: 2019-03-19

## 2019-03-19 RX ORDER — PANTOPRAZOLE SODIUM 40 MG/1
40 TABLET, DELAYED RELEASE ORAL DAILY
Status: DISCONTINUED | OUTPATIENT
Start: 2019-03-20 | End: 2019-03-22 | Stop reason: HOSPADM

## 2019-03-19 RX ORDER — ENOXAPARIN SODIUM 100 MG/ML
1 INJECTION SUBCUTANEOUS
Status: DISCONTINUED | OUTPATIENT
Start: 2019-03-19 | End: 2019-03-19

## 2019-03-19 RX ORDER — FUROSEMIDE 10 MG/ML
40 INJECTION INTRAMUSCULAR; INTRAVENOUS
Status: DISCONTINUED | OUTPATIENT
Start: 2019-03-19 | End: 2019-03-19

## 2019-03-19 RX ORDER — FUROSEMIDE 40 MG/1
40 TABLET ORAL DAILY
Status: DISCONTINUED | OUTPATIENT
Start: 2019-03-20 | End: 2019-03-21

## 2019-03-19 RX ORDER — GABAPENTIN 100 MG/1
100 CAPSULE ORAL 3 TIMES DAILY
Status: DISCONTINUED | OUTPATIENT
Start: 2019-03-19 | End: 2019-03-22 | Stop reason: HOSPADM

## 2019-03-19 RX ADMIN — AMIODARONE HYDROCHLORIDE 400 MG: 200 TABLET ORAL at 09:03

## 2019-03-19 RX ADMIN — APIXABAN 5 MG: 5 TABLET, FILM COATED ORAL at 05:03

## 2019-03-19 RX ADMIN — IOHEXOL 80 ML: 350 INJECTION, SOLUTION INTRAVENOUS at 08:03

## 2019-03-19 RX ADMIN — CLOPIDOGREL BISULFATE 75 MG: 75 TABLET ORAL at 11:03

## 2019-03-19 RX ADMIN — ASPIRIN 325 MG ORAL TABLET 325 MG: 325 PILL ORAL at 06:03

## 2019-03-19 RX ADMIN — ATORVASTATIN CALCIUM 20 MG: 10 TABLET, FILM COATED ORAL at 11:03

## 2019-03-19 RX ADMIN — GABAPENTIN 100 MG: 100 CAPSULE ORAL at 09:03

## 2019-03-19 RX ADMIN — ENOXAPARIN SODIUM 80 MG: 100 INJECTION SUBCUTANEOUS at 06:03

## 2019-03-19 RX ADMIN — FUROSEMIDE 40 MG: 10 INJECTION, SOLUTION INTRAVENOUS at 06:03

## 2019-03-19 NOTE — ASSESSMENT & PLAN NOTE
Reactive? No signs of infection and is well appearing  Is not neutropenic  CBC in AM  Monitor for signs of infection

## 2019-03-19 NOTE — HPI
86 year old female with coronary artery disease, diastolic heart failure and CKD stage 3 who presented with left sided chest pain of one day in evolution. Patient stated it felt like she couldn't breath. States pain is worse when she tries to take a deep breath. Denied radiation of pain. Endorses shortness of breath on exertion and worsening LE edema. States she is on diuretics but has not taken lately due to issues with incontinence. States compliance with low sodium diet but does not usually cook for herself. Denied fever, chills, rash, nausea, vomiting, abd pain, diarrhea, constipation.     In the ED, patient was tachycardic to low 100s but afebrile, normotensive and initially with adequate sats at room air. Did become hypoxic to 80% at one point but not sure if this was with ambulation. Will look into this. Has slight leukopenia, INR 1.4, AST and TBil slightly elevated, trop 0.086 and BNP of 1925.  EKG showed atrial fibrillation. CTA chest negative for PE but showed pleural effusion. BLE ultrasound negative for DVT. Patient admitted for trop trend, heart failure exacerbation and Cardiology consultation for newly diagnosed AFib.

## 2019-03-19 NOTE — ASSESSMENT & PLAN NOTE
New onset  Is rate controlled  Cardiology started oral amiodarone  On NOAC for stroke proph (discussed benefits of anticoagulation with patient. Also discussed risk for bleeding. Denied Hx of bleeding. Agreed with taking NOAC for stroke prophylaxis. Per cardiology, ok to stop ASA and continue clopidogrel to minimize risk for bleeding)  Cardiac monitoring  Tentative cardioversion in AM

## 2019-03-19 NOTE — H&P
"[facility] Medicine  History & Physical    Patient Name: Magaly Nunes  MRN: [id number]  Admission Date: 3/19/2019  Attending Physician: Madalyn Ramon, *   Primary Care Provider: Chris Bangura MD         Patient information was obtained from patient, past medical records and ER records.     Subjective:     Principal Problem:Persistent atrial fibrillation    Chief Complaint:   Chief Complaint   Patient presents with    Chest Pain     Pt reports she was folding clothes around 2am this morning and started to experience a sharp non-radiating midsternal chest pain accompanied with SOB. Pt reports moderate relied of her chest pain after drinking a Coca cola soda. Pt reports a history of MIx2, HTN,CHF, emphysema, and prediabetes. Pt is noncompliant with her medication regimen. Pt niece reports symptoms have been going on for sometime but pt has been "putting off" coming to the ED. Family member also reports increased swelling of pt BLE's.        HPI: 86 year old female with coronary artery disease, diastolic heart failure and CKD stage 3 who presented with left sided chest pain of one day in evolution. Patient stated it felt like she couldn't breath. States pain is worse when she tries to take a deep breath. Denied radiation of pain. Endorses shortness of breath on exertion and worsening LE edema. States she is on diuretics but has not taken lately due to issues with incontinence. States compliance with low sodium diet but does not usually cook for herself. Denied fever, chills, rash, nausea, vomiting, abd pain, diarrhea, constipation.     In the ED, patient was tachycardic to low 100s but afebrile, normotensive and initially with adequate sats at room air. Did become hypoxic to 80% at one point but not sure if this was with ambulation. Will look into this. Has slight leukopenia, INR 1.4, AST and TBil slightly elevated, trop 0.086 and BNP of 1925.  EKG showed atrial " fibrillation. CTA chest negative for PE but showed pleural effusion. BLE ultrasound negative for DVT. Patient admitted for trop trend, heart failure exacerbation and Cardiology consultation for newly diagnosed AFib.    Past Medical History:   Diagnosis Date    Anxiety disorder     Arthritis 12/28/2017    Carpal tunnel syndrome     CHF (congestive heart failure)     Chronic allergic rhinitis     Chronic pain 10/22/2012    CKD stage 3 due to type 2 diabetes mellitus 2/14/2017    Constipation - functional 4/10/2013    Depression     Diabetes mellitus type II     Hot flash not due to menopause     HTN (hypertension)     Hypokalemia 11/19/2012    Insomnia 4/10/2013    Knee pain, bilateral 7/24/2013    Personal history of DVT (deep vein thrombosis)     Polyarthralgia 7/16/2018    Pulmonary hypertension 2/1/2017    Renovascular hypertension 2/14/2017    Severe tricuspid regurgitation 2/14/2017    Spinal stenosis     Dr. Corrales    Spinal stenosis of lumbar region 2/3/2014    Systolic and diastolic CHF, chronic 7/16/2018    Vertigo        Past Surgical History:   Procedure Laterality Date    CATARACT EXTRACTION Bilateral     EYE SURGERY      FRACTURE SURGERY      HYSTERECTOMY      ORIF RADIUS & ULNA FRACTURES         Review of patient's allergies indicates:   Allergen Reactions    Ace inhibitors      Other reaction(s): Unknown    Aciphex  [rabeprazole]      Other reaction(s): Stomach upset    Aspirin      Other reaction(s): Unknown    Bextra  [valdecoxib]      Other reaction(s): Unknown    Cardizem  [diltiazem hcl]      Other reaction(s): Unknown    Clonidine      Other reaction(s): dry mouth.lip swelling    Mavik  [trandolapril]      Other reaction(s): Unknown    Nsaids (non-steroidal anti-inflammatory drug)      Other reaction(s): black spots on skin    Phenytoin sodium extended      Other reaction(s): Stomach upset    Tramadol      Other reaction(s): stomach pain       No current  facility-administered medications on file prior to encounter.      Current Outpatient Medications on File Prior to Encounter   Medication Sig    atorvastatin (LIPITOR) 20 MG tablet Take 1 tablet (20 mg total) by mouth once daily.    clopidogrel (PLAVIX) 75 mg tablet TAKE 1 TABLET(75 MG) BY MOUTH EVERY DAY    furosemide (LASIX) 40 MG tablet Take 1 tablet (40 mg total) by mouth once daily.    gabapentin (NEURONTIN) 100 MG capsule Take 1 capsule (100 mg total) by mouth 3 (three) times daily.    losartan (COZAAR) 100 MG tablet TAKE 1 TABLET BY MOUTH ONCE DAILY    omeprazole (PRILOSEC) 20 MG capsule Take 20 mg by mouth once daily.    potassium chloride SA (K-DUR,KLOR-CON) 20 MEQ tablet TAKE 1 TABLET(20 MEQ) BY MOUTH EVERY DAY    spironolactone (ALDACTONE) 25 MG tablet Take 25 mg by mouth once daily.    [DISCONTINUED] aspirin 81 MG Chew Take 1 tablet (81 mg total) by mouth once daily.    carvedilol (COREG) 6.25 MG tablet Take 1 tablet (6.25 mg total) by mouth 2 (two) times daily.    clopidogrel (PLAVIX) 75 mg tablet     DULoxetine (CYMBALTA) 60 MG capsule TAKE 1 CAPSULE(60 MG) BY MOUTH EVERY DAY    esomeprazole (NEXIUM) 40 MG capsule Take 1 capsule (40 mg total) by mouth before breakfast.    lidocaine-prilocaine (EMLA) cream     LORazepam (ATIVAN) 0.5 MG tablet Take 1 tablet (0.5 mg total) by mouth every 8 (eight) hours as needed for Anxiety. (caution-may cause sleepiness)    metOLazone (ZAROXOLYN) 2.5 MG tablet Take 1 tablet (2.5 mg total) by mouth daily as needed.    pantoprazole (PROTONIX) 20 MG tablet Take 2 tablets (40 mg total) by mouth once daily.    temazepam (RESTORIL) 30 mg capsule TAKE ONE CAPSULE BY MOUTH AT BEDTIME AS NEEDED FOR INSOMNIA    [DISCONTINUED] HYDROcodone-acetaminophen (NORCO) 5-325 mg per tablet Take 1 tablet by mouth every 6 (six) hours as needed for Pain.    [DISCONTINUED] tiZANidine 4 mg Cap Take by mouth.     Family History     Problem Relation (Age of Onset)    No Known  Problems Mother, Father, Sister, Brother, Maternal Aunt, Maternal Uncle, Paternal Aunt, Paternal Uncle, Maternal Grandmother, Maternal Grandfather, Paternal Grandmother, Paternal Grandfather        Tobacco Use    Smoking status: Never Smoker    Smokeless tobacco: Never Used   Substance and Sexual Activity    Alcohol use: No    Drug use: No    Sexual activity: No     Review of Systems   Constitutional: Negative.    HENT: Negative.    Eyes: Negative.    Respiratory: Positive for shortness of breath. Negative for cough and wheezing.    Cardiovascular: Positive for chest pain and leg swelling. Negative for palpitations.   Gastrointestinal: Negative.    Endocrine: Negative.    Genitourinary: Negative.    Musculoskeletal: Negative.    Skin: Negative.    Neurological: Negative.    Hematological: Negative.    Psychiatric/Behavioral: Negative.      Objective:     Vital Signs (Most Recent):  Temp: 98.7 °F (37.1 °C) (03/19/19 0717)  Pulse: 92 (03/19/19 1123)  Resp: 20 (03/19/19 1123)  BP: 139/73 (03/19/19 1101)  SpO2: 99 % (03/19/19 1123) Vital Signs (24h Range):  Temp:  [98.1 °F (36.7 °C)-98.7 °F (37.1 °C)] 98.7 °F (37.1 °C)  Pulse:  [] 92  Resp:  [20-27] 20  SpO2:  [80 %-100 %] 99 %  BP: (122-167)/(60-95) 139/73     Weight: 75.8 kg (167 lb)  Body mass index is 28.67 kg/m².    Physical Exam   Constitutional: She is oriented to person, place, and time. She appears well-developed. No distress.   Well appearing   Cardiovascular: Normal rate. An irregularly irregular rhythm present.   Pulmonary/Chest: Effort normal and breath sounds normal. She has no wheezes. She has no rales.   Abdominal: Soft. Bowel sounds are normal. She exhibits no distension. There is no tenderness.   Musculoskeletal: Normal range of motion. She exhibits edema (BLE).   Neurological: She is alert and oriented to person, place, and time.   Skin: Skin is warm and dry. Capillary refill takes less than 2 seconds. She is not diaphoretic.   Psychiatric:  "She has a normal mood and affect. Her behavior is normal. Judgment and thought content normal.   Nursing note and vitals reviewed.          Significant Labs: All pertinent labs within the past 24 hours have been reviewed.    Significant Imaging: I have reviewed all pertinent imaging results/findings within the past 24 hours.  I have reviewed and interpreted all pertinent imaging results/findings within the past 24 hours.    Assessment/Plan:     * Persistent atrial fibrillation    New onset  Is rate controlled  Cardiology started oral amiodarone  On NOAC for stroke proph (discussed benefits of anticoagulation with patient. Also discussed risk for bleeding. Denied Hx of bleeding. Agreed with taking NOAC for stroke prophylaxis. Per cardiology, ok to stop ASA and continue clopidogrel to minimize risk for bleeding)  Cardiac monitoring  Tentative cardioversion in AM         Acute on chronic diastolic heart failure    Has grade 3 diastolic dysfunction  Mild exacerbation 2/2 to medication non compliance  CT chest with "moderate" right pleural effusion? She has breath sounds at right base on my exam, maybe some crackles rather than absent sounds.   Patient already feeling better after a dose of furosemide IV given in the ED  O2 sat 100% at room air currently  Will resume home diuretic regimen (lasix + spironolactone) in AM  BMP in AM         Elevated troponin I level    Mild elevation but sure at risk  Treat BP and new onset AFib  On cardioprotective regimen  Trend trop x 2  Is chest pain free       Elevated brain natriuretic peptide (BNP) level    2/2 volume overload  On diuretic treatment       Serum total bilirubin elevated    abd exam benign  Likely from heart failure  CMP in AM       Leukopenia    Reactive? No signs of infection and is well appearing  Is not neutropenic  CBC in AM  Monitor for signs of infection         Chest pain    Described pain sounds more like MSK  No evidence of trauma, pneumothorax, consolitaion " or fracture at affected area.   Mild elevation of troponin not consistent with ACS  States chest pain resolved  On cardioprotective regimen pending trop trend       Essential hypertension    Currently at goal  Resume home regimen         VTE Risk Mitigation (From admission, onward)        Ordered     apixaban tablet 5 mg  2 times daily      03/19/19 9712             Claudia Christina MD  Department of Hospital Medicine   [facility]

## 2019-03-19 NOTE — ASSESSMENT & PLAN NOTE
New Dx. Rates ok. Add po amiodarone and eliquis. Stop ASA to avoid bleeding risk with triple therapy. Check echo. AMADO/CV in AM

## 2019-03-19 NOTE — ASSESSMENT & PLAN NOTE
Mild elevation but sure at risk  Treat BP and new onset AFib  On cardioprotective regimen  Trend trop x 2  Is chest pain free

## 2019-03-19 NOTE — SUBJECTIVE & OBJECTIVE
Past Medical History:   Diagnosis Date    Anxiety disorder     Arthritis 12/28/2017    Carpal tunnel syndrome     CHF (congestive heart failure)     Chronic allergic rhinitis     Chronic pain 10/22/2012    CKD stage 3 due to type 2 diabetes mellitus 2/14/2017    Constipation - functional 4/10/2013    Depression     Diabetes mellitus type II     Hot flash not due to menopause     HTN (hypertension)     Hypokalemia 11/19/2012    Insomnia 4/10/2013    Knee pain, bilateral 7/24/2013    Personal history of DVT (deep vein thrombosis)     Polyarthralgia 7/16/2018    Pulmonary hypertension 2/1/2017    Renovascular hypertension 2/14/2017    Severe tricuspid regurgitation 2/14/2017    Spinal stenosis     Dr. Corrales    Spinal stenosis of lumbar region 2/3/2014    Systolic and diastolic CHF, chronic 7/16/2018    Vertigo        Past Surgical History:   Procedure Laterality Date    CATARACT EXTRACTION Bilateral     EYE SURGERY      FRACTURE SURGERY      HYSTERECTOMY      ORIF RADIUS & ULNA FRACTURES         Review of patient's allergies indicates:   Allergen Reactions    Ace inhibitors      Other reaction(s): Unknown    Aciphex  [rabeprazole]      Other reaction(s): Stomach upset    Aspirin      Other reaction(s): Unknown    Bextra  [valdecoxib]      Other reaction(s): Unknown    Cardizem  [diltiazem hcl]      Other reaction(s): Unknown    Clonidine      Other reaction(s): dry mouth.lip swelling    Mavik  [trandolapril]      Other reaction(s): Unknown    Nsaids (non-steroidal anti-inflammatory drug)      Other reaction(s): black spots on skin    Phenytoin sodium extended      Other reaction(s): Stomach upset    Tramadol      Other reaction(s): stomach pain       No current facility-administered medications on file prior to encounter.      Current Outpatient Medications on File Prior to Encounter   Medication Sig    aspirin 81 MG Chew Take 1 tablet (81 mg total) by mouth once daily.     atorvastatin (LIPITOR) 20 MG tablet Take 1 tablet (20 mg total) by mouth once daily.    clopidogrel (PLAVIX) 75 mg tablet TAKE 1 TABLET(75 MG) BY MOUTH EVERY DAY    furosemide (LASIX) 40 MG tablet Take 1 tablet (40 mg total) by mouth once daily.    gabapentin (NEURONTIN) 100 MG capsule Take 1 capsule (100 mg total) by mouth 3 (three) times daily.    losartan (COZAAR) 100 MG tablet TAKE 1 TABLET BY MOUTH ONCE DAILY    omeprazole (PRILOSEC) 20 MG capsule Take 20 mg by mouth once daily.    potassium chloride SA (K-DUR,KLOR-CON) 20 MEQ tablet TAKE 1 TABLET(20 MEQ) BY MOUTH EVERY DAY    spironolactone (ALDACTONE) 25 MG tablet Take 25 mg by mouth once daily.    carvedilol (COREG) 6.25 MG tablet Take 1 tablet (6.25 mg total) by mouth 2 (two) times daily.    clopidogrel (PLAVIX) 75 mg tablet     DULoxetine (CYMBALTA) 60 MG capsule TAKE 1 CAPSULE(60 MG) BY MOUTH EVERY DAY    esomeprazole (NEXIUM) 40 MG capsule Take 1 capsule (40 mg total) by mouth before breakfast.    HYDROcodone-acetaminophen (NORCO) 5-325 mg per tablet Take 1 tablet by mouth every 6 (six) hours as needed for Pain.    lidocaine-prilocaine (EMLA) cream     LORazepam (ATIVAN) 0.5 MG tablet Take 1 tablet (0.5 mg total) by mouth every 8 (eight) hours as needed for Anxiety. (caution-may cause sleepiness)    metOLazone (ZAROXOLYN) 2.5 MG tablet Take 1 tablet (2.5 mg total) by mouth daily as needed.    pantoprazole (PROTONIX) 20 MG tablet Take 2 tablets (40 mg total) by mouth once daily.    temazepam (RESTORIL) 30 mg capsule TAKE ONE CAPSULE BY MOUTH AT BEDTIME AS NEEDED FOR INSOMNIA    tiZANidine 4 mg Cap Take by mouth.     Family History     Problem Relation (Age of Onset)    No Known Problems Mother, Father, Sister, Brother, Maternal Aunt, Maternal Uncle, Paternal Aunt, Paternal Uncle, Maternal Grandmother, Maternal Grandfather, Paternal Grandmother, Paternal Grandfather        Tobacco Use    Smoking status: Never Smoker    Smokeless  tobacco: Never Used   Substance and Sexual Activity    Alcohol use: No    Drug use: No    Sexual activity: No     Review of Systems   Constitution: Negative for decreased appetite.   HENT: Negative for ear discharge.    Eyes: Negative for blurred vision.   Respiratory: Negative for hemoptysis.    Endocrine: Negative for polyphagia.   Hematologic/Lymphatic: Negative for adenopathy.   Skin: Negative for color change.   Musculoskeletal: Negative for joint swelling.   Genitourinary: Negative for bladder incontinence.   Neurological: Negative for brief paralysis.   Psychiatric/Behavioral: Negative for hallucinations.   Allergic/Immunologic: Negative for hives.     Objective:     Vital Signs (Most Recent):  Temp: 98.7 °F (37.1 °C) (03/19/19 0717)  Pulse: 98 (03/19/19 0724)  Resp: 20 (03/19/19 0717)  BP: (!) 167/83 (03/19/19 0719)  SpO2: 97 % (03/19/19 0719) Vital Signs (24h Range):  Temp:  [98.1 °F (36.7 °C)-98.7 °F (37.1 °C)] 98.7 °F (37.1 °C)  Pulse:  [91-98] 98  Resp:  [20-27] 20  SpO2:  [95 %-97 %] 97 %  BP: (135-167)/(64-83) 167/83     Weight: 75.8 kg (167 lb)  Body mass index is 28.67 kg/m².    SpO2: 97 %  O2 Device (Oxygen Therapy): room air      Intake/Output Summary (Last 24 hours) at 3/19/2019 0944  Last data filed at 3/19/2019 0639  Gross per 24 hour   Intake --   Output 300 ml   Net -300 ml       Lines/Drains/Airways     Peripheral Intravenous Line                 Peripheral IV - Single Lumen 03/19/19 0610 Left Hand less than 1 day         Peripheral IV - Single Lumen 03/19/19 0705 Right Forearm less than 1 day                Physical Exam   Constitutional: She is oriented to person, place, and time. She appears well-developed and well-nourished.   HENT:   Head: Normocephalic and atraumatic.   Eyes: Conjunctivae are normal. Pupils are equal, round, and reactive to light.   Neck: Normal range of motion. Neck supple.   Cardiovascular: Normal rate, normal heart sounds and intact distal pulses. An irregularly  irregular rhythm present.   Pulmonary/Chest: Effort normal and breath sounds normal.   Abdominal: Soft. Bowel sounds are normal.   Musculoskeletal: Normal range of motion. She exhibits edema.   Neurological: She is alert and oriented to person, place, and time.   Skin: Skin is warm and dry.       Significant Labs: All pertinent lab results from the last 24 hours have been reviewed.    Significant Imaging: Echocardiogram:   2D echo with color flow doppler:   Results for orders placed or performed during the hospital encounter of 07/05/18   2D echo with color flow doppler   Result Value Ref Range    QEF 40 (A) 55 - 65    Mitral Valve Regurgitation MILD     Diastolic Dysfunction Yes (A)     Est. PA Systolic Pressure 63.35 (A)     Tricuspid Valve Regurgitation MILD TO MODERATE

## 2019-03-19 NOTE — CONSULTS
"[facility]  Cardiology  Consult Note    Patient Name: Magaly Nunes  MRN: [id number]  Admission Date: 3/19/2019  Hospital Length of Stay: 0 days  Code Status: Prior   Attending Provider: Madalyn Ramon, *   Consulting Provider: Heraclio Bertrand MD  Primary Care Physician: Chris Bangura MD  Principal Problem:<principal problem not specified>    Patient information was obtained from patient and ER records.     Consults  Subjective:     Chief Complaint:  CP, A-fib     HPI:       Pt reports she was folding clothes around 2am this morning and started to experience a sharp non-radiating midsternal chest pain accompanied with SOB. Pt reports moderate relied of her chest pain after drinking a Coca cola soda. Pt reports a history of MIx2, HTN,CHF, emphysema, and prediabetes. Pt is noncompliant with her medication regimen. Pt niece reports symptoms have been going on for sometime but pt has been "putting off" coming to the ED. Family member also reports increased swelling of pt BLE's.       EKG A-fib 94 NSSTT changes - new Dx  Currently denies CP, less SOB  BNP 1925  Troponin 0.08    Followed by Dr Humphrey    echo: 12-17  CONCLUSIONS     1 - Low normal to mildly depressed left ventricular systolic function (EF 50-55%).     2 - Concentric remodeling.     3 - Impaired LV relaxation, elevated LAP (grade 2 diastolic dysfunction).     4 - Low normal right ventricular systolic function .     5 - Pulmonary hypertension. The estimated PA systolic pressure is 67 mmHg.     6 - Mild to moderate mitral regurgitation.      C:      B. Summary/Post-Operative Diagnosis       Single vessel coronary artery disease.    Possible small branch diagonal likely cuprit for NSTEMI.     Diagnostic:          Patient has a right dominant coronary artery.        - Left Main Coronary Artery:             The LM is normal. There is VIJI 3 flow.     - Left Anterior Descending Artery:             The LAD is normal. There is " VIJI 3 flow.     - D4:             The D4 has a 90% stenosis. There is VIJI 3 flow. small vessel   2 mm     - Left Circumflex Artery:             The LCX is normal. There is VIJI 3 flow.     - Right Coronary Artery:             The RCA has luminal irregularities. There is VIJI 3 flow.     NST: 1-18  Impression: ABNORMAL MYOCARDIAL PERFUSION  1. There is evidence for mild myocardial ischemia in the anteroapical wall of the left ventricle.   2. The perfusion scan is free of evidence for myocardial injury.   3. Resting wall motion is physiologic.   4. There is resting LV dysfunction with a reduced ejection fraction of 46 %.   5. The ventricular volumes are normal at rest and stress.   6. The extracardiac distribution of radioactivity is normal.   7. When compared to the previous study from 01/03/2017, mild anteroapical ischemia now present     BECKY was stress:  8-18  CONCLUSIONS   Right:  The exercise component of this study demonstrates peripheral arterial disease in the right extremity that may account for symptoms if present.     Left:  Exercise study confirms peripheral arterial disease in the left extremity with some collateral flow during exercise.     1. Right lower extremity pressures and waveforms indicate mild arterial occlusive disease above the level of the ankle.  Toe pressures indicate severe arterial occlusive disease.  Exercise BECKY shows hemodynamically significant arterial occlusive disease.  2. Left lower extremity pressures and waveforms indicate mild arterial occlusive disease above the level of the ankle.  Toe pressures indicate severe arterial occlusive disease.  Exercise BECKY shows hemodynamically significant arterial occlusive disease.           Past Medical History:   Diagnosis Date    Anxiety disorder     Arthritis 12/28/2017    Carpal tunnel syndrome     CHF (congestive heart failure)     Chronic allergic rhinitis     Chronic pain 10/22/2012    CKD stage 3 due to type 2 diabetes mellitus  2/14/2017    Constipation - functional 4/10/2013    Depression     Diabetes mellitus type II     Hot flash not due to menopause     HTN (hypertension)     Hypokalemia 11/19/2012    Insomnia 4/10/2013    Knee pain, bilateral 7/24/2013    Personal history of DVT (deep vein thrombosis)     Polyarthralgia 7/16/2018    Pulmonary hypertension 2/1/2017    Renovascular hypertension 2/14/2017    Severe tricuspid regurgitation 2/14/2017    Spinal stenosis     Dr. Corrales    Spinal stenosis of lumbar region 2/3/2014    Systolic and diastolic CHF, chronic 7/16/2018    Vertigo        Past Surgical History:   Procedure Laterality Date    CATARACT EXTRACTION Bilateral     EYE SURGERY      FRACTURE SURGERY      HYSTERECTOMY      ORIF RADIUS & ULNA FRACTURES         Review of patient's allergies indicates:   Allergen Reactions    Ace inhibitors      Other reaction(s): Unknown    Aciphex  [rabeprazole]      Other reaction(s): Stomach upset    Aspirin      Other reaction(s): Unknown    Bextra  [valdecoxib]      Other reaction(s): Unknown    Cardizem  [diltiazem hcl]      Other reaction(s): Unknown    Clonidine      Other reaction(s): dry mouth.lip swelling    Mavik  [trandolapril]      Other reaction(s): Unknown    Nsaids (non-steroidal anti-inflammatory drug)      Other reaction(s): black spots on skin    Phenytoin sodium extended      Other reaction(s): Stomach upset    Tramadol      Other reaction(s): stomach pain       No current facility-administered medications on file prior to encounter.      Current Outpatient Medications on File Prior to Encounter   Medication Sig    aspirin 81 MG Chew Take 1 tablet (81 mg total) by mouth once daily.    atorvastatin (LIPITOR) 20 MG tablet Take 1 tablet (20 mg total) by mouth once daily.    clopidogrel (PLAVIX) 75 mg tablet TAKE 1 TABLET(75 MG) BY MOUTH EVERY DAY    furosemide (LASIX) 40 MG tablet Take 1 tablet (40 mg total) by mouth once daily.     gabapentin (NEURONTIN) 100 MG capsule Take 1 capsule (100 mg total) by mouth 3 (three) times daily.    losartan (COZAAR) 100 MG tablet TAKE 1 TABLET BY MOUTH ONCE DAILY    omeprazole (PRILOSEC) 20 MG capsule Take 20 mg by mouth once daily.    potassium chloride SA (K-DUR,KLOR-CON) 20 MEQ tablet TAKE 1 TABLET(20 MEQ) BY MOUTH EVERY DAY    spironolactone (ALDACTONE) 25 MG tablet Take 25 mg by mouth once daily.    carvedilol (COREG) 6.25 MG tablet Take 1 tablet (6.25 mg total) by mouth 2 (two) times daily.    clopidogrel (PLAVIX) 75 mg tablet     DULoxetine (CYMBALTA) 60 MG capsule TAKE 1 CAPSULE(60 MG) BY MOUTH EVERY DAY    esomeprazole (NEXIUM) 40 MG capsule Take 1 capsule (40 mg total) by mouth before breakfast.    HYDROcodone-acetaminophen (NORCO) 5-325 mg per tablet Take 1 tablet by mouth every 6 (six) hours as needed for Pain.    lidocaine-prilocaine (EMLA) cream     LORazepam (ATIVAN) 0.5 MG tablet Take 1 tablet (0.5 mg total) by mouth every 8 (eight) hours as needed for Anxiety. (caution-may cause sleepiness)    metOLazone (ZAROXOLYN) 2.5 MG tablet Take 1 tablet (2.5 mg total) by mouth daily as needed.    pantoprazole (PROTONIX) 20 MG tablet Take 2 tablets (40 mg total) by mouth once daily.    temazepam (RESTORIL) 30 mg capsule TAKE ONE CAPSULE BY MOUTH AT BEDTIME AS NEEDED FOR INSOMNIA    tiZANidine 4 mg Cap Take by mouth.     Family History     Problem Relation (Age of Onset)    No Known Problems Mother, Father, Sister, Brother, Maternal Aunt, Maternal Uncle, Paternal Aunt, Paternal Uncle, Maternal Grandmother, Maternal Grandfather, Paternal Grandmother, Paternal Grandfather        Tobacco Use    Smoking status: Never Smoker    Smokeless tobacco: Never Used   Substance and Sexual Activity    Alcohol use: No    Drug use: No    Sexual activity: No     Review of Systems   Constitution: Negative for decreased appetite.   HENT: Negative for ear discharge.    Eyes: Negative for blurred vision.    Respiratory: Negative for hemoptysis.    Endocrine: Negative for polyphagia.   Hematologic/Lymphatic: Negative for adenopathy.   Skin: Negative for color change.   Musculoskeletal: Negative for joint swelling.   Genitourinary: Negative for bladder incontinence.   Neurological: Negative for brief paralysis.   Psychiatric/Behavioral: Negative for hallucinations.   Allergic/Immunologic: Negative for hives.     Objective:     Vital Signs (Most Recent):  Temp: 98.7 °F (37.1 °C) (03/19/19 0717)  Pulse: 98 (03/19/19 0724)  Resp: 20 (03/19/19 0717)  BP: (!) 167/83 (03/19/19 0719)  SpO2: 97 % (03/19/19 0719) Vital Signs (24h Range):  Temp:  [98.1 °F (36.7 °C)-98.7 °F (37.1 °C)] 98.7 °F (37.1 °C)  Pulse:  [91-98] 98  Resp:  [20-27] 20  SpO2:  [95 %-97 %] 97 %  BP: (135-167)/(64-83) 167/83     Weight: 75.8 kg (167 lb)  Body mass index is 28.67 kg/m².    SpO2: 97 %  O2 Device (Oxygen Therapy): room air      Intake/Output Summary (Last 24 hours) at 3/19/2019 0944  Last data filed at 3/19/2019 0639  Gross per 24 hour   Intake --   Output 300 ml   Net -300 ml       Lines/Drains/Airways     Peripheral Intravenous Line                 Peripheral IV - Single Lumen 03/19/19 0610 Left Hand less than 1 day         Peripheral IV - Single Lumen 03/19/19 0705 Right Forearm less than 1 day                Physical Exam   Constitutional: She is oriented to person, place, and time. She appears well-developed and well-nourished.   HENT:   Head: Normocephalic and atraumatic.   Eyes: Conjunctivae are normal. Pupils are equal, round, and reactive to light.   Neck: Normal range of motion. Neck supple.   Cardiovascular: Normal rate, normal heart sounds and intact distal pulses. An irregularly irregular rhythm present.   Pulmonary/Chest: Effort normal and breath sounds normal.   Abdominal: Soft. Bowel sounds are normal.   Musculoskeletal: Normal range of motion. She exhibits edema.   Neurological: She is alert and oriented to person, place, and  time.   Skin: Skin is warm and dry.       Significant Labs: All pertinent lab results from the last 24 hours have been reviewed.    Significant Imaging: Echocardiogram:   2D echo with color flow doppler:   Results for orders placed or performed during the hospital encounter of 07/05/18   2D echo with color flow doppler   Result Value Ref Range    QEF 40 (A) 55 - 65    Mitral Valve Regurgitation MILD     Diastolic Dysfunction Yes (A)     Est. PA Systolic Pressure 63.35 (A)     Tricuspid Valve Regurgitation MILD TO MODERATE      Assessment and Plan:     Persistent atrial fibrillation    New Dx. Rates ok. Add po amiodarone and eliquis. Stop ASA to avoid bleeding risk with triple therapy. Check echo. AMADO/CV in AM     Chest pain    Likely from new onset A-fib. Known small vessel CAD by last [facility] - medical Rx     Diabetes mellitus, type II    Per primary     Chronic diastolic heart failure    Worsened by A-fib. Diuresis and afterload reduction as tolerated     PVD (peripheral vascular disease)    stable     Essential hypertension    stable         VTE Risk Mitigation (From admission, onward)        Ordered     apixaban tablet 5 mg  2 times daily      03/19/19 3177          Thank you for your consult. I will follow-up with patient. Please contact us if you have any additional questions.    Heraclio Bertrand MD  Cardiology   [facility]

## 2019-03-19 NOTE — SUBJECTIVE & OBJECTIVE
Past Medical History:   Diagnosis Date    Anxiety disorder     Arthritis 12/28/2017    Carpal tunnel syndrome     CHF (congestive heart failure)     Chronic allergic rhinitis     Chronic pain 10/22/2012    CKD stage 3 due to type 2 diabetes mellitus 2/14/2017    Constipation - functional 4/10/2013    Depression     Diabetes mellitus type II     Hot flash not due to menopause     HTN (hypertension)     Hypokalemia 11/19/2012    Insomnia 4/10/2013    Knee pain, bilateral 7/24/2013    Personal history of DVT (deep vein thrombosis)     Polyarthralgia 7/16/2018    Pulmonary hypertension 2/1/2017    Renovascular hypertension 2/14/2017    Severe tricuspid regurgitation 2/14/2017    Spinal stenosis     Dr. Corrales    Spinal stenosis of lumbar region 2/3/2014    Systolic and diastolic CHF, chronic 7/16/2018    Vertigo        Past Surgical History:   Procedure Laterality Date    CATARACT EXTRACTION Bilateral     EYE SURGERY      FRACTURE SURGERY      HYSTERECTOMY      ORIF RADIUS & ULNA FRACTURES         Review of patient's allergies indicates:   Allergen Reactions    Ace inhibitors      Other reaction(s): Unknown    Aciphex  [rabeprazole]      Other reaction(s): Stomach upset    Aspirin      Other reaction(s): Unknown    Bextra  [valdecoxib]      Other reaction(s): Unknown    Cardizem  [diltiazem hcl]      Other reaction(s): Unknown    Clonidine      Other reaction(s): dry mouth.lip swelling    Mavik  [trandolapril]      Other reaction(s): Unknown    Nsaids (non-steroidal anti-inflammatory drug)      Other reaction(s): black spots on skin    Phenytoin sodium extended      Other reaction(s): Stomach upset    Tramadol      Other reaction(s): stomach pain       No current facility-administered medications on file prior to encounter.      Current Outpatient Medications on File Prior to Encounter   Medication Sig    atorvastatin (LIPITOR) 20 MG tablet Take 1 tablet (20 mg total) by mouth  once daily.    clopidogrel (PLAVIX) 75 mg tablet TAKE 1 TABLET(75 MG) BY MOUTH EVERY DAY    furosemide (LASIX) 40 MG tablet Take 1 tablet (40 mg total) by mouth once daily.    gabapentin (NEURONTIN) 100 MG capsule Take 1 capsule (100 mg total) by mouth 3 (three) times daily.    losartan (COZAAR) 100 MG tablet TAKE 1 TABLET BY MOUTH ONCE DAILY    omeprazole (PRILOSEC) 20 MG capsule Take 20 mg by mouth once daily.    potassium chloride SA (K-DUR,KLOR-CON) 20 MEQ tablet TAKE 1 TABLET(20 MEQ) BY MOUTH EVERY DAY    spironolactone (ALDACTONE) 25 MG tablet Take 25 mg by mouth once daily.    [DISCONTINUED] aspirin 81 MG Chew Take 1 tablet (81 mg total) by mouth once daily.    carvedilol (COREG) 6.25 MG tablet Take 1 tablet (6.25 mg total) by mouth 2 (two) times daily.    clopidogrel (PLAVIX) 75 mg tablet     DULoxetine (CYMBALTA) 60 MG capsule TAKE 1 CAPSULE(60 MG) BY MOUTH EVERY DAY    esomeprazole (NEXIUM) 40 MG capsule Take 1 capsule (40 mg total) by mouth before breakfast.    lidocaine-prilocaine (EMLA) cream     LORazepam (ATIVAN) 0.5 MG tablet Take 1 tablet (0.5 mg total) by mouth every 8 (eight) hours as needed for Anxiety. (caution-may cause sleepiness)    metOLazone (ZAROXOLYN) 2.5 MG tablet Take 1 tablet (2.5 mg total) by mouth daily as needed.    pantoprazole (PROTONIX) 20 MG tablet Take 2 tablets (40 mg total) by mouth once daily.    temazepam (RESTORIL) 30 mg capsule TAKE ONE CAPSULE BY MOUTH AT BEDTIME AS NEEDED FOR INSOMNIA    [DISCONTINUED] HYDROcodone-acetaminophen (NORCO) 5-325 mg per tablet Take 1 tablet by mouth every 6 (six) hours as needed for Pain.    [DISCONTINUED] tiZANidine 4 mg Cap Take by mouth.     Family History     Problem Relation (Age of Onset)    No Known Problems Mother, Father, Sister, Brother, Maternal Aunt, Maternal Uncle, Paternal Aunt, Paternal Uncle, Maternal Grandmother, Maternal Grandfather, Paternal Grandmother, Paternal Grandfather        Tobacco Use     Smoking status: Never Smoker    Smokeless tobacco: Never Used   Substance and Sexual Activity    Alcohol use: No    Drug use: No    Sexual activity: No     Review of Systems   Constitutional: Negative.    HENT: Negative.    Eyes: Negative.    Respiratory: Positive for shortness of breath. Negative for cough and wheezing.    Cardiovascular: Positive for chest pain and leg swelling. Negative for palpitations.   Gastrointestinal: Negative.    Endocrine: Negative.    Genitourinary: Negative.    Musculoskeletal: Negative.    Skin: Negative.    Neurological: Negative.    Hematological: Negative.    Psychiatric/Behavioral: Negative.      Objective:     Vital Signs (Most Recent):  Temp: 98.7 °F (37.1 °C) (03/19/19 0717)  Pulse: 92 (03/19/19 1123)  Resp: 20 (03/19/19 1123)  BP: 139/73 (03/19/19 1101)  SpO2: 99 % (03/19/19 1123) Vital Signs (24h Range):  Temp:  [98.1 °F (36.7 °C)-98.7 °F (37.1 °C)] 98.7 °F (37.1 °C)  Pulse:  [] 92  Resp:  [20-27] 20  SpO2:  [80 %-100 %] 99 %  BP: (122-167)/(60-95) 139/73     Weight: 75.8 kg (167 lb)  Body mass index is 28.67 kg/m².    Physical Exam   Constitutional: She is oriented to person, place, and time. She appears well-developed. No distress.   Well appearing   Cardiovascular: Normal rate. An irregularly irregular rhythm present.   Pulmonary/Chest: Effort normal and breath sounds normal. She has no wheezes. She has no rales.   Abdominal: Soft. Bowel sounds are normal. She exhibits no distension. There is no tenderness.   Musculoskeletal: Normal range of motion. She exhibits edema (BLE).   Neurological: She is alert and oriented to person, place, and time.   Skin: Skin is warm and dry. Capillary refill takes less than 2 seconds. She is not diaphoretic.   Psychiatric: She has a normal mood and affect. Her behavior is normal. Judgment and thought content normal.   Nursing note and vitals reviewed.          Significant Labs: All pertinent labs within the past 24 hours have been  reviewed.    Significant Imaging: I have reviewed all pertinent imaging results/findings within the past 24 hours.  I have reviewed and interpreted all pertinent imaging results/findings within the past 24 hours.

## 2019-03-19 NOTE — ASSESSMENT & PLAN NOTE
"Has grade 3 diastolic dysfunction  Mild exacerbation 2/2 to medication non compliance  CT chest with "moderate" right pleural effusion? She has breath sounds at right base on my exam, maybe some crackles rather than absent sounds.   Patient already feeling better after a dose of furosemide IV given in the ED  O2 sat 100% at room air currently  Will resume home diuretic regimen (lasix + spironolactone) in AM  BMP in AM      "

## 2019-03-19 NOTE — ASSESSMENT & PLAN NOTE
Described pain sounds more like MSK  No evidence of trauma, pneumothorax, consolitaion or fracture at affected area.   Mild elevation of troponin not consistent with ACS  States chest pain resolved  On cardioprotective regimen pending trop trend

## 2019-03-19 NOTE — ED TRIAGE NOTES
"Pt presents to ED with c/o chest pain that started around 1 am this morning. Pt was folding clothes when the chest pain started. It was left sided chest pain that made her feel like her "breath was getting cut off" and like she "couldn't breathe." This has since resolved. Pt states she drank coke and burped several times and the pain went away. The pain did not radiate. Pt does also c/o right arm pain that started about three weeks ago. Pt also c/o bilateral lower extremity swelling that gets worse throughout the day but states it is gone in the morning time.   "

## 2019-03-19 NOTE — ED PROVIDER NOTES
"Encounter Date: 3/19/2019       History     Chief Complaint   Patient presents with    Chest Pain     Pt reports she was folding clothes around 2am this morning and started to experience a sharp non-radiating midsternal chest pain accompanied with SOB. Pt reports moderate relied of her chest pain after drinking a Coca cola soda. Pt reports a history of MIx2, HTN,CHF, emphysema, and prediabetes. Pt is noncompliant with her medication regimen. Pt niece reports symptoms have been going on for sometime but pt has been "putting off" coming to the ED. Family member also reports increased swelling of pt BLE's.     86 y.o. female Past Medical History:  No date: Anxiety disorder  12/28/2017: Arthritis  No date: Carpal tunnel syndrome  No date: CHF (congestive heart failure)  No date: Chronic allergic rhinitis  10/22/2012: Chronic pain  2/14/2017: CKD stage 3 due to type 2 diabetes mellitus  4/10/2013: Constipation - functional  No date: Depression  No date: Diabetes mellitus type II  No date: Hot flash not due to menopause  No date: HTN (hypertension)  11/19/2012: Hypokalemia  4/10/2013: Insomnia  7/24/2013: Knee pain, bilateral  No date: Personal history of DVT (deep vein thrombosis)  7/16/2018: Polyarthralgia  2/1/2017: Pulmonary hypertension  2/14/2017: Renovascular hypertension  2/14/2017: Severe tricuspid regurgitation  No date: Spinal stenosis      Comment:  Dr. Corrales  2/3/2014: Spinal stenosis of lumbar region  7/16/2018: Systolic and diastolic CHF, chronic  No date: Vertigo     Endorses 2 weeks increased LE edema. Notes leg swelling decreases when she raises her legs. Does have orthopnea which is not new for her. Endorses chest pressure starting 1am and lasting approx 2.5 hours prior to resolving. Denies f/c, n/v, diarrhea/dysuria.  Pt admits she is non compliant with lasix as it makes her "pee too much" and she doesn't like wearing depends.    7/2018 echo    1 - Mildly to moderately depressed left ventricular " systolic function (EF 40-45%) Anteroseptal hypokinesis.     2 - Concentric hypertrophy.     3 - Biatrial enlargement.     4 - Restrictive LV filling pattern, indicating markedly elevated LAP (grade 3 diastolic dysfunction).     5 - Right ventricular enlargement with normal systolic function.     6 - Pulmonary hypertension. The estimated PA systolic pressure is 63 mmHg.     7 - Mild mitral regurgitation.     8 - Mild to moderate tricuspid regurgitation.     7/2018 cardiac cath  Diagnostic:          Patient has a right dominant coronary artery.        - Left Main Coronary Artery:             The LM is normal. There is VIJI 3 flow.     - Left Anterior Descending Artery:             The LAD is normal. There is VIJI 3 flow.     - D1:             The D1 has a 90% stenosis. There is VIJI 3 flow. <2 mm vessel     - D2:             The ostial D2 has a 95% stenosis. There is VIJI 3 flow.     - Left Circumflex Artery:             The LCX is normal. There is VIJI 3 flow.     - Right Coronary Artery:             The proximal RCA has a 50% stenosis. There is VIJI 3 flow.            Review of patient's allergies indicates:   Allergen Reactions    Ace inhibitors      Other reaction(s): Unknown    Aciphex  [rabeprazole]      Other reaction(s): Stomach upset    Aspirin      Other reaction(s): Unknown    Bextra  [valdecoxib]      Other reaction(s): Unknown    Cardizem  [diltiazem hcl]      Other reaction(s): Unknown    Clonidine      Other reaction(s): dry mouth.lip swelling    Mavik  [trandolapril]      Other reaction(s): Unknown    Nsaids (non-steroidal anti-inflammatory drug)      Other reaction(s): black spots on skin    Phenytoin sodium extended      Other reaction(s): Stomach upset    Tramadol      Other reaction(s): stomach pain     Past Medical History:   Diagnosis Date    Anxiety disorder     Arthritis 12/28/2017    Carpal tunnel syndrome     CHF (congestive heart failure)     Chronic allergic rhinitis      Chronic pain 10/22/2012    CKD stage 3 due to type 2 diabetes mellitus 2/14/2017    Constipation - functional 4/10/2013    Depression     Diabetes mellitus type II     Hot flash not due to menopause     HTN (hypertension)     Hypokalemia 11/19/2012    Insomnia 4/10/2013    Knee pain, bilateral 7/24/2013    Personal history of DVT (deep vein thrombosis)     Polyarthralgia 7/16/2018    Pulmonary hypertension 2/1/2017    Renovascular hypertension 2/14/2017    Severe tricuspid regurgitation 2/14/2017    Spinal stenosis     Dr. Corrales    Spinal stenosis of lumbar region 2/3/2014    Systolic and diastolic CHF, chronic 7/16/2018    Vertigo      Past Surgical History:   Procedure Laterality Date    CATARACT EXTRACTION Bilateral     EYE SURGERY      FRACTURE SURGERY      HYSTERECTOMY      ORIF RADIUS & ULNA FRACTURES       Family History   Problem Relation Age of Onset    No Known Problems Mother     No Known Problems Father     No Known Problems Sister     No Known Problems Brother     No Known Problems Maternal Aunt     No Known Problems Maternal Uncle     No Known Problems Paternal Aunt     No Known Problems Paternal Uncle     No Known Problems Maternal Grandmother     No Known Problems Maternal Grandfather     No Known Problems Paternal Grandmother     No Known Problems Paternal Grandfather     Amblyopia Neg Hx     Blindness Neg Hx     Cancer Neg Hx     Diabetes Neg Hx     Glaucoma Neg Hx     Hypertension Neg Hx     Macular degeneration Neg Hx     Retinal detachment Neg Hx     Strabismus Neg Hx     Stroke Neg Hx     Thyroid disease Neg Hx      Social History     Tobacco Use    Smoking status: Never Smoker    Smokeless tobacco: Never Used   Substance Use Topics    Alcohol use: No    Drug use: No     Review of Systems   Constitutional: Negative for fever.   HENT: Negative for sore throat.    Respiratory: Positive for shortness of breath. Negative for cough.     Cardiovascular: Positive for chest pain.   Gastrointestinal: Negative for nausea.   Genitourinary: Negative for dysuria.   Musculoskeletal: Negative for back pain.   Skin: Negative for rash.   Neurological: Negative for weakness.   Hematological: Does not bruise/bleed easily.   All other systems reviewed and are negative.      Physical Exam     Initial Vitals [03/19/19 0505]   BP Pulse Resp Temp SpO2   135/76 91 20 98.1 °F (36.7 °C) 95 %      MAP       --         Physical Exam    Nursing note and vitals reviewed.  Constitutional: She appears well-developed and well-nourished.   HENT:   Head: Normocephalic and atraumatic.   Eyes: Conjunctivae and EOM are normal. Pupils are equal, round, and reactive to light.   Neck: Normal range of motion.   Cardiovascular: Normal rate.   Pulmonary/Chest: Breath sounds normal. No respiratory distress. She has no wheezes. She has no rales.   Abdominal: She exhibits no distension.   Musculoskeletal: Normal range of motion.   Neurological: She is alert. No cranial nerve deficit. GCS score is 15. GCS eye subscore is 4. GCS verbal subscore is 5. GCS motor subscore is 6.   Skin: Skin is warm and dry.   Psychiatric: She has a normal mood and affect. Thought content normal.     2+pitting to mid thigh  Irregularly irregular  ED Course   Procedures  Labs Reviewed   CBC W/ AUTO DIFFERENTIAL   COMPREHENSIVE METABOLIC PANEL   TROPONIN I   B-TYPE NATRIURETIC PEPTIDE   MAGNESIUM   URINALYSIS, REFLEX TO URINE CULTURE     EKG Readings: (Independently Interpreted)   Hr 94, afib,nl axis/intervals, no tiffanie/twi. Non acute/no stemi.       Imaging Results    None          Medical Decision Making:   Initial Assessment:   Review of records of numerous years worth of ekg's and discharge summaries/cards notes shows no mention of prior afib. Given chest pain/sob/new onset afib have anticoagulated with lovenox and will get cta. Plan to admit for cardiac evaluation.                  Labs Reviewed   CBC W/ AUTO  DIFFERENTIAL - Abnormal; Notable for the following components:       Result Value    WBC 3.63 (*)     Hemoglobin 11.1 (*)     Hematocrit 36.6 (*)     MCH 24.9 (*)     MCHC 30.3 (*)     RDW 17.1 (*)     Mono% 16.0 (*)     All other components within normal limits   COMPREHENSIVE METABOLIC PANEL - Abnormal; Notable for the following components:    CO2 21 (*)     Total Bilirubin 1.4 (*)     Alkaline Phosphatase 138 (*)     AST 44 (*)     eGFR if  43 (*)     eGFR if non  37 (*)     All other components within normal limits    Narrative:     Recoll. [id number] by [facility] at 03/19/2019 06:37, reason: Specimen   hemolyzed,Tube has been refrigerated. [facility]   03/19/2019  06:37   TROPONIN I - Abnormal; Notable for the following components:    Troponin I 0.086 (*)     All other components within normal limits    Narrative:     Recoll. [id number] by [facility] at 03/19/2019 06:37, reason: Specimen   hemolyzed,Tube has been refrigerated. called to [facility],ED   03/19/2019  06:37   B-TYPE NATRIURETIC PEPTIDE - Abnormal; Notable for the following components:    BNP 1,925 (*)     All other components within normal limits    Narrative:     Recoll. [id number] by [facility] at 03/19/2019 06:37, reason: Specimen   hemolyzed,Tube has been refrigerated. called to [facility],ED   03/19/2019  06:37   MAGNESIUM - Abnormal; Notable for the following components:    Magnesium 1.5 (*)     All other components within normal limits    Narrative:     Recoll. [id number] by [facility] at 03/19/2019 06:37, reason: Specimen   hemolyzed,Tube has been refrigerated. called to [facility],   03/19/2019  06:37   URINALYSIS, REFLEX TO URINE CULTURE - Abnormal; Notable for the following components:    Protein, UA 1+ (*)     Occult Blood UA 1+ (*)     Leukocytes, UA 1+ (*)     All other components within normal limits    Narrative:     Preferred Collection Type->Urine, Clean Catch   PROTIME-INR - Abnormal; Notable for the following components:     Prothrombin Time 14.7 (*)     INR 1.4 (*)     All other components within normal limits   URINALYSIS MICROSCOPIC - Abnormal; Notable for the following components:    WBC, UA 8 (*)     Hyaline Casts, UA 4 (*)     All other components within normal limits    Narrative:     Preferred Collection Type->Urine, Clean Catch   APTT       X-Ray Chest AP Portable   Final Result      Stable cardiomegaly with coarse interstitial lung markings.  No definite acute intrathoracic process.         Electronically signed by: Reji Dia MD   Date:    03/19/2019   Time:    07:01      CTA Chest Non-Coronary (PE Study)    (Results Pending)   US Lower Extremity Veins Bilateral    (Results Pending)       UA shows no bacteria.        Clinical Impression:       ICD-10-CM ICD-9-CM   1. Chest pain R07.9 786.50   2. A-fib I48.91 427.31                                Madalyn Ramon MD  03/19/19 0831       Madalyn Ramon MD  03/19/19 0807

## 2019-03-19 NOTE — HPI
"    Pt reports she was folding clothes around 2am this morning and started to experience a sharp non-radiating midsternal chest pain accompanied with SOB. Pt reports moderate relied of her chest pain after drinking a Coca cola soda. Pt reports a history of MIx2, HTN,CHF, emphysema, and prediabetes. Pt is noncompliant with her medication regimen. Pt niece reports symptoms have been going on for sometime but pt has been "putting off" coming to the ED. Family member also reports increased swelling of pt BLE's.       EKG A-fib 94 NSSTT changes - new Dx  Currently denies CP, less SOB  BNP 1925  Troponin 0.08    Followed by Dr Humphrey    echo: 12-17  CONCLUSIONS     1 - Low normal to mildly depressed left ventricular systolic function (EF 50-55%).     2 - Concentric remodeling.     3 - Impaired LV relaxation, elevated LAP (grade 2 diastolic dysfunction).     4 - Low normal right ventricular systolic function .     5 - Pulmonary hypertension. The estimated PA systolic pressure is 67 mmHg.     6 - Mild to moderate mitral regurgitation.      C:      B. Summary/Post-Operative Diagnosis       Single vessel coronary artery disease.    Possible small branch diagonal likely cuprit for NSTEMI.     Diagnostic:          Patient has a right dominant coronary artery.        - Left Main Coronary Artery:             The LM is normal. There is VIJI 3 flow.     - Left Anterior Descending Artery:             The LAD is normal. There is VIJI 3 flow.     - D4:             The D4 has a 90% stenosis. There is VIJI 3 flow. small vessel   2 mm     - Left Circumflex Artery:             The LCX is normal. There is VIJI 3 flow.     - Right Coronary Artery:             The RCA has luminal irregularities. There is VIJI 3 flow.     NST: 1-18  Impression: ABNORMAL MYOCARDIAL PERFUSION  1. There is evidence for mild myocardial ischemia in the anteroapical wall of the left ventricle.   2. The perfusion scan is free of evidence for myocardial injury. "   3. Resting wall motion is physiologic.   4. There is resting LV dysfunction with a reduced ejection fraction of 46 %.   5. The ventricular volumes are normal at rest and stress.   6. The extracardiac distribution of radioactivity is normal.   7. When compared to the previous study from 01/03/2017, mild anteroapical ischemia now present     BECKY was stress:  8-18  CONCLUSIONS   Right:  The exercise component of this study demonstrates peripheral arterial disease in the right extremity that may account for symptoms if present.     Left:  Exercise study confirms peripheral arterial disease in the left extremity with some collateral flow during exercise.     1. Right lower extremity pressures and waveforms indicate mild arterial occlusive disease above the level of the ankle.  Toe pressures indicate severe arterial occlusive disease.  Exercise BECKY shows hemodynamically significant arterial occlusive disease.  2. Left lower extremity pressures and waveforms indicate mild arterial occlusive disease above the level of the ankle.  Toe pressures indicate severe arterial occlusive disease.  Exercise BECKY shows hemodynamically significant arterial occlusive disease.

## 2019-03-20 PROBLEM — I50.43 ACUTE ON CHRONIC COMBINED SYSTOLIC AND DIASTOLIC HEART FAILURE: Status: ACTIVE | Noted: 2017-12-28

## 2019-03-20 LAB
ANION GAP SERPL CALC-SCNC: 11 MMOL/L
BASOPHILS # BLD AUTO: 0.02 K/UL
BASOPHILS NFR BLD: 0.4 %
BUN SERPL-MCNC: 21 MG/DL
CALCIUM SERPL-MCNC: 9.8 MG/DL
CHLORIDE SERPL-SCNC: 104 MMOL/L
CO2 SERPL-SCNC: 25 MMOL/L
CREAT SERPL-MCNC: 1.5 MG/DL
DIFFERENTIAL METHOD: ABNORMAL
EOSINOPHIL # BLD AUTO: 0.1 K/UL
EOSINOPHIL NFR BLD: 1.1 %
ERYTHROCYTE [DISTWIDTH] IN BLOOD BY AUTOMATED COUNT: 16.9 %
EST. GFR  (AFRICAN AMERICAN): 36 ML/MIN/1.73 M^2
EST. GFR  (NON AFRICAN AMERICAN): 31 ML/MIN/1.73 M^2
GLUCOSE SERPL-MCNC: 119 MG/DL
HCT VFR BLD AUTO: 35.7 %
HGB BLD-MCNC: 10.7 G/DL
LYMPHOCYTES # BLD AUTO: 1.4 K/UL
LYMPHOCYTES NFR BLD: 29.2 %
MCH RBC QN AUTO: 24.7 PG
MCHC RBC AUTO-ENTMCNC: 30 G/DL
MCV RBC AUTO: 82 FL
MONOCYTES # BLD AUTO: 1 K/UL
MONOCYTES NFR BLD: 20.6 %
NEUTROPHILS # BLD AUTO: 2.3 K/UL
NEUTROPHILS NFR BLD: 48.7 %
PLATELET # BLD AUTO: 215 K/UL
PMV BLD AUTO: 11.1 FL
POCT GLUCOSE: 120 MG/DL (ref 70–110)
POCT GLUCOSE: 128 MG/DL (ref 70–110)
POCT GLUCOSE: 132 MG/DL (ref 70–110)
POCT GLUCOSE: 150 MG/DL (ref 70–110)
POTASSIUM SERPL-SCNC: 5 MMOL/L
RBC # BLD AUTO: 4.34 M/UL
SODIUM SERPL-SCNC: 140 MMOL/L
TROPONIN I SERPL DL<=0.01 NG/ML-MCNC: 0.1 NG/ML
WBC # BLD AUTO: 4.76 K/UL

## 2019-03-20 PROCEDURE — 63600175 PHARM REV CODE 636 W HCPCS: Mod: HCNC | Performed by: INTERNAL MEDICINE

## 2019-03-20 PROCEDURE — 93005 ELECTROCARDIOGRAM TRACING: CPT | Mod: HCNC

## 2019-03-20 PROCEDURE — 93010 EKG 12-LEAD: ICD-10-PCS | Mod: HCNC,,, | Performed by: INTERNAL MEDICINE

## 2019-03-20 PROCEDURE — 99232 SBSQ HOSP IP/OBS MODERATE 35: CPT | Mod: 25,GT,HCNC, | Performed by: INTERNAL MEDICINE

## 2019-03-20 PROCEDURE — 36415 COLL VENOUS BLD VENIPUNCTURE: CPT | Mod: HCNC

## 2019-03-20 PROCEDURE — 25000003 PHARM REV CODE 250: Mod: HCNC | Performed by: INTERNAL MEDICINE

## 2019-03-20 PROCEDURE — 80048 BASIC METABOLIC PNL TOTAL CA: CPT | Mod: HCNC

## 2019-03-20 PROCEDURE — 93010 ELECTROCARDIOGRAM REPORT: CPT | Mod: HCNC,,, | Performed by: INTERNAL MEDICINE

## 2019-03-20 PROCEDURE — 25000003 PHARM REV CODE 250: Mod: HCNC | Performed by: EMERGENCY MEDICINE

## 2019-03-20 PROCEDURE — 85025 COMPLETE CBC W/AUTO DIFF WBC: CPT | Mod: HCNC

## 2019-03-20 PROCEDURE — 99232 PR SUBSEQUENT HOSPITAL CARE,LEVL II: ICD-10-PCS | Mod: 25,GT,HCNC, | Performed by: INTERNAL MEDICINE

## 2019-03-20 PROCEDURE — 21400001 HC TELEMETRY ROOM: Mod: HCNC

## 2019-03-20 PROCEDURE — 84484 ASSAY OF TROPONIN QUANT: CPT | Mod: HCNC

## 2019-03-20 PROCEDURE — 94761 N-INVAS EAR/PLS OXIMETRY MLT: CPT | Mod: HCNC

## 2019-03-20 RX ORDER — GUAIFENESIN/DEXTROMETHORPHAN 100-10MG/5
5 SYRUP ORAL EVERY 6 HOURS
Status: COMPLETED | OUTPATIENT
Start: 2019-03-20 | End: 2019-03-21

## 2019-03-20 RX ORDER — SPIRONOLACTONE 25 MG/1
25 TABLET ORAL DAILY
Status: DISCONTINUED | OUTPATIENT
Start: 2019-03-21 | End: 2019-03-22 | Stop reason: HOSPADM

## 2019-03-20 RX ORDER — FUROSEMIDE 10 MG/ML
40 INJECTION INTRAMUSCULAR; INTRAVENOUS ONCE
Status: COMPLETED | OUTPATIENT
Start: 2019-03-20 | End: 2019-03-20

## 2019-03-20 RX ADMIN — FUROSEMIDE 40 MG: 40 TABLET ORAL at 08:03

## 2019-03-20 RX ADMIN — GUAIFENESIN AND DEXTROMETHORPHAN 5 ML: 100; 10 SYRUP ORAL at 01:03

## 2019-03-20 RX ADMIN — GABAPENTIN 100 MG: 100 CAPSULE ORAL at 03:03

## 2019-03-20 RX ADMIN — AMIODARONE HYDROCHLORIDE 400 MG: 200 TABLET ORAL at 08:03

## 2019-03-20 RX ADMIN — GABAPENTIN 100 MG: 100 CAPSULE ORAL at 08:03

## 2019-03-20 RX ADMIN — CLOPIDOGREL BISULFATE 75 MG: 75 TABLET ORAL at 08:03

## 2019-03-20 RX ADMIN — GUAIFENESIN AND DEXTROMETHORPHAN 5 ML: 100; 10 SYRUP ORAL at 05:03

## 2019-03-20 RX ADMIN — PANTOPRAZOLE SODIUM 40 MG: 40 TABLET, DELAYED RELEASE ORAL at 08:03

## 2019-03-20 RX ADMIN — APIXABAN 2.5 MG: 2.5 TABLET, FILM COATED ORAL at 08:03

## 2019-03-20 RX ADMIN — ATORVASTATIN CALCIUM 20 MG: 10 TABLET, FILM COATED ORAL at 08:03

## 2019-03-20 RX ADMIN — APIXABAN 5 MG: 5 TABLET, FILM COATED ORAL at 08:03

## 2019-03-20 RX ADMIN — FUROSEMIDE 40 MG: 10 INJECTION, SOLUTION INTRAVENOUS at 05:03

## 2019-03-20 RX ADMIN — LOSARTAN POTASSIUM 100 MG: 25 TABLET, FILM COATED ORAL at 08:03

## 2019-03-20 NOTE — NURSING
Bedside report received from DIANA Ascencio. Patient sitting up in bed. NAD noted at this time. NPO status in place. All safety precautions in place. Will continue to monitor.

## 2019-03-20 NOTE — ASSESSMENT & PLAN NOTE
"Has grade 3 diastolic dysfunction. Repeat Echo with worsened LVEF of 30%  Mild exacerbation 2/2 to medication non compliance  CT chest with "moderate" right pleural effusion? She has breath sounds at right base on my exam, maybe some crackles rather than absent sounds. States feeling more SOB today despite use of IV lasix yesterday. She may need another dose. Will obtain CXR prior to repeat IV dose.   O2 sat stable at room air currently  On home diuretic regimen (lasix + spironolactone) and HR currently NSR  BMP in AM      "

## 2019-03-20 NOTE — ASSESSMENT & PLAN NOTE
New onset  Now NSR with oral amiodarone therefore cardioversion aborted  On NOAC for stroke proph (discussed benefits of anticoagulation with patient. Also discussed risk for bleeding. Denied Hx of bleeding. Agreed with taking NOAC for stroke prophylaxis. Per cardiology, ok to stop ASA and continue clopidogrel to minimize risk for bleeding)  Echo with LVEF of 30% which is lower then previous. Also with grade 3 diasto dysf  Cardiac monitoring

## 2019-03-20 NOTE — ASSESSMENT & PLAN NOTE
Described pain sounds more like MSK  No evidence of trauma, pneumothorax, consolitaion or fracture at affected area.   Mild elevation of troponin not consistent with ACS  States chest pain resolved  On cardioprotective regimen

## 2019-03-20 NOTE — SUBJECTIVE & OBJECTIVE
Interval History: now in NSR but feels more SOB and has a cough. No chest pain    Review of Systems   Respiratory: Positive for cough and shortness of breath.    Cardiovascular: Negative.    Gastrointestinal: Negative.      Objective:     Vital Signs (Most Recent):  Temp: 98.2 °F (36.8 °C) (03/20/19 1115)  Pulse: 77 (03/20/19 1115)  Resp: 19 (03/20/19 1115)  BP: (!) 150/67 (03/20/19 1115)  SpO2: 96 % (03/20/19 1115) Vital Signs (24h Range):  Temp:  [97.5 °F (36.4 °C)-99 °F (37.2 °C)] 98.2 °F (36.8 °C)  Pulse:  [] 77  Resp:  [17-27] 19  SpO2:  [92 %-100 %] 96 %  BP: (138-167)/(64-99) 150/67     Weight: 84.5 kg (186 lb 4.6 oz)  Body mass index is 33 kg/m².    Intake/Output Summary (Last 24 hours) at 3/20/2019 1253  Last data filed at 3/20/2019 0600  Gross per 24 hour   Intake --   Output 500 ml   Net -500 ml      Physical Exam   Constitutional: She is oriented to person, place, and time. She appears well-developed. No distress.   Well appearing   Cardiovascular: Normal rate and regular rhythm.   Pulmonary/Chest: Effort normal and breath sounds normal. She has no wheezes. She has no rales.   Speaking full sentences and breathing comfortably at room air   Abdominal: Soft. Bowel sounds are normal. She exhibits no distension. There is no tenderness.   Musculoskeletal: Normal range of motion. She exhibits edema (BLE).   Neurological: She is alert and oriented to person, place, and time.   Skin: Skin is warm and dry. Capillary refill takes less than 2 seconds. She is not diaphoretic.   Psychiatric: She has a normal mood and affect. Her behavior is normal. Judgment and thought content normal.   Nursing note and vitals reviewed.      Significant Labs: All pertinent labs within the past 24 hours have been reviewed.    Significant Imaging: I have reviewed all pertinent imaging results/findings within the past 24 hours.  I have reviewed and interpreted all pertinent imaging results/findings within the past 24 hours.

## 2019-03-20 NOTE — PLAN OF CARE
"   03/20/19 1244   Discharge Assessment   Assessment Type Discharge Planning Assessment   Confirmed/corrected address and phone number on facesheet? Yes   Assessment information obtained from? Patient   Communicated expected length of stay with patient/caregiver no   Prior to hospitilization cognitive status: Alert/Oriented   Prior to hospitalization functional status: Independent   Current cognitive status: Alert/Oriented   Current Functional Status: Independent   Lives With alone   Able to Return to Prior Arrangements yes   Is patient able to care for self after discharge? Unable to determine at this time (comments)   Patient's perception of discharge disposition home or selfcare   Readmission Within the Last 30 Days no previous admission in last 30 days   Patient currently being followed by outpatient case management? No   Patient currently receives any other outside agency services? No   Equipment Currently Used at Home cane, straight;shower chair   Do you have any problems affording any of your prescribed medications? No   Is the patient taking medications as prescribed? yes   Does the patient have transportation home? Yes   Transportation Anticipated family or friend will provide   Does the patient receive services at the [facility]? No   Discharge Plan A Home   Discharge Plan B Home Health   DME Needed Upon Discharge  (TBD)   Patient/Family in Agreement with Plan yes     Patient prefers appointments between 1-2pm    TN explained  role "Help manage care at Home", blue folder at bedside "My health Packet", pink and green stickers reviewed with patient, patient voiced understanding,      [employer] [facility] [id number] - JOHNNA CHAMPION  [address] [place], [place] [address]MARGARITA JIMENEZ AT [facility]  [address] [place] [address] [place] OROPEZA [id number]  Phone: [phone] Fax: [phone]      "

## 2019-03-20 NOTE — ASSESSMENT & PLAN NOTE
New Dx. Rates ok. Add po amiodarone and eliquis (decrease dose to 2.5 bid with Cr > 1.5 and age > 80). Stop ASA to avoid bleeding risk with triple therapy. Echo with decreased EF from baseline. Back in NSR. Continue Rx

## 2019-03-20 NOTE — PROGRESS NOTES
[facility]  Cardiology  Progress Note    Patient Name: Magaly Nunes  MRN: [id number]  Admission Date: 3/19/2019  Hospital Length of Stay: 1 days  Code Status: Full Code   Attending Physician: Claudia Watts MD   Primary Care Physician: Chris Bangura MD  Expected Discharge Date:   Principal Problem:Persistent atrial fibrillation    Subjective:     Hospital Course:   3-20 back in NSR. Still with LE edema. Denies CP. Less SOB    Echo 3/19/19  · Moderately decreased left ventricular systolic function. The estimated ejection fraction is 30%  · Concentric left ventricular hypertrophy.  · Grade III (severe) left ventricular diastolic dysfunction consistent with restrictive physiology.  · Mild right ventricular enlargement.  · Normal right ventricular systolic function.  · Moderate left atrial enlargement.  · Severe right atrial enlargement.  · Mild-to-moderate mitral regurgitation.  · Moderate to severe tricuspid regurgitation.  · The estimated PA systolic pressure is 78 mm Hg  Pulmonary hypertension present.    Interval History:     Review of Systems   Constitution: Negative for decreased appetite.   HENT: Negative for ear discharge.    Eyes: Negative for blurred vision.   Respiratory: Negative for hemoptysis.    Endocrine: Negative for polyphagia.   Hematologic/Lymphatic: Negative for adenopathy.   Skin: Negative for color change.   Musculoskeletal: Negative for joint swelling.   Genitourinary: Negative for bladder incontinence.   Neurological: Negative for brief paralysis.   Psychiatric/Behavioral: Negative for hallucinations.   Allergic/Immunologic: Negative for hives.     Objective:     Vital Signs (Most Recent):  Temp: 98 °F (36.7 °C) (03/20/19 0714)  Pulse: 86 (03/20/19 0714)  Resp: 19 (03/20/19 0714)  BP: (!) 147/81 (03/20/19 0714)  SpO2: (!) 94 % (03/20/19 0745) Vital Signs (24h Range):  Temp:  [97.5 °F (36.4 °C)-99 °F (37.2 °C)] 98 °F (36.7 °C)  Pulse:  [] 86  Resp:  [17-27]  19  SpO2:  [80 %-100 %] 94 %  BP: (122-167)/(64-99) 147/81     Weight: 84.5 kg (186 lb 4.6 oz)  Body mass index is 33 kg/m².     SpO2: (!) 94 %  O2 Device (Oxygen Therapy): room air      Intake/Output Summary (Last 24 hours) at 3/20/2019 0934  Last data filed at 3/20/2019 0600  Gross per 24 hour   Intake --   Output 700 ml   Net -700 ml       Lines/Drains/Airways     Peripheral Intravenous Line                 Peripheral IV - Single Lumen 03/19/19 0610 Left Hand 1 day         Peripheral IV - Single Lumen 03/19/19 0705 Right Forearm 1 day                Physical Exam   Constitutional: She is oriented to person, place, and time. She appears well-developed and well-nourished.   HENT:   Head: Normocephalic and atraumatic.   Eyes: Conjunctivae are normal. Pupils are equal, round, and reactive to light.   Neck: Normal range of motion. Neck supple.   Cardiovascular: Normal rate, regular rhythm, normal heart sounds and intact distal pulses.   Pulmonary/Chest: Effort normal and breath sounds normal.   Abdominal: Soft. Bowel sounds are normal.   Musculoskeletal: Normal range of motion. She exhibits edema.   Neurological: She is alert and oriented to person, place, and time.   Skin: Skin is warm and dry.       Significant Labs: All pertinent lab results from the last 24 hours have been reviewed.    Significant Imaging: Echocardiogram:   2D echo with color flow doppler:   Results for orders placed or performed during the hospital encounter of 07/05/18   2D echo with color flow doppler   Result Value Ref Range    QEF 40 (A) 55 - 65    Mitral Valve Regurgitation MILD     Diastolic Dysfunction Yes (A)     Est. PA Systolic Pressure 63.35 (A)     Tricuspid Valve Regurgitation MILD TO MODERATE      Assessment and Plan:     Brief HPI:     * Persistent atrial fibrillation    New Dx. Rates ok. Add po amiodarone and eliquis (decrease dose to 2.5 bid with Cr > 1.5 and age > 80). Stop ASA to avoid bleeding risk with triple therapy. Echo  with decreased EF from baseline. Back in NSR. Continue Rx     Chest pain    Likely from new onset A-fib. Known small vessel CAD by last [facility] - medical Rx     Diabetes mellitus, type II    Per primary     Acute on chronic diastolic heart failure    Worsened by A-fib. Diuresis and afterload reduction as tolerated. EF decreased by echo     PVD (peripheral vascular disease)    stable     Essential hypertension    stable         VTE Risk Mitigation (From admission, onward)        Ordered     apixaban tablet 2.5 mg  2 times daily      03/20/19 0938        Home soon. OV with Dr Humphrey 1 week. Will f/u prn    Heraclio Bertrand MD  Cardiology  [facility]

## 2019-03-20 NOTE — SUBJECTIVE & OBJECTIVE
Interval History:     Review of Systems   Constitution: Negative for decreased appetite.   HENT: Negative for ear discharge.    Eyes: Negative for blurred vision.   Respiratory: Negative for hemoptysis.    Endocrine: Negative for polyphagia.   Hematologic/Lymphatic: Negative for adenopathy.   Skin: Negative for color change.   Musculoskeletal: Negative for joint swelling.   Genitourinary: Negative for bladder incontinence.   Neurological: Negative for brief paralysis.   Psychiatric/Behavioral: Negative for hallucinations.   Allergic/Immunologic: Negative for hives.     Objective:     Vital Signs (Most Recent):  Temp: 98 °F (36.7 °C) (03/20/19 0714)  Pulse: 86 (03/20/19 0714)  Resp: 19 (03/20/19 0714)  BP: (!) 147/81 (03/20/19 0714)  SpO2: (!) 94 % (03/20/19 0745) Vital Signs (24h Range):  Temp:  [97.5 °F (36.4 °C)-99 °F (37.2 °C)] 98 °F (36.7 °C)  Pulse:  [] 86  Resp:  [17-27] 19  SpO2:  [80 %-100 %] 94 %  BP: (122-167)/(64-99) 147/81     Weight: 84.5 kg (186 lb 4.6 oz)  Body mass index is 33 kg/m².     SpO2: (!) 94 %  O2 Device (Oxygen Therapy): room air      Intake/Output Summary (Last 24 hours) at 3/20/2019 0934  Last data filed at 3/20/2019 0600  Gross per 24 hour   Intake --   Output 700 ml   Net -700 ml       Lines/Drains/Airways     Peripheral Intravenous Line                 Peripheral IV - Single Lumen 03/19/19 0610 Left Hand 1 day         Peripheral IV - Single Lumen 03/19/19 0705 Right Forearm 1 day                Physical Exam   Constitutional: She is oriented to person, place, and time. She appears well-developed and well-nourished.   HENT:   Head: Normocephalic and atraumatic.   Eyes: Conjunctivae are normal. Pupils are equal, round, and reactive to light.   Neck: Normal range of motion. Neck supple.   Cardiovascular: Normal rate, regular rhythm, normal heart sounds and intact distal pulses.   Pulmonary/Chest: Effort normal and breath sounds normal.   Abdominal: Soft. Bowel sounds are normal.    Musculoskeletal: Normal range of motion. She exhibits edema.   Neurological: She is alert and oriented to person, place, and time.   Skin: Skin is warm and dry.       Significant Labs: All pertinent lab results from the last 24 hours have been reviewed.    Significant Imaging: Echocardiogram:   2D echo with color flow doppler:   Results for orders placed or performed during the hospital encounter of 07/05/18   2D echo with color flow doppler   Result Value Ref Range    QEF 40 (A) 55 - 65    Mitral Valve Regurgitation MILD     Diastolic Dysfunction Yes (A)     Est. PA Systolic Pressure 63.35 (A)     Tricuspid Valve Regurgitation MILD TO MODERATE

## 2019-03-20 NOTE — HOSPITAL COURSE
3-20 back in NSR. Still with LE edema. Denies CP. Less SOB    Echo 3/19/19  · Moderately decreased left ventricular systolic function. The estimated ejection fraction is 30%  · Concentric left ventricular hypertrophy.  · Grade III (severe) left ventricular diastolic dysfunction consistent with restrictive physiology.  · Mild right ventricular enlargement.  · Normal right ventricular systolic function.  · Moderate left atrial enlargement.  · Severe right atrial enlargement.  · Mild-to-moderate mitral regurgitation.  · Moderate to severe tricuspid regurgitation.  · The estimated PA systolic pressure is 78 mm Hg  · Pulmonary hypertension present.

## 2019-03-20 NOTE — PLAN OF CARE
Problem: Skin Injury Risk Increased  Goal: Skin Health and Integrity    Intervention: Optimize Skin Protection   03/20/19 1652   Prevent Additional Skin Injury   Head of Bed (HOB) HOB elevated   Pressure Reduction Devices positioning supports utilized   Pressure Reduction Techniques frequent weight shift encouraged;weight shift assistance provided   Monitor and Manage Hypervolemia   Skin Protection incontinence pads utilized;tubing/devices free from skin contact

## 2019-03-20 NOTE — ASSESSMENT & PLAN NOTE
Mild elevation but sure at risk  Treat BP and new onset AFib  No need for further trending at this time  On cardioprotective regimen  Is chest pain free

## 2019-03-21 ENCOUNTER — OUTPATIENT CASE MANAGEMENT (OUTPATIENT)
Dept: ADMINISTRATIVE | Facility: OTHER | Age: 84
End: 2019-03-21

## 2019-03-21 LAB
ALBUMIN SERPL BCP-MCNC: 3.3 G/DL
ALP SERPL-CCNC: 124 U/L
ALT SERPL W/O P-5'-P-CCNC: 34 U/L
ANION GAP SERPL CALC-SCNC: 11 MMOL/L
AST SERPL-CCNC: 51 U/L
BASOPHILS # BLD AUTO: 0.01 K/UL
BASOPHILS NFR BLD: 0.2 %
BILIRUB SERPL-MCNC: 1.6 MG/DL
BUN SERPL-MCNC: 17 MG/DL
CALCIUM SERPL-MCNC: 9 MG/DL
CHLORIDE SERPL-SCNC: 101 MMOL/L
CO2 SERPL-SCNC: 29 MMOL/L
CREAT SERPL-MCNC: 1.2 MG/DL
DIFFERENTIAL METHOD: ABNORMAL
EOSINOPHIL # BLD AUTO: 0 K/UL
EOSINOPHIL NFR BLD: 1 %
ERYTHROCYTE [DISTWIDTH] IN BLOOD BY AUTOMATED COUNT: 16.7 %
EST. GFR  (AFRICAN AMERICAN): 47 ML/MIN/1.73 M^2
EST. GFR  (NON AFRICAN AMERICAN): 41 ML/MIN/1.73 M^2
GLUCOSE SERPL-MCNC: 105 MG/DL
HCT VFR BLD AUTO: 35.2 %
HGB BLD-MCNC: 10.9 G/DL
LYMPHOCYTES # BLD AUTO: 1.1 K/UL
LYMPHOCYTES NFR BLD: 26.1 %
MCH RBC QN AUTO: 25.1 PG
MCHC RBC AUTO-ENTMCNC: 31 G/DL
MCV RBC AUTO: 81 FL
MONOCYTES # BLD AUTO: 0.7 K/UL
MONOCYTES NFR BLD: 18.1 %
NEUTROPHILS # BLD AUTO: 2.2 K/UL
NEUTROPHILS NFR BLD: 54.6 %
PLATELET # BLD AUTO: 222 K/UL
PMV BLD AUTO: 10.9 FL
POCT GLUCOSE: 116 MG/DL (ref 70–110)
POCT GLUCOSE: 116 MG/DL (ref 70–110)
POCT GLUCOSE: 141 MG/DL (ref 70–110)
POTASSIUM SERPL-SCNC: 3.3 MMOL/L
PROT SERPL-MCNC: 6.6 G/DL
RBC # BLD AUTO: 4.34 M/UL
SODIUM SERPL-SCNC: 141 MMOL/L
WBC # BLD AUTO: 4.03 K/UL

## 2019-03-21 PROCEDURE — 25000003 PHARM REV CODE 250: Mod: HCNC | Performed by: INTERNAL MEDICINE

## 2019-03-21 PROCEDURE — 85025 COMPLETE CBC W/AUTO DIFF WBC: CPT | Mod: HCNC

## 2019-03-21 PROCEDURE — 93010 EKG 12-LEAD: ICD-10-PCS | Mod: HCNC,,, | Performed by: INTERNAL MEDICINE

## 2019-03-21 PROCEDURE — 93010 ELECTROCARDIOGRAM REPORT: CPT | Mod: HCNC,,, | Performed by: INTERNAL MEDICINE

## 2019-03-21 PROCEDURE — 80053 COMPREHEN METABOLIC PANEL: CPT | Mod: HCNC

## 2019-03-21 PROCEDURE — 93005 ELECTROCARDIOGRAM TRACING: CPT | Mod: HCNC

## 2019-03-21 PROCEDURE — 25000003 PHARM REV CODE 250: Mod: HCNC | Performed by: EMERGENCY MEDICINE

## 2019-03-21 PROCEDURE — 21400001 HC TELEMETRY ROOM: Mod: HCNC

## 2019-03-21 PROCEDURE — 36415 COLL VENOUS BLD VENIPUNCTURE: CPT | Mod: HCNC

## 2019-03-21 RX ORDER — FUROSEMIDE 40 MG/1
40 TABLET ORAL DAILY
Status: DISCONTINUED | OUTPATIENT
Start: 2019-03-22 | End: 2019-03-22 | Stop reason: HOSPADM

## 2019-03-21 RX ORDER — POTASSIUM CHLORIDE 20 MEQ/15ML
40 SOLUTION ORAL ONCE
Status: COMPLETED | OUTPATIENT
Start: 2019-03-21 | End: 2019-03-21

## 2019-03-21 RX ADMIN — LOSARTAN POTASSIUM 100 MG: 25 TABLET, FILM COATED ORAL at 09:03

## 2019-03-21 RX ADMIN — CLOPIDOGREL BISULFATE 75 MG: 75 TABLET ORAL at 09:03

## 2019-03-21 RX ADMIN — APIXABAN 2.5 MG: 2.5 TABLET, FILM COATED ORAL at 08:03

## 2019-03-21 RX ADMIN — GABAPENTIN 100 MG: 100 CAPSULE ORAL at 09:03

## 2019-03-21 RX ADMIN — POTASSIUM CHLORIDE 40 MEQ: 1.5 SOLUTION ORAL at 03:03

## 2019-03-21 RX ADMIN — PANTOPRAZOLE SODIUM 40 MG: 40 TABLET, DELAYED RELEASE ORAL at 09:03

## 2019-03-21 RX ADMIN — GUAIFENESIN AND DEXTROMETHORPHAN 5 ML: 100; 10 SYRUP ORAL at 12:03

## 2019-03-21 RX ADMIN — FUROSEMIDE 40 MG: 40 TABLET ORAL at 09:03

## 2019-03-21 RX ADMIN — APIXABAN 2.5 MG: 2.5 TABLET, FILM COATED ORAL at 09:03

## 2019-03-21 RX ADMIN — SPIRONOLACTONE 25 MG: 25 TABLET ORAL at 09:03

## 2019-03-21 RX ADMIN — GABAPENTIN 100 MG: 100 CAPSULE ORAL at 08:03

## 2019-03-21 RX ADMIN — ATORVASTATIN CALCIUM 20 MG: 10 TABLET, FILM COATED ORAL at 09:03

## 2019-03-21 RX ADMIN — GABAPENTIN 100 MG: 100 CAPSULE ORAL at 03:03

## 2019-03-21 RX ADMIN — AMIODARONE HYDROCHLORIDE 400 MG: 200 TABLET ORAL at 08:03

## 2019-03-21 RX ADMIN — AMIODARONE HYDROCHLORIDE 400 MG: 200 TABLET ORAL at 09:03

## 2019-03-21 NOTE — PROGRESS NOTES
Patient returned from dialysis. Denies any pain or discomfort or dizziness. Call light in reach. Bed low and locked. Will continue to monitor.

## 2019-03-21 NOTE — PROGRESS NOTES
[facility] Hospital Medicine  Progress Note    Patient Name: Magaly Nunes  MRN: [id number]  Patient Class: IP- Inpatient   Admission Date: 3/19/2019  Length of Stay: 2 days  Attending Physician: Claudia Watts MD  Primary Care Provider: Chris Bangura MD        Subjective:     Principal Problem:Persistent atrial fibrillation    HPI:  86 year old female with coronary artery disease, diastolic heart failure and CKD stage 3 who presented with left sided chest pain of one day in evolution. Patient stated it felt like she couldn't breath. States pain is worse when she tries to take a deep breath. Denied radiation of pain. Endorses shortness of breath on exertion and worsening LE edema. States she is on diuretics but has not taken lately due to issues with incontinence. States compliance with low sodium diet but does not usually cook for herself. Denied fever, chills, rash, nausea, vomiting, abd pain, diarrhea, constipation.     In the ED, patient was tachycardic to low 100s but afebrile, normotensive and initially with adequate sats at room air. Did become hypoxic to 80% at one point but not sure if this was with ambulation. Will look into this. Has slight leukopenia, INR 1.4, AST and TBil slightly elevated, trop 0.086 and BNP of 1925.  EKG showed atrial fibrillation. CTA chest negative for PE but showed pleural effusion. BLE ultrasound negative for DVT. Patient admitted for trop trend, heart failure exacerbation and Cardiology consultation for newly diagnosed AFib.    Hospital Course:  No notes on file    Interval History:SOB just  A bit better. Cr better as well. No cough today.     Review of Systems   Respiratory: Positive for shortness of breath. Negative for cough.    Cardiovascular: Negative.    Gastrointestinal: Negative.      Objective:     Vital Signs (Most Recent):  Temp: 99 °F (37.2 °C) (03/21/19 1637)  Pulse: 92 (03/21/19 1637)  Resp: 18 (03/21/19 1637)  BP: 129/65 (03/21/19  1637)  SpO2: 95 % (03/21/19 1637) Vital Signs (24h Range):  Temp:  [97.9 °F (36.6 °C)-99 °F (37.2 °C)] 99 °F (37.2 °C)  Pulse:  [75-96] 92  Resp:  [17-19] 18  SpO2:  [93 %-100 %] 95 %  BP: (129-176)/(62-82) 129/65     Weight: 84.5 kg (186 lb 4.6 oz)  Body mass index is 33 kg/m².    Intake/Output Summary (Last 24 hours) at 3/21/2019 1718  Last data filed at 3/21/2019 0000  Gross per 24 hour   Intake --   Output 1200 ml   Net -1200 ml      Physical Exam   Constitutional: She is oriented to person, place, and time. She appears well-developed. No distress.   Well appearing   Cardiovascular: Normal rate and regular rhythm.   Pulmonary/Chest: Effort normal and breath sounds normal. She has no wheezes. She has no rales.   Speaking full sentences and breathing comfortably at room air   Abdominal: Soft. Bowel sounds are normal. She exhibits no distension. There is no tenderness.   Musculoskeletal: Normal range of motion. She exhibits edema (BLE).   Neurological: She is alert and oriented to person, place, and time.   Skin: Skin is warm and dry. Capillary refill takes less than 2 seconds. She is not diaphoretic.   Psychiatric: She has a normal mood and affect. Her behavior is normal. Judgment and thought content normal.   Nursing note and vitals reviewed.      Significant Labs: All pertinent labs within the past 24 hours have been reviewed.    Significant Imaging: I have reviewed all pertinent imaging results/findings within the past 24 hours.  I have reviewed and interpreted all pertinent imaging results/findings within the past 24 hours.    Assessment/Plan:      * Persistent atrial fibrillation    New onset  Now NSR with oral amiodarone therefore cardioversion aborted  On NOAC for stroke proph (discussed benefits of anticoagulation with patient. Also discussed risk for bleeding. Denied Hx of bleeding. Agreed with taking NOAC for stroke prophylaxis. Per cardiology, ok to stop ASA and continue clopidogrel to minimize risk for  "bleeding)  Echo with LVEF of 30% which is lower then previous. Also with grade 3 diasto dysf  Cardiac monitoring           Acute on chronic combined systolic and diastolic heart failure    Has grade 3 diastolic dysfunction. Repeat Echo with worsened LVEF of 30%  Mild exacerbation 2/2 to medication non compliance  CT chest with "moderate" right pleural effusion? She has breath sounds at right base on my exam, maybe some crackles rather than absent sounds. Will give an additional dose of IV lasix now and replace potassium. Rechek BMP in AM  Advised patient to be compliant with diuretics  Cardiologist states EF will get better and that defibrillator (or life vest) not indicated at this time  Cardiac monitoring           Elevated troponin I level    Mild elevation but sure at risk  Treat BP and new onset AFib  No need for further trending at this time  On cardioprotective regimen  Is chest pain free       Elevated brain natriuretic peptide (BNP) level    2/2 volume overload  On diuretic treatment       Serum total bilirubin elevated    abd exam benign  Likely from heart failure  CMP in AM       Leukopenia    Reactive? No signs of infection and is well appearing  Resolved spontaneously          Chest pain    Described pain sounds more like MSK  No evidence of trauma, pneumothorax, consolitaion or fracture at affected area.   Mild elevation of troponin not consistent with ACS  States chest pain resolved  On cardioprotective regimen        Essential hypertension    Currently at goal  On home regimen         VTE Risk Mitigation (From admission, onward)        Ordered     apixaban tablet 2.5 mg  2 times daily      03/20/19 0938        Diurese further. Tentative discharge tomorrow      Claudia Christina MD  Department of Hospital Medicine   [facility]  "

## 2019-03-21 NOTE — PROGRESS NOTES
Report received from off going nurse, DIANA Rodriguez. Patient AAO. No signs of distress noted. Call light in reach. Bed low and locked. Will continue to monitor.

## 2019-03-21 NOTE — NURSING
Report given to day shift nurse, SIMEON Garcia. Patient is resting in bed. Fall precautions are in place.

## 2019-03-21 NOTE — NURSING
Bedside report given to DIANA Rodriguez. Patient sitting up in bed. NAD noted at this time. All safety precautions in place.  12 hr chart check complete

## 2019-03-21 NOTE — ASSESSMENT & PLAN NOTE
"Has grade 3 diastolic dysfunction. Repeat Echo with worsened LVEF of 30%  Mild exacerbation 2/2 to medication non compliance  CT chest with "moderate" right pleural effusion? She has breath sounds at right base on my exam, maybe some crackles rather than absent sounds. Will give an additional dose of IV lasix now and replace potassium. Rechek BMP in AM  Advised patient to be compliant with diuretics  Cardiologist states EF will get better and that defibrillator (or life vest) not indicated at this time  Cardiac monitoring        "

## 2019-03-21 NOTE — PROGRESS NOTES
Summary:  Chart review. Pt is currently inpatient at this time. LCSW will continue to conduct chart reviews and follow up with patient at discharge.     Interventions:  Chart review    Plan:  Re-assess for further needs

## 2019-03-21 NOTE — NURSING
Plan of care reviewed with patient at bedside. All questions answered. Fall precautions are in place. Bedside commode at bedside. Call light within reach. Bed in lowest position. Patient had a nose bleed this morning that quickly resolved. No further complaints throughout the night. Pitting edema noted to BLE from hip to feet. Patient reports an improvement since admission. PIV intact. Sinus arrhythmia. Will continue to monitor.

## 2019-03-21 NOTE — PLAN OF CARE
Problem: Fall Injury Risk  Goal: Absence of Fall and Fall-Related Injury  Outcome: Ongoing (interventions implemented as appropriate)  Intervention: Identify and Manage Contributors to Fall Injury Risk   03/21/19 1622   Manage Acute Allergic Reaction   Medication Review/Management medications reviewed   Identify and Manage Contributors to Fall Injury Risk   Self-Care Promotion independence encouraged     Intervention: Promote Injury-Free Environment   03/21/19 1622   Optimize [place] and Functional Mobility   Environmental Safety Modification assistive device/personal items within reach   Optimize Balance and Safe Activity   Safety Promotion/Fall Prevention assistive device/personal item within reach;bed alarm set;chair alarm set;side rails raised x 3

## 2019-03-21 NOTE — SUBJECTIVE & OBJECTIVE
Interval History:SOB just  A bit better. Cr better as well. No cough today.     Review of Systems   Respiratory: Positive for shortness of breath. Negative for cough.    Cardiovascular: Negative.    Gastrointestinal: Negative.      Objective:     Vital Signs (Most Recent):  Temp: 99 °F (37.2 °C) (03/21/19 1637)  Pulse: 92 (03/21/19 1637)  Resp: 18 (03/21/19 1637)  BP: 129/65 (03/21/19 1637)  SpO2: 95 % (03/21/19 1637) Vital Signs (24h Range):  Temp:  [97.9 °F (36.6 °C)-99 °F (37.2 °C)] 99 °F (37.2 °C)  Pulse:  [75-96] 92  Resp:  [17-19] 18  SpO2:  [93 %-100 %] 95 %  BP: (129-176)/(62-82) 129/65     Weight: 84.5 kg (186 lb 4.6 oz)  Body mass index is 33 kg/m².    Intake/Output Summary (Last 24 hours) at 3/21/2019 1718  Last data filed at 3/21/2019 0000  Gross per 24 hour   Intake --   Output 1200 ml   Net -1200 ml      Physical Exam   Constitutional: She is oriented to person, place, and time. She appears well-developed. No distress.   Well appearing   Cardiovascular: Normal rate and regular rhythm.   Pulmonary/Chest: Effort normal and breath sounds normal. She has no wheezes. She has no rales.   Speaking full sentences and breathing comfortably at room air   Abdominal: Soft. Bowel sounds are normal. She exhibits no distension. There is no tenderness.   Musculoskeletal: Normal range of motion. She exhibits edema (BLE).   Neurological: She is alert and oriented to person, place, and time.   Skin: Skin is warm and dry. Capillary refill takes less than 2 seconds. She is not diaphoretic.   Psychiatric: She has a normal mood and affect. Her behavior is normal. Judgment and thought content normal.   Nursing note and vitals reviewed.      Significant Labs: All pertinent labs within the past 24 hours have been reviewed.    Significant Imaging: I have reviewed all pertinent imaging results/findings within the past 24 hours.  I have reviewed and interpreted all pertinent imaging results/findings within the past 24 hours.

## 2019-03-22 VITALS
SYSTOLIC BLOOD PRESSURE: 124 MMHG | OXYGEN SATURATION: 95 % | BODY MASS INDEX: 31.45 KG/M2 | WEIGHT: 177.5 LBS | TEMPERATURE: 98 F | HEIGHT: 63 IN | HEART RATE: 85 BPM | RESPIRATION RATE: 18 BRPM | DIASTOLIC BLOOD PRESSURE: 82 MMHG

## 2019-03-22 LAB
ANION GAP SERPL CALC-SCNC: 9 MMOL/L
BUN SERPL-MCNC: 16 MG/DL
CALCIUM SERPL-MCNC: 8.5 MG/DL
CHLORIDE SERPL-SCNC: 100 MMOL/L
CO2 SERPL-SCNC: 29 MMOL/L
CREAT SERPL-MCNC: 1.2 MG/DL
EST. GFR  (AFRICAN AMERICAN): 47 ML/MIN/1.73 M^2
EST. GFR  (NON AFRICAN AMERICAN): 41 ML/MIN/1.73 M^2
GLUCOSE SERPL-MCNC: 112 MG/DL
POCT GLUCOSE: 117 MG/DL (ref 70–110)
POCT GLUCOSE: 131 MG/DL (ref 70–110)
POTASSIUM SERPL-SCNC: 3.5 MMOL/L
SODIUM SERPL-SCNC: 138 MMOL/L

## 2019-03-22 PROCEDURE — 36415 COLL VENOUS BLD VENIPUNCTURE: CPT | Mod: HCNC

## 2019-03-22 PROCEDURE — 25000003 PHARM REV CODE 250: Mod: HCNC | Performed by: EMERGENCY MEDICINE

## 2019-03-22 PROCEDURE — 94761 N-INVAS EAR/PLS OXIMETRY MLT: CPT | Mod: HCNC

## 2019-03-22 PROCEDURE — 25000003 PHARM REV CODE 250: Mod: HCNC | Performed by: INTERNAL MEDICINE

## 2019-03-22 PROCEDURE — 80048 BASIC METABOLIC PNL TOTAL CA: CPT | Mod: HCNC

## 2019-03-22 RX ORDER — NAPROXEN SODIUM 220 MG/1
81 TABLET, FILM COATED ORAL DAILY
Refills: 0
Start: 2019-03-22 | End: 2020-03-21

## 2019-03-22 RX ORDER — LOSARTAN POTASSIUM 25 MG/1
100 TABLET ORAL DAILY
Status: DISCONTINUED | OUTPATIENT
Start: 2019-03-23 | End: 2019-03-22 | Stop reason: HOSPADM

## 2019-03-22 RX ORDER — AMIODARONE HYDROCHLORIDE 400 MG/1
400 TABLET ORAL 2 TIMES DAILY
Qty: 60 TABLET | Refills: 11 | Status: SHIPPED | OUTPATIENT
Start: 2019-03-22 | End: 2020-03-21

## 2019-03-22 RX ADMIN — GABAPENTIN 100 MG: 100 CAPSULE ORAL at 09:03

## 2019-03-22 RX ADMIN — ATORVASTATIN CALCIUM 20 MG: 10 TABLET, FILM COATED ORAL at 09:03

## 2019-03-22 RX ADMIN — FUROSEMIDE 40 MG: 40 TABLET ORAL at 09:03

## 2019-03-22 RX ADMIN — AMIODARONE HYDROCHLORIDE 400 MG: 200 TABLET ORAL at 09:03

## 2019-03-22 RX ADMIN — PANTOPRAZOLE SODIUM 40 MG: 40 TABLET, DELAYED RELEASE ORAL at 09:03

## 2019-03-22 RX ADMIN — SPIRONOLACTONE 25 MG: 25 TABLET ORAL at 09:03

## 2019-03-22 RX ADMIN — APIXABAN 2.5 MG: 2.5 TABLET, FILM COATED ORAL at 09:03

## 2019-03-22 RX ADMIN — CLOPIDOGREL BISULFATE 75 MG: 75 TABLET ORAL at 09:03

## 2019-03-22 NOTE — PROGRESS NOTES
Follow-up Information     Hammad Humphrey MD. Go on 3/27/2019.    Specialties:  INTERVENTIONAL CARDIOLOGY, Cardiology  Why:  Outpatient Services, Cardiology Follow-up Appointment, Please arrive to clinic for 10:00AM  Contact information:  [address]  [address]  [place] [address]  [phone]             Chris Bangura MD. Go on 4/11/2019.    Specialty:  Internal Medicine  Why:  Outpatient Services, Primary Care Follow-up Appointment, Please arrive to clinic for 2:20PM  Contact information:  [address] [place] [address]  [phone]                   [facility]    WRITTEN HEALTHCARE AND DISCHARGE INFORMATION                        Help at Home           [phone]  After discharge for assistance [facility] On Call Nurse Care Line 24/7  Assistance    Things You are responsible For To Manage Your Care At Home:  1.    Getting your prescriptions filled   2.    Taking your medications as directed, DO NOT MISS ANY DOSES!  3.    Going to your follow-up doctor appointment. This is important because it  allow the doctor to monitor your progress and determine if  any changes need to made to your treatment plan.     Thank you for choosing [facility] for your care.  Please answer any calls you may receive from [facility] we want to continue to support you as you manage your healthcare needs. [facility] is happy to have the opportunity to serve you.     Sincerely,  Your [facility] Team,  Kanchan Emanuel LMSW   II  [phone]

## 2019-03-22 NOTE — PLAN OF CARE
Problem: Fall Injury Risk  Goal: Absence of Fall and Fall-Related Injury  Outcome: Ongoing (interventions implemented as appropriate)  Intervention: Identify and Manage Contributors to Fall Injury Risk   03/22/19 1211   Manage Acute Allergic Reaction   Medication Review/Management medications reviewed   Identify and Manage Contributors to Fall Injury Risk   Self-Care Promotion BADL personal objects within reach     Intervention: Promote Injury-Free Environment   03/22/19 1211   Optimize [place] and Functional Mobility   Environmental Safety Modification assistive device/personal items within reach   Optimize Balance and Safe Activity   Safety Promotion/Fall Prevention assistive device/personal item within reach;bed alarm set;side rails raised x 3

## 2019-03-22 NOTE — PLAN OF CARE
"SW met with patient to provide discharge follow-up instructions. SW reviewed with patient contents of "[facility] Packet" including "help at home", "things patient responsible for to manage her health at home" and "preferences". SW reminded patient how she will manage her health at home is by going on her doctor appointments, getting prescriptions filled and taking her medications. EDUCATION: Patient provided with educational information on Congestive Heart Failure.  Information reviewed and placed in "My Healthcare Packet" to be brought home for patient to use as resource after discharge.  Information included:  signs and symptoms to look for and when to call the doctor if experiencing any symptoms that may indicate a medical emergency: CALL 911. All questions answered.  Teach back method used. Patient was able to verbalize understanding of signs and symptoms and managing her health by paying attention to having weight gain and dizziness. DAISY informed nurse Radha sidhu mangkumar completed all discharge planning for patient and is clear to discharge from case management standpoint.           03/22/19 1035   Final Note   Assessment Type Final Discharge Note   Anticipated Discharge Disposition Home   Hospital Follow Up  Appt(s) scheduled? Yes   Discharge plans and expectations educations in teach back method with documentation complete? Yes   Right Care Referral Info   Post Acute Recommendation No Care     "

## 2019-03-22 NOTE — DISCHARGE SUMMARY
[facility] Hospital Medicine  Discharge Summary      Patient Name: Magaly Nunes  MRN: [id number]  Admission Date: 3/19/2019  Hospital Length of Stay: 3 days  Discharge Date and Time:  03/22/2019 12:58 PM  Attending Physician: Claudia Watts MD   Discharging Provider: Claudia Christina MD  Primary Care Provider: Chris Bangura MD      HPI:   86 year old female with coronary artery disease, diastolic heart failure and CKD stage 3 who presented with left sided chest pain of one day in evolution. Patient stated it felt like she couldn't breath. States pain is worse when she tries to take a deep breath. Denied radiation of pain. Endorses shortness of breath on exertion and worsening LE edema. States she is on diuretics but has not taken lately due to issues with incontinence. States compliance with low sodium diet but does not usually cook for herself. Denied fever, chills, rash, nausea, vomiting, abd pain, diarrhea, constipation.     In the ED, patient was tachycardic to low 100s but afebrile, normotensive and initially with adequate sats at room air. Did become hypoxic to 80% at one point but not sure if this was with ambulation. Will look into this. Has slight leukopenia, INR 1.4, AST and TBil slightly elevated, trop 0.086 and BNP of 1925.  EKG showed atrial fibrillation. CTA chest negative for PE but showed pleural effusion. BLE ultrasound negative for DVT. Patient admitted for trop trend, heart failure exacerbation and Cardiology consultation for newly diagnosed AFib.    Procedure(s) (LRB):  TRANSESOPHAGEAL ECHOCARDIOGRAM WITH POSSIBLE CARDIOVERSION (AMADO W/ POSS CARDIOVERSION) (N/A)      Hospital Course:   Ms Khan presented with new onset AFib and heart failure exacerbation. Patient was barely tachycardic but was hypoxic on exertion. Also with slight leukopenia, INR 1.4, AST and TBil slightly elevated and BNP of 1925.  EKG showed atrial fibrillation. Workup negative for ACS. CTA chest  negative for PE but showed pleural effusion. BLE ultrasound negative for DVT. Cardiology consulted. Started on oral amiodarone 400 mg BID. Patient actually converted to NSR with this. Had some PACs on cardiac monitor. Home carvedilol has to be held as he blood pressure would not tolerate. Was started on a NOAC but had to be held as she had two episodes of uncomplicated epistaxis. I discussed benefits and risks of anticoagulation and how antiplatelets are not as optimal for stroke prophylaxis in her case. However, antiplatelets preferred given this episode of bleeding. Patient verbalized understanding. A 2D Echo obtained. Showed LVEF 30% (decreased from prior), grade 3 diastolic dysfunction (unchanged), mild-mod mitral valve regurg (worse), mod-severe tricuspid valve regurg (worse), pulm HTN and normal right ventricular function (worse). Patient's symptoms of SOB improved with intermittent IV furosemide x 2 in addition to home regimen of furosemide 40 mg daily and spironolactone 25 daily. Renal function actually improved with diuresis. Given that patient was not taking these at home, I instructed to continue same regimen for now to see if it is adequate for maintenance. I discussed new EF with Dr Bertrand who states that he expects her EF to improve therefore no life vest or AICD planned for now. Low sodium diet. Activity as tolerated. F/u with Cardiology and PCP at dates shown below.       Consults:   Consults (From admission, onward)        Status Ordering Provider     Inpatient consult to Cardiology  Once     Provider:  Heraclio Bertrand MD    Completed CARLOS MORENO     IP consult to case management  Once     Provider:  (Not yet assigned)    Acknowledged CARLOS MORENO        Final Active Diagnoses:    Diagnosis Date Noted POA    PRINCIPAL PROBLEM:  Persistent atrial fibrillation [I48.1] 03/19/2019 Yes    Acute on chronic combined systolic and diastolic heart failure [I50.43] 12/28/2017 Yes     Elevated troponin I level [R74.8] 03/19/2019 Yes    Chest pain [R07.9] 03/19/2019 Yes    Leukopenia [D72.819] 03/19/2019 Yes    Serum total bilirubin elevated [R17] 03/19/2019 Yes    Elevated brain natriuretic peptide (BNP) level [R79.89] 03/19/2019 Yes    PVD (peripheral vascular disease) [I73.9] 05/08/2015 Yes    Essential hypertension [I10] 11/19/2012 Yes     Chronic      Problems Resolved During this Admission:       Discharged Condition: good    Disposition: Home or Self Care    Follow Up:  Follow-up Information     Hammad Humphrey MD. Go on 3/27/2019.    Specialties:  INTERVENTIONAL CARDIOLOGY, Cardiology  Why:  Outpatient Services, Cardiology Follow-up Appointment, Please arrive to clinic for 10:00AM  Contact information:  [address]  [address]  [place] [address]  [phone]             Chris Bangura MD. Go on 4/11/2019.    Specialty:  Internal Medicine  Why:  Outpatient Services, Primary Care Follow-up Appointment, Please arrive to clinic for 2:20PM  Contact information:  [address] [facility]  [place] [address]  [phone]                 Patient Instructions:      Transesophageal echo (AMADO) with possible cardioversion   Standing Status: Future Standing Exp. Date: 03/19/20     Medications:  Reconciled Home Medications:      Medication List      START taking these medications    amiodarone 400 MG tablet  Commonly known as:  PACERONE  Take 1 tablet (400 mg total) by mouth 2 (two) times daily.        CHANGE how you take these medications    clopidogrel 75 mg tablet  Commonly known as:  PLAVIX  TAKE 1 TABLET(75 MG) BY MOUTH EVERY DAY  What changed:  Another medication with the same name was removed. Continue taking this medication, and follow the directions you see here.        CONTINUE taking these medications    aspirin 81 MG Chew  Take 1 tablet (81 mg total) by mouth once daily.     atorvastatin 20 MG tablet  Commonly known as:  LIPITOR  Take 1 tablet (20 mg total) by mouth once daily.      DULoxetine 60 MG capsule  Commonly known as:  CYMBALTA  TAKE 1 CAPSULE(60 MG) BY MOUTH EVERY DAY     esomeprazole 40 MG capsule  Commonly known as:  NEXIUM  Take 1 capsule (40 mg total) by mouth before breakfast.     furosemide 40 MG tablet  Commonly known as:  LASIX  Take 1 tablet (40 mg total) by mouth once daily.     gabapentin 100 MG capsule  Commonly known as:  NEURONTIN  Take 1 capsule (100 mg total) by mouth 3 (three) times daily.     lidocaine-prilocaine cream  Commonly known as:  EMLA     losartan 100 MG tablet  Commonly known as:  COZAAR  TAKE 1 TABLET BY MOUTH ONCE DAILY     potassium chloride SA 20 MEQ tablet  Commonly known as:  K-DUR,KLOR-CON  TAKE 1 TABLET(20 MEQ) BY MOUTH EVERY DAY     spironolactone 25 MG tablet  Commonly known as:  ALDACTONE  Take 25 mg by mouth once daily.         Time spent on the discharge of patient: > 45 minutes  Patient was seen and examined on the date of discharge and determined to be suitable for discharge.         Claudia Christina MD  Department of Hospital Medicine  [facility]

## 2019-03-22 NOTE — PROGRESS NOTES
Patient is discharged. Telemetry removed. IV removed, tip intact. Discharge teaching given and understood. No questions asked. Getting dressed ride here for discharge.

## 2019-03-22 NOTE — PROGRESS NOTES
Patient awake, alert, oriented resting comfortably. No signs of distress observed. bed low and locked. Call light in reach. Report given to oncoming nurse, DIANA Rodriguez. 12hour chart check complete.

## 2019-03-22 NOTE — PLAN OF CARE
SW met with patient to explain IMM Notice, patient verbalized understanding.         03/22/19 1024 Medicare Message   Important Message from Medicare regarding Discharge Appeal Rights Given to patient/caregiver;Explained to patient/caregiver;Signed/date by patient/caregiver

## 2019-03-22 NOTE — HOSPITAL COURSE
Ms Khan presented with new onset AFib and heart failure exacerbation. Patient was barely tachycardic but was hypoxic on exertion. Also with slight leukopenia, INR 1.4, AST and TBil slightly elevated and BNP of 1925.  EKG showed atrial fibrillation. Workup negative for ACS. CTA chest negative for PE but showed pleural effusion. BLE ultrasound negative for DVT. Cardiology consulted. Started on oral amiodarone 400 mg BID. Patient actually converted to NSR with this. Had some PACs on cardiac monitor. Home carvedilol has to be held as he blood pressure would not tolerate. Was started on a NOAC but had to be held as she had two episodes of uncomplicated epistaxis. I discussed benefits and risks of anticoagulation and how antiplatelets are not as optimal for stroke prophylaxis in her case. However, antiplatelets preferred given this episode of bleeding. Patient verbalized understanding. A 2D Echo obtained. Showed LVEF 30% (decreased from prior), grade 3 diastolic dysfunction (unchanged), mild-mod mitral valve regurg (worse), mod-severe tricuspid valve regurg (worse), pulm HTN and normal right ventricular function (worse). Patient's symptoms of SOB improved with intermittent IV furosemide x 2 in addition to home regimen of furosemide 40 mg daily and spironolactone 25 daily. Renal function actually improved with diuresis. Given that patient was not taking these at home, I instructed to continue same regimen for now to see if it is adequate for maintenance. I discussed new EF with Dr Bertrand who states that he expects her EF to improve therefore no life vest or AICD planned for now. Low sodium diet. Activity as tolerated. F/u with Cardiology and PCP at dates shown below.

## 2019-03-26 ENCOUNTER — NURSE TRIAGE (OUTPATIENT)
Dept: ADMINISTRATIVE | Facility: CLINIC | Age: 84
End: 2019-03-26

## 2019-03-26 NOTE — TELEPHONE ENCOUNTER
Please call patient and see if she can be seen in the after hours clinic here or advised her about the local urgent care.  She probably does need someone to check out her legs.  It might be nothing but she needs someone to see the legs.    Check the times and advise her of the times and location

## 2019-03-26 NOTE — TELEPHONE ENCOUNTER
PT c\o worsening swelling to legs since home. Has discoloration to toes, but has been there and not worse. States swelling is to knees. Told pt that she needs to be seen today. States that she has transportation problems. Niece won't be back until later today so not sure if she has a ride to appt. Offered URGENT NP home visit. Pt declined stating she will wait to niece comes and try to get appt if she decides to go. Told to call [facility] if swelling worsens. Message to pcp.   Reason for Disposition   MODERATE swelling of both ankles (e.g., swelling extends up to the knees) AND new onset or worsening    Protocols used: LEG SWELLING AND EDEMA-A-OH

## 2019-03-26 NOTE — TELEPHONE ENCOUNTER
Spoke with pt went over detail message. Pt stated she has an appt tomorrow with Ms. Ibarra and will have her look at her leg tomorrow.

## 2019-03-28 ENCOUNTER — OUTPATIENT CASE MANAGEMENT (OUTPATIENT)
Dept: ADMINISTRATIVE | Facility: OTHER | Age: 84
End: 2019-03-28

## 2019-03-28 NOTE — PROGRESS NOTES
Summary:  1st Attempt to complete SW follow-up for Outpatient Care Management; phone continuously run - unable to leave a voicemail.  LCSW will reattempt at a later date.      Interventions:  None    Plan:  Re-assess for further needs

## 2019-03-29 ENCOUNTER — OUTPATIENT CASE MANAGEMENT (OUTPATIENT)
Dept: ADMINISTRATIVE | Facility: OTHER | Age: 84
End: 2019-03-29

## 2019-03-29 NOTE — PROGRESS NOTES
Summary:  Spoke with pt regarding follow-up - pt states she legs are swollen - that they go down and come back up - pt states that her knee is swollen but she just finished washing up- pt states that she is has been up since 8 am as her niece called Dianna for the well dine yesterday.  Pt states that other than her legs she feels ok - pt states everything was the same except for her knee - states it goes from the right to the left knee with the swelling-pt denies that any of her medications were changed in or after the hospital.   Pt denies any falls but still gets winded when she does too much - pt stating that she did just finish washing and was resting before she went to fix her breakfast.  Pt stated she was hungry and going to go fix breakfast-pt had not taken her B/P yet- pt wanted to fix breakfast - call shortened    Interventions:  Encouraged pt to rest and put her feet up when feeling winded or tired  Reminded pt that if B/P was 140/90 for 3 days brennan row to notify PCP  Encouraged pt to take and record B/P daily  Notified LCSW of conversation and pt's better spirits from last conversation with OPCM    Plan:  Follow-up on B/P and daily recording  Assess nutritional habits-sodium intake??    Todays OPCM Self-Management Care Plan was developed with the patients/caregivers input and was based on identified barriers from todays assessment.  Goals were written today with the patient/caregiver and the patient has agreed to work towards these goals to improve his/her overall well-being. Patient verbalized understanding of the care plan, goals, and all of today's instructions. Encouraged patient/caregiver to communicate with his/her physician and health care team about health conditions and the treatment plan.  Provided my contact information today and encouraged patient/caregiver to call me with any questions as needed.

## 2019-04-01 ENCOUNTER — OUTPATIENT CASE MANAGEMENT (OUTPATIENT)
Dept: ADMINISTRATIVE | Facility: OTHER | Age: 84
End: 2019-04-01

## 2019-04-01 NOTE — PROGRESS NOTES
Summary:  LCSW contacted pt for follow up. Pt stated she is doing fair. Pt has concerns with how her skin on her legs/feet are looking. Pt stated she is no longer carrying fluid in her lower extremities; however, she feels as if her legs/feet are looking like prunes. Pt stated she wants to get herself looked at for further evaluation. Pt is planning to visit the ED for further assistance. An appointment was offered to patient; however, patient declined. Pt is waiting for either her niece, Deborah or surrogate son, Jas to come to her home in order to bring her in for evaluation. No other concerns were voiced at this time.     Interventions:  Encourage family involvement in care  Encourage communication with providers/seeking treatment if/when s/s worsen    Plan:  Re-assess for further needs      Todays OPCM Self-Management Care Plan was developed with the patients/caregivers input and was based on identified barriers from todays assessment.  Goals were written today with the patient/caregiver and the patient has agreed to work towards these goals to improve his/her overall well-being. Patient verbalized understanding of the care plan, goals, and all of today's instructions. Encouraged patient/caregiver to communicate with his/her physician and health care team about health conditions and the treatment plan.  Provided my contact information today and encouraged patient/caregiver to call me with any questions as needed.

## 2019-04-02 ENCOUNTER — OUTPATIENT CASE MANAGEMENT (OUTPATIENT)
Dept: ADMINISTRATIVE | Facility: OTHER | Age: 84
End: 2019-04-02

## 2019-04-02 NOTE — PROGRESS NOTES
"Summary:  LCSW returned a missed call from patient. Pt stated she received a statement from [facility] last night stating her inpatient stay from 3/19-3/22 was denied. Pt stated in their notification that pt "lied about having a heart attack". Pt expressed her frustration related to the letter. A home visit has been tentative scheduled for tomorrow related to the notification where staff may assist pt with appealing the letter. Pt denied other needs to be addressed at this time.     Interventions:  Supportive counseling    Plan:  Home visit tentatively for tomorrow to discuss EOB    Todays OPCM Self-Management Care Plan was developed with the patients/caregivers input and was based on identified barriers from todays assessment.  Goals were written today with the patient/caregiver and the patient has agreed to work towards these goals to improve his/her overall well-being. Patient verbalized understanding of the care plan, goals, and all of today's instructions. Encouraged patient/caregiver to communicate with his/her physician and health care team about health conditions and the treatment plan.  Provided my contact information today and encouraged patient/caregiver to call me with any questions as needed.  "

## 2019-04-05 ENCOUNTER — OUTPATIENT CASE MANAGEMENT (OUTPATIENT)
Dept: ADMINISTRATIVE | Facility: OTHER | Age: 84
End: 2019-04-05

## 2019-04-05 NOTE — PROGRESS NOTES
Summary:  LCSW contacted pt for follow up. PT stated she is doing fair. Pt was reminded of her upcoming medical appointments. A  Home visit has been tentatively scheduled for next week to review the forms she received from [facility]. No other concerns were voiced.     Interventions:  Reminder of upcoming medical appointments    Plan:  Reassess for further needs    Todays OPCM Self-Management Care Plan was developed with the patients/caregivers input and was based on identified barriers from todays assessment.  Goals were written today with the patient/caregiver and the patient has agreed to work towards these goals to improve his/her overall well-being. Patient verbalized understanding of the care plan, goals, and all of today's instructions. Encouraged patient/caregiver to communicate with his/her physician and health care team about health conditions and the treatment plan.  Provided my contact information today and encouraged patient/caregiver to call me with any questions as needed.

## 2019-04-08 ENCOUNTER — TELEPHONE (OUTPATIENT)
Dept: CARDIOLOGY | Facility: CLINIC | Age: 84
End: 2019-04-08

## 2019-04-08 NOTE — TELEPHONE ENCOUNTER
Called pt several times, had to diane appt due to her pcp appt is on same day near timing, diane to 05/02/19 for 3pm    Ef

## 2019-04-09 NOTE — TELEPHONE ENCOUNTER
Informed of lab results; states she had a fall and is not feeling too well. Pt did not go to ED advised if pain continues to call to be seen in clinic tomorrow; verbalized understanding.

## 2019-04-11 ENCOUNTER — OUTPATIENT CASE MANAGEMENT (OUTPATIENT)
Dept: ADMINISTRATIVE | Facility: OTHER | Age: 84
End: 2019-04-11

## 2019-04-11 NOTE — PROGRESS NOTES
Summary:  1st Attempt to complete SW follow-up for Outpatient Care Management; unable to leave a voicemail.   LCSW will reattempt at a later date.      Interventions:  None    Plan:  Make 2nd attempt on next week.

## 2019-04-12 NOTE — PROGRESS NOTES
Summary:  Spoke with pt regarding follow-up -pt states she has a new corn and that she is going to the dermatologist on Monday  to have it check- she states it hurts a little- pt did not take her B/P this morning stating she was so busy with her rash - states that is is peeling rather than a rash -states yesterday she thinks it was in the 150's pt stated says that she is writing it down but it's in the other room -also reports she  she can't remember anything - Pt states that she has been upset about letter from insurance company that states she lied about she went to [facility]- pt states she plans to change doctors because she is afraid of that [facility] lied to the insurance company - pt was also insisting that he LCSW was supposed to come to her home on Monday and help her with the insurance papers- trying to explain to pt but she got upset and stated of the LCSW 's word would be taken over hers - went on to rant that she is 86 years old and shouldn't have to change doctors but now she is - stating she is not going to take calls from [facility] any more because she knows that someone from [facility] said they were coming to her home to help her with  insurance papers and they didn't even show up or call her.  Pt states she has not been weighing herself -admits she has weight logs but is not using them- pt states that her feet were swollen last night but she soaked them in Epsom's salts and that they are better today  Pt states that she was sure someone was coming to her house-she stated that no one would be believe her - she said who ever it was on the phone -she knows their word will be taken over hers  Pt denies any falls or SOB    Interventions:  Allowed pt to vent her feeling about the paper from insurance [employer] and [facility]  Explained to tp the LCSW had planned to met pt in the clinic but appt was rescheduled -   Reviewed appt with pt's- informed pt that she does not have an appt with a dermatologist coming  up  Instructed pt To take her B/P every day and weigh every day- encouraged pt to do so and explained importance-pt verbalized understanding  Informed pt if she gains more than 5 lbs in week and if more 3 lbs in 2 days she needs to let the PCP know  Messaged LCSW pt's perception  Discussed D/C with pt if pt did not have some reading next week    Plan:  Pt continues to be non-compliant with taking care of herself with taking B/P and weighing herself  Follow-up next week and assess for compliance     Todays OPCM Self-Management Care Plan was developed with the patients/caregivers input and was based on identified barriers from todays assessment.  Goals were written today with the patient/caregiver and the patient has agreed to work towards these goals to improve his/her overall well-being. Patient verbalized understanding of the care plan, goals, and all of today's instructions. Encouraged patient/caregiver to communicate with his/her physician and health care team about health conditions and the treatment plan.  Provided my contact information today and encouraged patient/caregiver to call me with any questions as needed.

## 2019-04-16 ENCOUNTER — OUTPATIENT CASE MANAGEMENT (OUTPATIENT)
Dept: ADMINISTRATIVE | Facility: OTHER | Age: 84
End: 2019-04-16

## 2019-04-16 NOTE — LETTER
April 16, 2019    Magaly Nunes  [address] [address]             [facility] [address] [place] [address] Dear Ms. Nunes:    I am writing from the Outpatient Complex Care Management Department at [facility].  I have been unsuccessful at reaching you to follow up with you to see how you have been doing. I hope you find the resources previously provided to you helpful. Please contact me for further assistance.    I can be reached at [phone] Monday thru Friday from 8:00am to 4:30pm.  [facility] also has a program with a nurse available 24/7 to answer questions or provide medical advice.  [facility] can be reached at [phone].    Sincerely,       Celia Campbell LCSW

## 2019-04-16 NOTE — PROGRESS NOTES
Summary:  2nd Attempt to complete SW follow-up for Outpatient Care Management; unable to leave a voicemail as the phone continuously run. LCSW mailed a letter with contact information requesting a return call.  OPCM RN notified.    Interventions:  Collaborated with OPCM-RN  Mailed unable to reach letter    Plan:  Make 3rd attempt on next week.

## 2019-04-23 ENCOUNTER — TELEPHONE (OUTPATIENT)
Dept: FAMILY MEDICINE | Facility: CLINIC | Age: 84
End: 2019-04-23

## 2019-04-23 ENCOUNTER — OUTPATIENT CASE MANAGEMENT (OUTPATIENT)
Dept: ADMINISTRATIVE | Facility: OTHER | Age: 84
End: 2019-04-23

## 2019-04-23 NOTE — TELEPHONE ENCOUNTER
----- Message from Janice Arias RN sent at 4/23/2019 10:05 AM CDT -----  Contact: Heather Arias RN OPCM  Good Morning,    Just an FYI - I just got off the phone with the above pt on a follow-up phone call.  Pt reports that she fell sometime last week - not sure of the day and that her left side still hurts - pt is unsure if she has any bruising -just states her left side still hurts.  Just want to let you know incase your wanted to reach out to her.      Have a great day!    Best Regards,    Heather ([place]) Hugo NIX BSN RN   Outpatient Care Management  [phone]

## 2019-04-23 NOTE — PROGRESS NOTES
Summary:  Spoke with pt regarding follow-up - pt states she had a rash but it went away but states that it is back-pt states she is going to call the dermatologist today- pt states she went to the doctor last Tuesday but did not see the doctor because she was told she was too late - pt states she is hurting all over - thinks it is from the rash-pt states she feels weak - she was going to drink OJ. Pt states sometimes she take her B/P but most of the time she doesn't take it -pt states her weight is 175 lb and states she has been 175 for quite a while now.  Pt states that she feel about a week ago and her left side still hurts a little - pt states she is using her walker now.  Pt states she is thinking of going to Urgent Care because she feels bad- pt states she keeps putting off going to the doctor and states that is where she needs to be -pt states she is going to take a shower and if she doesn't feel better she is going to Urgent care - unable to describe symptoms just that she feels bad stating this morning is worse than other    Interventions:  Reviewed upcoming appts with pt  Encouraged pt to use walker at all times  Discussed D/C and discussed pt's condition as unchanged at each call  Messaged PCP regarding pt's fall last week    Plan:  Call back this afternoon or tomorrow -plan to D/C If feeling better  Todays OPCM Self-Management Care Plan was developed with the patients/caregivers input and was based on identified barriers from todays assessment.  Goals were written today with the patient/caregiver and the patient has agreed to work towards these goals to improve his/her overall well-being. Patient verbalized understanding of the care plan, goals, and all of today's instructions. Encouraged patient/caregiver to communicate with his/her physician and health care team about health conditions and the treatment plan.  Provided my contact information today and encouraged patient/caregiver to call me with any  questions as needed.

## 2019-04-24 ENCOUNTER — OUTPATIENT CASE MANAGEMENT (OUTPATIENT)
Dept: ADMINISTRATIVE | Facility: OTHER | Age: 84
End: 2019-04-24

## 2019-04-24 NOTE — LETTER
April 26, 2019    Magaly Nunes  [id number] [address]             [facility] [address] [place] [address] Dear Ms Nunes,      I work with [facility] Outpatient Case Management Department. I have been unsuccessful at reaching you to follow-up to see how you have been doing. Please call me back at your earliest convenience to discuss your health care needs.      I can be reached at [phone]  from 8:00AM to 4:30 PM on Monday thru Friday. [facility] is a program offered through [facility] where a nurse is available 24/7 to answer questions or provide medical advice, their number is [phone].    Thanks,      Heather Arias RN ([place])  Outpatient Case Management

## 2019-04-24 NOTE — PROGRESS NOTES
Summary:  3rd Attempt to complete SW follow-up for Outpatient Care Management;  Unable to leave a voicemail, phone continuously rang.  LCSW mailed letter after 2nd attempt for SW follow-up with contact information requesting a return call and pt has not reached out to this LCSW.  LCSW will close case and notified OPCM RN.    Interventions:  Collaborated with OPCM-RN  Case closure    Plan:  None

## 2019-04-26 ENCOUNTER — TELEPHONE (OUTPATIENT)
Dept: FAMILY MEDICINE | Facility: CLINIC | Age: 84
End: 2019-04-26

## 2019-04-26 DIAGNOSIS — E11.22 CONTROLLED TYPE 2 DIABETES MELLITUS WITH STAGE 3 CHRONIC KIDNEY DISEASE, WITHOUT LONG-TERM CURRENT USE OF INSULIN: Primary | ICD-10-CM

## 2019-04-26 DIAGNOSIS — G89.29 OTHER CHRONIC PAIN: ICD-10-CM

## 2019-04-26 DIAGNOSIS — I10 ESSENTIAL HYPERTENSION: ICD-10-CM

## 2019-04-26 DIAGNOSIS — R53.81 DEBILITY: ICD-10-CM

## 2019-04-26 DIAGNOSIS — N18.30 CONTROLLED TYPE 2 DIABETES MELLITUS WITH STAGE 3 CHRONIC KIDNEY DISEASE, WITHOUT LONG-TERM CURRENT USE OF INSULIN: Primary | ICD-10-CM

## 2019-04-26 DIAGNOSIS — M25.50 POLYARTHRALGIA: ICD-10-CM

## 2019-04-26 DIAGNOSIS — R29.6 FREQUENT FALLS: ICD-10-CM

## 2019-04-26 NOTE — TELEPHONE ENCOUNTER
Home health orders a very complicated.  We need much more information to find out why home health is needed and what the family is looking for since often what family needs is not something that home health can provide.    Please call the family member    Perhaps we could consult outpatient case management, they can assess need and then they can let us know what is needed to be ordered

## 2019-04-26 NOTE — TELEPHONE ENCOUNTER
----- Message from Eduarda Montoya sent at 4/26/2019  3:42 PM CDT -----  Contact: Cassidy Cabrera called to request home health for listed patient. Please call to advise at [phone]

## 2019-04-30 ENCOUNTER — TELEPHONE (OUTPATIENT)
Dept: FAMILY MEDICINE | Facility: CLINIC | Age: 84
End: 2019-04-30

## 2019-04-30 NOTE — TELEPHONE ENCOUNTER
----- Message from Obi Guadalupe sent at 4/30/2019 10:16 AM CDT -----  Contact: Deborah daughter/[phone]  Type: Patient Call Back    Who called:Deborah daughter    What is the request in detail:Deborah would like to speak to Janeen. She didn't leave any details    Would the patient rather a call back or a response via [facility]? Call back    Best call back number:[phone]    Thank you

## 2019-04-30 NOTE — TELEPHONE ENCOUNTER
Stated, found Aunt on the floor Friday. Not sure how long she was on floor. No injuries reported. Patient unable to ambulate and/or do her ADL's. Requesting for HH evaluation r/t debilitation.

## 2019-04-30 NOTE — PROGRESS NOTES
Summary:  Spoke with pt regarding follow-up - pt states again she is not doing good - pt states that she has trouble walking still -pt states she has continued to use her walker and that she still has trouble getting in and out of the sofa since her fall about 3 weeks ago when she tripped on the rug-  that has been unchanged -pt admits that she has cancelled all of her appointments over the past 6 months -PCP office I informed pt;s niece that pt had not been there in a while.- pt states she has been hurting and depressed -pt's niece called PCP - pt has appt next week and niece suppose to go with her - pt states last week she fell asleep in the chair int he kitchen and fell out of the chair and hit her head on a cabinet knob - pt was unsure hane that was last week-pt reports that he niece called PCP to request HHA  and that pt was needing help to bath and that was why niece had called OPCM RN  last week - Pt reports that she still is not takingher B/P and does not remember what it was last-pt reports that her niece is becoming more involved and going to take pt to doctor next week- pt deneis any SOB or any swelling more than usual - doesn't think she has gain wt - did not weight herself      Interventions:  Informed pt that her niece had called last Friday and OPCM RN returned call t pt's niece and left a message  Reviewed appts -encouraged pt of importance to keep md appointments and to keep appt with PCP next week  Reviewed falls preventions again and encouraged pt to  rugs-keep phone near - use walker etc.  Discussed D/C -encouraged pt to follow what PCP suggests and recommends    Plan:  Case closed - pt' continues to do as she has been -  Niece is  getting more involved with pt's identifying needs/issues-perhaps that will be motivating for pt.  Todays OPC Self-Management Care Plan was developed with the patients/caregivers input and was based on identified barriers from todays assessment.  Goals were  written today with the patient/caregiver and the patient has agreed to work towards these goals to improve his/her overall well-being. Patient verbalized understanding of the care plan, goals, and all of today's instructions. Encouraged patient/caregiver to communicate with his/her physician and health care team about health conditions and the treatment plan.  Provided my contact information today and encouraged patient/caregiver to call me with any questions as needed.

## 2019-05-03 ENCOUNTER — TELEPHONE (OUTPATIENT)
Dept: FAMILY MEDICINE | Facility: CLINIC | Age: 84
End: 2019-05-03

## 2019-05-03 ENCOUNTER — HOSPITAL ENCOUNTER (INPATIENT)
Facility: HOSPITAL | Age: 84
LOS: 7 days | Discharge: NURSING, REHAB OR LONG-TERM CARE | DRG: 291 | End: 2019-05-10
Attending: EMERGENCY MEDICINE | Admitting: HOSPITALIST
Payer: MEDICARE

## 2019-05-03 DIAGNOSIS — I50.9 CHF (CONGESTIVE HEART FAILURE): ICD-10-CM

## 2019-05-03 DIAGNOSIS — T79.6XXA TRAUMATIC RHABDOMYOLYSIS, INITIAL ENCOUNTER: ICD-10-CM
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
## 2019-05-03 DIAGNOSIS — S09.90XA INJURY OF HEAD, INITIAL ENCOUNTER: ICD-10-CM

## 2019-05-03 DIAGNOSIS — M54.9 BACK PAIN, UNSPECIFIED BACK LOCATION, UNSPECIFIED BACK PAIN LATERALITY, UNSPECIFIED CHRONICITY: ICD-10-CM

## 2019-05-03 DIAGNOSIS — I50.9 CONGESTIVE HEART FAILURE, UNSPECIFIED HF CHRONICITY, UNSPECIFIED HEART FAILURE TYPE: Primary | ICD-10-CM

## 2019-05-03 DIAGNOSIS — J90 PLEURAL EFFUSION: ICD-10-CM

## 2019-05-03 DIAGNOSIS — R79.89 ELEVATED TROPONIN: ICD-10-CM

## 2019-05-03 LAB
ALBUMIN SERPL BCP-MCNC: 3 G/DL (ref 3.5–5.2)
ALP SERPL-CCNC: 118 U/L (ref 55–135)
ALT SERPL W/O P-5'-P-CCNC: 35 U/L (ref 10–44)
AMMONIA PLAS-SCNC: 34 UMOL/L (ref 10–50)
AMORPH CRY URNS QL MICRO: ABNORMAL
AMPHET+METHAMPHET UR QL: NEGATIVE
ANION GAP SERPL CALC-SCNC: 16 MMOL/L (ref 8–16)
ANISOCYTOSIS BLD QL SMEAR: SLIGHT
APAP SERPL-MCNC: <3 UG/ML (ref 10–20)
APTT BLDCRRT: 31.7 SEC (ref 21–32)
AST SERPL-CCNC: 67 U/L (ref 10–40)
BACTERIA #/AREA URNS HPF: ABNORMAL /HPF
BARBITURATES UR QL SCN>200 NG/ML: NEGATIVE
BASOPHILS # BLD AUTO: 0.02 K/UL (ref 0–0.2)
BASOPHILS NFR BLD: 0.3 % (ref 0–1.9)
BENZODIAZ UR QL SCN>200 NG/ML: NEGATIVE
BILIRUB SERPL-MCNC: 3 MG/DL (ref 0.1–1)
BILIRUB UR QL STRIP: NEGATIVE
BNP SERPL-MCNC: 2079 PG/ML (ref 0–99)
BUN SERPL-MCNC: 24 MG/DL (ref 8–23)
BURR CELLS BLD QL SMEAR: ABNORMAL
BZE UR QL SCN: NEGATIVE
CALCIUM SERPL-MCNC: 9.2 MG/DL (ref 8.7–10.5)
CANNABINOIDS UR QL SCN: NEGATIVE
CHLORIDE SERPL-SCNC: 107 MMOL/L (ref 95–110)
CK SERPL-CCNC: 655 U/L (ref 20–180)
CLARITY UR: ABNORMAL
CO2 SERPL-SCNC: 17 MMOL/L (ref 23–29)
COLOR UR: ABNORMAL
CREAT SERPL-MCNC: 1.4 MG/DL (ref 0.5–1.4)
CREAT UR-MCNC: 140.1 MG/DL (ref 15–325)
DIFFERENTIAL METHOD: ABNORMAL
EOSINOPHIL # BLD AUTO: 0 K/UL (ref 0–0.5)
EOSINOPHIL NFR BLD: 0.3 % (ref 0–8)
ERYTHROCYTE [DISTWIDTH] IN BLOOD BY AUTOMATED COUNT: 19.7 % (ref 11.5–14.5)
EST. GFR  (AFRICAN AMERICAN): 39 ML/MIN/1.73 M^2
EST. GFR  (NON AFRICAN AMERICAN): 34 ML/MIN/1.73 M^2
ETHANOL SERPL-MCNC: <10 MG/DL
GLUCOSE SERPL-MCNC: 96 MG/DL (ref 70–110)
GLUCOSE UR QL STRIP: NEGATIVE
GRAN CASTS #/AREA URNS LPF: 3 /LPF
HCT VFR BLD AUTO: 39.2 % (ref 37–48.5)
HGB BLD-MCNC: 11.9 G/DL (ref 12–16)
HGB UR QL STRIP: NEGATIVE
HYALINE CASTS #/AREA URNS LPF: 2 /LPF
INR PPP: 1.4 (ref 0.8–1.2)
KETONES UR QL STRIP: ABNORMAL
LACTATE SERPL-SCNC: 6.7 MMOL/L (ref 0.5–2.2)
LEUKOCYTE ESTERASE UR QL STRIP: NEGATIVE
LYMPHOCYTES # BLD AUTO: 1.2 K/UL (ref 1–4.8)
LYMPHOCYTES NFR BLD: 17.1 % (ref 18–48)
MAGNESIUM SERPL-MCNC: 1.3 MG/DL (ref 1.6–2.6)
MCH RBC QN AUTO: 24.2 PG (ref 27–31)
MCHC RBC AUTO-ENTMCNC: 30.4 G/DL (ref 32–36)
MCV RBC AUTO: 80 FL (ref 82–98)
METHADONE UR QL SCN>300 NG/ML: NEGATIVE
MICROSCOPIC COMMENT: ABNORMAL
MONOCYTES # BLD AUTO: 0.9 K/UL (ref 0.3–1)
MONOCYTES NFR BLD: 13.3 % (ref 4–15)
NEUTROPHILS # BLD AUTO: 4.6 K/UL (ref 1.8–7.7)
NEUTROPHILS NFR BLD: 69.1 % (ref 38–73)
NITRITE UR QL STRIP: NEGATIVE
OPIATES UR QL SCN: NEGATIVE
OVALOCYTES BLD QL SMEAR: ABNORMAL
PCP UR QL SCN>25 NG/ML: NEGATIVE
PH UR STRIP: 5 [PH] (ref 5–8)
PLATELET # BLD AUTO: 222 K/UL (ref 150–350)
PMV BLD AUTO: 10.4 FL (ref 9.2–12.9)
POIKILOCYTOSIS BLD QL SMEAR: SLIGHT
POLYCHROMASIA BLD QL SMEAR: ABNORMAL
POTASSIUM SERPL-SCNC: 4.8 MMOL/L (ref 3.5–5.1)
PROT SERPL-MCNC: 6.7 G/DL (ref 6–8.4)
PROT UR QL STRIP: ABNORMAL
PROTHROMBIN TIME: 15.1 SEC (ref 9–12.5)
RBC # BLD AUTO: 4.92 M/UL (ref 4–5.4)
RBC #/AREA URNS HPF: 2 /HPF (ref 0–4)
SALICYLATES SERPL-MCNC: <5 MG/DL (ref 15–30)
SCHISTOCYTES BLD QL SMEAR: ABNORMAL
SODIUM SERPL-SCNC: 140 MMOL/L (ref 136–145)
SP GR UR STRIP: 1.02 (ref 1–1.03)
SQUAMOUS #/AREA URNS HPF: 2 /HPF
T4 FREE SERPL-MCNC: 1.04 NG/DL (ref 0.71–1.51)
TARGETS BLD QL SMEAR: ABNORMAL
TOXICOLOGY INFORMATION: NORMAL
TROPONIN I SERPL DL<=0.01 NG/ML-MCNC: 0.11 NG/ML (ref 0–0.03)
TSH SERPL DL<=0.005 MIU/L-ACNC: 4.12 UIU/ML (ref 0.4–4)
URN SPEC COLLECT METH UR: ABNORMAL
UROBILINOGEN UR STRIP-ACNC: ABNORMAL EU/DL
WBC # BLD AUTO: 6.71 K/UL (ref 3.9–12.7)
WBC #/AREA URNS HPF: 3 /HPF (ref 0–5)

## 2019-05-03 PROCEDURE — 12000002 HC ACUTE/MED SURGE SEMI-PRIVATE ROOM: Mod: HCNC

## 2019-05-03 PROCEDURE — 96361 HYDRATE IV INFUSION ADD-ON: CPT | Mod: HCNC

## 2019-05-03 PROCEDURE — 25000003 PHARM REV CODE 250: Performed by: EMERGENCY MEDICINE

## 2019-05-03 PROCEDURE — 80307 DRUG TEST PRSMV CHEM ANLYZR: CPT | Mod: HCNC

## 2019-05-03 PROCEDURE — 83880 ASSAY OF NATRIURETIC PEPTIDE: CPT | Mod: HCNC

## 2019-05-03 PROCEDURE — 93010 EKG 12-LEAD: ICD-10-PCS | Mod: HCNC,,, | Performed by: INTERNAL MEDICINE

## 2019-05-03 PROCEDURE — 96374 THER/PROPH/DIAG INJ IV PUSH: CPT | Mod: HCNC

## 2019-05-03 PROCEDURE — 82140 ASSAY OF AMMONIA: CPT | Mod: HCNC

## 2019-05-03 PROCEDURE — 83735 ASSAY OF MAGNESIUM: CPT | Mod: HCNC

## 2019-05-03 PROCEDURE — 63600175 PHARM REV CODE 636 W HCPCS: Mod: HCNC | Performed by: EMERGENCY MEDICINE

## 2019-05-03 PROCEDURE — 84443 ASSAY THYROID STIM HORMONE: CPT | Mod: HCNC

## 2019-05-03 PROCEDURE — 80053 COMPREHEN METABOLIC PANEL: CPT | Mod: HCNC

## 2019-05-03 PROCEDURE — 83605 ASSAY OF LACTIC ACID: CPT | Mod: HCNC

## 2019-05-03 PROCEDURE — 80329 ANALGESICS NON-OPIOID 1 OR 2: CPT | Mod: HCNC

## 2019-05-03 PROCEDURE — 87040 BLOOD CULTURE FOR BACTERIA: CPT | Mod: HCNC

## 2019-05-03 PROCEDURE — 85610 PROTHROMBIN TIME: CPT | Mod: HCNC

## 2019-05-03 PROCEDURE — 93010 ELECTROCARDIOGRAM REPORT: CPT | Mod: HCNC,,, | Performed by: INTERNAL MEDICINE

## 2019-05-03 PROCEDURE — 85730 THROMBOPLASTIN TIME PARTIAL: CPT | Mod: HCNC

## 2019-05-03 PROCEDURE — 84484 ASSAY OF TROPONIN QUANT: CPT | Mod: HCNC

## 2019-05-03 PROCEDURE — 85025 COMPLETE CBC W/AUTO DIFF WBC: CPT | Mod: HCNC

## 2019-05-03 PROCEDURE — 80320 DRUG SCREEN QUANTALCOHOLS: CPT | Mod: HCNC

## 2019-05-03 PROCEDURE — 99291 CRITICAL CARE FIRST HOUR: CPT | Mod: 25,HCNC

## 2019-05-03 PROCEDURE — 81000 URINALYSIS NONAUTO W/SCOPE: CPT | Mod: HCNC,59

## 2019-05-03 PROCEDURE — 93005 ELECTROCARDIOGRAM TRACING: CPT | Mod: HCNC

## 2019-05-03 PROCEDURE — 82550 ASSAY OF CK (CPK): CPT | Mod: HCNC

## 2019-05-03 PROCEDURE — 84439 ASSAY OF FREE THYROXINE: CPT | Mod: HCNC

## 2019-05-03 RX ORDER — FUROSEMIDE 10 MG/ML
40 INJECTION INTRAMUSCULAR; INTRAVENOUS
Status: COMPLETED | OUTPATIENT
Start: 2019-05-03 | End: 2019-05-03

## 2019-05-03 RX ADMIN — FUROSEMIDE 40 MG: 10 INJECTION, SOLUTION INTRAVENOUS at 11:05

## 2019-05-03 RX ADMIN — SODIUM CHLORIDE 500 ML: 0.9 INJECTION, SOLUTION INTRAVENOUS at 09:05

## 2019-05-03 NOTE — TELEPHONE ENCOUNTER
----- Message from [place] sent at 5/3/2019  9:18 AM CDT -----  Contact: Deborah/ Chandler/  [phone]  Type: Patient Call Back    Who called: Deborah arteaga    What is the request in detail:  Patient chandler would like staff to give her a call in reference to patients home evaluation.  Thank you      Best call back number:  [phone]

## 2019-05-03 NOTE — TELEPHONE ENCOUNTER
Niryan stated, patient refused HH. Instructed her to contact HH agency again for them to come out and Evaluate patient. Encouraged her to have someone there to help patient through this process. No further assistance is needed.

## 2019-05-03 NOTE — TELEPHONE ENCOUNTER
----- Message from Shena Prado sent at 5/3/2019 11:33 AM CDT -----  ..Type:  Patient Returning Call    Who Called: Deborah uCrry     Who Left Message for Patient: Karely    Does the patient know what this is regarding?: Requested call back     Would the patient rather a call back or a response via [facility]? Call back     Best Call Back Number:[phone]

## 2019-05-04 PROBLEM — R29.6 RECURRENT FALLS WHILE WALKING: Status: ACTIVE | Noted: 2019-05-04

## 2019-05-04 LAB
ACANTHOCYTES BLD QL SMEAR: PRESENT
ALBUMIN SERPL BCP-MCNC: 2.7 G/DL (ref 3.5–5.2)
ALP SERPL-CCNC: 112 U/L (ref 55–135)
ALT SERPL W/O P-5'-P-CCNC: 34 U/L (ref 10–44)
ANION GAP SERPL CALC-SCNC: 16 MMOL/L (ref 8–16)
ANISOCYTOSIS BLD QL SMEAR: SLIGHT
AST SERPL-CCNC: 75 U/L (ref 10–40)
BASOPHILS # BLD AUTO: 0.02 K/UL (ref 0–0.2)
BASOPHILS NFR BLD: 0.3 % (ref 0–1.9)
BILIRUB SERPL-MCNC: 3 MG/DL (ref 0.1–1)
BUN SERPL-MCNC: 24 MG/DL (ref 8–23)
BURR CELLS BLD QL SMEAR: ABNORMAL
CALCIUM SERPL-MCNC: 8.8 MG/DL (ref 8.7–10.5)
CHLORIDE SERPL-SCNC: 106 MMOL/L (ref 95–110)
CO2 SERPL-SCNC: 17 MMOL/L (ref 23–29)
CREAT SERPL-MCNC: 1.2 MG/DL (ref 0.5–1.4)
DACRYOCYTES BLD QL SMEAR: ABNORMAL
DIFFERENTIAL METHOD: ABNORMAL
EOSINOPHIL # BLD AUTO: 0 K/UL (ref 0–0.5)
EOSINOPHIL NFR BLD: 0.1 % (ref 0–8)
ERYTHROCYTE [DISTWIDTH] IN BLOOD BY AUTOMATED COUNT: 19.5 % (ref 11.5–14.5)
EST. GFR  (AFRICAN AMERICAN): 47 ML/MIN/1.73 M^2
EST. GFR  (NON AFRICAN AMERICAN): 41 ML/MIN/1.73 M^2
ESTIMATED AVG GLUCOSE: 128 MG/DL (ref 68–131)
GIANT PLATELETS BLD QL SMEAR: PRESENT
GLUCOSE SERPL-MCNC: 73 MG/DL (ref 70–110)
HBA1C MFR BLD HPLC: 6.1 % (ref 4–5.6)
HCT VFR BLD AUTO: 35.2 % (ref 37–48.5)
HGB BLD-MCNC: 10.8 G/DL (ref 12–16)
LYMPHOCYTES # BLD AUTO: 1.2 K/UL (ref 1–4.8)
LYMPHOCYTES NFR BLD: 15.7 % (ref 18–48)
MAGNESIUM SERPL-MCNC: 1.4 MG/DL (ref 1.6–2.6)
MCH RBC QN AUTO: 24.4 PG (ref 27–31)
MCHC RBC AUTO-ENTMCNC: 30.7 G/DL (ref 32–36)
MCV RBC AUTO: 80 FL (ref 82–98)
MONOCYTES # BLD AUTO: 1.2 K/UL (ref 0.3–1)
MONOCYTES NFR BLD: 16 % (ref 4–15)
NEUTROPHILS # BLD AUTO: 5.1 K/UL (ref 1.8–7.7)
NEUTROPHILS NFR BLD: 68.4 % (ref 38–73)
OVALOCYTES BLD QL SMEAR: ABNORMAL
PLATELET # BLD AUTO: 215 K/UL (ref 150–350)
PLATELET BLD QL SMEAR: ABNORMAL
PMV BLD AUTO: 10.4 FL (ref 9.2–12.9)
POLYCHROMASIA BLD QL SMEAR: ABNORMAL
POTASSIUM SERPL-SCNC: 4.6 MMOL/L (ref 3.5–5.1)
PROT SERPL-MCNC: 6 G/DL (ref 6–8.4)
RBC # BLD AUTO: 4.42 M/UL (ref 4–5.4)
SCHISTOCYTES BLD QL SMEAR: ABNORMAL
SODIUM SERPL-SCNC: 139 MMOL/L (ref 136–145)
T3FREE SERPL-MCNC: <1 PG/ML (ref 2.3–4.2)
T4 FREE SERPL-MCNC: 0.95 NG/DL (ref 0.71–1.51)
TARGETS BLD QL SMEAR: ABNORMAL
TROPONIN I SERPL DL<=0.01 NG/ML-MCNC: 0.11 NG/ML (ref 0–0.03)
TROPONIN I SERPL DL<=0.01 NG/ML-MCNC: 0.12 NG/ML (ref 0–0.03)
WBC # BLD AUTO: 7.5 K/UL (ref 3.9–12.7)

## 2019-05-04 PROCEDURE — 80053 COMPREHEN METABOLIC PANEL: CPT | Mod: HCNC

## 2019-05-04 PROCEDURE — 84439 ASSAY OF FREE THYROXINE: CPT | Mod: HCNC

## 2019-05-04 PROCEDURE — 21400001 HC TELEMETRY ROOM: Mod: HCNC

## 2019-05-04 PROCEDURE — 25000003 PHARM REV CODE 250: Mod: HCNC | Performed by: HOSPITALIST

## 2019-05-04 PROCEDURE — 84484 ASSAY OF TROPONIN QUANT: CPT | Mod: 91,HCNC

## 2019-05-04 PROCEDURE — 97161 PT EVAL LOW COMPLEX 20 MIN: CPT | Mod: HCNC

## 2019-05-04 PROCEDURE — 83735 ASSAY OF MAGNESIUM: CPT | Mod: HCNC

## 2019-05-04 PROCEDURE — 94761 N-INVAS EAR/PLS OXIMETRY MLT: CPT | Mod: HCNC

## 2019-05-04 PROCEDURE — 63600175 PHARM REV CODE 636 W HCPCS: Mod: HCNC | Performed by: HOSPITALIST

## 2019-05-04 PROCEDURE — 85025 COMPLETE CBC W/AUTO DIFF WBC: CPT | Mod: HCNC

## 2019-05-04 PROCEDURE — 36415 COLL VENOUS BLD VENIPUNCTURE: CPT | Mod: HCNC

## 2019-05-04 PROCEDURE — 83036 HEMOGLOBIN GLYCOSYLATED A1C: CPT | Mod: HCNC

## 2019-05-04 PROCEDURE — 94799 UNLISTED PULMONARY SVC/PX: CPT | Mod: HCNC

## 2019-05-04 PROCEDURE — 99900035 HC TECH TIME PER 15 MIN (STAT): Mod: HCNC

## 2019-05-04 PROCEDURE — 84481 FREE ASSAY (FT-3): CPT | Mod: HCNC

## 2019-05-04 PROCEDURE — 97166 OT EVAL MOD COMPLEX 45 MIN: CPT | Mod: HCNC

## 2019-05-04 RX ORDER — ATORVASTATIN CALCIUM 10 MG/1
20 TABLET, FILM COATED ORAL DAILY
Status: DISCONTINUED | OUTPATIENT
Start: 2019-05-04 | End: 2019-05-10 | Stop reason: HOSPADM

## 2019-05-04 RX ORDER — NAPROXEN SODIUM 220 MG/1
81 TABLET, FILM COATED ORAL DAILY
Status: DISCONTINUED | OUTPATIENT
Start: 2019-05-04 | End: 2019-05-10 | Stop reason: HOSPADM

## 2019-05-04 RX ORDER — METOPROLOL TARTRATE 1 MG/ML
5 INJECTION, SOLUTION INTRAVENOUS EVERY 5 MIN PRN
Status: DISCONTINUED | OUTPATIENT
Start: 2019-05-04 | End: 2019-05-04

## 2019-05-04 RX ORDER — ISOSORBIDE DINITRATE AND HYDRALAZINE HYDROCHLORIDE 37.5; 2 MG/1; MG/1
1 TABLET ORAL 3 TIMES DAILY
Status: DISCONTINUED | OUTPATIENT
Start: 2019-05-04 | End: 2019-05-08

## 2019-05-04 RX ORDER — DULOXETIN HYDROCHLORIDE 30 MG/1
60 CAPSULE, DELAYED RELEASE ORAL DAILY
Status: DISCONTINUED | OUTPATIENT
Start: 2019-05-04 | End: 2019-05-10 | Stop reason: HOSPADM

## 2019-05-04 RX ORDER — SPIRONOLACTONE 25 MG/1
25 TABLET ORAL DAILY
Status: DISCONTINUED | OUTPATIENT
Start: 2019-05-04 | End: 2019-05-10 | Stop reason: HOSPADM

## 2019-05-04 RX ORDER — ENOXAPARIN SODIUM 100 MG/ML
40 INJECTION SUBCUTANEOUS EVERY 24 HOURS
Status: DISCONTINUED | OUTPATIENT
Start: 2019-05-04 | End: 2019-05-04

## 2019-05-04 RX ORDER — SODIUM CHLORIDE 0.9 % (FLUSH) 0.9 %
10 SYRINGE (ML) INJECTION
Status: DISCONTINUED | OUTPATIENT
Start: 2019-05-04 | End: 2019-05-10 | Stop reason: HOSPADM

## 2019-05-04 RX ORDER — FUROSEMIDE 10 MG/ML
40 INJECTION INTRAMUSCULAR; INTRAVENOUS 2 TIMES DAILY
Status: COMPLETED | OUTPATIENT
Start: 2019-05-04 | End: 2019-05-05

## 2019-05-04 RX ORDER — AMIODARONE HYDROCHLORIDE 200 MG/1
400 TABLET ORAL 2 TIMES DAILY
Status: DISCONTINUED | OUTPATIENT
Start: 2019-05-04 | End: 2019-05-10 | Stop reason: HOSPADM

## 2019-05-04 RX ORDER — HYDRALAZINE HYDROCHLORIDE 20 MG/ML
10 INJECTION INTRAMUSCULAR; INTRAVENOUS EVERY 8 HOURS PRN
Status: DISCONTINUED | OUTPATIENT
Start: 2019-05-04 | End: 2019-05-10 | Stop reason: HOSPADM

## 2019-05-04 RX ORDER — CLOPIDOGREL BISULFATE 75 MG/1
75 TABLET ORAL DAILY
Status: DISCONTINUED | OUTPATIENT
Start: 2019-05-04 | End: 2019-05-10 | Stop reason: HOSPADM

## 2019-05-04 RX ORDER — ACETAMINOPHEN 325 MG/1
650 TABLET ORAL EVERY 4 HOURS PRN
Status: DISCONTINUED | OUTPATIENT
Start: 2019-05-04 | End: 2019-05-10 | Stop reason: HOSPADM

## 2019-05-04 RX ORDER — HEPARIN SODIUM 5000 [USP'U]/ML
5000 INJECTION, SOLUTION INTRAVENOUS; SUBCUTANEOUS EVERY 8 HOURS
Status: DISCONTINUED | OUTPATIENT
Start: 2019-05-04 | End: 2019-05-06

## 2019-05-04 RX ORDER — ONDANSETRON 2 MG/ML
8 INJECTION INTRAMUSCULAR; INTRAVENOUS EVERY 6 HOURS PRN
Status: DISCONTINUED | OUTPATIENT
Start: 2019-05-04 | End: 2019-05-10 | Stop reason: HOSPADM

## 2019-05-04 RX ORDER — MAGNESIUM SULFATE HEPTAHYDRATE 40 MG/ML
2 INJECTION, SOLUTION INTRAVENOUS ONCE
Status: COMPLETED | OUTPATIENT
Start: 2019-05-04 | End: 2019-05-04

## 2019-05-04 RX ADMIN — MAGNESIUM SULFATE HEPTAHYDRATE 2 G: 40 INJECTION, SOLUTION INTRAVENOUS at 06:05

## 2019-05-04 RX ADMIN — ACETAMINOPHEN 650 MG: 325 TABLET, FILM COATED ORAL at 09:05

## 2019-05-04 RX ADMIN — HEPARIN SODIUM 5000 UNITS: 5000 INJECTION, SOLUTION INTRAVENOUS; SUBCUTANEOUS at 04:05

## 2019-05-04 RX ADMIN — CEFTRIAXONE 1 G: 1 INJECTION, SOLUTION INTRAVENOUS at 09:05

## 2019-05-04 RX ADMIN — SPIRONOLACTONE 25 MG: 25 TABLET ORAL at 09:05

## 2019-05-04 RX ADMIN — ASPIRIN 81 MG 81 MG: 81 TABLET ORAL at 09:05

## 2019-05-04 RX ADMIN — DULOXETINE 60 MG: 30 CAPSULE, DELAYED RELEASE ORAL at 09:05

## 2019-05-04 RX ADMIN — FUROSEMIDE 40 MG: 10 INJECTION, SOLUTION INTRAVENOUS at 05:05

## 2019-05-04 RX ADMIN — AMIODARONE HYDROCHLORIDE 400 MG: 200 TABLET ORAL at 09:05

## 2019-05-04 RX ADMIN — ATORVASTATIN CALCIUM 20 MG: 10 TABLET, FILM COATED ORAL at 09:05

## 2019-05-04 RX ADMIN — FUROSEMIDE 40 MG: 10 INJECTION, SOLUTION INTRAVENOUS at 12:05

## 2019-05-04 RX ADMIN — HEPARIN SODIUM 5000 UNITS: 5000 INJECTION, SOLUTION INTRAVENOUS; SUBCUTANEOUS at 06:05

## 2019-05-04 RX ADMIN — HYDRALAZINE HYDROCHLORIDE AND ISOSORBIDE DINITRATE 1 TABLET: 37.5; 2 TABLET, FILM COATED ORAL at 09:05

## 2019-05-04 RX ADMIN — CLOPIDOGREL BISULFATE 75 MG: 75 TABLET ORAL at 09:05

## 2019-05-04 RX ADMIN — HEPARIN SODIUM 5000 UNITS: 5000 INJECTION, SOLUTION INTRAVENOUS; SUBCUTANEOUS at 09:05

## 2019-05-04 NOTE — H&P
"[facility] Medicine  History & Physical    Patient Name: Magaly Nunes  MRN: [id number]  Admission Date: 05/04/2019  Attending Physician: Seng Oliveira MD, MPH      PCP:     Chris Bangura MD    CC:     Chief Complaint   Patient presents with    Fall     unwitnessed fall, family member came to house and found pt on the floor, reports she's fallen 4 times in the last two weeks; family reports pt has become more immobile; reports she lives alone; reports "it might be a UTI because urine was orange and cloudy"; reports pt is "off" when you talk to her and reports it is unknown when the pt last ate       HISTORY OF PRESENT ILLNESS:     Magaly Nunes is a 86 y.o. female that (in part)  has a past medical history of Anxiety disorder, Arthritis, Carpal tunnel syndrome, CHF (congestive heart failure), Chronic allergic rhinitis, Chronic pain, CKD stage 3 due to type 2 diabetes mellitus, Constipation - functional, Depression, Diabetes mellitus type II, History of cataract surgery, Hot flash not due to menopause, HTN (hypertension), Hypokalemia, Insomnia, Knee pain, bilateral, Personal history of DVT (deep vein thrombosis), Polyarthralgia, Pulmonary hypertension, Renovascular hypertension, Severe tricuspid regurgitation, Spinal stenosis, Spinal stenosis of lumbar region, Systolic and diastolic CHF, chronic, and Vertigo.  has a past surgical history that includes Hysterectomy; Cataract extraction (Bilateral); Eye surgery; Fracture surgery; and ORIF radius & ulna fractures. Presents to [facility] initially complaining of multiple recurrent falls.  She has fallen 4 times over the last 2 weeks.  She reports all of these were mechanical falls and denies chest pain, syncope, or palpitations.  She 1st tripped over bryn approximately 2 weeks ago and struck the left side of her head and landed on her buttocks.  She has had some difficulty " ambulating due to weakness since that time.  She is also complaining of increased weight gain, swelling, and generalized non acute weakness.  She has been so we can fact that she has been sleeping on the floor at home and has been incontinent of urine.  She has not been eating or drinking adequately since the falls.  She was reluctant to come into the hospital, but finally reports that her pride subsided and she called for help.    In the emergency department multiple imaging studies were obtained to evaluate for possible fracture dislocation which proved to be negative.  CT of head was also negative.  Routine laboratory studies were obtained which demonstrated elevated CPK levels, elevated troponin, and markedly elevated BNP.  Physical exam was consistent with edema likely due to acute CHF exacerbation.    Hospital medicine has been asked to admit for further evaluation and treatment.       REVIEW OF SYSTEMS:     -- Constitutional:  Generalized fatigue and malaise.  No fever or chills.  -- Eyes: No visual changes, diplopia, pain, tearing, blind spots, or discharge.   -- Ears, nose, mouth, throat, and face: No congestion, sore throat, epistaxis, d/c, bleeding gums, neck stiffness masses, or dental issues.  -- Respiratory:  Shortness of breath at rest.  Denies hemoptysis.  -- Cardiovascular:  Dyspnea with exertion.  Edema. No chest pain, no syncope  -- Gastrointestinal: No vomiting, abdominal pain, hematemesis, melena, dyspepsia, or change in bowel habits.  -- Genitourinary:  Incontinence.  No hematuria, dysuria, frequency, urgency, nocturia, polyuria, stones  -- Integument/breast:  Skin irritation in perineum and buttocks due to urination.  No lacerations or skin tears  -- Hematologic/lymphatic:  Positive for easy bruising.  No lymphadenopathy.   -- Musculoskeletal:  Diffuse nonfocal muscle weakness as noted above in the HPI.  -- Neurological:  Positive for generalized weakness and recurrent falls.  No seizures,  headaches, incoordination  -- Behavioral/Psych: No auditory or visual hallucinations, depression, or suicidal/homicidal ideations.  -- Endocrine: No heat or cold intolerance, polydipsia, or unintentional weight gain / loss.  -- Allergy/Immunologic: No recurrent infections or adverse reaction to food, insects, or difficulty breathing.      PAST MEDICAL / SURGICAL HISTORY:     Past Medical History:   Diagnosis Date    Anxiety disorder     Arthritis 12/28/2017    Carpal tunnel syndrome     CHF (congestive heart failure)     Chronic allergic rhinitis     Chronic pain 10/22/2012    CKD stage 3 due to type 2 diabetes mellitus 2/14/2017    Constipation - functional 4/10/2013    Depression     Diabetes mellitus type II     History of cataract surgery     Hot flash not due to menopause     HTN (hypertension)     Hypokalemia 11/19/2012    Insomnia 4/10/2013    Knee pain, bilateral 7/24/2013    Personal history of DVT (deep vein thrombosis)     Polyarthralgia 7/16/2018    Pulmonary hypertension 2/1/2017    Renovascular hypertension 2/14/2017    Severe tricuspid regurgitation 2/14/2017    Spinal stenosis     Dr. Corrales    Spinal stenosis of lumbar region 2/3/2014    Systolic and diastolic CHF, chronic 7/16/2018    Vertigo      Past Surgical History:   Procedure Laterality Date    CATARACT EXTRACTION Bilateral     EYE SURGERY      FRACTURE SURGERY      HYSTERECTOMY      ORIF RADIUS & ULNA FRACTURES           FAMILY HISTORY:     Family History   Problem Relation Age of Onset    No Known Problems Mother     No Known Problems Father     No Known Problems Sister     No Known Problems Brother     No Known Problems Maternal Aunt     No Known Problems Maternal Uncle     No Known Problems Paternal Aunt     No Known Problems Paternal Uncle     No Known Problems Maternal Grandmother     No Known Problems Maternal Grandfather     No Known Problems Paternal Grandmother     No Known Problems Paternal  Grandfather     Amblyopia Neg Hx     Blindness Neg Hx     Cancer Neg Hx     Diabetes Neg Hx     Glaucoma Neg Hx     Hypertension Neg Hx     Macular degeneration Neg Hx     Retinal detachment Neg Hx     Strabismus Neg Hx     Stroke Neg Hx     Thyroid disease Neg Hx          SOCIAL HISTORY:     Social History     Socioeconomic History    Marital status:      Spouse name: Not on file    Number of children: Not on file    Years of education: Not on file    Highest education level: Not on file   Occupational History    Not on file   Social Needs    Financial resource strain: Not on file    Food insecurity:     Worry: Not on file     Inability: Not on file    Transportation needs:     Medical: Not on file     Non-medical: Not on file   Tobacco Use    Smoking status: Never Smoker    Smokeless tobacco: Never Used   Substance and Sexual Activity    Alcohol use: No    Drug use: No    Sexual activity: Not Currently   Lifestyle    Physical activity:     Days per week: Not on file     Minutes per session: Not on file    Stress: Not on file   Relationships    Social connections:     Talks on phone: Not on file     Gets together: Not on file     Attends [employer] service: Not on file     Active member of club or organization: Not on file     Attends meetings of clubs or organizations: Not on file     Relationship status: Not on file   Other Topics Concern    Not on file   Social History Narrative    .  Lives alone.   and two sons have .  Active.           ALLERGIES:       Review of patient's allergies indicates:   Allergen Reactions    Ace inhibitors Swelling     Other reaction(s): Unknown    Aciphex  [rabeprazole]      Other reaction(s): Stomach upset    Bextra  [valdecoxib]      Other reaction(s): Unknown    Cardizem  [diltiazem hcl]      Other reaction(s): Unknown    Clonidine      Other reaction(s): dry mouth.lip swelling    Mavik  [trandolapril]      Other reaction(s):  Unknown    Phenytoin sodium extended      Other reaction(s): Stomach upset    Tramadol      Other reaction(s): stomach pain    Aspirin Rash     Other reaction(s): Unknown    Nsaids (non-steroidal anti-inflammatory drug) Rash     Other reaction(s): black spots on skin         HOME MEDICATIONS:     Prior to Admission medications    Medication Sig Start Date End Date Taking? Authorizing Provider   amiodarone (PACERONE) 400 MG tablet Take 1 tablet (400 mg total) by mouth 2 (two) times daily. 3/22/19 3/21/20 Yes Claudia Watts MD   aspirin 81 MG Chew Take 1 tablet (81 mg total) by mouth once daily. 3/22/19 3/21/20 Yes Claudia Watts MD   atorvastatin (LIPITOR) 20 MG tablet Take 1 tablet (20 mg total) by mouth once daily. 11/15/18  Yes Hammad Humphrey MD   clopidogrel (PLAVIX) 75 mg tablet TAKE 1 TABLET(75 MG) BY MOUTH EVERY DAY 1/29/19  Yes Hammad Humphrey MD   DULoxetine (CYMBALTA) 60 MG capsule TAKE 1 CAPSULE(60 MG) BY MOUTH EVERY DAY 12/7/18  Yes Chris Bangura MD   esomeprazole (NEXIUM) 40 MG capsule Take 1 capsule (40 mg total) by mouth before breakfast. 9/18/18 9/18/19 Yes Chris Bangura MD   furosemide (LASIX) 40 MG tablet Take 1 tablet (40 mg total) by mouth once daily. 2/26/18  Yes Hammad Humphrey MD   gabapentin (NEURONTIN) 100 MG capsule Take 1 capsule (100 mg total) by mouth 3 (three) times daily. 6/8/18 6/8/19 Yes Chris Bangura MD   lidocaine-prilocaine (EMLA) cream  3/13/18  Yes Historical Provider, MD   losartan (COZAAR) 100 MG tablet TAKE 1 TABLET BY MOUTH ONCE DAILY 12/11/18  Yes Chris Bangura MD   potassium chloride SA (K-DUR,KLOR-CON) 20 MEQ tablet TAKE 1 TABLET(20 MEQ) BY MOUTH EVERY DAY 12/7/18  Yes Chris Bangura MD   spironolactone (ALDACTONE) 25 MG tablet Take 25 mg by mouth once daily.   Yes Historical Provider, MD   LORazepam (ATIVAN) 0.5 MG tablet Take 1 tablet (0.5 mg total) by mouth every 8 (eight) hours as needed for Anxiety. (caution-may cause  "sleepiness) 12/11/17 3/20/18  Chris Bangura MD   temazepam (RESTORIL) 30 mg capsule TAKE ONE CAPSULE BY MOUTH AT BEDTIME AS NEEDED FOR INSOMNIA 7/6/18   Chris Bangura MD          [facility] MEDICATIONS:     Scheduled Meds:    amiodarone  400 mg Oral BID    aspirin  81 mg Oral Daily    atorvastatin  20 mg Oral Daily    clopidogrel  75 mg Oral Daily    DULoxetine  60 mg Oral Daily    furosemide  40 mg Intravenous BID    heparin (porcine)  5,000 Units Subcutaneous Q8H    isosorbide-hydrALAZINE 20-37.5 mg  1 tablet Oral TID    magnesium sulfate IVPB  2 g Intravenous Once    spironolactone  25 mg Oral Daily     Continuous Infusions:   PRN Meds: hydrALAZINE, metoprolol, sodium chloride 0.9%, DIPH,PERTUS (ADACEL),TETANUS PF VAC (ADULT)      PHYSICAL EXAM:     Wt Readings from Last 1 Encounters:   05/04/19 0200 80 kg (176 lb 5.9 oz)   05/04/19 0050 80 kg (176 lb 5.9 oz)   05/03/19 2030 80.3 kg (177 lb)     Body mass index is 31.24 kg/m².  Vitals:    05/04/19 0130 05/04/19 0200 05/04/19 0445 05/04/19 0446   BP: 118/65 118/65 122/74    BP Location: Left arm Right arm     Patient Position: Lying Lying     Pulse: 98 94 (!) 111 98   Resp: 20 20 20    Temp: 98.1 °F (36.7 °C) 98.1 °F (36.7 °C) 98 °F (36.7 °C)    TempSrc: Oral      SpO2: 97% 97%     Weight:  80 kg (176 lb 5.9 oz)     Height:  5' 3" (1.6 m)            -- General appearance: well developed. appears stated age   -- Head: normocephalic, atraumatic   -- Eyes: conjunctivae clear. Extraocular muscles intact  -- Nose: Nares normal. Septum midline.   -- Mouth/Throat: lips, mucosa, and tongue normal. no throat erythema.   -- Neck: supple, symmetrical, trachea midline, no JVD and thyroid not grossly enlarged, appears symmetric  -- Lungs: clear to auscultation bilaterally. normal respiratory effort. No use of accessory muscles.   -- Chest wall: no tenderness. equal bilateral chest rise   -- Heart:  Rapid rate and regular rhythm. S1, S2 normal.  no click, " rub or gallop   -- Abdomen: soft, non-tender, non-distended, non-tympanic; bowel sounds normal; no masses  -- Extremities: no cyanosis, clubbing .  2+ bilateral lower extremity pitting edema  -- Pulses: 2+ and symmetric   -- Skin: color normal, texture normal, turgor normal. No rashes or lesions.   -- Neurologic: Normal strength and tone. No focal numbness or weakness. CNII-XII intact. Amanuel coma scale: eyes open spontaneously-4, oriented & converses-5, obeys commands-6.      LABORATORY STUDIES:     Recent Results (from the past 36 hour(s))   Blood culture #1 **CANNOT BE ORDERED STAT**    Collection Time: 05/03/19 10:16 AM   Result Value Ref Range    Blood Culture, Routine No Growth to date    Blood culture #2 **CANNOT BE ORDERED STAT**    Collection Time: 05/03/19 10:19 AM   Result Value Ref Range    Blood Culture, Routine No Growth to date    APTT    Collection Time: 05/03/19  9:47 PM   Result Value Ref Range    aPTT 31.7 21.0 - 32.0 sec   Brain natriuretic peptide    Collection Time: 05/03/19  9:47 PM   Result Value Ref Range    BNP 2,079 (H) 0 - 99 pg/mL   CBC auto differential    Collection Time: 05/03/19  9:47 PM   Result Value Ref Range    WBC 6.71 3.90 - 12.70 K/uL    RBC 4.92 4.00 - 5.40 M/uL    Hemoglobin 11.9 (L) 12.0 - 16.0 g/dL    Hematocrit 39.2 37.0 - 48.5 %    Mean Corpuscular Volume 80 (L) 82 - 98 fL    Mean Corpuscular Hemoglobin 24.2 (L) 27.0 - 31.0 pg    Mean Corpuscular Hemoglobin Conc 30.4 (L) 32.0 - 36.0 g/dL    RDW 19.7 (H) 11.5 - 14.5 %    Platelets 222 150 - 350 K/uL    MPV 10.4 9.2 - 12.9 fL    Gran # (ANC) 4.6 1.8 - 7.7 K/uL    Lymph # 1.2 1.0 - 4.8 K/uL    Mono # 0.9 0.3 - 1.0 K/uL    Eos # 0.0 0.0 - 0.5 K/uL    Baso # 0.02 0.00 - 0.20 K/uL    Gran% 69.1 38.0 - 73.0 %    Lymph% 17.1 (L) 18.0 - 48.0 %    Mono% 13.3 4.0 - 15.0 %    Eosinophil% 0.3 0.0 - 8.0 %    Basophil% 0.3 0.0 - 1.9 %    Aniso Slight     Poik Slight     Poly Occasional     Ovalocytes Occasional     Target Cells  Occasional     Ralph Cells Moderate     Fragmented Cells Occasional     Differential Method Automated    Comprehensive metabolic panel    Collection Time: 05/03/19  9:47 PM   Result Value Ref Range    Sodium 140 136 - 145 mmol/L    Potassium 4.8 3.5 - 5.1 mmol/L    Chloride 107 95 - 110 mmol/L    CO2 17 (L) 23 - 29 mmol/L    Glucose 96 70 - 110 mg/dL    BUN, Bld 24 (H) 8 - 23 mg/dL    Creatinine 1.4 0.5 - 1.4 mg/dL    Calcium 9.2 8.7 - 10.5 mg/dL    Total Protein 6.7 6.0 - 8.4 g/dL    Albumin 3.0 (L) 3.5 - 5.2 g/dL    Total Bilirubin 3.0 (H) 0.1 - 1.0 mg/dL    Alkaline Phosphatase 118 55 - 135 U/L    AST 67 (H) 10 - 40 U/L    ALT 35 10 - 44 U/L    Anion Gap 16 8 - 16 mmol/L    eGFR if African American 39 (A) >60 mL/min/1.73 m^2    eGFR if non African American 34 (A) >60 mL/min/1.73 m^2   Magnesium    Collection Time: 05/03/19  9:47 PM   Result Value Ref Range    Magnesium 1.3 (L) 1.6 - 2.6 mg/dL   Protime-INR    Collection Time: 05/03/19  9:47 PM   Result Value Ref Range    Prothrombin Time 15.1 (H) 9.0 - 12.5 sec    INR 1.4 (H) 0.8 - 1.2   Troponin I    Collection Time: 05/03/19  9:47 PM   Result Value Ref Range    Troponin I 0.106 (H) 0.000 - 0.026 ng/mL   TSH    Collection Time: 05/03/19  9:47 PM   Result Value Ref Range    TSH 4.125 (H) 0.400 - 4.000 uIU/mL   Lactic acid, plasma    Collection Time: 05/03/19  9:47 PM   Result Value Ref Range    Lactate (Lactic Acid) 6.7 (HH) 0.5 - 2.2 mmol/L   CPK    Collection Time: 05/03/19  9:47 PM   Result Value Ref Range     (H) 20 - 180 U/L   Ammonia    Collection Time: 05/03/19  9:47 PM   Result Value Ref Range    Ammonia 34 10 - 50 umol/L   Ethanol    Collection Time: 05/03/19  9:47 PM   Result Value Ref Range    Alcohol, Medical, Serum <10 <10 mg/dL   Salicylate level    Collection Time: 05/03/19  9:47 PM   Result Value Ref Range    Salicylate Lvl <5.0 (L) 15.0 - 30.0 mg/dL   Acetaminophen level    Collection Time: 05/03/19  9:47 PM   Result Value Ref Range     Acetaminophen (Tylenol), Serum <3.0 (L) 10.0 - 20.0 ug/mL   T4, free    Collection Time: 05/03/19  9:47 PM   Result Value Ref Range    Free T4 1.04 0.71 - 1.51 ng/dL   Urinalysis, Reflex to Urine Culture Urine, Clean Catch    Collection Time: 05/03/19 10:45 PM   Result Value Ref Range    Specimen UA Urine, Clean Catch     Color, UA Viki Yellow, Straw, Viki    Appearance, UA Hazy (A) Clear    pH, UA 5.0 5.0 - 8.0    Specific Gravity, UA 1.020 1.005 - 1.030    Protein, UA 1+ (A) Negative    Glucose, UA Negative Negative    Ketones, UA Trace (A) Negative    Bilirubin (UA) Negative Negative    Occult Blood UA Negative Negative    Nitrite, UA Negative Negative    Urobilinogen, UA 4.0-6.0 (A) <2.0 EU/dL    Leukocytes, UA Negative Negative   Urinalysis Microscopic    Collection Time: 05/03/19 10:45 PM   Result Value Ref Range    RBC, UA 2 0 - 4 /hpf    WBC, UA 3 0 - 5 /hpf    Bacteria Occasional None-Occ /hpf    Squam Epithel, UA 2 /hpf    Hyaline Casts, UA 2 (A) 0-1/lpf /lpf    Granular Casts, UA 3 (A) None /lpf    Amorphous, UA Moderate None-Moderate    Microscopic Comment SEE COMMENT    Drug screen panel, emergency    Collection Time: 05/03/19 10:46 PM   Result Value Ref Range    Benzodiazepines Negative     Methadone metabolites Negative     Cocaine (Metab.) Negative     Opiate Scrn, Ur Negative     Barbiturate Screen, Ur Negative     Amphetamine Screen, Ur Negative     THC Negative     Phencyclidine Negative     Creatinine, Random Ur 140.1 15.0 - 325.0 mg/dL    Toxicology Information SEE COMMENT    CBC auto differential    Collection Time: 05/04/19  2:50 AM   Result Value Ref Range    WBC 7.50 3.90 - 12.70 K/uL    RBC 4.42 4.00 - 5.40 M/uL    Hemoglobin 10.8 (L) 12.0 - 16.0 g/dL    Hematocrit 35.2 (L) 37.0 - 48.5 %    Mean Corpuscular Volume 80 (L) 82 - 98 fL    Mean Corpuscular Hemoglobin 24.4 (L) 27.0 - 31.0 pg    Mean Corpuscular Hemoglobin Conc 30.7 (L) 32.0 - 36.0 g/dL    RDW 19.5 (H) 11.5 - 14.5 %    Platelets  215 150 - 350 K/uL    MPV 10.4 9.2 - 12.9 fL    Gran # (ANC) 5.1 1.8 - 7.7 K/uL    Lymph # 1.2 1.0 - 4.8 K/uL    Mono # 1.2 (H) 0.3 - 1.0 K/uL    Eos # 0.0 0.0 - 0.5 K/uL    Baso # 0.02 0.00 - 0.20 K/uL    Gran% 68.4 38.0 - 73.0 %    Lymph% 15.7 (L) 18.0 - 48.0 %    Mono% 16.0 (H) 4.0 - 15.0 %    Eosinophil% 0.1 0.0 - 8.0 %    Basophil% 0.3 0.0 - 1.9 %    Platelet Estimate Appears normal     Aniso Slight     Poly Occasional     Ovalocytes Occasional     Target Cells Occasional     Tear Drop Cells Occasional     Isaias Cells Occasional     Acanthocytes Present     Large/Giant Platelets Present     Fragmented Cells Occasional     Differential Method Automated    Comprehensive metabolic panel    Collection Time: 05/04/19  2:50 AM   Result Value Ref Range    Sodium 139 136 - 145 mmol/L    Potassium 4.6 3.5 - 5.1 mmol/L    Chloride 106 95 - 110 mmol/L    CO2 17 (L) 23 - 29 mmol/L    Glucose 73 70 - 110 mg/dL    BUN, Bld 24 (H) 8 - 23 mg/dL    Creatinine 1.2 0.5 - 1.4 mg/dL    Calcium 8.8 8.7 - 10.5 mg/dL    Total Protein 6.0 6.0 - 8.4 g/dL    Albumin 2.7 (L) 3.5 - 5.2 g/dL    Total Bilirubin 3.0 (H) 0.1 - 1.0 mg/dL    Alkaline Phosphatase 112 55 - 135 U/L    AST 75 (H) 10 - 40 U/L    ALT 34 10 - 44 U/L    Anion Gap 16 8 - 16 mmol/L    eGFR if African American 47 (A) >60 mL/min/1.73 m^2    eGFR if non African American 41 (A) >60 mL/min/1.73 m^2   Troponin I    Collection Time: 05/04/19  2:50 AM   Result Value Ref Range    Troponin I 0.122 (H) 0.000 - 0.026 ng/mL       Lab Results   Component Value Date    INR 1.4 (H) 05/03/2019    INR 1.5 (H) 03/19/2019    INR 1.4 (H) 03/19/2019     Lab Results   Component Value Date    HGBA1C 6.4 (H) 03/01/2019     No results for input(s): POCTGLUCOSE in the last 72 hours.        MICROBIOLOGY DATA:     Urine Culture, Routine   Date Value Ref Range Status   01/18/2018 No significant growth  Final   07/24/2015 No significant growth  Final   03/27/2013 NO SIGNIFICANT GROWTH  Final    2012 NO SIGNIFICANT GROWTH  Final   10/19/2008 NO SIGNIFICANT GROWTH  Final     AFB Culture & Smear   Date Value Ref Range Status   2017 No growth after 8 weeks.   Final       Microbiology x 7d:   Microbiology Results (last 7 days)     Procedure Component Value Units Date/Time    Blood culture #1 **CANNOT BE ORDERED STAT** [[id number]] Collected:  19 1016    Order Status:  Completed Specimen:  Blood from Peripheral, Hand, Right Updated:  19 0512     Blood Culture, Routine No Growth to date    Blood culture #2 **CANNOT BE ORDERED STAT** [[id number]] Collected:  19 1019    Order Status:  Completed Specimen:  Blood from Peripheral, Hand, Right Updated:  19 05     Blood Culture, Routine No Growth to date            IMAGIND echocardiogram  2019  · Moderately decreased left ventricular systolic function. The estimated ejection fraction is 30%  · Concentric left ventricular hypertrophy.  · Grade III (severe) left ventricular diastolic dysfunction consistent with restrictive physiology.  · Mild right ventricular enlargement.  · Normal right ventricular systolic function.  · Moderate left atrial enlargement.  · Severe right atrial enlargement.  · Mild-to-moderate mitral regurgitation.  · Moderate to severe tricuspid regurgitation.  · The estimated PA systolic pressure is 78 mm Hg  · Pulmonary hypertension present.    Imaging Results          X-Ray Chest AP Portable (Final result)  Result time 19 22:34:24    Final result by Lexy Lake MD (19 22:34:24)                 Impression:      Enlargement of the cardiac silhouette.  Blunting of the right costophrenic angle, which may represent small right pleural effusion.      Electronically signed by: Lexy Lake  Date:    2019  Time:    22:34             Narrative:    EXAMINATION:  AP PORTABLE CHEST    CLINICAL HISTORY:  fall;    TECHNIQUE:  AP portable chest radiograph was  submitted.    COMPARISON:  03/20/2019    FINDINGS:  AP portable chest radiograph demonstrates enlargement of the cardiac silhouette.  Vascular calcifications seen in the aortic knob.  There is blunting of the right costophrenic angle.  There is no focal consolidation or pneumothorax.  Bones are diffusely osteopenic.  Spondylitic changes are present.                               X-Ray Pelvis Routine AP (Final result)  Result time 05/03/19 22:54:42    Final result by Lexy Lake MD (05/03/19 22:54:42)                 Impression:      No definite acute bony abnormality detected.      Electronically signed by: Lexy Lake  Date:    05/03/2019  Time:    22:54             Narrative:    EXAMINATION:  PELVIS ROUTINE AP    CLINICAL HISTORY:  Unspecified fall, initial encounter    TECHNIQUE:  AP view of the pelvis    COMPARISON:  03/20/2009    FINDINGS:  AP view of the pelvis demonstrate no definite acute fracture or dislocation. If pain is out of proportion to the radiological findings, consider MRI or CT of the pelvis.                               X-Ray Elbow Complete Right (Final result)  Result time 05/03/19 22:39:10    Final result by Lexy Lake MD (05/03/19 22:39:10)                 Impression:      As above described.      Electronically signed by: Lexy Lake  Date:    05/03/2019  Time:    22:39             Narrative:    EXAMINATION:  THREE VIEWS OF THE RIGHT ELBOW    CLINICAL HISTORY:  Unspecified fall, initial encounter    TECHNIQUE:  AP, oblique and lateral view of the right elbow    COMPARISON:  07/24/2011    FINDINGS:  Postsurgical fixation of the distal humerus and the ulna with hardware is seen.  Bones are diffusely osteopenic.  There is no definite evidence of acute fracture.  There is no elbow effusion.  Mild degenerative changes are noted.                               X-Ray Lumbar Spine Ap And Lateral (Final result)  Result time 05/03/19 22:43:42    Final result by Lexy HERNANDEZ  MD Jaya (05/03/19 22:43:42)                 Impression:      No definite evidence acute fracture.  Multilevel degenerative change of the spine.      Electronically signed by: Lexy Lake  Date:    05/03/2019  Time:    22:43             Narrative:    EXAMINATION:  LUMBAR SPINE    CLINICAL HISTORY:  Back pain.    TECHNIQUE:  AP and lateral views of the lumbar spine were submitted.    COMPARISON:  03/30/2012    FINDINGS:  The patient is tilted to the right.  There is no definite evidence of acute fracture.  There is grade 1 anterolisthesis of L3 on L4 and L4 on L5.  There are marginal osteophytes seen throughout.  There is severe facet arthrosis at L3/L4, L4/L5 and L5/S1.  There is mild intervertebral disc space narrowing at L5/S1.                               CT Head Without Contrast (Final result)  Result time 05/03/19 21:31:20    Final result by Binu Ruiz MD (05/03/19 21:31:20)                 Impression:      1. No acute intracranial abnormalities noting sequela of chronic microvascular ischemic change and senescent change.  2. Subcutaneous hematoma overlying the left parietal calvarium at the vertex.      Electronically signed by: Binu Ruiz MD  Date:    05/03/2019  Time:    21:31             Narrative:    EXAMINATION:  CT HEAD WITHOUT CONTRAST    CLINICAL HISTORY:  fall;    TECHNIQUE:  Low dose axial images were obtained through the head.  Coronal and sagittal reformations were also performed. Contrast was not administered.    COMPARISON:  05/02/2010    FINDINGS:  Please note, exam is limited secondary to patient motion.    There is generalized cerebral volume loss.  There is hypoattenuation in a periventricular fashion, likely sequela of chronic microvascular ischemic change.  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  There is no evidence of acute major vascular territory infarct, hemorrhage, or mass.  There is no hydrocephalus.  There are no  abnormal extra-axial fluid collections.  The paranasal sinuses and mastoid air cells are clear, and there is no evidence of calvarial fracture.  The visualized soft tissues are remarkable for subcutaneous hematoma overlying the left parietal calvarium at the vertex..                               CT Cervical Spine Without Contrast (Final result)  Result time 05/03/19 21:33:12    Final result by Yusuf Pérez MD (05/03/19 21:33:12)                 Impression:      No evidence of acute fracture or listhesis of the cervical spine.    Advanced degenerative changes in the cervical spine.    Anterior bridging osteophytosis.  The findings may be seen with DISH.      Electronically signed by: Yusuf Pérez MD  Date:    05/03/2019  Time:    21:33             Narrative:    EXAMINATION:  CT CERVICAL SPINE WITHOUT CONTRAST    CLINICAL HISTORY:  Status post fall.    TECHNIQUE:  Low dose axial images, sagittal and coronal reformations were performed though the cervical spine.  Contrast was not administered.    COMPARISON:  X-rays of the cervical spine dated 05/11/2006.    FINDINGS:  The visualized portions of the posterior fossa is unremarkable.  There is arthropathy at the craniocervical junction.  No prevertebral soft tissue swelling is identified.    The cervical alignment is maintained.  The vertebral body heights are maintained.  There is hypertrophy of the posterior elements.  There is no evidence of jumped or perched facet.  There are anterior bridging osteophytosis.  There is intervertebral disc space narrowing throughout the cervical spine.  There is no evidence of acute fracture or listhesis of the cervical spine.    There are calcifications involving the neck vessels.  There is no evidence of lymphadenopathy in the neck.  The thyroid gland is unremarkable.  There are emphysematous changes in the lung apices.  There is no evidence of a pneumothorax.  No pulmonary contusion is identified.                                   CONSULTS:     IP CONSULT TO SOCIAL WORK/CASE MANAGEMENT       ASSESSMENT & PLAN:     Primary Diagnosis:  CHF (congestive heart failure)    Active Hospital Problems    Diagnosis  POA    *CHF (congestive heart failure) [I50.9]  Yes     Priority: 1 - High    Elevated brain natriuretic peptide (BNP) level [R79.89]  Yes     Priority: 2     Elevated troponin I level [R74.8]  Yes     Priority: 3     Recurrent falls while walking [R29.6]  Yes     Priority: 4     Severe tricuspid regurgitation [I07.1]  Yes     Priority: 5     GERD (gastroesophageal reflux disease) [K21.9]  Yes     Chronic    CKD Stage 3 [N18.3]  Yes     Chronic    Pulmonary hypertension [I27.20]  Yes     Chronic    Essential hypertension [I10]  Yes     Chronic    Depression [F32.9]  Yes     Chronic      Resolved Hospital Problems   No resolved problems to display.         Acute on chronic CHF exacerbation  · Evidenced by history, elevated BNP, pulmonary edema, peripheral edema; history of pulmonary hypertension and severe tricuspid regurgitation  · Provide diuresis w/ IV medication  · Maintain w/ beta-blocker  · ACE inhibitor or ARB if GFR allows and remains stable  · If 1) Patient cannot tolerate ACEi or 2) NYHA class III or above, add spironolactone (or eplerenone) and hydralazine-isosorbide dinitrate   · Daily Weights  · Strict I/O  · Fluid restriction to 1,500cc daily  · Low-sodium cardiac diet  · Obtain 2D echo if <6 months  · Chest X-ray  · Check TSH, albumin, UA, and renal function  · EKG and cardiac enzymes PRN  · DVT prophylaxis w/ pharmacological and/or mechanical measures  · Oxygen supplementation support PRN    Instructions given to patient/family:  Monitor daily weight.  Regular activity within patient's limitations.  Low salt, low fat and low choleterol diet and restrict fluid < 2L per day.  Call MD if SOB, chest pain, weight gain > 2-3 lbs per day and/or 5-6 lbs per week.   No smoking. Annual influenza vaccine  required.    Elevated troponin  · No evidence of acute ST elevation MI   · Monitoring on telemetry  · Likely due to cardiac strain  · Aspirin   · Supplemental oxygen  · Trend troponins    Recurrent falls and Physical deconditioning  · PT and OT noted consult for evaluation and treatment recommendations    Chronic Kidney Disease  · Renal dose medications  · Avoid nephrotoxic agents  · Maintain euvolemic state      Hypertension  · Goal while inpatient is a systolic blood pressure less than 160mmHg  · BP in acceptable range at this time  · Continue current home regimen with hold parameters  · PRN antihypertensives available      VTE Risk Mitigation (From admission, onward)        Ordered     heparin (porcine) injection 5,000 Units  Every 8 hours      05/04/19 0425     Place PHILL hose  Until discontinued      05/04/19 0511     IP VTE HIGH RISK PATIENT  Once      05/04/19 0108            Adult PRN medications available   DVT prophylaxis given       DISPOSITION:     Will admit to the Hospital Medicine service for further evaluation and treatment.    Chart reviewed and updated where applicable.    High Risk Conditions:  Patient has a condition that poses threat to life and bodily function:  Acute CHF exacerbation      ===============================================================    Seng Oliveira MD, MPH  Department of Hospital Medicine   [facility]  [phone]  (7pm - 6am)          This note is dictated using [employer] voice recognition software.  There are word recognition mistakes that are occasionally missed on review.

## 2019-05-04 NOTE — PLAN OF CARE
Problem: Occupational Therapy Goal  Goal: Occupational Therapy Goal  Goals to be met by: 5/18/19     Patient will increase functional independence with ADLs by performing:    UE Dressing with Modified [place] and Supervision.  LE Dressing with Modified [place] and Supervision.  Grooming while standing at sink with Modified [place] and Supervision.  Toileting from toilet with Modified [place] and Supervision for hygiene and clothing management.   Supine to sit with Modified [place] and Supervision.  Step transfer with Modified [place] and Supervision  Toilet transfer to toilet with Stand-by Assistance.  Upper extremity exercise program x10 reps per handout, with assistance as needed.    Outcome: Ongoing (interventions implemented as appropriate)  The patient will benefit from OT to address functional deficits.    Comments: The patient will benefit from SNF.

## 2019-05-04 NOTE — ED PROVIDER NOTES
"Encounter Date: 5/3/2019    SCRIBE #1 NOTE: I, Louis Ezio, am scribing for, and in the presence of,  Taiwo Dewitt MD. I have scribed the following portions of the note - Other sections scribed: HPI, ROS, PE.       History     Chief Complaint   Patient presents with    Fall     unwitnessed fall, family member came to house and found pt on the floor, reports she's fallen 4 times in the last two weeks; family reports pt has become more immobile; reports she lives alone; reports "it might be a UTI because urine was orange and cloudy"; reports pt is "off" when you talk to her and reports it is unknown when the pt last ate     CC: Fall    HPI: This 86 y.o female with medical h/o HTN, DM type II, CHF, CKD stage 3, spinal stenosisof lumbar region, polyarthralgia, hypokalemia, vertigo, radius/ulna fracture surgery (R arm) presents to the ED via personal transport (brought in by daughter) for an emergent evaluation of multiple mechanical falls (x4) within the last 2 weeks (4/21/19). Pt lives at home alone. She reports in her 1st fall episode, she had slipped on her rug at home and hit the L side of her head during the fall against a cabinet door knob and landed her tailbone on the floor. She had been experiencing bilateral leg pain/swelling and difficulty ambulating since then. She denies any fevers or chills. She has been sleeping on the floor of her home for the past few days due to her symptoms. She also c/o constant cloudy, orange-tinted, and malodorous urine lately. She has been trying to keep herself hydrated by drinking orange juice and water, but notes she not been adequately eating food. She does not know when she last ate. She has chronic R arm pain secondary to fx in the past. She reports compliance with her daily medications. Her PCP is MD Dr. Chris Bangura.    The history is provided by the patient. No  was used.     Review of patient's allergies indicates:   Allergen Reactions    " Ace inhibitors Swelling     Other reaction(s): Unknown    Aciphex  [rabeprazole]      Other reaction(s): Stomach upset    Bextra  [valdecoxib]      Other reaction(s): Unknown    Cardizem  [diltiazem hcl]      Other reaction(s): Unknown    Clonidine      Other reaction(s): dry mouth.lip swelling    Mavik  [trandolapril]      Other reaction(s): Unknown    Phenytoin sodium extended      Other reaction(s): Stomach upset    Tramadol      Other reaction(s): stomach pain    Aspirin Rash     Other reaction(s): Unknown    Nsaids (non-steroidal anti-inflammatory drug) Rash     Other reaction(s): black spots on skin     Past Medical History:   Diagnosis Date    Anxiety disorder     Arthritis 12/28/2017    Carpal tunnel syndrome     CHF (congestive heart failure)     Chronic allergic rhinitis     Chronic pain 10/22/2012    CKD stage 3 due to type 2 diabetes mellitus 2/14/2017    Constipation - functional 4/10/2013    Depression     Diabetes mellitus type II     History of cataract surgery     Hot flash not due to menopause     HTN (hypertension)     Hypokalemia 11/19/2012    Insomnia 4/10/2013    Knee pain, bilateral 7/24/2013    Personal history of DVT (deep vein thrombosis)     Polyarthralgia 7/16/2018    Pulmonary hypertension 2/1/2017    Renovascular hypertension 2/14/2017    Severe tricuspid regurgitation 2/14/2017    Spinal stenosis     Dr. Corrales    Spinal stenosis of lumbar region 2/3/2014    Systolic and diastolic CHF, chronic 7/16/2018    Vertigo      Past Surgical History:   Procedure Laterality Date    CATARACT EXTRACTION Bilateral     EYE SURGERY      FRACTURE SURGERY      HYSTERECTOMY      ORIF RADIUS & ULNA FRACTURES       Family History   Problem Relation Age of Onset    No Known Problems Mother     No Known Problems Father     No Known Problems Sister     No Known Problems Brother     No Known Problems Maternal Aunt     No Known Problems Maternal Uncle     No Known  Problems Paternal Aunt     No Known Problems Paternal Uncle     No Known Problems Maternal Grandmother     No Known Problems Maternal Grandfather     No Known Problems Paternal Grandmother     No Known Problems Paternal Grandfather     Amblyopia Neg Hx     Blindness Neg Hx     Cancer Neg Hx     Diabetes Neg Hx     Glaucoma Neg Hx     Hypertension Neg Hx     Macular degeneration Neg Hx     Retinal detachment Neg Hx     Strabismus Neg Hx     Stroke Neg Hx     Thyroid disease Neg Hx      Social History     Tobacco Use    Smoking status: Never Smoker    Smokeless tobacco: Never Used   Substance Use Topics    Alcohol use: No    Drug use: No     Review of Systems   Constitutional: Negative for chills and fever.   HENT: Negative for congestion, ear pain, rhinorrhea and sore throat.    Eyes: Negative for pain and visual disturbance.   Respiratory: Negative for cough and shortness of breath.    Cardiovascular: Positive for leg swelling (bilateral). Negative for chest pain.   Gastrointestinal: Negative for abdominal pain, diarrhea, nausea and vomiting.   Genitourinary: Negative for dysuria.        (+) Malodorous urine   Musculoskeletal: Positive for gait problem. Negative for back pain and neck pain.        (+) Bilateral leg pain. (+) Chronic R arm pain   Skin: Negative for rash.   Neurological: Negative for headaches.   All other systems reviewed and are negative.      Physical Exam     Initial Vitals   BP Pulse Resp Temp SpO2   05/03/19 2030 05/03/19 2030 05/03/19 2030 05/03/19 2056 05/03/19 2030   129/61 99 (!) 24 98.7 °F (37.1 °C) 99 %      MAP       --                Physical Exam    Nursing note and vitals reviewed.  Constitutional: She appears well-developed and well-nourished. She is not diaphoretic. No distress.   Weak and elderly.   HENT:   Head: Normocephalic.   Nose: Nose normal.   Mouth/Throat: Mucous membranes are dry.   No Wilhelm signs or Raccoon eyes.   Eyes: Conjunctivae and EOM are  normal. Pupils are equal, round, and reactive to light.   Neck: Normal range of motion. Neck supple.   Cardiovascular: Normal rate, regular rhythm, normal heart sounds and intact distal pulses.   No murmur heard.  Pulmonary/Chest: Breath sounds normal. No respiratory distress.   Abdominal: Soft. Bowel sounds are normal. She exhibits no distension. There is no tenderness.   Musculoskeletal: Normal range of motion. She exhibits edema and tenderness.   2+ bilateral edema from the knees distally. L elbow tenderness. L knee tenderness.   Neurological: She is alert and oriented to person, place, and time.   Skin: Skin is warm and dry.   Skin breakdown of gluteal folds.         ED Course   Procedures  Labs Reviewed   B-TYPE NATRIURETIC PEPTIDE - Abnormal; Notable for the following components:       Result Value    BNP 2,079 (*)     All other components within normal limits   CBC W/ AUTO DIFFERENTIAL - Abnormal; Notable for the following components:    Hemoglobin 11.9 (*)     Mean Corpuscular Volume 80 (*)     Mean Corpuscular Hemoglobin 24.2 (*)     Mean Corpuscular Hemoglobin Conc 30.4 (*)     RDW 19.7 (*)     Lymph% 17.1 (*)     All other components within normal limits   COMPREHENSIVE METABOLIC PANEL - Abnormal; Notable for the following components:    CO2 17 (*)     BUN, Bld 24 (*)     Albumin 3.0 (*)     Total Bilirubin 3.0 (*)     AST 67 (*)     eGFR if  39 (*)     eGFR if non  34 (*)     All other components within normal limits   MAGNESIUM - Abnormal; Notable for the following components:    Magnesium 1.3 (*)     All other components within normal limits   PROTIME-INR - Abnormal; Notable for the following components:    Prothrombin Time 15.1 (*)     INR 1.4 (*)     All other components within normal limits   TROPONIN I - Abnormal; Notable for the following components:    Troponin I 0.106 (*)     All other components within normal limits   TSH - Abnormal; Notable for the following  components:    TSH 4.125 (*)     All other components within normal limits   URINALYSIS, REFLEX TO URINE CULTURE - Abnormal; Notable for the following components:    Appearance, UA Hazy (*)     Protein, UA 1+ (*)     Ketones, UA Trace (*)     Urobilinogen, UA 4.0-6.0 (*)     All other components within normal limits    Narrative:     Preferred Collection Type->Urine, Clean Catch   LACTIC ACID, PLASMA - Abnormal; Notable for the following components:    Lactate (Lactic Acid) 6.7 (*)     All other components within normal limits    Narrative:     Lactic Acid critical result(s) called and verbal readback obtained   from [employer]  , 05/03/2019 22:15   CK - Abnormal; Notable for the following components:     (*)     All other components within normal limits   SALICYLATE LEVEL - Abnormal; Notable for the following components:    Salicylate Lvl <5.0 (*)     All other components within normal limits   ACETAMINOPHEN LEVEL - Abnormal; Notable for the following components:    Acetaminophen (Tylenol), Serum <3.0 (*)     All other components within normal limits   URINALYSIS MICROSCOPIC - Abnormal; Notable for the following components:    Hyaline Casts, UA 2 (*)     Granular Casts, UA 3 (*)     All other components within normal limits    Narrative:     Preferred Collection Type->Urine, Clean Catch   CULTURE, BLOOD   CULTURE, BLOOD   APTT   DRUG SCREEN PANEL, URINE EMERGENCY    Narrative:     Preferred Collection Type->Urine, Clean Catch   AMMONIA   ALCOHOL,MEDICAL (ETHANOL)   T4, FREE     EKG Readings: (Independently Interpreted)   EKG done at 9:04 p.m. on 05/03/2019 showing normal sinus rhythm rate of 97.  Wavy baseline.  Normal axis.  No ST elevation or major T-wave abnormalities.  Occasional PVCs.  Compared to previous and fairly similar.       Imaging Results          X-Ray Chest AP Portable (Final result)  Result time 05/03/19 22:34:24    Final result by Lexy Lake MD (05/03/19 22:34:24)                  Impression:      Enlargement of the cardiac silhouette.  Blunting of the right costophrenic angle, which may represent small right pleural effusion.      Electronically signed by: Lexy Lake  Date:    05/03/2019  Time:    22:34             Narrative:    EXAMINATION:  AP PORTABLE CHEST    CLINICAL HISTORY:  fall;    TECHNIQUE:  AP portable chest radiograph was submitted.    COMPARISON:  03/20/2019    FINDINGS:  AP portable chest radiograph demonstrates enlargement of the cardiac silhouette.  Vascular calcifications seen in the aortic knob.  There is blunting of the right costophrenic angle.  There is no focal consolidation or pneumothorax.  Bones are diffusely osteopenic.  Spondylitic changes are present.                               X-Ray Pelvis Routine AP (Final result)  Result time 05/03/19 22:54:42    Final result by Lexy Lake MD (05/03/19 22:54:42)                 Impression:      No definite acute bony abnormality detected.      Electronically signed by: Lexy Lake  Date:    05/03/2019  Time:    22:54             Narrative:    EXAMINATION:  PELVIS ROUTINE AP    CLINICAL HISTORY:  Unspecified fall, initial encounter    TECHNIQUE:  AP view of the pelvis    COMPARISON:  03/20/2009    FINDINGS:  AP view of the pelvis demonstrate no definite acute fracture or dislocation. If pain is out of proportion to the radiological findings, consider MRI or CT of the pelvis.                               X-Ray Elbow Complete Right (Final result)  Result time 05/03/19 22:39:10    Final result by Lexy Lake MD (05/03/19 22:39:10)                 Impression:      As above described.      Electronically signed by: Lexy Lake  Date:    05/03/2019  Time:    22:39             Narrative:    EXAMINATION:  THREE VIEWS OF THE RIGHT ELBOW    CLINICAL HISTORY:  Unspecified fall, initial encounter    TECHNIQUE:  AP, oblique and lateral view of the right  elbow    COMPARISON:  07/24/2011    FINDINGS:  Postsurgical fixation of the distal humerus and the ulna with hardware is seen.  Bones are diffusely osteopenic.  There is no definite evidence of acute fracture.  There is no elbow effusion.  Mild degenerative changes are noted.                               X-Ray Lumbar Spine Ap And Lateral (Final result)  Result time 05/03/19 22:43:42    Final result by Lexy Lake MD (05/03/19 22:43:42)                 Impression:      No definite evidence acute fracture.  Multilevel degenerative change of the spine.      Electronically signed by: Lexy Lake  Date:    05/03/2019  Time:    22:43             Narrative:    EXAMINATION:  LUMBAR SPINE    CLINICAL HISTORY:  Back pain.    TECHNIQUE:  AP and lateral views of the lumbar spine were submitted.    COMPARISON:  03/30/2012    FINDINGS:  The patient is tilted to the right.  There is no definite evidence of acute fracture.  There is grade 1 anterolisthesis of L3 on L4 and L4 on L5.  There are marginal osteophytes seen throughout.  There is severe facet arthrosis at L3/L4, L4/L5 and L5/S1.  There is mild intervertebral disc space narrowing at L5/S1.                               CT Head Without Contrast (Final result)  Result time 05/03/19 21:31:20    Final result by Binu Ruiz MD (05/03/19 21:31:20)                 Impression:      1. No acute intracranial abnormalities noting sequela of chronic microvascular ischemic change and senescent change.  2. Subcutaneous hematoma overlying the left parietal calvarium at the vertex.      Electronically signed by: Binu Ruiz MD  Date:    05/03/2019  Time:    21:31             Narrative:    EXAMINATION:  CT HEAD WITHOUT CONTRAST    CLINICAL HISTORY:  fall;    TECHNIQUE:  Low dose axial images were obtained through the head.  Coronal and sagittal reformations were also performed. Contrast was not administered.    COMPARISON:  05/02/2010    FINDINGS:  Please note, exam  is limited secondary to patient motion.    There is generalized cerebral volume loss.  There is hypoattenuation in a periventricular fashion, likely sequela of chronic microvascular ischemic change.  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  There is no evidence of acute major vascular territory infarct, hemorrhage, or mass.  There is no hydrocephalus.  There are no abnormal extra-axial fluid collections.  The paranasal sinuses and mastoid air cells are clear, and there is no evidence of calvarial fracture.  The visualized soft tissues are remarkable for subcutaneous hematoma overlying the left parietal calvarium at the vertex..                               CT Cervical Spine Without Contrast (Final result)  Result time 05/03/19 21:33:12    Final result by Yusuf Pérez MD (05/03/19 21:33:12)                 Impression:      No evidence of acute fracture or listhesis of the cervical spine.    Advanced degenerative changes in the cervical spine.    Anterior bridging osteophytosis.  The findings may be seen with DISH.      Electronically signed by: Yusuf Pérez MD  Date:    05/03/2019  Time:    21:33             Narrative:    EXAMINATION:  CT CERVICAL SPINE WITHOUT CONTRAST    CLINICAL HISTORY:  Status post fall.    TECHNIQUE:  Low dose axial images, sagittal and coronal reformations were performed though the cervical spine.  Contrast was not administered.    COMPARISON:  X-rays of the cervical spine dated 05/11/2006.    FINDINGS:  The visualized portions of the posterior fossa is unremarkable.  There is arthropathy at the craniocervical junction.  No prevertebral soft tissue swelling is identified.    The cervical alignment is maintained.  The vertebral body heights are maintained.  There is hypertrophy of the posterior elements.  There is no evidence of jumped or perched facet.  There are anterior bridging osteophytosis.  There is intervertebral disc space narrowing throughout  the cervical spine.  There is no evidence of acute fracture or listhesis of the cervical spine.    There are calcifications involving the neck vessels.  There is no evidence of lymphadenopathy in the neck.  The thyroid gland is unremarkable.  There are emphysematous changes in the lung apices.  There is no evidence of a pneumothorax.  No pulmonary contusion is identified.                                 Medical Decision Making:   Initial Assessment:   86-year-old female coming in after multiple falls.  Patient has been on the ground for a very long time.  I am concerned about rhabdo.  Underlying infection is also possibility.  Concerned about potential dehydration due to being on the floor and eating and drinking on the floor.  Started with a 500 mL bolus on the patient.  Concerned also for potential fluid overload on her due to the pitting edema bilaterally but this could be due to the fact that she has been the ground for such a long period of time.  Will check labs on the patient.  Patient states all of falls have been mechanical.  However I will look for potential cardiac causes or major electrolyte or infectious causes on the patient.  Patient did have a blunt head trauma and never had evaluation afterwards.  She has some type of process this could led her to be more unstable her feet to more falls.  Due to her age I cannot rule this out I get imaging of her head.    Labs are notable for an elevated BNP.  Her troponin is at her baseline which is elevated at 0.1.  No major infection.  Imaging does not show anything acute other than some pleural effusions.  Due to the elevated BNP and pleural effusions give patient a dose of Lasix.  Her lactic acid is also elevated.  I spoke with Dr. Saida Sousa and we will start patient on Lovenox.  Patient is admitted to the hospitalist service further workup and management.  Patient's CPKs in the 600    Please put in 35 minutes of critical care due to patient having a high risk of  cardiac failure.   Separate from teaching and exclusive of procedure and ekg time  Includes:  Time at bedside  Time reviewing test results  Time discussing case with staff  Time documenting the medical record  Time spent with family members  Time spent with consults  Management    Clinical Tests:   Lab Tests: Ordered and Reviewed  Radiological Study: Reviewed and Ordered  Medical Tests: Ordered and Reviewed            Scribe Attestation:   Scribe #1: I performed the above scribed service and the documentation accurately describes the services I performed. I attest to the accuracy of the note.    Attending Attestation:           Physician Attestation for Scribe:  Physician Attestation Statement for Scribe #1: I, Taiwo Dewitt MD, reviewed documentation, as scribed by Louis Holguin in my presence, and it is both accurate and complete.                    Clinical Impression:       ICD-10-CM ICD-9-CM   1. Traumatic rhabdomyolysis, initial encounter T79.6XXA 958.6   2. Fall W19.XXXA E888.9   3. CHF (congestive heart failure) I50.9 428.0   4. Congestive heart failure, unspecified HF chronicity, unspecified heart failure type I50.9 428.0   5. Elevated troponin R74.8 790.6   6. Pleural effusion J90 511.9   7. Injury of head, initial encounter S09.90XA 959.01   8. Back pain, unspecified back location, unspecified back pain laterality, unspecified chronicity M54.9 724.5                                Taiwo Dewitt MD  05/04/19 0222

## 2019-05-04 NOTE — ED TRIAGE NOTES
"Pt Fall Pt had unwitnessed fall, family member came to house and found pt on the floor, reports she's fallen 4 times in the last two weeks; family reports pt has become more immobile; reports she lives alone; reports "it might be a UTI because urine was orange and cloudy"; reports pt is "off" when you talk to her and reports it is unknown when the pt last ate.  "

## 2019-05-04 NOTE — CARE UPDATE
Patient seen and examined.  Agree with Dr. Oliveira's treatment and plan.  Admitted with CHF exacerbation.  Started on IV diuresis.  Recurrent falls.  Will get PT/OT evaluation.  Continue current management.  Reassess in Am.

## 2019-05-04 NOTE — NURSING
Bedside report received from DIANA Durham. Patient lying in bed with eyes open. NAD noted at this time. All safety precautions in place. Will continue to monitor.

## 2019-05-04 NOTE — NURSING
Received patient from the ER. Bed to bed transfer performed due to weakness. Niece at the bedside for information source. Patient has no obvious distress noted

## 2019-05-04 NOTE — HPI
Magaly Nunes is a 86 y.o. female that (in part)  has a past medical history of Anxiety disorder, Arthritis, Carpal tunnel syndrome, CHF (congestive heart failure), Chronic allergic rhinitis, Chronic pain, CKD stage 3 due to type 2 diabetes mellitus, Constipation - functional, Depression, Diabetes mellitus type II, History of cataract surgery, Hot flash not due to menopause, HTN (hypertension), Hypokalemia, Insomnia, Knee pain, bilateral, Personal history of DVT (deep vein thrombosis), Polyarthralgia, Pulmonary hypertension, Renovascular hypertension, Severe tricuspid regurgitation, Spinal stenosis, Spinal stenosis of lumbar region, Systolic and diastolic CHF, chronic, and Vertigo.  has a past surgical history that includes Hysterectomy; Cataract extraction (Bilateral); Eye surgery; Fracture surgery; and ORIF radius & ulna fractures. Presents to [facility] initially complaining of multiple recurrent falls.  She has fallen 4 times over the last 2 weeks.  She reports all of these were mechanical falls and denies chest pain, syncope, or palpitations.  She 1st tripped over bryn approximately 2 weeks ago and struck the left side of her head and landed on her buttocks.  She has had some difficulty ambulating due to weakness since that time.  She is also complaining of increased weight gain, swelling, and generalized non acute weakness.  She has been so we can fact that she has been sleeping on the floor at home and has been incontinent of urine.  She has not been eating or drinking adequately since the falls.  She was reluctant to come into the hospital, but finally reports that her pride subsided and she called for help.    In the emergency department multiple imaging studies were obtained to evaluate for possible fracture dislocation which proved to be negative.  CT of head was also negative.  Routine laboratory studies were obtained which demonstrated elevated CPK  levels, elevated troponin, and markedly elevated BNP.  Physical exam was consistent with edema likely due to acute CHF exacerbation.    Hospital medicine has been asked to admit for further evaluation and treatment.

## 2019-05-04 NOTE — PT/OT/SLP EVAL
Physical Therapy Evaluation    Patient Name:  Magaly Nunes   MRN:  [id number]    Recommendations:     Discharge Recommendations:  (Recommend 24 hr supervision)   Discharge Equipment Recommendations: (To be determined)   Barriers to discharge: Decreased caregiver support    Assessment:     Magaly Nunes is a 86 y.o. female admitted with a medical diagnosis of CHF (congestive heart failure).  She presents with the following impairments/functional limitations:  impaired functional mobilty, impaired endurance .    Rehab Prognosis: Good; patient would benefit from acute skilled PT services to address these deficits and reach maximum level of function.    Recent Surgery: * No surgery found *      Plan:     During this hospitalization, patient to be seen 5 x/week to address the identified rehab impairments via gait training, therapeutic activities, therapeutic exercises and progress toward the following goals:    · Plan of Care Expires:  05/10/19    Subjective     Chief Complaint: I asked to get to BSC during the night but they won't let me.  Patient/Family Comments/goals: To be able to get OOB to chair and get to toilet or BSC.  Pain/Comfort:  · Pain Rating 1: 0/10    Patients cultural, spiritual, Episcopal conflicts given the current situation:      Living Environment:  Pt reports living alone in a 1 story house with no steps to enter.  Prior to admission, patients level of function was mod I.  Equipment used at home: walker, rolling.  DME owned (not currently used): none.  Upon discharge, patient will have assistance from unknown.    Objective:     Communicated with nurse prior to session.  Patient found (L) sidelying in bed with pure wick in place    upon PT entry to room.    General Precautions: Standard,     Orthopedic Precautions:N/A   Braces: N/A     Exams:  · RLE ROM: WFL  · RLE Strength: WFL  · LLE ROM: WFL  · LLE Strength: WFL    Functional Mobility:  · Bed mobility: (L) sidelying to sit with  SBA  · Transfer sit<>stand CGA  · Gait: Ambulated 10'x2 with RW CGA/min A to/from BR.        AM-PAC 6 CLICK MOBILITY  Total Score:19     Patient left reclined with pillow under BLEs with call button in reach.    GOALS:   Multidisciplinary Problems     Physical Therapy Goals        Problem: Physical Therapy Goal    Goal Priority Disciplines Outcome Goal Variances Interventions   Physical Therapy Goal     PT, PT/OT      Description:  Goals to be met by: 05/10/19.     Patient will increase functional independence with mobility by performin. Supine to sit with supervision.  2. Sit to stand transfer with Supervision.  3. Gait  x 50 feet with Stand-by Assistance using Rolling Walker.                       History:     Past Medical History:   Diagnosis Date    Anxiety disorder     Arthritis 2017    Carpal tunnel syndrome     CHF (congestive heart failure)     Chronic allergic rhinitis     Chronic pain 10/22/2012    CKD stage 3 due to type 2 diabetes mellitus 2017    Constipation - functional 4/10/2013    Depression     Diabetes mellitus type II     History of cataract surgery     Hot flash not due to menopause     HTN (hypertension)     Hypokalemia 2012    Insomnia 4/10/2013    Knee pain, bilateral 2013    Personal history of DVT (deep vein thrombosis)     Polyarthralgia 2018    Pulmonary hypertension 2017    Renovascular hypertension 2017    Severe tricuspid regurgitation 2017    Spinal stenosis     Dr. Corrales    Spinal stenosis of lumbar region 2/3/2014    Systolic and diastolic CHF, chronic 2018    Vertigo        Past Surgical History:   Procedure Laterality Date    CATARACT EXTRACTION Bilateral     EYE SURGERY      FRACTURE SURGERY      HYSTERECTOMY      ORIF RADIUS & ULNA FRACTURES         Time Tracking:     PT Received On: 19  PT Start Time: 1435     PT Stop Time: 1450  PT Total Time (min): 15 min     Billable Minutes: Evaluation  15 (Co-eval with OT)      Terinne G Coleman, PT  05/04/2019

## 2019-05-04 NOTE — PLAN OF CARE
"TN introduced herself and explained the 's role. TN utilized 2 patient identifiers: Name & .  TN placed Name and spectralink number on whiteboard.  TN provided and reviewed with patient "Blue My Health Packet". TN discussed what Help At Home means to the patient and the name of the person friend, Jas, who helps at home. TN discussed with patient the things the patient is responsible for to HELP manage patient's  healthcare at home. TN taught Symptoms and Problems with patient forCHF .Patient verbalized understanding & teachback:1.sob, 2.Swelling of the feet . Patient prefers morning or after noon doctor appointments.       19 1128   Discharge Assessment   Assessment Type Discharge Planning Assessment   Confirmed/corrected address and phone number on facesheet? Yes   Assessment information obtained from? Patient   Communicated expected length of stay with patient/caregiver no   Prior to hospitilization cognitive status: Alert/Oriented   Prior to hospitalization functional status: Independent   Current cognitive status: Alert/Oriented   Current Functional Status: Independent   Lives With alone   Able to Return to Prior Arrangements yes   Is patient able to care for self after discharge? Yes   Who are your caregiver(s) and their phone number(s)?   (relative Deborah Curry [phone]/  Sheng Simone, friend [phone])   Patient's perception of discharge disposition admitted as an inpatient   Readmission Within the Last 30 Days no previous admission in last 30 days   Patient currently being followed by outpatient case management? No   Patient currently receives any other outside agency services? No   Equipment Currently Used at Home none;walker, rolling   Do you have any problems affording any of your prescribed medications? No   Is the patient taking medications as prescribed? yes   Does the patient have transportation home? Yes   Does the patient receive services at the [facility]? " No   Discharge Plan A Home   Discharge Plan B Home;Home Health   DME Needed Upon Discharge  none   Patient/Family in Agreement with Plan yes   Does the patient have transportation to healthcare appointments? Yes   Readmission Questionnaire   Have you felt down, depressed, or hopeless? 1     [employer] [id number] - JOHNNA CHAMPION  [address] [place], [place] [address]MARGARITA JIMENEZ AT [address]  [place] [address]  Phone: [phone] Fax: [phone]

## 2019-05-04 NOTE — PROGRESS NOTES
TN called telemetry unit to inform Viki/ Carola that pt is cleared for discharge form  [facility]..Shena Jane RN, BSN, STN [facility]  5/4/2019  '

## 2019-05-04 NOTE — PLAN OF CARE
05/04/19 1642   Final Note   Assessment Type Final Discharge Note   Anticipated Discharge Disposition Home   What phone number can be called within the next 1-3 days to see how you are doing after discharge?   (see chart)   Hospital Follow Up  Appt(s) scheduled? Yes   Discharge plans and expectations educations in teach back method with documentation complete? Yes   Right Care Referral Info   Post Acute Recommendation Other   Referral Type NO CARE   Facility Name [facility]

## 2019-05-04 NOTE — NURSING
Report given to SIMEON Radford. 12 hour chart check performed. Patient resting in bed no obvious distress noted

## 2019-05-04 NOTE — PLAN OF CARE
Problem: Adult Inpatient Plan of Care  Goal: Plan of Care Review     05/04/19 0530   Plan of Care Review   Plan of Care Reviewed With patient;family   Progress no change     Goal: Patient-Specific Goal (Individualization)     05/04/19 0100 05/04/19 0530   Patient-Specific Goal (Individualization)   Patient-Specific Goals (Include Timeframe)  --  have decreased redness in groin   OTHER   Anxieties, Fears or Concerns none  --      Goal: Absence of Hospital-Acquired Illness or Injury  Outcome: Ongoing (interventions implemented as appropriate)  Intervention: Identify and Manage Fall Risk     05/04/19 0530   Optimize Balance and Safe Activity   Safety Promotion/Fall Prevention nonskid shoes/socks when out of bed;side rails raised x 2;assistive device/personal item within reach;Fall Risk reviewed with patient/family;Fall Risk signage in place     Intervention: Prevent VTE (venous thromboembolism)     05/04/19 0530   Prevent or Manage Embolism   VTE Prevention/Management ROM (active) performed;remove, assess skin and reapply sequential compression device;fluids promoted       Goal: Optimal Comfort and Wellbeing  Outcome: Ongoing (interventions implemented as appropriate)  Intervention: Provide Person-Centered Care     05/04/19 0200   Support Dyspnea Relief   Trust Relationship/Rapport care explained;choices provided;emotional support provided;empathic listening provided;questions answered;questions encouraged;reassurance provided;thoughts/feelings acknowledged         Problem: Skin Injury Risk Increased  Goal: Skin Health and Integrity    Intervention: Optimize Skin Protection     05/04/19 0530   Prevent Additional Skin Injury   Head of Bed (HOB) HOB at 30-45 degrees   Pressure Reduction Devices positioning supports utilized   Monitor and Manage Hypervolemia   Skin Protection skin sealant/moisture barrier applied;incontinence pads utilized;other (see comments)  (pure wick utilized to decrease groin excoriation)      Intervention: Promote and Optimize Oral Intake     05/04/19 0530   Monitor and Manage Anemia   Oral Nutrition Promotion rest periods promoted;calorie dense foods provided

## 2019-05-04 NOTE — PLAN OF CARE
Problem: Fall Injury Risk  Goal: Absence of Fall and Fall-Related Injury    Intervention: Promote Injury-Free Environment     05/04/19 1242   Optimize Balance and Safe Activity   Safety Promotion/Fall Prevention assistive device/personal item within reach;bed alarm set;Fall Risk reviewed with patient/family;nonskid shoes/socks when out of bed;side rails raised x 2;room near unit station;instructed to call staff for mobility   Optimize [place] and Functional Mobility   Environmental Safety Modification assistive device/personal items within reach;clutter free environment maintained;room near unit station;room organization consistent         Problem: Adult Inpatient Plan of Care  Goal: Optimal Comfort and Wellbeing    Intervention: Provide Person-Centered Care     05/04/19 1242   Support Dyspnea Relief   Trust Relationship/Rapport care explained;choices provided;emotional support provided;empathic listening provided;questions answered;questions encouraged;reassurance provided;thoughts/feelings acknowledged

## 2019-05-04 NOTE — PLAN OF CARE
Problem: Adult Inpatient Plan of Care  Goal: Plan of Care Review  Outcome: Ongoing (interventions implemented as appropriate)  Pt currently on room air. Continue lung expansion therapy via BERTHA Lee, RRT

## 2019-05-04 NOTE — PT/OT/SLP EVAL
Occupational Therapy   Evaluation    Name: Magaly Nunes  MRN: [id number]  Admitting Diagnosis:  CHF (congestive heart failure)      Recommendations:     Discharge Recommendations: nursing facility, skilled  Discharge Equipment Recommendations:  tub bench(BSC)  Barriers to discharge:  Decreased caregiver support(The patient lives alone)    Assessment:     Magaly Nunes is a 86 y.o. female with a medical diagnosis of CHF (congestive heart failure). The patient required CGA to amb using a RW to the bathrrom and CGA/min assist for RW management. The patient lives alone ans was recently D/C from the hospital. The patient will benefit from SNF or will benefit from 24* (S)/assist for safety. Performance deficits affecting function: weakness, impaired endurance, impaired self care skills, gait instability, impaired functional mobilty, impaired balance, decreased safety awareness.      Rehab Prognosis: Good; patient would benefit from acute skilled OT services to address these deficits and reach maximum level of function.       Plan:     Patient to be seen 5 x/week to address the above listed problems via self-care/home management, therapeutic exercises, therapeutic activities  · Plan of Care Expires:    · Plan of Care Reviewed with: patient    Subjective     Chief Complaint: I wanted to get up to the bathroom during the night but they wouldn't let me.  Patient/Family Comments/goals:get stronger    Occupational Profile:  Living Environment: The patient lives alone in a SS house with no VIVEK.  Previous level of function: The patient amb using a RW with c/o decreased balance and difficulty ambulating x3 weeks. The patient was able to bathe and dress herslif with assist from her niece, prn.  Roles and Routines: The patient does not drive. She has a friend who assist with grocery shopping and driving.  Equipment Used at Home:  walker, rolling  Assistance upon Discharge: niece and friend    Pain/Comfort:  · Pain Rating 1:  0/10    Patients cultural, spiritual, Protestant conflicts given the current situation: no    Objective:     Communicated with: nurseMalina prior to session.  Patient found HOB elevated with PureWick, bed alarm, telemetry, peripheral IV upon OT entry to room.    General Precautions: Standard, fall   Orthopedic Precautions:N/A   Braces: N/A     Occupational Performance:    Bed Mobility:    · Patient completed Scooting/Bridging with stand by assistance  · Patient completed Supine to Sit with stand by assistance    Functional Mobility/Transfers:  · Patient completed Sit <> Stand Transfer with stand by assistance  with  rolling walker   · Patient completed Toilet Transfer Step Transfer technique with contact guard assistance with  rolling walker and grab bars  · Functional Mobility: The patient amb using a RW ~10'x2 from the bed>toilet, toilet to chair. The patient req min assist for RW management.    Activities of Daily Living:  · Upper Body Dressing: minimum assistance    · Lower Body Dressing: dependence    · Toileting: The patient sat on the toilet but did not urinate      Cognitive/Visual Perceptual:  Cognitive/Psychosocial Skills:     -       Oriented to: Person, Place, Time and Situation   -       Follows Commands/attention:Follows two-step commands  -       Communication: clear/fluent  -       Memory: No Deficits noted  -       Safety awareness/insight to disability: impaired   -       Mood/Affect/Coping skills/emotional control: Appropriate to situation    Physical Exam:  Balance: -       fair+  Postural examination/scapula alignment:    -       Rounded shoulders  -       Forward head  Edema present in B feet  Skin integrity: Visible skin intact    Upper Extremity Range of Motion:     -       Right Upper Extremity: WFL  -       Left Upper Extremity: WFL  Upper Extremity Strength:    -       Right Upper Extremity: WFL  -       Left Upper Extremity: WFL    AMPAC 6 Click ADL:  AMPAC Total Score: 20    Treatment  & Education:  The patient participated in the OT eval and was educatyed re: OT role and recommendation to receive nurse assist to amb to the bathroom.  Education:    Patient left up in chair with all lines intact, call button in reach and nurseMalina notified    GOALS:   Multidisciplinary Problems     Occupational Therapy Goals        Problem: Occupational Therapy Goal    Goal Priority Disciplines Outcome Interventions   Occupational Therapy Goal     OT, PT/OT Ongoing (interventions implemented as appropriate)    Description:  Goals to be met by: 5/18/19     Patient will increase functional independence with ADLs by performing:    UE Dressing with Modified [place] and Supervision.  LE Dressing with Modified [place] and Supervision.  Grooming while standing at sink with Modified [place] and Supervision.  Toileting from toilet with Modified [place] and Supervision for hygiene and clothing management.   Supine to sit with Modified [place] and Supervision.  Step transfer with Modified [place] and Supervision  Toilet transfer to toilet with Stand-by Assistance.  Upper extremity exercise program x10 reps per handout, with assistance as needed.                      History:     Past Medical History:   Diagnosis Date    Anxiety disorder     Arthritis 12/28/2017    Carpal tunnel syndrome     CHF (congestive heart failure)     Chronic allergic rhinitis     Chronic pain 10/22/2012    CKD stage 3 due to type 2 diabetes mellitus 2/14/2017    Constipation - functional 4/10/2013    Depression     Diabetes mellitus type II     History of cataract surgery     Hot flash not due to menopause     HTN (hypertension)     Hypokalemia 11/19/2012    Insomnia 4/10/2013    Knee pain, bilateral 7/24/2013    Personal history of DVT (deep vein thrombosis)     Polyarthralgia 7/16/2018    Pulmonary hypertension 2/1/2017    Renovascular hypertension 2/14/2017    Severe tricuspid regurgitation  2/14/2017    Spinal stenosis     Dr. Corrales    Spinal stenosis of lumbar region 2/3/2014    Systolic and diastolic CHF, chronic 7/16/2018    Vertigo        Past Surgical History:   Procedure Laterality Date    CATARACT EXTRACTION Bilateral     EYE SURGERY      FRACTURE SURGERY      HYSTERECTOMY      ORIF RADIUS & ULNA FRACTURES         Time Tracking:     OT Date of Treatment: 05/04/19  OT Start Time: 1436  OT Stop Time: 1451  OT Total Time (min): 15 min    Billable Minutes:Evaluation 15 (with PT)    Charley Tucker OT  5/4/2019

## 2019-05-04 NOTE — PROGRESS NOTES
[facility] CASE MANAGEMENT                  WRITTEN DISCHARGE INFORMATION  Follow-up Information     Chris Bangura MD On 5/20/2019.    Specialty:  Internal Medicine  Why:  out patient services: 3:20PM FOLLOW UP FROM THE HOSPITAL  Contact information:  [address] [place] [address]  [phone]             Hammad Humphrey MD On 5/6/2019.    Specialties:  INTERVENTIONAL CARDIOLOGY, Cardiology  Why:  out patient services:  11:00 AM FOLLOW UP FROM THE HOSPITAL  Contact information:  [address]  [address]  [place] [address]  [phone]                 APPOINTMENTS AND RESOURCES TO HELP YOU MANAGE YOUR CARE AT HOME BASED ON YOUR PREFERENCES:  (If an appointment is not scheduled for you when you leave the hospital, call your doctor to schedule a follow up visit within a week)  Healthy Living Instructions to HELP MANAGE YOUR CARE AT HOME:  Things You are responsible for:  1.    Getting your prescriptions filled   2.    Taking your medications as directed, DO NOT MISS ANY DOSES!  3.    Following the diet and exercise recommended by your doctor  4.    Going to your follow-up doctor appointment. This is important because it allows the doctor to monitor your progress and determine if any changes need to made to your treatment plan.  5. If you have any questions about MANAGING YOUR CARE AT HOME Call the Nurse Care Line for 24/7 Assistance [phone]   Please answer any calls you may receive from [facility]. We want to continue to support you as you manage your healthcare needs. [facility] is happy to have the opportunity to serve you.    Thank you for choosing [facility] for your healthcare needs!  Your [facility] Case Management Team,  Shena Jane RN, BSN, STN CCM  5/4/2019  '

## 2019-05-05 PROBLEM — I48.0 PAF (PAROXYSMAL ATRIAL FIBRILLATION): Chronic | Status: ACTIVE | Noted: 2019-05-05

## 2019-05-05 PROBLEM — I48.19 PERSISTENT ATRIAL FIBRILLATION: Status: RESOLVED | Noted: 2019-03-19 | Resolved: 2019-05-05

## 2019-05-05 LAB
ALBUMIN SERPL BCP-MCNC: 2.5 G/DL (ref 3.5–5.2)
ALP SERPL-CCNC: 106 U/L (ref 55–135)
ALT SERPL W/O P-5'-P-CCNC: 36 U/L (ref 10–44)
ANION GAP SERPL CALC-SCNC: 7 MMOL/L (ref 8–16)
AST SERPL-CCNC: 75 U/L (ref 10–40)
BASOPHILS # BLD AUTO: 0.01 K/UL (ref 0–0.2)
BASOPHILS NFR BLD: 0.2 % (ref 0–1.9)
BILIRUB SERPL-MCNC: 1.8 MG/DL (ref 0.1–1)
BUN SERPL-MCNC: 23 MG/DL (ref 8–23)
CALCIUM SERPL-MCNC: 8.3 MG/DL (ref 8.7–10.5)
CHLORIDE SERPL-SCNC: 105 MMOL/L (ref 95–110)
CHOLEST SERPL-MCNC: 105 MG/DL (ref 120–199)
CHOLEST/HDLC SERPL: 5.3 {RATIO} (ref 2–5)
CO2 SERPL-SCNC: 28 MMOL/L (ref 23–29)
CREAT SERPL-MCNC: 1.3 MG/DL (ref 0.5–1.4)
DIFFERENTIAL METHOD: ABNORMAL
EOSINOPHIL # BLD AUTO: 0.1 K/UL (ref 0–0.5)
EOSINOPHIL NFR BLD: 1.7 % (ref 0–8)
ERYTHROCYTE [DISTWIDTH] IN BLOOD BY AUTOMATED COUNT: 19.2 % (ref 11.5–14.5)
EST. GFR  (AFRICAN AMERICAN): 43 ML/MIN/1.73 M^2
EST. GFR  (NON AFRICAN AMERICAN): 37 ML/MIN/1.73 M^2
GLUCOSE SERPL-MCNC: 117 MG/DL (ref 70–110)
HCT VFR BLD AUTO: 30.7 % (ref 37–48.5)
HDLC SERPL-MCNC: 20 MG/DL (ref 40–75)
HDLC SERPL: 19 % (ref 20–50)
HGB BLD-MCNC: 9.7 G/DL (ref 12–16)
LDLC SERPL CALC-MCNC: 76 MG/DL (ref 63–159)
LYMPHOCYTES # BLD AUTO: 1.3 K/UL (ref 1–4.8)
LYMPHOCYTES NFR BLD: 23.1 % (ref 18–48)
MAGNESIUM SERPL-MCNC: 1.3 MG/DL (ref 1.6–2.6)
MCH RBC QN AUTO: 24.6 PG (ref 27–31)
MCHC RBC AUTO-ENTMCNC: 31.6 G/DL (ref 32–36)
MCV RBC AUTO: 78 FL (ref 82–98)
MONOCYTES # BLD AUTO: 1.1 K/UL (ref 0.3–1)
MONOCYTES NFR BLD: 19.4 % (ref 4–15)
NEUTROPHILS # BLD AUTO: 3 K/UL (ref 1.8–7.7)
NEUTROPHILS NFR BLD: 55.6 % (ref 38–73)
NONHDLC SERPL-MCNC: 85 MG/DL
PHOSPHATE SERPL-MCNC: 2.4 MG/DL (ref 2.7–4.5)
PLATELET # BLD AUTO: 261 K/UL (ref 150–350)
PMV BLD AUTO: 10.1 FL (ref 9.2–12.9)
POTASSIUM SERPL-SCNC: 3.5 MMOL/L (ref 3.5–5.1)
PROT SERPL-MCNC: 5.6 G/DL (ref 6–8.4)
RBC # BLD AUTO: 3.95 M/UL (ref 4–5.4)
SODIUM SERPL-SCNC: 140 MMOL/L (ref 136–145)
TRIGL SERPL-MCNC: 45 MG/DL (ref 30–150)
WBC # BLD AUTO: 5.42 K/UL (ref 3.9–12.7)

## 2019-05-05 PROCEDURE — 80053 COMPREHEN METABOLIC PANEL: CPT | Mod: HCNC

## 2019-05-05 PROCEDURE — 25000003 PHARM REV CODE 250: Mod: HCNC | Performed by: HOSPITALIST

## 2019-05-05 PROCEDURE — 94799 UNLISTED PULMONARY SVC/PX: CPT | Mod: HCNC

## 2019-05-05 PROCEDURE — 63600175 PHARM REV CODE 636 W HCPCS: Mod: HCNC | Performed by: HOSPITALIST

## 2019-05-05 PROCEDURE — 21400001 HC TELEMETRY ROOM: Mod: HCNC

## 2019-05-05 PROCEDURE — 84100 ASSAY OF PHOSPHORUS: CPT | Mod: HCNC

## 2019-05-05 PROCEDURE — 83735 ASSAY OF MAGNESIUM: CPT | Mod: HCNC

## 2019-05-05 PROCEDURE — 85025 COMPLETE CBC W/AUTO DIFF WBC: CPT | Mod: HCNC

## 2019-05-05 PROCEDURE — 94761 N-INVAS EAR/PLS OXIMETRY MLT: CPT | Mod: HCNC

## 2019-05-05 PROCEDURE — 80061 LIPID PANEL: CPT | Mod: HCNC

## 2019-05-05 PROCEDURE — 36415 COLL VENOUS BLD VENIPUNCTURE: CPT | Mod: HCNC

## 2019-05-05 RX ORDER — LOSARTAN POTASSIUM 25 MG/1
100 TABLET ORAL DAILY
Status: DISCONTINUED | OUTPATIENT
Start: 2019-05-05 | End: 2019-05-07

## 2019-05-05 RX ORDER — GUAIFENESIN/DEXTROMETHORPHAN 100-10MG/5
5 SYRUP ORAL EVERY 6 HOURS
Status: DISCONTINUED | OUTPATIENT
Start: 2019-05-05 | End: 2019-05-10 | Stop reason: HOSPADM

## 2019-05-05 RX ORDER — BENZONATATE 100 MG/1
200 CAPSULE ORAL 3 TIMES DAILY PRN
Status: DISCONTINUED | OUTPATIENT
Start: 2019-05-05 | End: 2019-05-10 | Stop reason: HOSPADM

## 2019-05-05 RX ADMIN — SPIRONOLACTONE 25 MG: 25 TABLET ORAL at 09:05

## 2019-05-05 RX ADMIN — ATORVASTATIN CALCIUM 20 MG: 10 TABLET, FILM COATED ORAL at 09:05

## 2019-05-05 RX ADMIN — HYDRALAZINE HYDROCHLORIDE AND ISOSORBIDE DINITRATE 1 TABLET: 37.5; 2 TABLET, FILM COATED ORAL at 09:05

## 2019-05-05 RX ADMIN — FUROSEMIDE 40 MG: 10 INJECTION, SOLUTION INTRAVENOUS at 09:05

## 2019-05-05 RX ADMIN — GUAIFENESIN AND DEXTROMETHORPHAN 5 ML: 100; 10 SYRUP ORAL at 12:05

## 2019-05-05 RX ADMIN — AMIODARONE HYDROCHLORIDE 400 MG: 200 TABLET ORAL at 09:05

## 2019-05-05 RX ADMIN — FUROSEMIDE 40 MG: 10 INJECTION, SOLUTION INTRAVENOUS at 05:05

## 2019-05-05 RX ADMIN — GUAIFENESIN AND DEXTROMETHORPHAN 5 ML: 100; 10 SYRUP ORAL at 05:05

## 2019-05-05 RX ADMIN — HEPARIN SODIUM 5000 UNITS: 5000 INJECTION, SOLUTION INTRAVENOUS; SUBCUTANEOUS at 09:05

## 2019-05-05 RX ADMIN — HEPARIN SODIUM 5000 UNITS: 5000 INJECTION, SOLUTION INTRAVENOUS; SUBCUTANEOUS at 07:05

## 2019-05-05 RX ADMIN — CEFTRIAXONE 1 G: 1 INJECTION, SOLUTION INTRAVENOUS at 09:05

## 2019-05-05 RX ADMIN — CLOPIDOGREL BISULFATE 75 MG: 75 TABLET ORAL at 09:05

## 2019-05-05 RX ADMIN — ASPIRIN 81 MG 81 MG: 81 TABLET ORAL at 09:05

## 2019-05-05 RX ADMIN — GUAIFENESIN AND DEXTROMETHORPHAN 5 ML: 100; 10 SYRUP ORAL at 11:05

## 2019-05-05 RX ADMIN — DULOXETINE 60 MG: 30 CAPSULE, DELAYED RELEASE ORAL at 09:05

## 2019-05-05 RX ADMIN — HEPARIN SODIUM 5000 UNITS: 5000 INJECTION, SOLUTION INTRAVENOUS; SUBCUTANEOUS at 04:05

## 2019-05-05 RX ADMIN — ACETAMINOPHEN 650 MG: 325 TABLET, FILM COATED ORAL at 10:05

## 2019-05-05 NOTE — NURSING
Bedside report given to DIANA Durham. Patient sitting up in bed. NAD noted at this time. All safety precautions in place.  12 hr chart check complete

## 2019-05-05 NOTE — PLAN OF CARE
Problem: Adult Inpatient Plan of Care  Goal: Plan of Care Review  Outcome: Ongoing (interventions implemented as appropriate)     05/05/19 1836   Plan of Care Review   Plan of Care Reviewed With patient   Progress improving       Problem: Skin Injury Risk Increased  Goal: Skin Health and Integrity    Intervention: Optimize Skin Protection     05/05/19 1836   Prevent Additional Skin Injury   Head of Bed (HOB) HOB at 30-45 degrees   Pressure Reduction Devices positioning supports utilized   Pressure Reduction Techniques frequent weight shift encouraged;heels elevated off bed;weight shift assistance provided   Monitor and Manage Hypervolemia   Skin Protection incontinence pads utilized

## 2019-05-05 NOTE — ASSESSMENT & PLAN NOTE
Initially in SR  Converted to afib yesterday.  Continue amiodarone.  Does not seem to be on any B blocker.  Poor anticoagulation candidate secondary to recurrent falls.

## 2019-05-05 NOTE — SUBJECTIVE & OBJECTIVE
Interval History: Feels weak.    Review of Systems   Constitutional: Negative for chills and fever.   HENT: Negative for ear discharge and ear pain.    Eyes: Negative for pain and itching.   Endocrine: Negative for polyphagia and polyuria.     Objective:     Vital Signs (Most Recent):  Temp: 97.9 °F (36.6 °C) (05/05/19 1124)  Pulse: 85 (05/05/19 1124)  Resp: 18 (05/05/19 1124)  BP: (!) 92/58 (05/05/19 1130)  SpO2: 97 % (05/05/19 1124) Vital Signs (24h Range):  Temp:  [97.7 °F (36.5 °C)-98.5 °F (36.9 °C)] 97.9 °F (36.6 °C)  Pulse:  [] 85  Resp:  [18-20] 18  SpO2:  [96 %-98 %] 97 %  BP: ()/(55-62) 92/58     Weight: 80 kg (176 lb 5.9 oz)  Body mass index is 31.24 kg/m².    Intake/Output Summary (Last 24 hours) at 5/5/2019 1341  Last data filed at 5/5/2019 0300  Gross per 24 hour   Intake 480 ml   Output 900 ml   Net -420 ml      Physical Exam   Constitutional: She is oriented to person, place, and time. No distress.   HENT:   Head: Normocephalic and atraumatic.   Eyes: Conjunctivae are normal. Right eye exhibits no discharge. Left eye exhibits no discharge.   Neck: Neck supple. No thyromegaly present.   Cardiovascular: Normal rate.   Irregular   Pulmonary/Chest: Effort normal and breath sounds normal.   Abdominal: Bowel sounds are normal.   Musculoskeletal: She exhibits no tenderness.   Neurological: She is alert and oriented to person, place, and time.   Skin: Skin is warm and dry. She is not diaphoretic.       Significant Labs:   BMP:   Recent Labs   Lab 05/05/19  0531   *      K 3.5      CO2 28   BUN 23   CREATININE 1.3   CALCIUM 8.3*   MG 1.3*     CBC:   Recent Labs   Lab 05/03/19  2147 05/04/19  0250 05/05/19  0531   WBC 6.71 7.50 5.42   HGB 11.9* 10.8* 9.7*   HCT 39.2 35.2* 30.7*    215 261

## 2019-05-05 NOTE — HOSPITAL COURSE
85 y/o female admitted with CHF exacerbation.  Started on IV diuresis.  Recurrent falls and PT/OT consulted.  Therapy recommending SNF. SW consulted. She was stable on room air and medically stable for discharge to SNF. Discharged on an increased PO regimen of Lasix and follow up with PCP or cardiology for continued adjustment.  
Color consistent with ethnicity/race, warm, dry intact, resilient.

## 2019-05-05 NOTE — NURSING
Bedside shift report received from DIANA Durham. Communication board has been updated. NAD noted. Will continue to monitor.

## 2019-05-05 NOTE — NURSING
Report received from SIMEON Radford. Patient resting in bed family at bedside no obvious distress noted

## 2019-05-05 NOTE — ASSESSMENT & PLAN NOTE
Acute on chronic combined systolic and diastolic CHF  Echo on 3/2019 showing EF of 30% and diastolic dysfunction.    Started on IV Lasix.  Continue Losartan.  Does not seem to be on any B blocker.  Monitor urine output.

## 2019-05-05 NOTE — PLAN OF CARE
Problem: Fall Injury Risk  Goal: Absence of Fall and Fall-Related Injury    Intervention: Identify and Manage Contributors to Fall Injury Risk     05/05/19 0518   Identify and Manage Contributors to Fall Injury Risk   Self-Care Promotion independence encouraged;BADL personal objects within reach;BADL personal routines maintained     Intervention: Promote Injury-Free Environment     05/05/19 0518   Optimize Balance and Safe Activity   Safety Promotion/Fall Prevention nonskid shoes/socks when out of bed;instructed to call staff for mobility;side rails raised x 2   Optimize [place] and Functional Mobility   Environmental Safety Modification clutter free environment maintained;assistive device/personal items within reach         Problem: Infection  Goal: Infection Symptom Resolution    Intervention: Prevent or Manage Infection     05/05/19 0518   Prevent or Manage Infection   Infection Management aseptic technique maintained         Problem: Diabetes Comorbidity  Goal: Blood Glucose Level Within Desired Range    Intervention: Maintain Glycemic Control     05/05/19 0518   Monitor and Manage Ketoacidosis   Glycemic Management oral hydration promoted

## 2019-05-06 LAB
ALBUMIN SERPL BCP-MCNC: 2.6 G/DL (ref 3.5–5.2)
ALP SERPL-CCNC: 108 U/L (ref 55–135)
ALT SERPL W/O P-5'-P-CCNC: 39 U/L (ref 10–44)
ANION GAP SERPL CALC-SCNC: 9 MMOL/L (ref 8–16)
AST SERPL-CCNC: 74 U/L (ref 10–40)
BILIRUB SERPL-MCNC: 1.7 MG/DL (ref 0.1–1)
BUN SERPL-MCNC: 20 MG/DL (ref 8–23)
CALCIUM SERPL-MCNC: 8.2 MG/DL (ref 8.7–10.5)
CHLORIDE SERPL-SCNC: 101 MMOL/L (ref 95–110)
CO2 SERPL-SCNC: 27 MMOL/L (ref 23–29)
CREAT SERPL-MCNC: 1.2 MG/DL (ref 0.5–1.4)
EST. GFR  (AFRICAN AMERICAN): 47 ML/MIN/1.73 M^2
EST. GFR  (NON AFRICAN AMERICAN): 41 ML/MIN/1.73 M^2
GLUCOSE SERPL-MCNC: 95 MG/DL (ref 70–110)
POTASSIUM SERPL-SCNC: 3 MMOL/L (ref 3.5–5.1)
PROT SERPL-MCNC: 5.9 G/DL (ref 6–8.4)
SODIUM SERPL-SCNC: 137 MMOL/L (ref 136–145)

## 2019-05-06 PROCEDURE — 25000003 PHARM REV CODE 250: Mod: HCNC | Performed by: HOSPITALIST

## 2019-05-06 PROCEDURE — 30200315 PPD INTRADERMAL TEST REV CODE 302: Mod: HCNC | Performed by: HOSPITALIST

## 2019-05-06 PROCEDURE — 21400001 HC TELEMETRY ROOM: Mod: HCNC

## 2019-05-06 PROCEDURE — 36415 COLL VENOUS BLD VENIPUNCTURE: CPT | Mod: HCNC

## 2019-05-06 PROCEDURE — 86580 TB INTRADERMAL TEST: CPT | Mod: HCNC | Performed by: HOSPITALIST

## 2019-05-06 PROCEDURE — 63600175 PHARM REV CODE 636 W HCPCS: Mod: HCNC | Performed by: HOSPITALIST

## 2019-05-06 PROCEDURE — 94799 UNLISTED PULMONARY SVC/PX: CPT | Mod: HCNC

## 2019-05-06 PROCEDURE — 80053 COMPREHEN METABOLIC PANEL: CPT | Mod: HCNC

## 2019-05-06 PROCEDURE — 97110 THERAPEUTIC EXERCISES: CPT | Mod: HCNC

## 2019-05-06 PROCEDURE — 94761 N-INVAS EAR/PLS OXIMETRY MLT: CPT | Mod: HCNC

## 2019-05-06 PROCEDURE — 97116 GAIT TRAINING THERAPY: CPT | Mod: HCNC

## 2019-05-06 RX ORDER — DOXYCYCLINE HYCLATE 100 MG
100 TABLET ORAL EVERY 12 HOURS
Status: DISCONTINUED | OUTPATIENT
Start: 2019-05-06 | End: 2019-05-10 | Stop reason: HOSPADM

## 2019-05-06 RX ORDER — POTASSIUM CHLORIDE 20 MEQ/1
40 TABLET, EXTENDED RELEASE ORAL ONCE
Status: COMPLETED | OUTPATIENT
Start: 2019-05-06 | End: 2019-05-06

## 2019-05-06 RX ORDER — FUROSEMIDE 10 MG/ML
40 INJECTION INTRAMUSCULAR; INTRAVENOUS 2 TIMES DAILY
Status: DISCONTINUED | OUTPATIENT
Start: 2019-05-06 | End: 2019-05-09

## 2019-05-06 RX ORDER — FUROSEMIDE 10 MG/ML
40 INJECTION INTRAMUSCULAR; INTRAVENOUS 2 TIMES DAILY
Status: DISCONTINUED | OUTPATIENT
Start: 2019-05-06 | End: 2019-05-06

## 2019-05-06 RX ORDER — ENOXAPARIN SODIUM 100 MG/ML
40 INJECTION SUBCUTANEOUS EVERY 24 HOURS
Status: DISCONTINUED | OUTPATIENT
Start: 2019-05-06 | End: 2019-05-10 | Stop reason: HOSPADM

## 2019-05-06 RX ADMIN — CLOPIDOGREL BISULFATE 75 MG: 75 TABLET ORAL at 09:05

## 2019-05-06 RX ADMIN — GUAIFENESIN AND DEXTROMETHORPHAN 5 ML: 100; 10 SYRUP ORAL at 06:05

## 2019-05-06 RX ADMIN — HYDRALAZINE HYDROCHLORIDE AND ISOSORBIDE DINITRATE 1 TABLET: 37.5; 2 TABLET, FILM COATED ORAL at 04:05

## 2019-05-06 RX ADMIN — ATORVASTATIN CALCIUM 20 MG: 10 TABLET, FILM COATED ORAL at 09:05

## 2019-05-06 RX ADMIN — POTASSIUM CHLORIDE 40 MEQ: 1500 TABLET, EXTENDED RELEASE ORAL at 09:05

## 2019-05-06 RX ADMIN — DULOXETINE 60 MG: 30 CAPSULE, DELAYED RELEASE ORAL at 09:05

## 2019-05-06 RX ADMIN — DOXYCYCLINE HYCLATE 100 MG: 100 TABLET, COATED ORAL at 02:05

## 2019-05-06 RX ADMIN — HYDRALAZINE HYDROCHLORIDE AND ISOSORBIDE DINITRATE 1 TABLET: 37.5; 2 TABLET, FILM COATED ORAL at 09:05

## 2019-05-06 RX ADMIN — GUAIFENESIN AND DEXTROMETHORPHAN 5 ML: 100; 10 SYRUP ORAL at 02:05

## 2019-05-06 RX ADMIN — ACETAMINOPHEN 650 MG: 325 TABLET, FILM COATED ORAL at 09:05

## 2019-05-06 RX ADMIN — BENZONATATE 200 MG: 100 CAPSULE ORAL at 03:05

## 2019-05-06 RX ADMIN — FUROSEMIDE 40 MG: 10 INJECTION, SOLUTION INTRAVENOUS at 05:05

## 2019-05-06 RX ADMIN — ENOXAPARIN SODIUM 40 MG: 100 INJECTION SUBCUTANEOUS at 05:05

## 2019-05-06 RX ADMIN — AMIODARONE HYDROCHLORIDE 400 MG: 200 TABLET ORAL at 09:05

## 2019-05-06 RX ADMIN — HEPARIN SODIUM 5000 UNITS: 5000 INJECTION, SOLUTION INTRAVENOUS; SUBCUTANEOUS at 06:05

## 2019-05-06 RX ADMIN — ASPIRIN 81 MG 81 MG: 81 TABLET ORAL at 09:05

## 2019-05-06 RX ADMIN — GUAIFENESIN AND DEXTROMETHORPHAN 5 ML: 100; 10 SYRUP ORAL at 05:05

## 2019-05-06 RX ADMIN — Medication 5 UNITS: at 05:05

## 2019-05-06 RX ADMIN — CEFTRIAXONE 1 G: 1 INJECTION, SOLUTION INTRAVENOUS at 09:05

## 2019-05-06 RX ADMIN — DOXYCYCLINE HYCLATE 100 MG: 100 TABLET, COATED ORAL at 09:05

## 2019-05-06 RX ADMIN — SPIRONOLACTONE 25 MG: 25 TABLET ORAL at 09:05

## 2019-05-06 NOTE — PLAN OF CARE
SW sent referrals upon patients preferences to [facility], [facility], and [facility], awaiting acceptance.      05/06/19 1437   Post-Acute Status   Post-Acute Authorization Placement  (SNF)   Post-Acute Placement Status Referrals Sent

## 2019-05-06 NOTE — PLAN OF CARE
Problem: Physical Therapy Goal  Goal: Physical Therapy Goal  Goals to be met by: 05/10/19.     Patient will increase functional independence with mobility by performin. Supine to sit with supervision  2. Sit to stand transfer with Supervision using RW  3. Gait  x 150 feet with Supervision using Rolling Walker  4. Bed to chair with supervision  5. LE therex 3 sets x10 reps with supervision      Outcome: Ongoing (interventions implemented as appropriate)  Pt ambulated ~60 ft with CGA using RW.

## 2019-05-06 NOTE — ASSESSMENT & PLAN NOTE
Acute on chronic combined systolic and diastolic CHF  Echo on 3/2019 showing EF of 30% and diastolic dysfunction.    Started on IV Lasix.  Continue Losartan.  Does not seem to be on any B blocker.  Monitor urine output.  One more day of IV Lasix.    PT/OT recommending SNF upon discharge.

## 2019-05-06 NOTE — PT/OT/SLP PROGRESS
Physical Therapy Treatment    Patient Name:  Magaly Nunes   MRN:  [id number]    Recommendations:     Discharge Recommendations:  nursing facility, skilled(Pt will need 24 hr supervision if D/C'ed home.)   Discharge Equipment Recommendations: commode, tub bench   Barriers to discharge home: Decreased caregiver support and Pt with decreased mobility.    Assessment:     Magaly Nunes is a 86 y.o. female admitted with a medical diagnosis of CHF (congestive heart failure).  She presents with the following impairments/functional limitations:  weakness, impaired endurance, impaired functional mobilty, gait instability, impaired balance, visual deficits, decreased coordination, decreased lower extremity function, decreased safety awareness, pain, edema.    Rehab Prognosis: Fair+; patient would benefit from acute skilled PT services to address these deficits and reach maximum level of function.    Recent Surgery: * No surgery found *      Plan:     During this hospitalization, patient to be seen 5 x/week to address the identified rehab impairments via gait training, therapeutic activities, therapeutic exercises and progress toward the following goals:    · Plan of Care Expires:  05/10/19    Subjective     Chief Complaint: edema in LE  Patient/Family Comments/goals: to get well   Pain/Comfort:  Pain Rating 1: (Pt with no pain at rest, c/o minimal chronic B knee pain during ambulation.)      Objective:     Communicated with nurse Dey prior to session.  Patient found left sidelying with peripheral IV, telemetry upon PT entry to room.     General Precautions: Standard, fall, visually impaired without her eyeglasses    Orthopedic Precautions:N/A   Braces: N/A     Functional Mobility:  · Bed Mobility:     · Scooting: stand by assistance  · Supine to Sit: stand by assistance with HOB elevated   · Transfers:     · Sit to Stand:  contact guard assistance with rolling walker  · Bed to Chair: contact guard assistance with   rolling walker  using  Step Transfer  · Gait: Pt ambulated ~60 ft with CGA using RW.  Pt with max decreased step length and yaneth.  Pt with mild forward flexed posture.    · Balance: Pt with fair dynamic standing balance.       AM-PAC 6 CLICK MOBILITY  Turning over in bed (including adjusting bedclothes, sheets and blankets)?: 4  Sitting down on and standing up from a chair with arms (e.g., wheelchair, bedside commode, etc.): 3  Moving from lying on back to sitting on the side of the bed?: 4  Moving to and from a bed to a chair (including a wheelchair)?: 3  Need to walk in hospital room?: 3  Climbing 3-5 steps with a railing?: 2  Basic Mobility Total Score: 19       Therapeutic Activities and Exercises:  BLE seated therex 20 reps: hip flex, LAQ, and AP    Patient left up in chair reclined with BLE elevated on pillow with all lines intact and call button in reach.    GOALS:   Multidisciplinary Problems     Physical Therapy Goals        Problem: Physical Therapy Goal    Goal Priority Disciplines Outcome Goal Variances Interventions   Physical Therapy Goal     PT, PT/OT Ongoing (interventions implemented as appropriate)     Description:  Goals to be met by: 05/10/19.     Patient will increase functional independence with mobility by performin. Supine to sit with supervision  2. Sit to stand transfer with Supervision using RW  3. Gait  x 150 feet with Supervision using Rolling Walker  4. Bed to chair with supervision  5. LE therex 3 sets x10 reps with supervision                        Time Tracking:     PT Received On: 19  PT Start Time: 936     PT Stop Time: 959  PT Total Time (min): 23 min     Billable Minutes: Gait Training 13 min and Therapeutic Exercise 10 min                   Denai Wilkins PT  2019

## 2019-05-06 NOTE — NURSING
Report given to RN. Pt resting comfortably. No acute distress noted. Denies pain at this time. Safety precautions maintained.     Chart check completed.

## 2019-05-06 NOTE — SUBJECTIVE & OBJECTIVE
Interval History: Feeling better.    Review of Systems   Constitutional: Negative for chills and fever.   HENT: Negative for ear discharge and ear pain.    Eyes: Negative for pain and itching.   Endocrine: Negative for polyphagia and polyuria.     Objective:     Vital Signs (Most Recent):  Temp: 98.9 °F (37.2 °C) (05/06/19 1136)  Pulse: 91 (05/06/19 1136)  Resp: 17 (05/06/19 1136)  BP: (!) 105/54 (05/06/19 1136)  SpO2: 96 % (05/06/19 1136) Vital Signs (24h Range):  Temp:  [98.1 °F (36.7 °C)-98.9 °F (37.2 °C)] 98.9 °F (37.2 °C)  Pulse:  [82-97] 91  Resp:  [17-20] 17  SpO2:  [96 %-99 %] 96 %  BP: (105-118)/(52-57) 105/54     Weight: 80 kg (176 lb 5.9 oz)  Body mass index is 31.24 kg/m².    Intake/Output Summary (Last 24 hours) at 5/6/2019 1235  Last data filed at 5/6/2019 0304  Gross per 24 hour   Intake 100 ml   Output 1900 ml   Net -1800 ml      Physical Exam   Constitutional: She is oriented to person, place, and time. No distress.   HENT:   Head: Normocephalic and atraumatic.   Eyes: Conjunctivae are normal. Right eye exhibits no discharge. Left eye exhibits no discharge.   Neck: Neck supple. No thyromegaly present.   Cardiovascular: Normal rate.   Irregular   Pulmonary/Chest: Effort normal and breath sounds normal.   Abdominal: Bowel sounds are normal.   Musculoskeletal: She exhibits no tenderness.   Neurological: She is alert and oriented to person, place, and time.   Skin: Skin is warm and dry. She is not diaphoretic.       Significant Labs:   BMP:   Recent Labs   Lab 05/05/19  0531 05/06/19  0548   * 95    137   K 3.5 3.0*    101   CO2 28 27   BUN 23 20   CREATININE 1.3 1.2   CALCIUM 8.3* 8.2*   MG 1.3*  --      CBC:   Recent Labs   Lab 05/05/19  0531   WBC 5.42   HGB 9.7*   HCT 30.7*

## 2019-05-06 NOTE — NURSING
Report received from Yissel ORTIZ. Pt awake in bed. NAD noted. Telemetry monitor in use, alarm set. Call light within reach. Will continue to monitor.

## 2019-05-06 NOTE — NURSING
Patient bedside report has been completed and given to SIMEON Harris. Chart check has been completed. NAD noted.

## 2019-05-06 NOTE — PROGRESS NOTES
DAISY contacted Rox at [facility] to complete  LOCET. DAISY faxed PASRR to [facility] and uploaded document to [facility].

## 2019-05-06 NOTE — PLAN OF CARE
Problem: Fall Injury Risk  Goal: Absence of Fall and Fall-Related Injury    Intervention: Identify and Manage Contributors to Fall Injury Risk     05/05/19 1950   Identify and Manage Contributors to Fall Injury Risk   Self-Care Promotion independence encouraged;BADL personal objects within reach;BADL personal routines maintained   Manage Acute Allergic Reaction   Medication Review/Management medications reviewed     Intervention: Promote Injury-Free Environment     05/06/19 0457   Optimize Balance and Safe Activity   Safety Promotion/Fall Prevention assistive device/personal item within reach;Fall Risk reviewed with patient/family;medications reviewed;side rails raised x 2;instructed to call staff for mobility   Optimize [place] and Functional Mobility   Environmental Safety Modification assistive device/personal items within reach      05/06/19 0457   Optimize Balance and Safe Activity   Safety Promotion/Fall Prevention assistive device/personal item within reach;Fall Risk reviewed with patient/family;medications reviewed;side rails raised x 2;instructed to call staff for mobility   Optimize [place] and Functional Mobility   Environmental Safety Modification assistive device/personal items within reach         Comments: Pt remains free of falls during this shift. No injuries noted.  Encouraged to call for assistance.

## 2019-05-06 NOTE — PROGRESS NOTES
[facility] Hospital Medicine  Progress Note    Patient Name: Magaly Nunes  MRN: [id number]  Patient Class: IP- Inpatient   Admission Date: 5/3/2019  Length of Stay: 3 days  Attending Physician: Manuel Ordonez MD  Primary Care Provider: Chris Bangura MD        Subjective:     Principal Problem:CHF (congestive heart failure)    HPI:  Magaly Nunes is a 86 y.o. female that (in part)  has a past medical history of Anxiety disorder, Arthritis, Carpal tunnel syndrome, CHF (congestive heart failure), Chronic allergic rhinitis, Chronic pain, CKD stage 3 due to type 2 diabetes mellitus, Constipation - functional, Depression, Diabetes mellitus type II, History of cataract surgery, Hot flash not due to menopause, HTN (hypertension), Hypokalemia, Insomnia, Knee pain, bilateral, Personal history of DVT (deep vein thrombosis), Polyarthralgia, Pulmonary hypertension, Renovascular hypertension, Severe tricuspid regurgitation, Spinal stenosis, Spinal stenosis of lumbar region, Systolic and diastolic CHF, chronic, and Vertigo.  has a past surgical history that includes Hysterectomy; Cataract extraction (Bilateral); Eye surgery; Fracture surgery; and ORIF radius & ulna fractures. Presents to [facility] initially complaining of multiple recurrent falls.  She has fallen 4 times over the last 2 weeks.  She reports all of these were mechanical falls and denies chest pain, syncope, or palpitations.  She 1st tripped over bryn approximately 2 weeks ago and struck the left side of her head and landed on her buttocks.  She has had some difficulty ambulating due to weakness since that time.  She is also complaining of increased weight gain, swelling, and generalized non acute weakness.  She has been so we can fact that she has been sleeping on the floor at home and has been incontinent of urine.  She has not been eating or drinking adequately since the falls.  She  was reluctant to come into the hospital, but finally reports that her pride subsided and she called for help.    In the emergency department multiple imaging studies were obtained to evaluate for possible fracture dislocation which proved to be negative.  CT of head was also negative.  Routine laboratory studies were obtained which demonstrated elevated CPK levels, elevated troponin, and markedly elevated BNP.  Physical exam was consistent with edema likely due to acute CHF exacerbation.    Hospital medicine has been asked to admit for further evaluation and treatment.     Hospital Course:  85 y/o female admitted with CHF exacerbation.  Started on IV diuresis.  Recurrent falls and PT/OT consulted.  Therapy recommending SNF.    Interval History: Feeling better.    Review of Systems   Constitutional: Negative for chills and fever.   HENT: Negative for ear discharge and ear pain.    Eyes: Negative for pain and itching.   Endocrine: Negative for polyphagia and polyuria.     Objective:     Vital Signs (Most Recent):  Temp: 98.9 °F (37.2 °C) (05/06/19 1136)  Pulse: 91 (05/06/19 1136)  Resp: 17 (05/06/19 1136)  BP: (!) 105/54 (05/06/19 1136)  SpO2: 96 % (05/06/19 1136) Vital Signs (24h Range):  Temp:  [98.1 °F (36.7 °C)-98.9 °F (37.2 °C)] 98.9 °F (37.2 °C)  Pulse:  [82-97] 91  Resp:  [17-20] 17  SpO2:  [96 %-99 %] 96 %  BP: (105-118)/(52-57) 105/54     Weight: 80 kg (176 lb 5.9 oz)  Body mass index is 31.24 kg/m².    Intake/Output Summary (Last 24 hours) at 5/6/2019 1235  Last data filed at 5/6/2019 0304  Gross per 24 hour   Intake 100 ml   Output 1900 ml   Net -1800 ml      Physical Exam   Constitutional: She is oriented to person, place, and time. No distress.   HENT:   Head: Normocephalic and atraumatic.   Eyes: Conjunctivae are normal. Right eye exhibits no discharge. Left eye exhibits no discharge.   Neck: Neck supple. No thyromegaly present.   Cardiovascular: Normal rate.   Irregular   Pulmonary/Chest: Effort normal  and breath sounds normal.   Abdominal: Bowel sounds are normal.   Musculoskeletal: She exhibits no tenderness.   Neurological: She is alert and oriented to person, place, and time.   Skin: Skin is warm and dry. She is not diaphoretic.       Significant Labs:   BMP:   Recent Labs   Lab 05/05/19  0531 05/06/19  0548   * 95    137   K 3.5 3.0*    101   CO2 28 27   BUN 23 20   CREATININE 1.3 1.2   CALCIUM 8.3* 8.2*   MG 1.3*  --      CBC:   Recent Labs   Lab 05/05/19  0531   WBC 5.42   HGB 9.7*   HCT 30.7*            Assessment/Plan:      * CHF (congestive heart failure)  Acute on chronic combined systolic and diastolic CHF  Echo on 3/2019 showing EF of 30% and diastolic dysfunction.    Started on IV Lasix.  Continue Losartan.  Does not seem to be on any B blocker.  Monitor urine output.  One more day of IV Lasix.    PT/OT recommending SNF upon discharge.      PAF (paroxysmal atrial fibrillation)  Initially in SR  Converted to afib.  Continue amiodarone.  Does not seem to be on any B blocker.  Poor anticoagulation candidate secondary to recurrent falls.      Recurrent falls while walking  Weakness  PT/OT consult.  Recommending SNF      GERD (gastroesophageal reflux disease)        CKD Stage 3  Creat at baseline.  No acute issues.  Closely monitor while on diuresis.      Pulmonary hypertension        Essential hypertension  Resume home medications.  Low Na diet.  Add prn Hydralazine.      Depression          VTE Risk Mitigation (From admission, onward)        Ordered     enoxaparin injection 40 mg  Daily      05/06/19 1053     Place PHILL hose  Until discontinued      05/04/19 0511     IP VTE HIGH RISK PATIENT  Once      05/04/19 0108              Manuel Ordonez MD  Department of Hospital Medicine   [facility]

## 2019-05-06 NOTE — NURSING
Bedside shift report received from SIMEON Harris. Communication board has been updated. NAD noted. Will continue to monitor.

## 2019-05-06 NOTE — PROGRESS NOTES
SW met with patient to discuss SNF preferences. Patient was apprehensive about going to SNF but after discussing concerns with SW changed her mind. SW offered [facility]. Patient stated that she would like [facility] but would also like her referrals sent to [facility].

## 2019-05-06 NOTE — ASSESSMENT & PLAN NOTE
Initially in SR  Converted to afib.  Continue amiodarone.  Does not seem to be on any B blocker.  Poor anticoagulation candidate secondary to recurrent falls.

## 2019-05-07 PROBLEM — I07.1 SEVERE TRICUSPID REGURGITATION: Chronic | Status: ACTIVE | Noted: 2017-02-14

## 2019-05-07 LAB
ALBUMIN SERPL BCP-MCNC: 2.7 G/DL (ref 3.5–5.2)
ALP SERPL-CCNC: 113 U/L (ref 55–135)
ALT SERPL W/O P-5'-P-CCNC: 42 U/L (ref 10–44)
ANION GAP SERPL CALC-SCNC: 9 MMOL/L (ref 8–16)
AST SERPL-CCNC: 69 U/L (ref 10–40)
BILIRUB SERPL-MCNC: 1.5 MG/DL (ref 0.1–1)
BUN SERPL-MCNC: 19 MG/DL (ref 8–23)
CALCIUM SERPL-MCNC: 8 MG/DL (ref 8.7–10.5)
CHLORIDE SERPL-SCNC: 100 MMOL/L (ref 95–110)
CO2 SERPL-SCNC: 26 MMOL/L (ref 23–29)
CREAT SERPL-MCNC: 1.1 MG/DL (ref 0.5–1.4)
EST. GFR  (AFRICAN AMERICAN): 53 ML/MIN/1.73 M^2
EST. GFR  (NON AFRICAN AMERICAN): 46 ML/MIN/1.73 M^2
GLUCOSE SERPL-MCNC: 93 MG/DL (ref 70–110)
POTASSIUM SERPL-SCNC: 3.5 MMOL/L (ref 3.5–5.1)
PROT SERPL-MCNC: 6.2 G/DL (ref 6–8.4)
SODIUM SERPL-SCNC: 135 MMOL/L (ref 136–145)

## 2019-05-07 PROCEDURE — 25000003 PHARM REV CODE 250: Mod: HCNC | Performed by: HOSPITALIST

## 2019-05-07 PROCEDURE — 36415 COLL VENOUS BLD VENIPUNCTURE: CPT | Mod: HCNC

## 2019-05-07 PROCEDURE — 25000003 PHARM REV CODE 250: Mod: HCNC | Performed by: INTERNAL MEDICINE

## 2019-05-07 PROCEDURE — 97530 THERAPEUTIC ACTIVITIES: CPT | Mod: HCNC

## 2019-05-07 PROCEDURE — 63600175 PHARM REV CODE 636 W HCPCS: Mod: HCNC | Performed by: HOSPITALIST

## 2019-05-07 PROCEDURE — 80053 COMPREHEN METABOLIC PANEL: CPT | Mod: HCNC

## 2019-05-07 PROCEDURE — 21400001 HC TELEMETRY ROOM: Mod: HCNC

## 2019-05-07 PROCEDURE — 97110 THERAPEUTIC EXERCISES: CPT | Mod: HCNC

## 2019-05-07 PROCEDURE — 97116 GAIT TRAINING THERAPY: CPT | Mod: HCNC

## 2019-05-07 PROCEDURE — 94799 UNLISTED PULMONARY SVC/PX: CPT | Mod: HCNC

## 2019-05-07 PROCEDURE — 94760 N-INVAS EAR/PLS OXIMETRY 1: CPT | Mod: HCNC

## 2019-05-07 RX ORDER — LOSARTAN POTASSIUM 25 MG/1
50 TABLET ORAL DAILY
Status: DISCONTINUED | OUTPATIENT
Start: 2019-05-08 | End: 2019-05-10 | Stop reason: HOSPADM

## 2019-05-07 RX ORDER — METOPROLOL SUCCINATE 25 MG/1
25 TABLET, EXTENDED RELEASE ORAL DAILY
Status: DISCONTINUED | OUTPATIENT
Start: 2019-05-07 | End: 2019-05-10 | Stop reason: HOSPADM

## 2019-05-07 RX ADMIN — HYDRALAZINE HYDROCHLORIDE AND ISOSORBIDE DINITRATE 1 TABLET: 37.5; 2 TABLET, FILM COATED ORAL at 08:05

## 2019-05-07 RX ADMIN — GUAIFENESIN AND DEXTROMETHORPHAN 5 ML: 100; 10 SYRUP ORAL at 12:05

## 2019-05-07 RX ADMIN — SPIRONOLACTONE 25 MG: 25 TABLET ORAL at 08:05

## 2019-05-07 RX ADMIN — CLOPIDOGREL BISULFATE 75 MG: 75 TABLET ORAL at 08:05

## 2019-05-07 RX ADMIN — GUAIFENESIN AND DEXTROMETHORPHAN 5 ML: 100; 10 SYRUP ORAL at 06:05

## 2019-05-07 RX ADMIN — LOSARTAN POTASSIUM 100 MG: 25 TABLET, FILM COATED ORAL at 08:05

## 2019-05-07 RX ADMIN — ENOXAPARIN SODIUM 40 MG: 100 INJECTION SUBCUTANEOUS at 05:05

## 2019-05-07 RX ADMIN — BENZONATATE 200 MG: 100 CAPSULE ORAL at 08:05

## 2019-05-07 RX ADMIN — DOXYCYCLINE HYCLATE 100 MG: 100 TABLET, COATED ORAL at 08:05

## 2019-05-07 RX ADMIN — FUROSEMIDE 40 MG: 10 INJECTION, SOLUTION INTRAVENOUS at 05:05

## 2019-05-07 RX ADMIN — AMIODARONE HYDROCHLORIDE 400 MG: 200 TABLET ORAL at 08:05

## 2019-05-07 RX ADMIN — DOXYCYCLINE HYCLATE 100 MG: 100 TABLET, COATED ORAL at 09:05

## 2019-05-07 RX ADMIN — GUAIFENESIN AND DEXTROMETHORPHAN 5 ML: 100; 10 SYRUP ORAL at 11:05

## 2019-05-07 RX ADMIN — DULOXETINE 60 MG: 30 CAPSULE, DELAYED RELEASE ORAL at 08:05

## 2019-05-07 RX ADMIN — METOPROLOL SUCCINATE 25 MG: 25 TABLET, EXTENDED RELEASE ORAL at 04:05

## 2019-05-07 RX ADMIN — FUROSEMIDE 40 MG: 10 INJECTION, SOLUTION INTRAVENOUS at 08:05

## 2019-05-07 RX ADMIN — ATORVASTATIN CALCIUM 20 MG: 10 TABLET, FILM COATED ORAL at 08:05

## 2019-05-07 RX ADMIN — ASPIRIN 81 MG 81 MG: 81 TABLET ORAL at 08:05

## 2019-05-07 NOTE — PT/OT/SLP PROGRESS
Physical Therapy Treatment    Patient Name:  Magaly Nunes   MRN:  [id number]    Recommendations:     Discharge Recommendations:  nursing facility, skilled(Pt will need 24 hr supervision if D/C'ed home.)   Discharge Equipment Recommendations: commode, tub bench   Barriers to discharge home: Decreased caregiver support and Pt with decreased mobility.    Assessment:     Magaly Nunes is a 86 y.o. female admitted with a medical diagnosis of CHF (congestive heart failure).  She presents with the following impairments/functional limitations:  weakness, impaired endurance, impaired functional mobilty, gait instability, impaired balance, visual deficits, decreased coordination, decreased lower extremity function, decreased safety awareness, pain, edema.    Rehab Prognosis: Fair+; patient would benefit from acute skilled PT services to address these deficits and reach maximum level of function.    Recent Surgery: * No surgery found *      Plan:     During this hospitalization, patient to be seen 5 x/week to address the identified rehab impairments via gait training, therapeutic activities, therapeutic exercises and progress toward the following goals:    · Plan of Care Expires:  05/10/19    Subjective     Chief Complaint: pain  Patient/Family Comments/goals: to get well   Pain/Comfort:  · Pain Rating 1: (Pt c/o sacral and B posterior knee pain.)  · Pain Addressed 1: Nurse notified      Objective:     Patient found HOB elevated with peripheral IV, telemetry upon PT entry to room.     General Precautions: Standard, fall   Orthopedic Precautions:N/A   Braces: N/A    Functional Mobility:  Pt feeling sad today, reported today is her   death anniversary.  Pt reported it has been 4 years.  Pt having more LE pain today.  Pt also with c/o sacral pain, after skin inspection pt noted to have 2 small sacral skin injuries.  Pt was given seat cushion for bedside chair and nurse Wiggins notified.    · Bed Mobility:      · Scooting: contact guard assistance  · Supine to Sit: contact guard assistance with HOB elevated   · Transfers:     · Sit to Stand:  CGA from EOB with rolling walker  · Bed to Chair: minimum assistance with  rolling walker  using  Step Transfer  · Toilet Transfer: minimum assistance with  rolling walker  using  Step Transfer; Pt able to ambulate to the bathroom.    · Gait: Pt ambulated ~14 ft and ~10 ft with CGA using RW.  Pt with decreased weight shifting, foot clearance, step length, and max decreased yaneth.  Pt required min A for safety with RW management.   · Balance: Pt with fair dynamic standing balance.       AM-PAC 6 CLICK MOBILITY  Turning over in bed (including adjusting bedclothes, sheets and blankets)?: 4  Sitting down on and standing up from a chair with arms (e.g., wheelchair, bedside commode, etc.): 3  Moving from lying on back to sitting on the side of the bed?: 4  Moving to and from a bed to a chair (including a wheelchair)?: 3  Need to walk in hospital room?: 3  Climbing 3-5 steps with a railing?: 2  Basic Mobility Total Score: 19       Therapeutic Activities and Exercises:  BLE seated therex 20 reps: hip flex, LAQ, and AP    BLE supine therex 20 reps: QS and GS    Patient left up in chair on seat cushion reclined with BLE elevated on pillow with all lines intact, call button in reach and nurse Rosalie notified.    GOALS:   Multidisciplinary Problems     Physical Therapy Goals        Problem: Physical Therapy Goal    Goal Priority Disciplines Outcome Goal Variances Interventions   Physical Therapy Goal     PT, PT/OT Ongoing (interventions implemented as appropriate)     Description:  Goals to be met by: 05/10/19.     Patient will increase functional independence with mobility by performin. Supine to sit with supervision  2. Sit to stand transfer with Supervision using RW  3. Gait  x 150 feet with Supervision using Rolling Walker  4. Bed to chair with supervision  5. LE therex 3 sets  x10 reps with supervision                        Time Tracking:     PT Received On: 05/07/19  PT Start Time: 1054     PT Stop Time: 1117  PT Total Time (min): 23 min     Billable Minutes: Gait Training 13 min and Therapeutic Exercise 10 min                   Denia Wilkins, PT  05/07/2019

## 2019-05-07 NOTE — PLAN OF CARE
Problem: Physical Therapy Goal  Goal: Physical Therapy Goal  Goals to be met by: 05/10/19.     Patient will increase functional independence with mobility by performin. Supine to sit with supervision  2. Sit to stand transfer with Supervision using RW  3. Gait  x 150 feet with Supervision using Rolling Walker  4. Bed to chair with supervision  5. LE therex 3 sets x10 reps with supervision       Outcome: Ongoing (interventions implemented as appropriate)  Pt OOB>chair, limited ambulation 2* LE pain today.

## 2019-05-07 NOTE — PLAN OF CARE
Problem: Occupational Therapy Goal  Goal: Occupational Therapy Goal  Goals to be met by: 5/18/19     Patient will increase functional independence with ADLs by performing:    UE Dressing with Modified [place] and Supervision.  LE Dressing with Modified [place] and Supervision.  Grooming while standing at sink with Modified [place] and Supervision.  Toileting from toilet with Modified [place] and Supervision for hygiene and clothing management.   Supine to sit with Modified [place] and Supervision.  Step transfer with Modified [place] and Supervision  Toilet transfer to toilet with Stand-by Assistance.  Upper extremity exercise program x10 reps per handout, with assistance as needed.     Outcome: Ongoing (interventions implemented as appropriate)  The patient with minimal participation in OT 2* c/o fatigue. Patient will benefit from OT to address functional deficits.    Comments: The patient will benefit from SNF.

## 2019-05-07 NOTE — PROGRESS NOTES
[facility] Hospital Medicine  Progress Note    Patient Name: Magaly Nunes  MRN: [id number]  Patient Class: IP- Inpatient   Admission Date: 5/3/2019  Length of Stay: 4 days  Attending Physician: Dejah Steele MD  Primary Care Provider: Chris Bangura MD        Subjective:     Principal Problem:CHF (congestive heart failure)    HPI:  Magaly Nunes is a 86 y.o. female that (in part)  has a past medical history of Anxiety disorder, Arthritis, Carpal tunnel syndrome, CHF (congestive heart failure), Chronic allergic rhinitis, Chronic pain, CKD stage 3 due to type 2 diabetes mellitus, Constipation - functional, Depression, Diabetes mellitus type II, History of cataract surgery, Hot flash not due to menopause, HTN (hypertension), Hypokalemia, Insomnia, Knee pain, bilateral, Personal history of DVT (deep vein thrombosis), Polyarthralgia, Pulmonary hypertension, Renovascular hypertension, Severe tricuspid regurgitation, Spinal stenosis, Spinal stenosis of lumbar region, Systolic and diastolic CHF, chronic, and Vertigo.  has a past surgical history that includes Hysterectomy; Cataract extraction (Bilateral); Eye surgery; Fracture surgery; and ORIF radius & ulna fractures. Presents to [facility] Emergency Department initially complaining of multiple recurrent falls.  She has fallen 4 times over the last 2 weeks.  She reports all of these were mechanical falls and denies chest pain, syncope, or palpitations.  She 1st tripped over bryn approximately 2 weeks ago and struck the left side of her head and landed on her buttocks.  She has had some difficulty ambulating due to weakness since that time.  She is also complaining of increased weight gain, swelling, and generalized non acute weakness.  She has been so we can fact that she has been sleeping on the floor at home and has been incontinent of urine.  She has not been eating or drinking adequately since the falls.  She  was reluctant to come into the hospital, but finally reports that her pride subsided and she called for help.    In the emergency department multiple imaging studies were obtained to evaluate for possible fracture dislocation which proved to be negative.  CT of head was also negative.  Routine laboratory studies were obtained which demonstrated elevated CPK levels, elevated troponin, and markedly elevated BNP.  Physical exam was consistent with edema likely due to acute CHF exacerbation.    Hospital medicine has been asked to admit for further evaluation and treatment.     Hospital Course:  87 y/o female admitted with CHF exacerbation.  Started on IV diuresis.  Recurrent falls and PT/OT consulted.  Therapy recommending SNF. SW consulted. She is stable on room air and medically stable for discharge to SNF.    Interval History:  Symptoms resolved. Pleasant. Agrees to SNF placement.    Review of Systems   Constitutional: Negative for chills and fever.   Respiratory: Negative for cough and shortness of breath.    Cardiovascular: Positive for leg swelling. Negative for chest pain.   Gastrointestinal: Negative for constipation and diarrhea.   Musculoskeletal: Positive for gait problem. Negative for joint swelling.     Objective:     Vital Signs (Most Recent):  Temp: 99 °F (37.2 °C) (05/07/19 1152)  Pulse: 84 (05/07/19 1152)  Resp: 18 (05/07/19 1152)  BP: (!) 100/56 (05/07/19 1152)  SpO2: 98 % (05/07/19 1152) Vital Signs (24h Range):  Temp:  [98.1 °F (36.7 °C)-99.5 °F (37.5 °C)] 99 °F (37.2 °C)  Pulse:  [84-95] 84  Resp:  [17-19] 18  SpO2:  [96 %-98 %] 98 %  BP: (100-125)/(56-75) 100/56     Weight: 80 kg (176 lb 5.9 oz)  Body mass index is 31.24 kg/m².    Intake/Output Summary (Last 24 hours) at 5/7/2019 1543  Last data filed at 5/7/2019 1200  Gross per 24 hour   Intake 360 ml   Output 300 ml   Net 60 ml      Physical Exam   Constitutional: She is oriented to person, place, and time. No distress.   HENT:   Head:  Normocephalic and atraumatic.   Eyes: Conjunctivae are normal. Right eye exhibits no discharge. Left eye exhibits no discharge.   Neck: Neck supple. No thyromegaly present.   Cardiovascular: Normal rate.   Irregular   Pulmonary/Chest: Effort normal and breath sounds normal.   Abdominal: Bowel sounds are normal.   Musculoskeletal: She exhibits no tenderness.   Neurological: She is alert and oriented to person, place, and time.   Skin: Skin is warm and dry. She is not diaphoretic.       Significant Labs:   BMP:   Recent Labs   Lab 05/07/19  0600   GLU 93   *   K 3.5      CO2 26   BUN 19   CREATININE 1.1   CALCIUM 8.0*         Assessment/Plan:      * CHF (congestive heart failure)  Acute on chronic combined systolic and diastolic CHF  Echo on 3/2019 showing EF of 30% and diastolic dysfunction.    Started on IV Lasix.  Continue Losartan.  Does not seem to be on any B blocker, but she needs to be on one.  Monitor urine output.  Transition IV Lasix to PO soon as swelling significantly improved.    PT/OT recommending SNF upon discharge.      PAF (paroxysmal atrial fibrillation)  Initially in SR  Converted to afib.  Continue amiodarone.  Does not seem to be on any B blocker.  Poor anticoagulation candidate secondary to recurrent falls.    Recurrent falls while walking  Weakness  PT/OT consult.  Recommending SNF    Elevated brain natriuretic peptide (BNP) level  Due to CHF exacerbation    Elevated troponin I level  Due to strain, stable. Not consistent with ACLS.    GERD (gastroesophageal reflux disease)  Monitor symptoms    CKD Stage 3  Creat at baseline.  No acute issues.  Closely monitor while on diuresis.    Severe tricuspid regurgitation  Monitor symptoms    Pulmonary hypertension  PAP 78 on echo    Essential hypertension  Resume home medications.  Low Na+ diet.  Add prn Hydralazine.    Depression  Cont home meds.      VTE Risk Mitigation (From admission, onward)        Ordered     enoxaparin injection 40 mg   Daily      05/06/19 1053     Place PHILL hose  Until discontinued      05/04/19 0511     IP VTE HIGH RISK PATIENT  Once      05/04/19 0108              Dejah Steele MD  Department of Hospital Medicine   [facility]

## 2019-05-07 NOTE — PLAN OF CARE
SW spoke with Patient to inform her of approval from [facility] for SNF. SW asked patient for Janeen's (niece) phone number to call and inform her of placement. Patient did not know the number but stated that her niece Deborah is coming today and  will tell her to call SW when she gets here.        05/07/19 1317   Post-Acute Status   Post-Acute Authorization Placement   Post-Acute Placement Status Set-up Complete

## 2019-05-07 NOTE — PHYSICIAN QUERY
PT Name: Magaly Nunes  MR #: [id number]     PHYSICIAN QUERY -  ELECTROLYTE CLARIFICATION      CDS/: Leyda Vences RN              Contact information:[phone]  This form is a permanent document in the medical record.     Query Date: May 7, 2019    By submitting this query, we are merely seeking further clarification of documentation to reflect the severity of illness of your patient. Please utilize your independent clinical judgment when addressing the question(s) below.    The Medical record reflects the following:     Indicators   Supporting Clinical Findings Location in Medical Record   x Lab Value(s) Potassium  3.5-> 3.0->3.5    Magnesium 1.3->1.4-> 1.3 Lab 5/5, 5/6, 5/7    Lab 5/3, 5/4, 5/5     x Treatment                                 Medication Potassium chloride  40 mEq oral     Magnesium sulfate IV  MAR 5/6    MAR 5/4    Other       Provider, please specify the diagnosis or diagnoses that correspond(s) to the above indicators.     Aureliano all that apply:    [ x  ] Hypokalemia     [  x ] Hypomagnesemia     [   ] Other electrolyte disturbance (please specify): _______     [   ]  Clinically Undetermined       Please document in your progress notes daily for the duration of treatment until resolved, and include in your discharge summary.

## 2019-05-07 NOTE — SUBJECTIVE & OBJECTIVE
Interval History:  Symptoms resolved. Pleasant. Agrees to SNF placement.    Review of Systems   Constitutional: Negative for chills and fever.   Respiratory: Negative for cough and shortness of breath.    Cardiovascular: Positive for leg swelling. Negative for chest pain.   Gastrointestinal: Negative for constipation and diarrhea.   Musculoskeletal: Positive for gait problem. Negative for joint swelling.     Objective:     Vital Signs (Most Recent):  Temp: 99 °F (37.2 °C) (05/07/19 1152)  Pulse: 84 (05/07/19 1152)  Resp: 18 (05/07/19 1152)  BP: (!) 100/56 (05/07/19 1152)  SpO2: 98 % (05/07/19 1152) Vital Signs (24h Range):  Temp:  [98.1 °F (36.7 °C)-99.5 °F (37.5 °C)] 99 °F (37.2 °C)  Pulse:  [84-95] 84  Resp:  [17-19] 18  SpO2:  [96 %-98 %] 98 %  BP: (100-125)/(56-75) 100/56     Weight: 80 kg (176 lb 5.9 oz)  Body mass index is 31.24 kg/m².    Intake/Output Summary (Last 24 hours) at 5/7/2019 1543  Last data filed at 5/7/2019 1200  Gross per 24 hour   Intake 360 ml   Output 300 ml   Net 60 ml      Physical Exam   Constitutional: She is oriented to person, place, and time. No distress.   HENT:   Head: Normocephalic and atraumatic.   Eyes: Conjunctivae are normal. Right eye exhibits no discharge. Left eye exhibits no discharge.   Neck: Neck supple. No thyromegaly present.   Cardiovascular: Normal rate.   Irregular   Pulmonary/Chest: Effort normal and breath sounds normal.   Abdominal: Bowel sounds are normal.   Musculoskeletal: She exhibits no tenderness.   Neurological: She is alert and oriented to person, place, and time.   Skin: Skin is warm and dry. She is not diaphoretic.       Significant Labs:   BMP:   Recent Labs   Lab 05/07/19  0600   GLU 93   *   K 3.5      CO2 26   BUN 19   CREATININE 1.1   CALCIUM 8.0*

## 2019-05-07 NOTE — PROGRESS NOTES
DAISY spoke with Karena (Admissions at [facility]) to inform her that patient has approved the placement at the facility. DAISY informed Karena that patient's niece, Janeen, will be meeting with her to complete all necessary paperwork. Karena informed DAISY to instruct patient's niece to call and schedule an appointment to meet with her.

## 2019-05-07 NOTE — PLAN OF CARE
Problem: Adult Inpatient Plan of Care  Goal: Plan of Care Review  Outcome: Ongoing (interventions implemented as appropriate)     05/06/19 1957   Plan of Care Review   Plan of Care Reviewed With patient   Progress improving     Goal: Optimal Comfort and Wellbeing    Intervention: Monitor Pain and Promote Comfort  Acetaminophen given this morning. Patient was complaining of pain in buttocks but no redness or skin breakdown noted. Patient walked with PT and was placed in chair for the remainder of the day and patient has had no further complaints of pain.

## 2019-05-07 NOTE — PLAN OF CARE
Patient accepted to [facility] for SNF.        05/07/19 3224   Discharge Reassessment   Assessment Type Discharge Planning Reassessment   Provided patient/caregiver education on the expected discharge date and the discharge plan Yes   Do you have any problems affording any of your prescribed medications? No   Discharge Plan A Skilled Nursing Facility   Discharge Plan B Skilled Nursing Facility;Home Health   DME Needed Upon Discharge  walker, rolling   Patient choice form signed by patient/caregiver Yes   Anticipated Discharge Disposition SNF   Can the patient answer the patient profile reliably? Yes, cognitively intact   How does the patient rate their overall health at the present time? Fair   Describe the patient's ability to walk at the present time. Walks with the help of equipment   How often would a person be available to care for the patient? Infrequently   During the past month, has the patient often been bothered by feeling down, depressed or hopeless? No   During the past month, has the patient often been bothered by little interest or pleasure in doing things? No

## 2019-05-07 NOTE — NURSING
Patient bedside report has been completed and given to DIANA Resendez. Chart check has been completed. NAD noted.

## 2019-05-07 NOTE — PLAN OF CARE
Problem: Fall Injury Risk  Goal: Absence of Fall and Fall-Related Injury    Intervention: Identify and Manage Contributors to Fall Injury Risk     05/07/19 0339   Identify and Manage Contributors to Fall Injury Risk   Self-Care Promotion independence encouraged;BADL personal objects within reach;BADL personal routines maintained;meal setup provided   Manage Acute Allergic Reaction   Medication Review/Management medications reviewed;high risk medications identified     Intervention: Promote Injury-Free Environment     05/07/19 0339   Optimize Balance and Safe Activity   Safety Promotion/Fall Prevention assistive device/personal item within reach;bed alarm set;nonskid shoes/socks when out of bed;room near unit station;instructed to call staff for mobility;side rails raised x 2;medications reviewed;commode/urinal/bedpan at bedside   Optimize [place] and Functional Mobility   Environmental Safety Modification assistive device/personal items within reach;room near unit station;clutter free environment maintained

## 2019-05-07 NOTE — NURSING
Report received from DIANA Dey. Patient awake and alert. Up in chair, NAD noted. Safety maintained. Will continue to monitor.

## 2019-05-07 NOTE — PT/OT/SLP PROGRESS
"Occupational Therapy   Treatment    Name: Magaly Nunes  MRN: [id number]  Admitting Diagnosis:  CHF (congestive heart failure)       Recommendations:     Discharge Recommendations: nursing facility, skilled  Discharge Equipment Recommendations:  none  Barriers to discharge:  Decreased caregiver support    Assessment:     Magaly Nunes is a 86 y.o. female with a medical diagnosis of CHF (congestive heart failure).  She presents with decreased endurance for participation in self care tasks. Performance deficits affecting function are weakness, impaired endurance, impaired self care skills, decreased lower extremity function, decreased upper extremity function, impaired balance, gait instability, impaired functional mobilty, decreased safety awareness, impaired cardiopulmonary response to activity, impaired skin.     Rehab Prognosis:  Good; patient would benefit from acute skilled OT services to address these deficits and reach maximum level of function.       Plan:     Patient to be seen 5 x/week to address the above listed problems via self-care/home management, therapeutic activities, therapeutic exercises  · Plan of Care Expires: 05/19/19  · Plan of Care Reviewed with: patient    Subjective     Pain/Comfort:  · Pain Rating 1: (yes-did not rate)  · Location 1: back  · Pain Addressed 1: Reposition, Nurse notified    Objective:     Communicated with: Rosalie kennedy prior to session.  Patient found seated on the EOB with telemetry, peripheral IV upon OT entry to room.    General Precautions: Standard, fall   Orthopedic Precautions:N/A   Braces: N/A     Occupational Performance:     Bed Mobility:    · Patient completed Scooting/Bridging with stand by assistance  · Patient completed Sit to Supine with contact guard assistance and with leg lift     Functional Mobility/Transfers:  · Functional Mobility: The patient was found seated on the EOB. The patient refused to get up to the sink or toilet with OT stating "I " "can only do one therapy a day"    Activities of Daily Living:  · Upper Body Dressing: minimum assistance to doff back gown  · Lower Body Dressing: dependence to doff B socks.       [facility] 6 Click ADL: 20    Treatment & Education:  The patient was educated re: need to request assistance to transfer to the bed or BSC.     Patient left HOB elevated with all lines intact, call button in reach, bed alarm on and nurseRosalie notifiedEducation:      GOALS:   Multidisciplinary Problems     Occupational Therapy Goals        Problem: Occupational Therapy Goal    Goal Priority Disciplines Outcome Interventions   Occupational Therapy Goal     OT, PT/OT Ongoing (interventions implemented as appropriate)    Description:  Goals to be met by: 5/18/19     Patient will increase functional independence with ADLs by performing:    UE Dressing with Modified [place] and Supervision.  LE Dressing with Modified [place] and Supervision.  Grooming while standing at sink with Modified [place] and Supervision.  Toileting from toilet with Modified [place] and Supervision for hygiene and clothing management.   Supine to sit with Modified [place] and Supervision.  Step transfer with Modified [place] and Supervision  Toilet transfer to toilet with Stand-by Assistance.  Upper extremity exercise program x10 reps per handout, with assistance as needed.                      Time Tracking:     OT Date of Treatment: 05/07/19  OT Start Time: 1346  OT Stop Time: 1356  OT Total Time (min): 10 min    Billable Minutes:Therapeutic Activity 10    Charley Tucker OT  5/7/2019    "

## 2019-05-07 NOTE — ASSESSMENT & PLAN NOTE
Acute on chronic combined systolic and diastolic CHF  Echo on 3/2019 showing EF of 30% and diastolic dysfunction.    Started on IV Lasix.  Continue Losartan.  Does not seem to be on any B blocker, but she needs to be on one.  Monitor urine output.  Transition IV Lasix to PO soon as swelling significantly improved.    PT/OT recommending SNF upon discharge.

## 2019-05-08 LAB
ALBUMIN SERPL BCP-MCNC: 2.8 G/DL (ref 3.5–5.2)
ALP SERPL-CCNC: 118 U/L (ref 55–135)
ALT SERPL W/O P-5'-P-CCNC: 42 U/L (ref 10–44)
ANION GAP SERPL CALC-SCNC: 9 MMOL/L (ref 8–16)
AST SERPL-CCNC: 65 U/L (ref 10–40)
BACTERIA BLD CULT: NORMAL
BACTERIA BLD CULT: NORMAL
BILIRUB SERPL-MCNC: 1.4 MG/DL (ref 0.1–1)
BUN SERPL-MCNC: 23 MG/DL (ref 8–23)
CALCIUM SERPL-MCNC: 8.2 MG/DL (ref 8.7–10.5)
CHLORIDE SERPL-SCNC: 101 MMOL/L (ref 95–110)
CO2 SERPL-SCNC: 25 MMOL/L (ref 23–29)
CREAT SERPL-MCNC: 1.2 MG/DL (ref 0.5–1.4)
EST. GFR  (AFRICAN AMERICAN): 47 ML/MIN/1.73 M^2
EST. GFR  (NON AFRICAN AMERICAN): 41 ML/MIN/1.73 M^2
GLUCOSE SERPL-MCNC: 105 MG/DL (ref 70–110)
POTASSIUM SERPL-SCNC: 3.4 MMOL/L (ref 3.5–5.1)
PROT SERPL-MCNC: 6.3 G/DL (ref 6–8.4)
SODIUM SERPL-SCNC: 135 MMOL/L (ref 136–145)

## 2019-05-08 PROCEDURE — 97110 THERAPEUTIC EXERCISES: CPT | Mod: HCNC

## 2019-05-08 PROCEDURE — 94799 UNLISTED PULMONARY SVC/PX: CPT | Mod: HCNC

## 2019-05-08 PROCEDURE — 80053 COMPREHEN METABOLIC PANEL: CPT | Mod: HCNC

## 2019-05-08 PROCEDURE — 25000003 PHARM REV CODE 250: Mod: HCNC | Performed by: HOSPITALIST

## 2019-05-08 PROCEDURE — 21400001 HC TELEMETRY ROOM: Mod: HCNC

## 2019-05-08 PROCEDURE — 25000003 PHARM REV CODE 250: Mod: HCNC | Performed by: INTERNAL MEDICINE

## 2019-05-08 PROCEDURE — 36415 COLL VENOUS BLD VENIPUNCTURE: CPT | Mod: HCNC

## 2019-05-08 PROCEDURE — 63600175 PHARM REV CODE 636 W HCPCS: Mod: HCNC | Performed by: HOSPITALIST

## 2019-05-08 RX ORDER — PANTOPRAZOLE SODIUM 40 MG/1
40 TABLET, DELAYED RELEASE ORAL DAILY
Status: DISCONTINUED | OUTPATIENT
Start: 2019-05-08 | End: 2019-05-10 | Stop reason: HOSPADM

## 2019-05-08 RX ADMIN — ATORVASTATIN CALCIUM 20 MG: 10 TABLET, FILM COATED ORAL at 09:05

## 2019-05-08 RX ADMIN — ENOXAPARIN SODIUM 40 MG: 100 INJECTION SUBCUTANEOUS at 04:05

## 2019-05-08 RX ADMIN — GUAIFENESIN AND DEXTROMETHORPHAN 5 ML: 100; 10 SYRUP ORAL at 12:05

## 2019-05-08 RX ADMIN — METOPROLOL SUCCINATE 25 MG: 25 TABLET, EXTENDED RELEASE ORAL at 09:05

## 2019-05-08 RX ADMIN — DOXYCYCLINE HYCLATE 100 MG: 100 TABLET, COATED ORAL at 09:05

## 2019-05-08 RX ADMIN — HYDRALAZINE HYDROCHLORIDE AND ISOSORBIDE DINITRATE 1 TABLET: 37.5; 2 TABLET, FILM COATED ORAL at 09:05

## 2019-05-08 RX ADMIN — LOSARTAN POTASSIUM 50 MG: 25 TABLET, FILM COATED ORAL at 09:05

## 2019-05-08 RX ADMIN — CLOPIDOGREL BISULFATE 75 MG: 75 TABLET ORAL at 09:05

## 2019-05-08 RX ADMIN — PANTOPRAZOLE SODIUM 40 MG: 40 TABLET, DELAYED RELEASE ORAL at 11:05

## 2019-05-08 RX ADMIN — ACETAMINOPHEN 650 MG: 325 TABLET, FILM COATED ORAL at 02:05

## 2019-05-08 RX ADMIN — GUAIFENESIN AND DEXTROMETHORPHAN 5 ML: 100; 10 SYRUP ORAL at 11:05

## 2019-05-08 RX ADMIN — ASPIRIN 81 MG 81 MG: 81 TABLET ORAL at 09:05

## 2019-05-08 RX ADMIN — FUROSEMIDE 40 MG: 10 INJECTION, SOLUTION INTRAVENOUS at 05:05

## 2019-05-08 RX ADMIN — GUAIFENESIN AND DEXTROMETHORPHAN 5 ML: 100; 10 SYRUP ORAL at 05:05

## 2019-05-08 RX ADMIN — FUROSEMIDE 40 MG: 10 INJECTION, SOLUTION INTRAVENOUS at 08:05

## 2019-05-08 RX ADMIN — BENZONATATE 200 MG: 100 CAPSULE ORAL at 09:05

## 2019-05-08 RX ADMIN — AMIODARONE HYDROCHLORIDE 400 MG: 200 TABLET ORAL at 09:05

## 2019-05-08 RX ADMIN — DULOXETINE 60 MG: 30 CAPSULE, DELAYED RELEASE ORAL at 09:05

## 2019-05-08 RX ADMIN — SPIRONOLACTONE 25 MG: 25 TABLET ORAL at 09:05

## 2019-05-08 NOTE — PLAN OF CARE
Problem: Physical Therapy Goal  Goal: Physical Therapy Goal  Goals to be met by: 05/10/19.     Patient will increase functional independence with mobility by performin. Supine to sit with supervision  2. Sit to stand transfer with Supervision using RW  3. Gait  x 150 feet with Supervision using Rolling Walker  4. Bed to chair with supervision  5. LE therex 3 sets x10 reps with supervision       Outcome: Ongoing (interventions implemented as appropriate)  Pt feeling fatigue and c/o back pain, declined ambulation in the hallway however agreeable to seated LE therex.

## 2019-05-08 NOTE — NURSING
Report received from DIANA Wiggins. Patient awake and alert. NAD noted, family at bedside. Safety maintained. Will continue to monitor.

## 2019-05-08 NOTE — PT/OT/SLP PROGRESS
Occupational Therapy      Patient Name:  Magaly Nunes   MRN:  [id number]    Patient not seen today secondary to Patient unwilling to participate(The patient refues to get OOB with OT 2* just getting back in bed from using the BSC with nursing assistance. The patient states she will get up when her lunch comes. The patient refused despuite encouragement and education.). Will follow-up later as able.    Charley Tucker, OT  5/8/2019

## 2019-05-08 NOTE — SUBJECTIVE & OBJECTIVE
Interval History:  Symptoms resolved. Pleasant. Agrees to SNF placement.    Review of Systems   Constitutional: Negative for chills and fever.   Respiratory: Negative for cough and shortness of breath.    Cardiovascular: Positive for leg swelling. Negative for chest pain.   Gastrointestinal: Negative for constipation and diarrhea.   Musculoskeletal: Positive for gait problem. Negative for joint swelling.     Objective:     Vital Signs (Most Recent):  Temp: 98.5 °F (36.9 °C) (05/08/19 1516)  Pulse: 64 (05/08/19 1516)  Resp: 19 (05/08/19 1516)  BP: (!) 103/54 (05/08/19 1516)  SpO2: 98 % (05/08/19 1516) Vital Signs (24h Range):  Temp:  [98 °F (36.7 °C)-98.8 °F (37.1 °C)] 98.5 °F (36.9 °C)  Pulse:  [64-87] 64  Resp:  [18-22] 19  SpO2:  [96 %-99 %] 98 %  BP: ()/(54-65) 103/54     Weight: 77.9 kg (171 lb 11.8 oz)  Body mass index is 30.42 kg/m².    Intake/Output Summary (Last 24 hours) at 5/8/2019 1613  Last data filed at 5/8/2019 1230  Gross per 24 hour   Intake 520 ml   Output 1125 ml   Net -605 ml      Physical Exam   Constitutional: She is oriented to person, place, and time. She appears well-developed and well-nourished. No distress.   HENT:   Head: Normocephalic and atraumatic.   Eyes: Pupils are equal, round, and reactive to light. Conjunctivae and EOM are normal. Right eye exhibits no discharge. Left eye exhibits no discharge.   Neck: Neck supple. No thyromegaly present.   Cardiovascular: Normal rate. Exam reveals no friction rub.   No murmur heard.  Pulmonary/Chest: Effort normal and breath sounds normal.   Abdominal: Soft. Bowel sounds are normal. She exhibits no distension. There is no tenderness.   Musculoskeletal: She exhibits edema (1+ BLE). She exhibits no tenderness.   Neurological: She is alert and oriented to person, place, and time.   Skin: Skin is warm and dry. She is not diaphoretic.       Significant Labs:   BMP:   Recent Labs   Lab 05/08/19  0515      *   K 3.4*      CO2 25    BUN 23   CREATININE 1.2   CALCIUM 8.2*

## 2019-05-08 NOTE — PROGRESS NOTES
[facility] Hospital Medicine  Progress Note    Patient Name: Magaly Nunes  MRN: [id number]  Patient Class: IP- Inpatient   Admission Date: 5/3/2019  Length of Stay: 5 days  Attending Physician: Dejah Steele MD  Primary Care Provider: Chris Bangura MD        Subjective:     Principal Problem:CHF (congestive heart failure)    HPI:  Magaly Nunse is a 86 y.o. female that (in part)  has a past medical history of Anxiety disorder, Arthritis, Carpal tunnel syndrome, CHF (congestive heart failure), Chronic allergic rhinitis, Chronic pain, CKD stage 3 due to type 2 diabetes mellitus, Constipation - functional, Depression, Diabetes mellitus type II, History of cataract surgery, Hot flash not due to menopause, HTN (hypertension), Hypokalemia, Insomnia, Knee pain, bilateral, Personal history of DVT (deep vein thrombosis), Polyarthralgia, Pulmonary hypertension, Renovascular hypertension, Severe tricuspid regurgitation, Spinal stenosis, Spinal stenosis of lumbar region, Systolic and diastolic CHF, chronic, and Vertigo.  has a past surgical history that includes Hysterectomy; Cataract extraction (Bilateral); Eye surgery; Fracture surgery; and ORIF radius & ulna fractures. Presents to [facility] Emergency Department initially complaining of multiple recurrent falls.  She has fallen 4 times over the last 2 weeks.  She reports all of these were mechanical falls and denies chest pain, syncope, or palpitations.  She 1st tripped over bryn approximately 2 weeks ago and struck the left side of her head and landed on her buttocks.  She has had some difficulty ambulating due to weakness since that time.  She is also complaining of increased weight gain, swelling, and generalized non acute weakness.  She has been so we can fact that she has been sleeping on the floor at home and has been incontinent of urine.  She has not been eating or drinking adequately since the falls.  She  was reluctant to come into the hospital, but finally reports that her pride subsided and she called for help.    In the emergency department multiple imaging studies were obtained to evaluate for possible fracture dislocation which proved to be negative.  CT of head was also negative.  Routine laboratory studies were obtained which demonstrated elevated CPK levels, elevated troponin, and markedly elevated BNP.  Physical exam was consistent with edema likely due to acute CHF exacerbation.    Hospital medicine has been asked to admit for further evaluation and treatment.     Hospital Course:  85 y/o female admitted with CHF exacerbation.  Started on IV diuresis.  Recurrent falls and PT/OT consulted.  Therapy recommending SNF. SW consulted. She is stable on room air and medically stable for discharge to SNF. Discharge on a increased PO regimen of Lasix and follow up with PCP or cardiology for continued adjustment.    Interval History:  Symptoms resolved. Pleasant. Agrees to SNF placement.    Review of Systems   Constitutional: Negative for chills and fever.   Respiratory: Negative for cough and shortness of breath.    Cardiovascular: Positive for leg swelling. Negative for chest pain.   Gastrointestinal: Negative for constipation and diarrhea.   Musculoskeletal: Positive for gait problem. Negative for joint swelling.     Objective:     Vital Signs (Most Recent):  Temp: 98.5 °F (36.9 °C) (05/08/19 1516)  Pulse: 64 (05/08/19 1516)  Resp: 19 (05/08/19 1516)  BP: (!) 103/54 (05/08/19 1516)  SpO2: 98 % (05/08/19 1516) Vital Signs (24h Range):  Temp:  [98 °F (36.7 °C)-98.8 °F (37.1 °C)] 98.5 °F (36.9 °C)  Pulse:  [64-87] 64  Resp:  [18-22] 19  SpO2:  [96 %-99 %] 98 %  BP: ()/(54-65) 103/54     Weight: 77.9 kg (171 lb 11.8 oz)  Body mass index is 30.42 kg/m².    Intake/Output Summary (Last 24 hours) at 5/8/2019 1613  Last data filed at 5/8/2019 1230  Gross per 24 hour   Intake 520 ml   Output 1125 ml   Net -605 ml       Physical Exam   Constitutional: She is oriented to person, place, and time. She appears well-developed and well-nourished. No distress.   HENT:   Head: Normocephalic and atraumatic.   Eyes: Pupils are equal, round, and reactive to light. Conjunctivae and EOM are normal. Right eye exhibits no discharge. Left eye exhibits no discharge.   Neck: Neck supple. No thyromegaly present.   Cardiovascular: Normal rate. Exam reveals no friction rub.   No murmur heard.  Pulmonary/Chest: Effort normal and breath sounds normal.   Abdominal: Soft. Bowel sounds are normal. She exhibits no distension. There is no tenderness.   Musculoskeletal: She exhibits edema (1+ BLE). She exhibits no tenderness.   Neurological: She is alert and oriented to person, place, and time.   Skin: Skin is warm and dry. She is not diaphoretic.       Significant Labs:   BMP:   Recent Labs   Lab 05/08/19  0515      *   K 3.4*      CO2 25   BUN 23   CREATININE 1.2   CALCIUM 8.2*         Assessment/Plan:      * CHF (congestive heart failure)  Acute on chronic combined systolic and diastolic CHF  Echo on 3/2019 showing EF of 30% and diastolic dysfunction.    Started on IV Lasix.  Continued Losartan.  Does not seem to be on any B blocker, but she needs to be on one for heart failure so metoprolol was started.  Monitor urine output.  Transition IV Lasix to PO and follow up with PCP for continued adjustment.    PT/OT recommending SNF upon discharge.    PAF (paroxysmal atrial fibrillation)  Initially in SR  Converted to afib.  Continue amiodarone.  Does not seem to be on any B blocker.  Poor anticoagulation candidate secondary to recurrent falls.    Recurrent falls while walking  Weakness  PT/OT consult.  Recommending SNF    Elevated brain natriuretic peptide (BNP) level  Due to CHF exacerbation    Elevated troponin I level  Due to strain, stable. Not consistent with ACLS.    GERD (gastroesophageal reflux disease)  Monitor symptoms    CKD Stage  3  Creat at baseline.  No acute issues.  Closely monitor while on diuresis.    Severe tricuspid regurgitation  Monitor symptoms    Pulmonary hypertension  PAP 78 on echo    Essential hypertension  Resume home medications.  Low Na+ diet.  Add prn Hydralazine.    Depression  Cont home meds.      VTE Risk Mitigation (From admission, onward)        Ordered     enoxaparin injection 40 mg  Daily      05/06/19 1053     Place PHILL hose  Until discontinued      05/04/19 0511     IP VTE HIGH RISK PATIENT  Once      05/04/19 0108              Dejah Steele MD  Department of Hospital Medicine   [facility]

## 2019-05-08 NOTE — PT/OT/SLP PROGRESS
Physical Therapy Treatment    Patient Name:  Magaly Nunes   MRN:  [id number]    Recommendations:     Discharge Recommendations:  nursing facility, skilled(Pt will need 24 hr supervision if D/C'ed home.)   Discharge Equipment Recommendations: commode, tub bench   Barriers to discharge home: Decreased caregiver support and Pt with decreased mobility.    Assessment:     Magaly Nunes is a 86 y.o. female admitted with a medical diagnosis of CHF (congestive heart failure).  She presents with the following impairments/functional limitations:  weakness, impaired endurance, impaired functional mobilty, gait instability, impaired balance, visual deficits, decreased coordination, decreased lower extremity function, decreased safety awareness, pain, edema.    Rehab Prognosis: Fair+; patient would benefit from acute skilled PT services to address these deficits and reach maximum level of function.    Recent Surgery: * No surgery found *      Plan:     During this hospitalization, patient to be seen 5 x/week to address the identified rehab impairments via gait training, therapeutic activities, therapeutic exercises and progress toward the following goals:    · Plan of Care Expires:  05/10/19    Subjective     Chief Complaint: pain and fatigue  Patient/Family Comments/goals: Pt would like to get back in bed.    Pain/Comfort:  · Pain Rating 1: (Pt c/o back pain.)  · Pain Addressed 1: Reposition, Distraction, Cessation of Activity, Nurse notified      Objective:     Patient found in bed with peripheral IV, telemetry upon PT entry to room.     General Precautions: Standard, fall   Orthopedic Precautions:N/A   Braces: N/A    Functional Mobility:  · Bed Mobility:     · Rolling Right: stand by assistance  · Scooting: stand by assistance  · Bridging: stand by assistance  · Supine to Sit: contact guard assistance and minimum assistance  · Sit to Supine: minimum assistance with LE  · Transfers:     · Sit to Stand:  contact guard  "assistance and minimum assistance with rolling walker; Pt with decreased safety awareness, sat without warnings.    · Gait: Pt ambulated ~4-5 sidesteps along bedside with min A-CGA using RW.  Pt declined to ambulate in the hallway despite encouragement 2* pain and fatigue.  Pt stated, "maybe tomorrow."  Pt with decreased weight shifting, foot clearance, and step length.    · Balance: Pt with fair balance.      AM-PAC 6 CLICK MOBILITY  Turning over in bed (including adjusting bedclothes, sheets and blankets)?: 4  Sitting down on and standing up from a chair with arms (e.g., wheelchair, bedside commode, etc.): 3  Moving from lying on back to sitting on the side of the bed?: 4  Moving to and from a bed to a chair (including a wheelchair)?: 3  Need to walk in hospital room?: 3  Climbing 3-5 steps with a railing?: 2  Basic Mobility Total Score: 19       Therapeutic Activities and Exercises:  BLE seated therex 15 reps: heel/toe raises, LAQ, hip flex, pillow squeezes, and GS    Patient left right sidelying with pillow between LE and B heel offloaded with all lines intact, call button in reach, bed alarm on and nurse Malina notified.    GOALS:   Multidisciplinary Problems     Physical Therapy Goals        Problem: Physical Therapy Goal    Goal Priority Disciplines Outcome Goal Variances Interventions   Physical Therapy Goal     PT, PT/OT Ongoing (interventions implemented as appropriate)     Description:  Goals to be met by: 05/10/19.     Patient will increase functional independence with mobility by performin. Supine to sit with supervision  2. Sit to stand transfer with Supervision using RW  3. Gait  x 150 feet with Supervision using Rolling Walker  4. Bed to chair with supervision  5. LE therex 3 sets x10 reps with supervision                        Time Tracking:     PT Received On: 19  PT Start Time: 1418     PT Stop Time: 1430  PT Total Time (min): 12 min     Billable Minutes: Therapeutic Exercise 12 min "                    Denia Wilkins, PT  05/08/2019

## 2019-05-08 NOTE — NURSING
Bedside report received from DIANA Resendez. Patient lying in bed with eyes closed. Easily aroused by verbal stimuli. NAD noted at this time. All safety precautions in place. Will continue to monitor.

## 2019-05-08 NOTE — PLAN OF CARE
Problem: Fall Injury Risk  Goal: Absence of Fall and Fall-Related Injury    Intervention: Identify and Manage Contributors to Fall Injury Risk     05/08/19 0447   Identify and Manage Contributors to Fall Injury Risk   Self-Care Promotion independence encouraged;BADL personal objects within reach;BADL personal routines maintained;meal setup provided   Manage Acute Allergic Reaction   Medication Review/Management medications reviewed;high risk medications identified

## 2019-05-08 NOTE — PLAN OF CARE
Problem: Fall Injury Risk  Goal: Absence of Fall and Fall-Related Injury    Intervention: Promote Injury-Free Environment     05/08/19 1312   Optimize Balance and Safe Activity   Safety Promotion/Fall Prevention assistive device/personal item within reach;bed alarm set;commode/urinal/bedpan at bedside;Fall Risk reviewed with patient/family;lighting adjusted;medications reviewed;nonskid shoes/socks when out of bed;room near unit station;side rails raised x 2;instructed to call staff for mobility   Optimize [place] and Functional Mobility   Environmental Safety Modification assistive device/personal items within reach;clutter free environment maintained;room near unit station;room organization consistent         Problem: Adult Inpatient Plan of Care  Goal: Optimal Comfort and Wellbeing    Intervention: Provide Person-Centered Care     05/08/19 1312   Support Dyspnea Relief   Trust Relationship/Rapport care explained;choices provided;emotional support provided;empathic listening provided;questions answered;questions encouraged;reassurance provided;thoughts/feelings acknowledged      05/08/19 1312   Support Dyspnea Relief   Trust Relationship/Rapport care explained;choices provided;emotional support provided;empathic listening provided;questions answered;questions encouraged;reassurance provided;thoughts/feelings acknowledged      05/08/19 1312   Support Dyspnea Relief   Trust Relationship/Rapport care explained;choices provided;emotional support provided;empathic listening provided;questions answered;questions encouraged;reassurance provided;thoughts/feelings acknowledged

## 2019-05-08 NOTE — PROGRESS NOTES
DAISY spoke with Deborah (niece) about placement at [facility]. DAISY gave chandler contact information for Admissions to set up appointment for intake. Chandler stated that she will call today to schedule an appointment. DAISY also sent a message to Karena (Admissions) at [facility] through [facility] to give her contact information for chandler.

## 2019-05-08 NOTE — ASSESSMENT & PLAN NOTE
Acute on chronic combined systolic and diastolic CHF  Echo on 3/2019 showing EF of 30% and diastolic dysfunction.    Started on IV Lasix.  Continued Losartan.  Does not seem to be on any B blocker, but she needs to be on one for heart failure so metoprolol was started.  Monitor urine output.  Transition IV Lasix to PO and follow up with PCP for continued adjustment.    PT/OT recommending SNF upon discharge.

## 2019-05-08 NOTE — PLAN OF CARE
Karena (Admissions) from [facility] contacted DAISY to inform that she spoke to patient's niece about SNF placement. Karena stated that Deborah (niece) will be coming to the facility tomorrow to complete intake paperwork. Karena informed  that she will submit authorization to [employer].        05/08/19 1509   Post-Acute Status   Post-Acute Authorization Placement  (SNF)   Post-Acute Placement Status   (Pending [employer] authorization)

## 2019-05-09 LAB
ALBUMIN SERPL BCP-MCNC: 2.8 G/DL (ref 3.5–5.2)
ALP SERPL-CCNC: 124 U/L (ref 55–135)
ALT SERPL W/O P-5'-P-CCNC: 41 U/L (ref 10–44)
ANION GAP SERPL CALC-SCNC: 12 MMOL/L (ref 8–16)
AST SERPL-CCNC: 65 U/L (ref 10–40)
BILIRUB SERPL-MCNC: 1.7 MG/DL (ref 0.1–1)
BUN SERPL-MCNC: 25 MG/DL (ref 8–23)
CALCIUM SERPL-MCNC: 8.3 MG/DL (ref 8.7–10.5)
CHLORIDE SERPL-SCNC: 100 MMOL/L (ref 95–110)
CO2 SERPL-SCNC: 24 MMOL/L (ref 23–29)
CREAT SERPL-MCNC: 1.2 MG/DL (ref 0.5–1.4)
EST. GFR  (AFRICAN AMERICAN): 47 ML/MIN/1.73 M^2
EST. GFR  (NON AFRICAN AMERICAN): 41 ML/MIN/1.73 M^2
GLUCOSE SERPL-MCNC: 92 MG/DL (ref 70–110)
POTASSIUM SERPL-SCNC: 3.3 MMOL/L (ref 3.5–5.1)
PROT SERPL-MCNC: 6.4 G/DL (ref 6–8.4)
SODIUM SERPL-SCNC: 136 MMOL/L (ref 136–145)

## 2019-05-09 PROCEDURE — 94761 N-INVAS EAR/PLS OXIMETRY MLT: CPT | Mod: HCNC

## 2019-05-09 PROCEDURE — 21400001 HC TELEMETRY ROOM: Mod: HCNC

## 2019-05-09 PROCEDURE — 36415 COLL VENOUS BLD VENIPUNCTURE: CPT | Mod: HCNC

## 2019-05-09 PROCEDURE — 63600175 PHARM REV CODE 636 W HCPCS: Mod: HCNC | Performed by: HOSPITALIST

## 2019-05-09 PROCEDURE — 94799 UNLISTED PULMONARY SVC/PX: CPT | Mod: HCNC

## 2019-05-09 PROCEDURE — 25000003 PHARM REV CODE 250: Mod: HCNC | Performed by: INTERNAL MEDICINE

## 2019-05-09 PROCEDURE — 25000003 PHARM REV CODE 250: Mod: HCNC | Performed by: HOSPITALIST

## 2019-05-09 PROCEDURE — 80053 COMPREHEN METABOLIC PANEL: CPT | Mod: HCNC

## 2019-05-09 RX ORDER — LOSARTAN POTASSIUM 50 MG/1
50 TABLET ORAL DAILY
Qty: 90 TABLET | Refills: 3 | Status: SHIPPED | OUTPATIENT
Start: 2019-05-09 | End: 2020-05-08

## 2019-05-09 RX ORDER — FUROSEMIDE 40 MG/1
40 TABLET ORAL 2 TIMES DAILY
Status: DISCONTINUED | OUTPATIENT
Start: 2019-05-09 | End: 2019-05-10 | Stop reason: HOSPADM

## 2019-05-09 RX ORDER — METOPROLOL SUCCINATE 25 MG/1
25 TABLET, EXTENDED RELEASE ORAL DAILY
Qty: 30 TABLET | Refills: 11 | Status: SHIPPED | OUTPATIENT
Start: 2019-05-09 | End: 2020-05-08

## 2019-05-09 RX ORDER — PANTOPRAZOLE SODIUM 40 MG/1
40 TABLET, DELAYED RELEASE ORAL DAILY
Qty: 30 TABLET | Refills: 11 | Status: SHIPPED | OUTPATIENT
Start: 2019-05-09 | End: 2020-05-08

## 2019-05-09 RX ORDER — GABAPENTIN 300 MG/1
300 CAPSULE ORAL NIGHTLY
Status: DISCONTINUED | OUTPATIENT
Start: 2019-05-09 | End: 2019-05-10 | Stop reason: HOSPADM

## 2019-05-09 RX ORDER — FUROSEMIDE 40 MG/1
40 TABLET ORAL 2 TIMES DAILY
Qty: 30 TABLET | Refills: 11 | Status: SHIPPED | OUTPATIENT
Start: 2019-05-09 | End: 2019-05-10 | Stop reason: SDUPTHER

## 2019-05-09 RX ORDER — DOXYCYCLINE HYCLATE 100 MG
100 TABLET ORAL EVERY 12 HOURS
Qty: 2 TABLET | Refills: 0 | Status: SHIPPED | OUTPATIENT
Start: 2019-05-09 | End: 2019-05-10

## 2019-05-09 RX ADMIN — DOXYCYCLINE HYCLATE 100 MG: 100 TABLET, COATED ORAL at 08:05

## 2019-05-09 RX ADMIN — ATORVASTATIN CALCIUM 20 MG: 10 TABLET, FILM COATED ORAL at 08:05

## 2019-05-09 RX ADMIN — FUROSEMIDE 40 MG: 10 INJECTION, SOLUTION INTRAVENOUS at 09:05

## 2019-05-09 RX ADMIN — FUROSEMIDE 40 MG: 40 TABLET ORAL at 05:05

## 2019-05-09 RX ADMIN — AMIODARONE HYDROCHLORIDE 400 MG: 200 TABLET ORAL at 08:05

## 2019-05-09 RX ADMIN — SPIRONOLACTONE 25 MG: 25 TABLET ORAL at 08:05

## 2019-05-09 RX ADMIN — GUAIFENESIN AND DEXTROMETHORPHAN 5 ML: 100; 10 SYRUP ORAL at 05:05

## 2019-05-09 RX ADMIN — ASPIRIN 81 MG 81 MG: 81 TABLET ORAL at 08:05

## 2019-05-09 RX ADMIN — ENOXAPARIN SODIUM 40 MG: 100 INJECTION SUBCUTANEOUS at 04:05

## 2019-05-09 RX ADMIN — GABAPENTIN 300 MG: 300 CAPSULE ORAL at 09:05

## 2019-05-09 RX ADMIN — DOXYCYCLINE HYCLATE 100 MG: 100 TABLET, COATED ORAL at 09:05

## 2019-05-09 RX ADMIN — PANTOPRAZOLE SODIUM 40 MG: 40 TABLET, DELAYED RELEASE ORAL at 08:05

## 2019-05-09 RX ADMIN — AMIODARONE HYDROCHLORIDE 400 MG: 200 TABLET ORAL at 09:05

## 2019-05-09 RX ADMIN — DULOXETINE 60 MG: 30 CAPSULE, DELAYED RELEASE ORAL at 08:05

## 2019-05-09 RX ADMIN — CLOPIDOGREL BISULFATE 75 MG: 75 TABLET ORAL at 08:05

## 2019-05-09 RX ADMIN — METOPROLOL SUCCINATE 25 MG: 25 TABLET, EXTENDED RELEASE ORAL at 08:05

## 2019-05-09 RX ADMIN — GUAIFENESIN AND DEXTROMETHORPHAN 5 ML: 100; 10 SYRUP ORAL at 12:05

## 2019-05-09 NOTE — PLAN OF CARE
Human informed DAISY that authorization was submitted by [facility] today at 10:20 am. Human stated that they will do their best to process authorization today. DAISY contacted Karena (Admissions) at [facility] to update her on the status of the authorization. Karena informed DAISY that she sent the authorization yesterday afternoon. Karena stated that she will contact DAISY when she gets authorization from [facility].        05/09/19 1434   Post-Acute Status   Post-Acute Authorization Placement  ([facility])   Post-Acute Placement Status Pending Payor Review

## 2019-05-09 NOTE — PLAN OF CARE
Problem: Fall Injury Risk  Goal: Absence of Fall and Fall-Related Injury  Outcome: Ongoing (interventions implemented as appropriate)  Intervention: Identify and Manage Contributors to Fall Injury Risk     05/09/19 0456   Identify and Manage Contributors to Fall Injury Risk   Self-Care Promotion BADL personal routines maintained;BADL personal objects within reach   Manage Acute Allergic Reaction   Medication Review/Management medications reviewed;high risk medications identified     Intervention: Promote Injury-Free Environment     05/09/19 0456   Optimize Balance and Safe Activity   Safety Promotion/Fall Prevention assistive device/personal item within reach;bed alarm set;Fall Risk reviewed with patient/family;high risk medications identified;nonskid shoes/socks when out of bed;side rails raised x 3;room near unit station;commode/urinal/bedpan at bedside;bedside commode chair;instructed to call staff for mobility   Optimize [place] and Functional Mobility   Environmental Safety Modification assistive device/personal items within reach;clutter free environment maintained;lighting adjusted

## 2019-05-09 NOTE — PLAN OF CARE
Problem: Pain Acute  Goal: Optimal Pain Control  Outcome: Ongoing (interventions implemented as appropriate)  Intervention: Prevent or Manage Pain     05/09/19 0459   Prevent or Manage Pain   Sleep/Rest Enhancement noise level reduced;consistent schedule promoted;regular sleep/rest pattern promoted;relaxation techniques promoted

## 2019-05-09 NOTE — SUBJECTIVE & OBJECTIVE
Interval History:  Symptoms resolved. Pleasant. Agrees to SNF placement. Discharge pending SNF placement.    Review of Systems   Constitutional: Negative for chills and fever.   Respiratory: Negative for cough and shortness of breath.    Cardiovascular: Positive for leg swelling. Negative for chest pain.   Gastrointestinal: Negative for constipation and diarrhea.   Musculoskeletal: Positive for gait problem. Negative for joint swelling.     Objective:     Vital Signs (Most Recent):  Temp: 98 °F (36.7 °C) (05/09/19 1116)  Pulse: 65 (05/09/19 1116)  Resp: 16 (05/09/19 1116)  BP: (!) 105/52 (05/09/19 1116)  SpO2: 97 % (05/09/19 1116) Vital Signs (24h Range):  Temp:  [97.2 °F (36.2 °C)-98.8 °F (37.1 °C)] 98 °F (36.7 °C)  Pulse:  [64-75] 65  Resp:  [16-19] 16  SpO2:  [95 %-99 %] 97 %  BP: ()/(52-66) 105/52     Weight: 78.5 kg (173 lb 1 oz)  Body mass index is 30.66 kg/m².    Intake/Output Summary (Last 24 hours) at 5/9/2019 1432  Last data filed at 5/9/2019 0730  Gross per 24 hour   Intake 400 ml   Output 800 ml   Net -400 ml      Physical Exam   Constitutional: She is oriented to person, place, and time. She appears well-developed and well-nourished. No distress.   HENT:   Head: Normocephalic and atraumatic.   Eyes: Pupils are equal, round, and reactive to light. Conjunctivae and EOM are normal. Right eye exhibits no discharge. Left eye exhibits no discharge.   Neck: Neck supple. No thyromegaly present.   Cardiovascular: Normal rate. Exam reveals no friction rub.   No murmur heard.  Pulmonary/Chest: Effort normal and breath sounds normal.   Abdominal: Soft. Bowel sounds are normal. She exhibits no distension. There is no tenderness.   Musculoskeletal: She exhibits edema (1+ BLE). She exhibits no tenderness.   Neurological: She is alert and oriented to person, place, and time.   Skin: Skin is warm and dry. She is not diaphoretic.       Significant Labs:   BMP:   Recent Labs   Lab 05/09/19  0527   GLU 92       K 3.3*      CO2 24   BUN 25*   CREATININE 1.2   CALCIUM 8.3*

## 2019-05-09 NOTE — NURSING
Bedside report given to DIANA Chatterjee. Patient lying in bed> NAD noted at this time. All safety precautions in place.  12 hr chart check complete

## 2019-05-09 NOTE — PT/OT/SLP PROGRESS
Physical Therapy      Patient Name:  Magaly Nunes   MRN:  [id number]    Patient not seen today secondary to Patient unwilling to participate, Patient ill (Comment). Will follow-up 5/10/19.    Letty Youngblood, PT

## 2019-05-09 NOTE — PT/OT/SLP PROGRESS
Occupational Therapy      Patient Name:  Magaly Nunes   MRN:  [id number]    Patient not seen today secondary to Patient unwilling to participate(The states she just got back in bed from using the BSC). Will follow-up as able.    Charley Tucker OT  5/9/2019

## 2019-05-09 NOTE — PLAN OF CARE
[facility]  [address] [place] [address]    Facility Transfer Orders                        05/10/2019    Admit to: SNF    Diagnoses:  Active Hospital Problems    Diagnosis  POA    *CHF (congestive heart failure) [I50.9]  Yes    PAF (paroxysmal atrial fibrillation) [I48.0]  Yes     Chronic    Recurrent falls while walking [R29.6]  Yes    Elevated troponin I level [R74.8]  Yes    Elevated brain natriuretic peptide (BNP) level [R79.89]  Yes    GERD (gastroesophageal reflux disease) [K21.9]  Yes     Chronic    CKD Stage 3 [N18.3]  Yes     Chronic    Severe tricuspid regurgitation [I07.1]  Yes     Chronic    Pulmonary hypertension [I27.20]  Yes     Chronic    Essential hypertension [I10]  Yes     Chronic    Depression [F32.9]  Yes     Chronic      Resolved Hospital Problems   No resolved problems to display.       Allergies:  Review of patient's allergies indicates:   Allergen Reactions    Ace inhibitors Swelling     Other reaction(s): Unknown    Aciphex  [rabeprazole]      Other reaction(s): Stomach upset    Bextra  [valdecoxib]      Other reaction(s): Unknown    Cardizem  [diltiazem hcl]      Other reaction(s): Unknown    Clonidine      Other reaction(s): dry mouth.lip swelling    Mavik  [trandolapril]      Other reaction(s): Unknown    Phenytoin sodium extended      Other reaction(s): Stomach upset    Tramadol      Other reaction(s): stomach pain    Aspirin Rash     Other reaction(s): Unknown    Nsaids (non-steroidal anti-inflammatory drug) Rash     Other reaction(s): black spots on skin       Vitals:  Every shift (Skilled Nursing patients)      Diet: Cardiac diet      Activity:     - Up in a chair each morning as tolerated   - Ambulate with assistance to bathroom       Nursing Precautions:     - Aspiration precautions:             - Total assistance with meals            -  Upright 90 degrees befor during and after meals             -  Suction at bedside           - Fall precautions   - Seizure precaution   - Decubitus precautions:        -  for positioning   - Pressure reducing foam mattress   - Turn patient every two hours. Use wedge pillows to anchor patient      CONSULTS:     PT to evaluate and treat - five times a week     OT to evaluate and treat - five times a week      LABS:  RADHA on Wednesday 5/15/2019      Medications: Discontinue all previous medication orders, if any. See new list below.     Magaly Nunes   Home Medication Instructions ALETA:[id number]    Printed on:05/09/19 4159   Medication Information                      amiodarone (PACERONE) 400 MG tablet  Take 1 tablet (400 mg total) by mouth 2 (two) times daily.             aspirin 81 MG Chew  Take 1 tablet (81 mg total) by mouth once daily.             atorvastatin (LIPITOR) 20 MG tablet  Take 1 tablet (20 mg total) by mouth once daily.             clopidogrel (PLAVIX) 75 mg tablet  TAKE 1 TABLET(75 MG) BY MOUTH EVERY DAY             doxycycline (VIBRA-TABS) 100 MG tablet  Take 1 tablet (100 mg total) by mouth every 12 (twelve) hours. Last dose 5/10/2019 PM dose             DULoxetine (CYMBALTA) 60 MG capsule  TAKE 1 CAPSULE(60 MG) BY MOUTH EVERY DAY             furosemide (LASIX) 40 MG tablet  Take 1 tablet (40 mg total) by mouth 2 (two) times daily.             gabapentin (NEURONTIN) 100 MG capsule  Take 1 capsule (100 mg total) by mouth 3 (three) times daily.             losartan (COZAAR) 50 MG tablet  Take 1 tablet (50 mg total) by mouth once daily.             metoprolol succinate (TOPROL-XL) 25 MG 24 hr tablet  Take 1 tablet (25 mg total) by mouth once daily.             pantoprazole (PROTONIX) 40 MG tablet  Take 1 tablet (40 mg total) by mouth once daily.             potassium chloride SA (K-DUR,KLOR-CON) 20 MEQ tablet  TAKE 1 TABLET(20 MEQ) BY MOUTH EVERY DAY                       _________________________________  Dejah Steele MD  05/09/2019

## 2019-05-09 NOTE — NURSING
Bedside hand off given SIMEON Radford. Patient in bed, awake and alert. Breathing spontaneously in room air. No apparent distress noted. Denies pain and needs at this time.

## 2019-05-09 NOTE — PROGRESS NOTES
DAISY sent Dr. Steele  a secure chat to inform her patient will not be discharging; waiting on authorization for [employer].

## 2019-05-09 NOTE — NURSING
Patient reports of lower abdominal pain and acid reflux.  Patient been taking antacids at home. Dr. Goyal notified.     11:30 pm Protonix tablet started.    Will continue to monitor.

## 2019-05-09 NOTE — PROGRESS NOTES
[facility] Hospital Medicine  Progress Note    Patient Name: Magaly Nunes  MRN: [id number]  Patient Class: IP- Inpatient   Admission Date: 5/3/2019  Length of Stay: 6 days  Attending Physician: Dejah Steele MD  Primary Care Provider: Chris Bangura MD        Subjective:     Principal Problem:CHF (congestive heart failure)    HPI:  Magaly Nunes is a 86 y.o. female that (in part)  has a past medical history of Anxiety disorder, Arthritis, Carpal tunnel syndrome, CHF (congestive heart failure), Chronic allergic rhinitis, Chronic pain, CKD stage 3 due to type 2 diabetes mellitus, Constipation - functional, Depression, Diabetes mellitus type II, History of cataract surgery, Hot flash not due to menopause, HTN (hypertension), Hypokalemia, Insomnia, Knee pain, bilateral, Personal history of DVT (deep vein thrombosis), Polyarthralgia, Pulmonary hypertension, Renovascular hypertension, Severe tricuspid regurgitation, Spinal stenosis, Spinal stenosis of lumbar region, Systolic and diastolic CHF, chronic, and Vertigo.  has a past surgical history that includes Hysterectomy; Cataract extraction (Bilateral); Eye surgery; Fracture surgery; and ORIF radius & ulna fractures. Presents to [facility] Emergency Department initially complaining of multiple recurrent falls.  She has fallen 4 times over the last 2 weeks.  She reports all of these were mechanical falls and denies chest pain, syncope, or palpitations.  She 1st tripped over bryn approximately 2 weeks ago and struck the left side of her head and landed on her buttocks.  She has had some difficulty ambulating due to weakness since that time.  She is also complaining of increased weight gain, swelling, and generalized non acute weakness.  She has been so we can fact that she has been sleeping on the floor at home and has been incontinent of urine.  She has not been eating or drinking adequately since the falls.  She  was reluctant to come into the hospital, but finally reports that her pride subsided and she called for help.    In the emergency department multiple imaging studies were obtained to evaluate for possible fracture dislocation which proved to be negative.  CT of head was also negative.  Routine laboratory studies were obtained which demonstrated elevated CPK levels, elevated troponin, and markedly elevated BNP.  Physical exam was consistent with edema likely due to acute CHF exacerbation.    Hospital medicine has been asked to admit for further evaluation and treatment.     Hospital Course:  87 y/o female admitted with CHF exacerbation.  Started on IV diuresis.  Recurrent falls and PT/OT consulted.  Therapy recommending SNF. SW consulted. She is stable on room air and medically stable for discharge to SNF. Discharge on a increased PO regimen of Lasix and follow up with PCP or cardiology for continued adjustment.    Interval History:  Symptoms resolved. Pleasant. Agrees to SNF placement. Discharge pending SNF placement.    Review of Systems   Constitutional: Negative for chills and fever.   Respiratory: Negative for cough and shortness of breath.    Cardiovascular: Positive for leg swelling. Negative for chest pain.   Gastrointestinal: Negative for constipation and diarrhea.   Musculoskeletal: Positive for gait problem. Negative for joint swelling.     Objective:     Vital Signs (Most Recent):  Temp: 98 °F (36.7 °C) (05/09/19 1116)  Pulse: 65 (05/09/19 1116)  Resp: 16 (05/09/19 1116)  BP: (!) 105/52 (05/09/19 1116)  SpO2: 97 % (05/09/19 1116) Vital Signs (24h Range):  Temp:  [97.2 °F (36.2 °C)-98.8 °F (37.1 °C)] 98 °F (36.7 °C)  Pulse:  [64-75] 65  Resp:  [16-19] 16  SpO2:  [95 %-99 %] 97 %  BP: ()/(52-66) 105/52     Weight: 78.5 kg (173 lb 1 oz)  Body mass index is 30.66 kg/m².    Intake/Output Summary (Last 24 hours) at 5/9/2019 5512  Last data filed at 5/9/2019 0730  Gross per 24 hour   Intake 400 ml   Output  800 ml   Net -400 ml      Physical Exam   Constitutional: She is oriented to person, place, and time. She appears well-developed and well-nourished. No distress.   HENT:   Head: Normocephalic and atraumatic.   Eyes: Pupils are equal, round, and reactive to light. Conjunctivae and EOM are normal. Right eye exhibits no discharge. Left eye exhibits no discharge.   Neck: Neck supple. No thyromegaly present.   Cardiovascular: Normal rate. Exam reveals no friction rub.   No murmur heard.  Pulmonary/Chest: Effort normal and breath sounds normal.   Abdominal: Soft. Bowel sounds are normal. She exhibits no distension. There is no tenderness.   Musculoskeletal: She exhibits edema (1+ BLE). She exhibits no tenderness.   Neurological: She is alert and oriented to person, place, and time.   Skin: Skin is warm and dry. She is not diaphoretic.       Significant Labs:   BMP:   Recent Labs   Lab 05/09/19  0527   GLU 92      K 3.3*      CO2 24   BUN 25*   CREATININE 1.2   CALCIUM 8.3*         Assessment/Plan:      * CHF (congestive heart failure)  Acute on chronic combined systolic and diastolic CHF  Echo on 3/2019 showing EF of 30% and diastolic dysfunction.    Started on IV Lasix.  Continued Losartan.  Does not seem to be on any B blocker, but she needs to be on one for heart failure so metoprolol was started.  Monitor urine output.  Transition IV Lasix to PO and follow up with PCP for continued adjustment.    PT/OT recommending SNF upon discharge.    PAF (paroxysmal atrial fibrillation)  Initially in SR  Converted to afib.  Continue amiodarone.  Does not seem to be on any B blocker.  Poor anticoagulation candidate secondary to recurrent falls.    Recurrent falls while walking  Weakness  PT/OT consult.  Recommending SNF    Elevated brain natriuretic peptide (BNP) level  Due to CHF exacerbation    Elevated troponin I level  Due to strain, stable. Not consistent with ACLS.    GERD (gastroesophageal reflux  disease)  Monitor symptoms    CKD Stage 3  Creat at baseline.  No acute issues.  Closely monitor while on diuresis.    Severe tricuspid regurgitation  Monitor symptoms    Pulmonary hypertension  PAP 78 on echo    Essential hypertension  Resume home medications.  Low Na+ diet.  Add prn Hydralazine.    Depression  Cont home meds.      VTE Risk Mitigation (From admission, onward)        Ordered     enoxaparin injection 40 mg  Daily      05/06/19 1053     Place PHILL hose  Until discontinued      05/04/19 0511     IP VTE HIGH RISK PATIENT  Once      05/04/19 0108              Dejah Steele MD  Department of Hospital Medicine   [facility]

## 2019-05-09 NOTE — PLAN OF CARE
Problem: Pain Acute  Goal: Optimal Pain Control  Outcome: Ongoing (interventions implemented as appropriate)  Intervention: Develop Pain Management Plan     05/09/19 0457   Prevent or Manage Pain   Pain Management Interventions awakened for pain meds per patient request;pain management plan reviewed with patient/caregiver;pillow support provided;relaxation techniques promoted;quiet environment facilitated;breathing exercises;care clustered;position adjusted

## 2019-05-09 NOTE — NURSING
Bedside report received from DIANA Chatterjee. Patient lying in bed with eyes open. NAD noted at this time. All safety precautions in place. Will continue to monitor.

## 2019-05-10 VITALS
SYSTOLIC BLOOD PRESSURE: 101 MMHG | RESPIRATION RATE: 16 BRPM | BODY MASS INDEX: 30.66 KG/M2 | DIASTOLIC BLOOD PRESSURE: 57 MMHG | HEIGHT: 63 IN | HEART RATE: 64 BPM | OXYGEN SATURATION: 97 % | WEIGHT: 173.06 LBS | TEMPERATURE: 99 F

## 2019-05-10 LAB
ALBUMIN SERPL BCP-MCNC: 2.8 G/DL (ref 3.5–5.2)
ALP SERPL-CCNC: 126 U/L (ref 55–135)
ALT SERPL W/O P-5'-P-CCNC: 34 U/L (ref 10–44)
ANION GAP SERPL CALC-SCNC: 11 MMOL/L (ref 8–16)
AST SERPL-CCNC: 54 U/L (ref 10–40)
BILIRUB SERPL-MCNC: 1.8 MG/DL (ref 0.1–1)
BUN SERPL-MCNC: 26 MG/DL (ref 8–23)
CALCIUM SERPL-MCNC: 8.3 MG/DL (ref 8.7–10.5)
CHLORIDE SERPL-SCNC: 100 MMOL/L (ref 95–110)
CO2 SERPL-SCNC: 25 MMOL/L (ref 23–29)
CREAT SERPL-MCNC: 1.4 MG/DL (ref 0.5–1.4)
EST. GFR  (AFRICAN AMERICAN): 39 ML/MIN/1.73 M^2
EST. GFR  (NON AFRICAN AMERICAN): 34 ML/MIN/1.73 M^2
GLUCOSE SERPL-MCNC: 91 MG/DL (ref 70–110)
POTASSIUM SERPL-SCNC: 3.5 MMOL/L (ref 3.5–5.1)
PROT SERPL-MCNC: 6.4 G/DL (ref 6–8.4)
SODIUM SERPL-SCNC: 136 MMOL/L (ref 136–145)

## 2019-05-10 PROCEDURE — 94799 UNLISTED PULMONARY SVC/PX: CPT | Mod: HCNC

## 2019-05-10 PROCEDURE — 25000003 PHARM REV CODE 250: Mod: HCNC | Performed by: INTERNAL MEDICINE

## 2019-05-10 PROCEDURE — 97116 GAIT TRAINING THERAPY: CPT | Mod: HCNC

## 2019-05-10 PROCEDURE — 36415 COLL VENOUS BLD VENIPUNCTURE: CPT | Mod: HCNC

## 2019-05-10 PROCEDURE — 97530 THERAPEUTIC ACTIVITIES: CPT | Mod: HCNC

## 2019-05-10 PROCEDURE — 97535 SELF CARE MNGMENT TRAINING: CPT | Mod: HCNC

## 2019-05-10 PROCEDURE — 25000003 PHARM REV CODE 250: Mod: HCNC | Performed by: HOSPITALIST

## 2019-05-10 PROCEDURE — 80053 COMPREHEN METABOLIC PANEL: CPT | Mod: HCNC

## 2019-05-10 PROCEDURE — 94761 N-INVAS EAR/PLS OXIMETRY MLT: CPT | Mod: HCNC

## 2019-05-10 RX ORDER — ATORVASTATIN CALCIUM 20 MG/1
TABLET, FILM COATED ORAL
Qty: 30 TABLET | Refills: 0 | Status: SHIPPED | OUTPATIENT
Start: 2019-05-10

## 2019-05-10 RX ORDER — FUROSEMIDE 40 MG/1
TABLET ORAL
Qty: 30 TABLET | Refills: 0 | Status: SHIPPED | OUTPATIENT
Start: 2019-05-10

## 2019-05-10 RX ADMIN — LOSARTAN POTASSIUM 50 MG: 25 TABLET, FILM COATED ORAL at 10:05

## 2019-05-10 RX ADMIN — ACETAMINOPHEN 650 MG: 325 TABLET, FILM COATED ORAL at 12:05

## 2019-05-10 RX ADMIN — GUAIFENESIN AND DEXTROMETHORPHAN 5 ML: 100; 10 SYRUP ORAL at 06:05

## 2019-05-10 RX ADMIN — BENZONATATE 200 MG: 100 CAPSULE ORAL at 06:05

## 2019-05-10 RX ADMIN — DULOXETINE 60 MG: 30 CAPSULE, DELAYED RELEASE ORAL at 10:05

## 2019-05-10 RX ADMIN — ASPIRIN 81 MG 81 MG: 81 TABLET ORAL at 10:05

## 2019-05-10 RX ADMIN — GUAIFENESIN AND DEXTROMETHORPHAN 5 ML: 100; 10 SYRUP ORAL at 12:05

## 2019-05-10 RX ADMIN — BENZONATATE 200 MG: 100 CAPSULE ORAL at 12:05

## 2019-05-10 RX ADMIN — CLOPIDOGREL BISULFATE 75 MG: 75 TABLET ORAL at 10:05

## 2019-05-10 RX ADMIN — AMIODARONE HYDROCHLORIDE 400 MG: 200 TABLET ORAL at 10:05

## 2019-05-10 RX ADMIN — METOPROLOL SUCCINATE 25 MG: 25 TABLET, EXTENDED RELEASE ORAL at 10:05

## 2019-05-10 RX ADMIN — ATORVASTATIN CALCIUM 20 MG: 10 TABLET, FILM COATED ORAL at 10:05

## 2019-05-10 RX ADMIN — PANTOPRAZOLE SODIUM 40 MG: 40 TABLET, DELAYED RELEASE ORAL at 10:05

## 2019-05-10 RX ADMIN — DOXYCYCLINE HYCLATE 100 MG: 100 TABLET, COATED ORAL at 10:05

## 2019-05-10 RX ADMIN — FUROSEMIDE 40 MG: 40 TABLET ORAL at 10:05

## 2019-05-10 RX ADMIN — SPIRONOLACTONE 25 MG: 25 TABLET ORAL at 10:05

## 2019-05-10 NOTE — PROGRESS NOTES
DAISY spoke to nurse Mar and physical therapist Letty about delay in discharge to SNF.  Letty stated that she was aware of the delay and would be meeting with patient today for treatment.

## 2019-05-10 NOTE — PLAN OF CARE
Karena (admissions) at [facility] contacted SW to give call report. SW called nurse Lilly to inform her that patient was ready for discharge from Care Management standpoint, gave call report.        05/10/19 1434   Final Note   Assessment Type Final Discharge Note   Anticipated Discharge Disposition SNF  ([facility])   Hospital Follow Up  Appt(s) scheduled? No   Discharge plans and expectations educations in teach back method with documentation complete? No   Right Care Referral Info   Post Acute Recommendation SNF / Sub-Acute Rehab   Referral Type SNF   Facility Name [address]    [facility], [place], [place]

## 2019-05-10 NOTE — PLAN OF CARE
Karena (admissions) at [facility] contacted  to inform her that [facility] has approved the authorization for SNF.      05/10/19 1315   Post-Acute Status   Post-Acute Authorization Placement   Post-Acute Placement Status Set-up Complete

## 2019-05-10 NOTE — SUBJECTIVE & OBJECTIVE
Interval History:  Symptoms resolved. Pleasant. Agrees to SNF placement. Discharge pending SNF placement.    Review of Systems   Constitutional: Negative for chills and fever.   Respiratory: Negative for cough and shortness of breath.    Cardiovascular: Positive for leg swelling. Negative for chest pain.   Gastrointestinal: Negative for constipation and diarrhea.   Musculoskeletal: Positive for gait problem. Negative for joint swelling.     Objective:     Vital Signs (Most Recent):  Temp: 98.9 °F (37.2 °C) (05/10/19 1111)  Pulse: 64 (05/10/19 1111)  Resp: 16 (05/10/19 1111)  BP: (!) 101/57 (05/10/19 1111)  SpO2: 97 % (05/10/19 1111) Vital Signs (24h Range):  Temp:  [98 °F (36.7 °C)-99 °F (37.2 °C)] 98.9 °F (37.2 °C)  Pulse:  [61-70] 64  Resp:  [16-20] 16  SpO2:  [96 %-99 %] 97 %  BP: (101-123)/(54-58) 101/57     Weight: 78.5 kg (173 lb 1 oz)  Body mass index is 30.66 kg/m².    Intake/Output Summary (Last 24 hours) at 5/10/2019 1538  Last data filed at 5/10/2019 0000  Gross per 24 hour   Intake --   Output 200 ml   Net -200 ml      Physical Exam   Constitutional: She is oriented to person, place, and time. She appears well-developed and well-nourished. No distress.   HENT:   Head: Normocephalic and atraumatic.   Eyes: Pupils are equal, round, and reactive to light. Conjunctivae and EOM are normal. Right eye exhibits no discharge. Left eye exhibits no discharge.   Neck: Neck supple. No thyromegaly present.   Cardiovascular: Normal rate. Exam reveals no friction rub.   No murmur heard.  Pulmonary/Chest: Effort normal and breath sounds normal.   Abdominal: Soft. Bowel sounds are normal. She exhibits no distension. There is no tenderness.   Musculoskeletal: She exhibits edema (1+ BLE). She exhibits no tenderness.   Neurological: She is alert and oriented to person, place, and time.   Skin: Skin is warm and dry. She is not diaphoretic.       Significant Labs:   BMP:   Recent Labs   Lab 05/10/19  0522   GLU 91      K  3.5      CO2 25   BUN 26*   CREATININE 1.4   CALCIUM 8.3*

## 2019-05-10 NOTE — PROGRESS NOTES
DAISY updated patient on transfer to SNF. SW explained to patient that her insurance is waiting for updated PT/OT notes that show that she is participating in treatment. SW suggested that patient try to participate in treatment so that she can go to SNF. Patient stated that no one has been to her room for treatment asking her to walk. Patient stated that she is not going to walk if her legs are hurting. Patient stated that someone did come in her room and asked her to sit on the side of the bed and do exercises. Patient stated that she did the exercises.

## 2019-05-10 NOTE — DISCHARGE SUMMARY
[facility] Hospital Medicine  Discharge Summary      Patient Name: Magaly Nunes  MRN: [id number]  Admission Date: 5/3/2019  Hospital Length of Stay: 7 days  Discharge Date and Time:  05/10/2019   Attending Physician: Dejah Steele MD   Discharging Provider: Dejah Steeel MD  Primary Care Provider: Chris Bangura MD      HPI:   Magaly Nunes is a 86 y.o. female that (in part)  has a past medical history of Anxiety disorder, Arthritis, Carpal tunnel syndrome, CHF (congestive heart failure), Chronic allergic rhinitis, Chronic pain, CKD stage 3 due to type 2 diabetes mellitus, Constipation - functional, Depression, Diabetes mellitus type II, History of cataract surgery, Hot flash not due to menopause, HTN (hypertension), Hypokalemia, Insomnia, Knee pain, bilateral, Personal history of DVT (deep vein thrombosis), Polyarthralgia, Pulmonary hypertension, Renovascular hypertension, Severe tricuspid regurgitation, Spinal stenosis, Spinal stenosis of lumbar region, Systolic and diastolic CHF, chronic, and Vertigo.  has a past surgical history that includes Hysterectomy; Cataract extraction (Bilateral); Eye surgery; Fracture surgery; and ORIF radius & ulna fractures. Presents to [facility] Emergency Department initially complaining of multiple recurrent falls.  She has fallen 4 times over the last 2 weeks.  She reports all of these were mechanical falls and denies chest pain, syncope, or palpitations.  She 1st tripped over bryn approximately 2 weeks ago and struck the left side of her head and landed on her buttocks.  She has had some difficulty ambulating due to weakness since that time.  She is also complaining of increased weight gain, swelling, and generalized non acute weakness.  She has been so we can fact that she has been sleeping on the floor at home and has been incontinent of urine.  She has not been eating or drinking adequately since the falls.   She was reluctant to come into the hospital, but finally reports that her pride subsided and she called for help.    In the emergency department multiple imaging studies were obtained to evaluate for possible fracture dislocation which proved to be negative.  CT of head was also negative.  Routine laboratory studies were obtained which demonstrated elevated CPK levels, elevated troponin, and markedly elevated BNP.  Physical exam was consistent with edema likely due to acute CHF exacerbation.    Hospital medicine has been asked to admit for further evaluation and treatment.       Hospital Course:   87 y/o female admitted with CHF exacerbation.  Started on IV diuresis.  Recurrent falls and PT/OT consulted.  Therapy recommending SNF. SW consulted. She was stable on room air and medically stable for discharge to SNF. Discharged on an increased PO regimen of Lasix and follow up with PCP or cardiology for continued adjustment.     Consults:   Consults (From admission, onward)        Status Ordering Provider     Inpatient consult to Social Work  Once     Provider:  (Not yet assigned)    Completed EPI RICK     IP consult to case management  Once     Provider:  (Not yet assigned)    Completed VANI NAVARRO          * CHF (congestive heart failure)  Acute on chronic combined systolic and diastolic CHF  Echo on 3/2019 showing EF of 30% and diastolic dysfunction.    Started on IV Lasix.  Continued Losartan.  Does not seem to be on any B blocker, but she needs to be on one for heart failure so metoprolol was started.  Monitor urine output.  Transition IV Lasix to PO and follow up with PCP for continued adjustment.    PT/OT recommending SNF upon discharge.    Recurrent falls while walking  Weakness  PT/OT consult.  Recommending SNF    Elevated brain natriuretic peptide (BNP) level  Due to CHF exacerbation    Elevated troponin I level  Due to strain, stable. Not consistent with ACLS.      Final Active Diagnoses:     Diagnosis Date Noted POA    PRINCIPAL PROBLEM:  CHF (congestive heart failure) [I50.9] 05/03/2019 Yes    PAF (paroxysmal atrial fibrillation) [I48.0] 05/05/2019 Yes     Chronic    Recurrent falls while walking [R29.6] 05/04/2019 Yes    Elevated troponin I level [R74.8] 03/19/2019 Yes    Elevated brain natriuretic peptide (BNP) level [R79.89] 03/19/2019 Yes    GERD (gastroesophageal reflux disease) [K21.9] 12/28/2017 Yes     Chronic    CKD Stage 3 [N18.3] 12/28/2017 Yes     Chronic    Severe tricuspid regurgitation [I07.1] 02/14/2017 Yes     Chronic    Pulmonary hypertension [I27.20] 02/01/2017 Yes     Chronic    Essential hypertension [I10] 11/19/2012 Yes     Chronic    Depression [F32.9] 08/15/2012 Yes     Chronic      Problems Resolved During this Admission:       Discharged Condition: stable    Disposition: Skilled Nursing Facility    Follow Up:  Follow-up Information     Chris Bangura MD On 5/20/2019.    Specialty:  Internal Medicine  Why:  out patient services: 3:20PM FOLLOW UP FROM THE HOSPITAL  Contact information:  [address] [facility] [address]  [phone]             Hammad Humphrey MD In 1 week.    Specialties:  INTERVENTIONAL CARDIOLOGY, Cardiology  Why:  out patient services:  11:00 AM FOLLOW UP FROM THE HOSPITAL  Contact information:  [address]  [address]  [facility] [id number] [phone]                 Patient Instructions:      Diet Cardiac     Notify your health care provider if you experience any of the following:  increased confusion or weakness     Notify your health care provider if you experience any of the following:  persistent dizziness, light-headedness, or visual disturbances     Notify your health care provider if you experience any of the following:  worsening rash     Notify your health care provider if you experience any of the following:  severe persistent headache     Notify your health care provider if you experience any of the following:  difficulty  breathing or increased cough     Notify your health care provider if you experience any of the following:  redness, tenderness, or signs of infection (pain, swelling, redness, odor or green/yellow discharge around incision site)     Notify your health care provider if you experience any of the following:  severe uncontrolled pain     Notify your health care provider if you experience any of the following:  persistent nausea and vomiting or diarrhea     Notify your health care provider if you experience any of the following:  temperature >100.4     Activity as tolerated       Medications:  Reconciled Home Medications:      Medication List      START taking these medications    doxycycline 100 MG tablet  Commonly known as:  VIBRA-TABS  Take 1 tablet (100 mg total) by mouth every 12 (twelve) hours. for 1 day     metoprolol succinate 25 MG 24 hr tablet  Commonly known as:  TOPROL-XL  Take 1 tablet (25 mg total) by mouth once daily.     pantoprazole 40 MG tablet  Commonly known as:  PROTONIX  Take 1 tablet (40 mg total) by mouth once daily.        CHANGE how you take these medications    losartan 50 MG tablet  Commonly known as:  COZAAR  Take 1 tablet (50 mg total) by mouth once daily.  What changed:    · medication strength  · how much to take        CONTINUE taking these medications    amiodarone 400 MG tablet  Commonly known as:  PACERONE  Take 1 tablet (400 mg total) by mouth 2 (two) times daily.     aspirin 81 MG Chew  Take 1 tablet (81 mg total) by mouth once daily.     clopidogrel 75 mg tablet  Commonly known as:  PLAVIX  TAKE 1 TABLET(75 MG) BY MOUTH EVERY DAY     DULoxetine 60 MG capsule  Commonly known as:  CYMBALTA  TAKE 1 CAPSULE(60 MG) BY MOUTH EVERY DAY     gabapentin 100 MG capsule  Commonly known as:  NEURONTIN  Take 1 capsule (100 mg total) by mouth 3 (three) times daily.     potassium chloride SA 20 MEQ tablet  Commonly known as:  K-DUR,KLOR-CON  TAKE 1 TABLET(20 MEQ) BY MOUTH EVERY DAY        STOP  taking these medications    esomeprazole 40 MG capsule  Commonly known as:  NEXIUM     lidocaine-prilocaine cream  Commonly known as:  EMLA     LORazepam 0.5 MG tablet  Commonly known as:  ATIVAN     spironolactone 25 MG tablet  Commonly known as:  ALDACTONE     temazepam 30 mg capsule  Commonly known as:  RESTORIL        ASK your doctor about these medications    atorvastatin 20 MG tablet  Commonly known as:  LIPITOR  TAKE 1 TABLET(20 MG) BY MOUTH EVERY DAY     furosemide 40 MG tablet  Commonly known as:  LASIX  TAKE 1 TABLET BY MOUTH DAILY          Time spent on the discharge of patient: 35 minutes  Patient was seen and examined on the date of discharge and determined to be suitable for discharge.         Dejah Steele MD  Department of Hospital Medicine  [facility]

## 2019-05-10 NOTE — PT/OT/SLP PROGRESS
Occupational Therapy   Treatment    Name: Magaly Nunes  MRN: [id number]  Admitting Diagnosis:  CHF (congestive heart failure)       Recommendations:     Discharge Recommendations: nursing facility, skilled  Discharge Equipment Recommendations:  none  Barriers to discharge:  Decreased caregiver support    Assessment:     Magaly Nunes is a 86 y.o. female with a medical diagnosis of CHF (congestive heart failure). The patient was agreeable to OT. The patient was educated re: need to request nursing assist adolph amb to the bathroom or bed and was given a chair alarm for safety.  Performance deficits affecting function are weakness, impaired endurance, impaired self care skills, impaired functional mobilty, gait instability, decreased safety awareness, pain, impaired balance, decreased upper extremity function, decreased lower extremity function, edema.     Rehab Prognosis:  Good; patient would benefit from acute skilled OT services to address these deficits and reach maximum level of function.       Plan:     Patient to be seen 5 x/week to address the above listed problems via self-care/home management, therapeutic activities, therapeutic exercises  · Plan of Care Expires: 05/19/19  · Plan of Care Reviewed with: patient    Subjective     Pain/Comfort:  · Pain Rating 1: 0/10    Objective:     Communicated with: nurse prior to session.  Patient found up in chair with peripheral IV, telemetry upon OT entry to room.    General Precautions: Standard, fall   Orthopedic Precautions:N/A   Braces: N/A     Occupational Performance:     Bed Mobility:    · N/T     Functional Mobility/Transfers:  · Patient completed Sit <> Stand Transfer with contact guard assistance and minimum assistance  with  rolling walker   · Functional Mobility: The patient was able to amb using a RW with CGA to the sink. The patient stood at the sink to wash her hands and amb to the chair. The patient required verbal cues to safely sit in the  chair.    Activities of Daily Living:  · Grooming: stand by assistance and contact guard assistance to stand at the sink to wash her hands      AMPA 6 Click ADL: 20    Treatment & Education:  The patient performed AROM to BUE x10 reps after demo. The patient was educated re: chair alarm and need to request assist to amb to the bathroom or bed.     Patient left up in chair, reclined with all lines intact, call button in reach and chair alarm onEducation:      GOALS:   Multidisciplinary Problems     Occupational Therapy Goals        Problem: Occupational Therapy Goal    Goal Priority Disciplines Outcome Interventions   Occupational Therapy Goal     OT, PT/OT Ongoing (interventions implemented as appropriate)    Description:  Goals to be met by: 5/18/19     Patient will increase functional independence with ADLs by performing:    UE Dressing with Modified Arden and Supervision.  LE Dressing with Modified Arden and Supervision.  Grooming while standing at sink with Modified Arden and Supervision.  Toileting from toilet with Modified Arden and Supervision for hygiene and clothing management.   Supine to sit with Modified Arden and Supervision.  Step transfer with Modified Arden and Supervision  Toilet transfer to toilet with Stand-by Assistance.  Upper extremity exercise program x10 reps per handout, with assistance as needed.                      Time Tracking:     OT Date of Treatment: 05/10/19  OT Start Time: 1203  OT Stop Time: 1217  OT Total Time (min): 14 min    Billable Minutes:Self Care/Home Management 14 Janterra Tucker OT  5/10/2019

## 2019-05-10 NOTE — NURSING
Patient discharged instructions given to patient at the time of discharge. Patient states understanding of information. Tele monitor and IV discontinued. Tolerated well. Patient discharged to SNF. Transported to facility by [facility]. All patient belongings with her at the time of discharge.   1620- Spoke with Kinsey([phone]) from [facility]. Report given.

## 2019-05-10 NOTE — PROGRESS NOTES
DAISY completed patient transportation. DAISY called patient's niece, Deborah Curry to inform her that patient would be discharged today and transported to [facility].

## 2019-05-10 NOTE — PLAN OF CARE
Problem: Physical Therapy Goal  Goal: Physical Therapy Goal  Goals to be met by: 05/10/19.     Patient will increase functional independence with mobility by performin. Supine to sit with supervision  2. Sit to stand transfer with Supervision using RW  3. Gait  x 150 feet with Supervision using Rolling Walker  4. Bed to chair with supervision  5. LE therex 3 sets x10 reps with supervision       Outcome: Ongoing (interventions implemented as appropriate)   Pt ambulated ~12ft, 8ft, 36ft with RW, CGA with R lateral leaning.  Pt with LOB x1 posteriorly with Mod A to recover.  Pt requires assistance for all OOB activity.  Pt would benefit from further skilled PT services due to pt living alone, having frequent falls, and decreased safety awareness.

## 2019-05-10 NOTE — NURSING
Report given to SIMEON Radford. Patient resting in bed with dry cough. No other obvious distress noted. Meds given for cough   12 hour chart check performed

## 2019-05-10 NOTE — NURSING
"Patient had minimal po intake and UO. She did not urinate during the night but refused the need to use bedside commode. Bladder scanner revealed less than 300 ml. Encouraged patient to drink more fluid since she reports that she doesn't want to "urinate on herself" so she limits po intake  "

## 2019-05-10 NOTE — PROGRESS NOTES
[facility] Hospital Medicine  Progress Note    Patient Name: Magaly Nunes  MRN: [id number]  Patient Class: IP- Inpatient   Admission Date: 5/3/2019  Length of Stay: 7 days  Attending Physician: Dejah Steele MD  Primary Care Provider: Chris Bangura MD        Subjective:     Principal Problem:CHF (congestive heart failure)    HPI:  Magaly Nunes is a 86 y.o. female that (in part)  has a past medical history of Anxiety disorder, Arthritis, Carpal tunnel syndrome, CHF (congestive heart failure), Chronic allergic rhinitis, Chronic pain, CKD stage 3 due to type 2 diabetes mellitus, Constipation - functional, Depression, Diabetes mellitus type II, History of cataract surgery, Hot flash not due to menopause, HTN (hypertension), Hypokalemia, Insomnia, Knee pain, bilateral, Personal history of DVT (deep vein thrombosis), Polyarthralgia, Pulmonary hypertension, Renovascular hypertension, Severe tricuspid regurgitation, Spinal stenosis, Spinal stenosis of lumbar region, Systolic and diastolic CHF, chronic, and Vertigo.  has a past surgical history that includes Hysterectomy; Cataract extraction (Bilateral); Eye surgery; Fracture surgery; and ORIF radius & ulna fractures. Presents to [facility] Emergency Department initially complaining of multiple recurrent falls.  She has fallen 4 times over the last 2 weeks.  She reports all of these were mechanical falls and denies chest pain, syncope, or palpitations.  She 1st tripped over bryn approximately 2 weeks ago and struck the left side of her head and landed on her buttocks.  She has had some difficulty ambulating due to weakness since that time.  She is also complaining of increased weight gain, swelling, and generalized non acute weakness.  She has been so we can fact that she has been sleeping on the floor at home and has been incontinent of urine.  She has not been eating or drinking adequately since the falls.  She  was reluctant to come into the hospital, but finally reports that her pride subsided and she called for help.    In the emergency department multiple imaging studies were obtained to evaluate for possible fracture dislocation which proved to be negative.  CT of head was also negative.  Routine laboratory studies were obtained which demonstrated elevated CPK levels, elevated troponin, and markedly elevated BNP.  Physical exam was consistent with edema likely due to acute CHF exacerbation.    Hospital medicine has been asked to admit for further evaluation and treatment.     Hospital Course:  85 y/o female admitted with CHF exacerbation.  Started on IV diuresis.  Recurrent falls and PT/OT consulted.  Therapy recommending SNF. SW consulted. She is stable on room air and medically stable for discharge to SNF. Discharge on a increased PO regimen of Lasix and follow up with PCP or cardiology for continued adjustment.    Interval History:  Symptoms resolved. Pleasant. Agrees to SNF placement. Discharge pending SNF placement.    Review of Systems   Constitutional: Negative for chills and fever.   Respiratory: Negative for cough and shortness of breath.    Cardiovascular: Positive for leg swelling. Negative for chest pain.   Gastrointestinal: Negative for constipation and diarrhea.   Musculoskeletal: Positive for gait problem. Negative for joint swelling.     Objective:     Vital Signs (Most Recent):  Temp: 98.9 °F (37.2 °C) (05/10/19 1111)  Pulse: 64 (05/10/19 1111)  Resp: 16 (05/10/19 1111)  BP: (!) 101/57 (05/10/19 1111)  SpO2: 97 % (05/10/19 1111) Vital Signs (24h Range):  Temp:  [98 °F (36.7 °C)-99 °F (37.2 °C)] 98.9 °F (37.2 °C)  Pulse:  [61-70] 64  Resp:  [16-20] 16  SpO2:  [96 %-99 %] 97 %  BP: (101-123)/(54-58) 101/57     Weight: 78.5 kg (173 lb 1 oz)  Body mass index is 30.66 kg/m².    Intake/Output Summary (Last 24 hours) at 5/10/2019 1538  Last data filed at 5/10/2019 0000  Gross per 24 hour   Intake --   Output  200 ml   Net -200 ml      Physical Exam   Constitutional: She is oriented to person, place, and time. She appears well-developed and well-nourished. No distress.   HENT:   Head: Normocephalic and atraumatic.   Eyes: Pupils are equal, round, and reactive to light. Conjunctivae and EOM are normal. Right eye exhibits no discharge. Left eye exhibits no discharge.   Neck: Neck supple. No thyromegaly present.   Cardiovascular: Normal rate. Exam reveals no friction rub.   No murmur heard.  Pulmonary/Chest: Effort normal and breath sounds normal.   Abdominal: Soft. Bowel sounds are normal. She exhibits no distension. There is no tenderness.   Musculoskeletal: She exhibits edema (1+ BLE). She exhibits no tenderness.   Neurological: She is alert and oriented to person, place, and time.   Skin: Skin is warm and dry. She is not diaphoretic.       Significant Labs:   BMP:   Recent Labs   Lab 05/10/19  0522   GLU 91      K 3.5      CO2 25   BUN 26*   CREATININE 1.4   CALCIUM 8.3*         Assessment/Plan:      * CHF (congestive heart failure)  Acute on chronic combined systolic and diastolic CHF  Echo on 3/2019 showing EF of 30% and diastolic dysfunction.    Started on IV Lasix.  Continued Losartan.  Does not seem to be on any B blocker, but she needs to be on one for heart failure so metoprolol was started.  Monitor urine output.  Transition IV Lasix to PO and follow up with PCP for continued adjustment.    PT/OT recommending SNF upon discharge.    PAF (paroxysmal atrial fibrillation)  Initially in SR  Converted to afib.  Continue amiodarone.  Does not seem to be on any B blocker.  Poor anticoagulation candidate secondary to recurrent falls.    Recurrent falls while walking  Weakness  PT/OT consult.  Recommending SNF    Elevated brain natriuretic peptide (BNP) level  Due to CHF exacerbation    Elevated troponin I level  Due to strain, stable. Not consistent with ACLS.    GERD (gastroesophageal reflux disease)  Monitor  symptoms    CKD Stage 3  Creat at baseline.  No acute issues.  Closely monitor while on diuresis.    Severe tricuspid regurgitation  Monitor symptoms    Pulmonary hypertension  PAP 78 on echo    Essential hypertension  Resume home medications.  Low Na+ diet.  Add prn Hydralazine.    Depression  Cont home meds.      VTE Risk Mitigation (From admission, onward)        Ordered     enoxaparin injection 40 mg  Daily      05/06/19 1053     Place PHILL hose  Until discontinued      05/04/19 0511     IP VTE HIGH RISK PATIENT  Once      05/04/19 0108              Dejah Steele MD  Department of Hospital Medicine   [facility]

## 2019-05-10 NOTE — PLAN OF CARE
Problem: Fall Injury Risk  Goal: Absence of Fall and Fall-Related Injury    Intervention: Identify and Manage Contributors to Fall Injury Risk     05/10/19 0254   Identify and Manage Contributors to Fall Injury Risk   Self-Care Promotion independence encouraged;BADL personal routines maintained;BADL personal objects within reach   Manage Acute Allergic Reaction   Medication Review/Management medications reviewed;high risk medications identified     Intervention: Promote Injury-Free Environment     05/10/19 0254   Optimize Balance and Safe Activity   Safety Promotion/Fall Prevention assistive device/personal item within reach;nonskid shoes/socks when out of bed;side rails raised x 2;Fall Risk reviewed with patient/family   Optimize [place] and Functional Mobility   Environmental Safety Modification assistive device/personal items within reach;clutter free environment maintained;lighting adjusted         Problem: Infection  Goal: Infection Symptom Resolution  Outcome: Ongoing (interventions implemented as appropriate)  Intervention: Prevent or Manage Infection     05/10/19 0254   Prevent or Manage Infection   Infection Management aseptic technique maintained         Problem: Adult Inpatient Plan of Care  Goal: Plan of Care Review     05/10/19 0254   Plan of Care Review   Plan of Care Reviewed With patient   Progress improving     Goal: Patient-Specific Goal (Individualization)     05/04/19 0100 05/10/19 0254   Patient-Specific Goal (Individualization)   Patient-Specific Goals (Include Timeframe)  --  reposition every 2 hours to promote improved skin integrity   OTHER   Anxieties, Fears or Concerns none  --    Individualized Care Needs  --  monitor for pain     Goal: Absence of Hospital-Acquired Illness or Injury  Outcome: Outcome(s) achieved Date Met: 05/10/19  Intervention: Identify and Manage Fall Risk     05/10/19 0254   Optimize Balance and Safe Activity   Safety Promotion/Fall Prevention assistive  device/personal item within reach;nonskid shoes/socks when out of bed;side rails raised x 2;Fall Risk reviewed with patient/family

## 2019-05-10 NOTE — PT/OT/SLP PROGRESS
"Physical Therapy Treatment    Patient Name:  Magaly Nunes   MRN:  [id number]    Recommendations:     Discharge Recommendations:  nursing facility, skilled   Discharge Equipment Recommendations: none   Barriers to discharge: Decreased caregiver support (pt lives alone)    Assessment:     Magaly Nunes is a 86 y.o. female admitted with a medical diagnosis of CHF (congestive heart failure).  She presents with the following impairments/functional limitations:  weakness, impaired endurance, gait instability, impaired self care skills, impaired balance, decreased safety awareness, impaired functional mobilty, decreased lower extremity function, edema, impaired cardiopulmonary response to activity, decreased ROM, decreased upper extremity function, impaired coordination .  Pt ambulated ~12ft, 8ft, 36ft with RW, CGA with R lateral leaning.  Pt with LOB x1 posteriorly with Mod A to recover.  Pt requires assistance for all OOB activity.  Pt would benefit from further skilled PT services due to pt living alone, having frequent falls, and decreased safety awareness.    Rehab Prognosis: Good; patient would benefit from acute skilled PT services to address these deficits and reach maximum level of function.    Recent Surgery: * No surgery found *      Plan:     During this hospitalization, patient to be seen 5 x/week to address the identified rehab impairments via gait training, therapeutic activities, therapeutic exercises and progress toward the following goals:    · Plan of Care Expires:  05/10/19    Subjective     Chief Complaint: fatigue  Patient/Family Comments/goals: Pt states " I was on the ground for 3 days and never thought I would be able to walk again."  Pain/Comfort:  · Pain Rating 1: 5/10  · Location - Side 1: Right  · Location 1: knee  · Pain Addressed 1: Cessation of Activity, Distraction, Reposition, Nurse notified      Objective:     Communicated with pt's Lilly prior to session.  Patient found HOB " elevated with peripheral IV, telemetry upon PT entry to room.     General Precautions: Standard, fall   Orthopedic Precautions:N/A   Braces: N/A     Functional Mobility:  Pt with decreased endurance and fatigues with activity.  · Bed Mobility:     · Scooting: contact guard assistance  · Supine to Sit: contact guard assistance and minimum assistance  · Transfers:     · Sit to Stand:  CGA from EOB, Min A from toilet, and Mod A from BSChair with rolling walker  · Gait: Pt ambualted ~12ft from EOB>bathroom with RW, CGA , ~8ft from sink to BSchair with RW, CGA with LOB x1 posteriorly with Mod A to recover with seated break.  After seated break, pt ambulated ~36 ft with RW, CGA.  Pt with SOB and fatigue noted.  Pt displays R lateral leaning, decreased yaneth, decreased foot clearance, decreased weight shifting ability, decreased postural control and decreased safety awareness.  · Balance: good sitting and fair standing      AM-PAC 6 CLICK MOBILITY  Turning over in bed (including adjusting bedclothes, sheets and blankets)?: 4  Sitting down on and standing up from a chair with arms (e.g., wheelchair, bedside commode, etc.): 3  Moving from lying on back to sitting on the side of the bed?: 3  Moving to and from a bed to a chair (including a wheelchair)?: 3  Need to walk in hospital room?: 3  Climbing 3-5 steps with a railing?: 2  Basic Mobility Total Score: 18       Therapeutic Activities and Exercises:   Pt performed seated therex to BLEs x15 reps including: hip flexion, LAQs, heel raises, ankle raises    Pt performed BM, transfers, gait training, and toileting.  Pt washing hands at sink with RW, SBA.    Patient left reclined in BSchair with pressure cushion, and B heels elevated with all lines intact, call button in reach, chair alarm on and pt's nurse, Lilly, notified..    GOALS:   Multidisciplinary Problems     Physical Therapy Goals        Problem: Physical Therapy Goal    Goal Priority Disciplines Outcome Goal  Variances Interventions   Physical Therapy Goal     PT, PT/OT Ongoing (interventions implemented as appropriate)     Description:  Goals to be met by: 05/10/19.     Patient will increase functional independence with mobility by performin. Supine to sit with supervision  2. Sit to stand transfer with Supervision using RW  3. Gait  x 150 feet with Supervision using Rolling Walker  4. Bed to chair with supervision  5. LE therex 3 sets x10 reps with supervision                        Time Tracking:     PT Received On: 05/10/19  PT Start Time: 1130     PT Stop Time: 1200  PT Total Time (min): 30 min     Billable Minutes: Gait Training 15 and Therapeutic Activity 15       PT/PTA: PTA     PTA Visit Number: 1     Zuleyka Ancamichelle, PTA  05/10/2019

## 2019-05-10 NOTE — NURSING
Bedside report received from DIANA Durham. Patient sitting up in bed with eyes closed. Easily aroused by verbal stimuli. NAD noted at this time. All safety precautions in place. Will continue to monitor.

## 2019-05-10 NOTE — PLAN OF CARE
Problem: Occupational Therapy Goal  Goal: Occupational Therapy Goal  Goals to be met by: 5/18/19     Patient will increase functional independence with ADLs by performing:    UE Dressing with Modified [place] and Supervision.  LE Dressing with Modified [place] and Supervision.  Grooming while standing at sink with Modified [place] and Supervision.  Toileting from toilet with Modified [place] and Supervision for hygiene and clothing management.   Supine to sit with Modified [place] and Supervision.  Step transfer with Modified [place] and Supervision  Toilet transfer to toilet with Stand-by Assistance.  Upper extremity exercise program x10 reps per handout, with assistance as needed.     Outcome: Ongoing (interventions implemented as appropriate)  The patient participated in grooming at the sink and seated UE therex. Patient will benefit from OT to address functional deficits.    Comments: The patient will benefit from SNF.

## 2019-05-10 NOTE — PLAN OF CARE
DAISY spoke to Karena (Admissions) at [facility]. Karena informed DAISY that Dianna is waiting for updated PT/OT notes that document patient's participation in treatment. Patient is refusing PT/OT.        05/10/19 1111   Post-Acute Status   Post-Acute Authorization Placement  ([facility])   Post-Acute Placement Status Pending Payor Review

## 2019-05-13 NOTE — PT/OT/SLP DISCHARGE
Physical Therapy Discharge Summary    Name: Magaly Nunes  MRN: [id number]   Principal Problem: CHF (congestive heart failure)     Patient Discharged from acute Physical Therapy on 5/10/19.  Please refer to prior PT notes for functional status.     Assessment:     Patient appropriate for care in another setting.    Objective:     GOALS:   Multidisciplinary Problems     Physical Therapy Goals        Problem: Physical Therapy Goal    Goal Priority Disciplines Outcome Goal Variances Interventions   Physical Therapy Goal     PT, PT/OT Ongoing (interventions implemented as appropriate)     Description:  Goals to be met by: 05/10/19.     Patient will increase functional independence with mobility by performin. Supine to sit with supervision  2. Sit to stand transfer with Supervision using RW  3. Gait  x 150 feet with Supervision using Rolling Walker  4. Bed to chair with supervision  5. LE therex 3 sets x10 reps with supervision                        Reasons for Discontinuation of Therapy Services  Transfer to alternate level of care.      Plan:     Patient Discharged to: Skilled Nursing Facility.    Denia Wilkins, PT  2019

## 2022-10-05 NOTE — NURSING
Bedside hand off received from SIMEON Radford. Patient in bed, awake and alert. No apparent distress noted. Safety and fall precautions maintained. Will continue to monitor.   Keystone Flap Text: The defect edges were debeveled with a #15 scalpel blade.  Given the location of the defect, shape of the defect a keystone flap was deemed most appropriate.  Using a sterile surgical marker, an appropriate keystone flap was drawn incorporating the defect, outlining the appropriate donor tissue and placing the expected incisions within the relaxed skin tension lines where possible. The area thus outlined was incised deep to adipose tissue with a #15 scalpel blade.  The skin margins were undermined to an appropriate distance in all directions around the primary defect and laterally outward around the flap utilizing iris scissors.

## 2023-05-05 NOTE — ASSESSMENT & PLAN NOTE
Weakness  PT/OT consult.  Recommending SNF   PHYSICAL EXAM:  GENERAL: NAD, lying in bed comfortably  HEAD:  Atraumatic, Normocephalic  EYES: EOMI, PERRLA, conjunctiva and sclera clear  ENT: Dry mucous membranes  NECK: Supple, No JVD  CHEST/LUNG: Clear to auscultation bilaterally; No rales, rhonchi, wheezing, or rubs  HEART: Regular rate and rhythm; No murmurs, rubs, or gallops  ABDOMEN: Bowel sounds present; distension with LLQ tenderness to palpation, no rebound or guarding  EXTREMITIES:  2+ Peripheral Pulses, brisk capillary refill. No clubbing, cyanosis, or edema  NERVOUS SYSTEM:  Alert & Oriented X1   MSK: FROM all 4 extremities, full and equal strength  SKIN: No rashes or lesions  : mckeon with 1.5 L of blood tinged urine

## 2023-05-12 NOTE — PROGRESS NOTES
[facility] Hospital Medicine  Progress Note    Patient Name: Magaly Nunes  MRN: [id number]  Patient Class: IP- Inpatient   Admission Date: 5/3/2019  Length of Stay: 2 days  Attending Physician: Manuel Ordonez MD  Primary Care Provider: Chris Bangura MD        Subjective:     Principal Problem:CHF (congestive heart failure)    HPI:  Magaly Nunes is a 86 y.o. female that (in part)  has a past medical history of Anxiety disorder, Arthritis, Carpal tunnel syndrome, CHF (congestive heart failure), Chronic allergic rhinitis, Chronic pain, CKD stage 3 due to type 2 diabetes mellitus, Constipation - functional, Depression, Diabetes mellitus type II, History of cataract surgery, Hot flash not due to menopause, HTN (hypertension), Hypokalemia, Insomnia, Knee pain, bilateral, Personal history of DVT (deep vein thrombosis), Polyarthralgia, Pulmonary hypertension, Renovascular hypertension, Severe tricuspid regurgitation, Spinal stenosis, Spinal stenosis of lumbar region, Systolic and diastolic CHF, chronic, and Vertigo.  has a past surgical history that includes Hysterectomy; Cataract extraction (Bilateral); Eye surgery; Fracture surgery; and ORIF radius & ulna fractures. Presents to [facility] Emergency Department initially complaining of multiple recurrent falls.  She has fallen 4 times over the last 2 weeks.  She reports all of these were mechanical falls and denies chest pain, syncope, or palpitations.  She 1st tripped over bryn approximately 2 weeks ago and struck the left side of her head and landed on her buttocks.  She has had some difficulty ambulating due to weakness since that time.  She is also complaining of increased weight gain, swelling, and generalized non acute weakness.  She has been so we can fact that she has been sleeping on the floor at home and has been incontinent of urine.  She has not been eating or drinking adequately since the falls.  She  was reluctant to come into the hospital, but finally reports that her pride subsided and she called for help.    In the emergency department multiple imaging studies were obtained to evaluate for possible fracture dislocation which proved to be negative.  CT of head was also negative.  Routine laboratory studies were obtained which demonstrated elevated CPK levels, elevated troponin, and markedly elevated BNP.  Physical exam was consistent with edema likely due to acute CHF exacerbation.    Hospital medicine has been asked to admit for further evaluation and treatment.     Hospital Course:  85 y/o female admitted with CHF exacerbation.  Started on IV diuresis.  Recurrent falls and PT/OT consulted.    Interval History: Feels weak.    Review of Systems   Constitutional: Negative for chills and fever.   HENT: Negative for ear discharge and ear pain.    Eyes: Negative for pain and itching.   Endocrine: Negative for polyphagia and polyuria.     Objective:     Vital Signs (Most Recent):  Temp: 97.9 °F (36.6 °C) (05/05/19 1124)  Pulse: 85 (05/05/19 1124)  Resp: 18 (05/05/19 1124)  BP: (!) 92/58 (05/05/19 1130)  SpO2: 97 % (05/05/19 1124) Vital Signs (24h Range):  Temp:  [97.7 °F (36.5 °C)-98.5 °F (36.9 °C)] 97.9 °F (36.6 °C)  Pulse:  [] 85  Resp:  [18-20] 18  SpO2:  [96 %-98 %] 97 %  BP: ()/(55-62) 92/58     Weight: 80 kg (176 lb 5.9 oz)  Body mass index is 31.24 kg/m².    Intake/Output Summary (Last 24 hours) at 5/5/2019 1341  Last data filed at 5/5/2019 0300  Gross per 24 hour   Intake 480 ml   Output 900 ml   Net -420 ml      Physical Exam   Constitutional: She is oriented to person, place, and time. No distress.   HENT:   Head: Normocephalic and atraumatic.   Eyes: Conjunctivae are normal. Right eye exhibits no discharge. Left eye exhibits no discharge.   Neck: Neck supple. No thyromegaly present.   Cardiovascular: Normal rate.   Irregular   Pulmonary/Chest: Effort normal and breath sounds normal.    Abdominal: Bowel sounds are normal.   Musculoskeletal: She exhibits no tenderness.   Neurological: She is alert and oriented to person, place, and time.   Skin: Skin is warm and dry. She is not diaphoretic.       Significant Labs:   BMP:   Recent Labs   Lab 05/05/19  0531   *      K 3.5      CO2 28   BUN 23   CREATININE 1.3   CALCIUM 8.3*   MG 1.3*     CBC:   Recent Labs   Lab 05/03/19  2147 05/04/19  0250 05/05/19  0531   WBC 6.71 7.50 5.42   HGB 11.9* 10.8* 9.7*   HCT 39.2 35.2* 30.7*    215 261         Assessment/Plan:      * CHF (congestive heart failure)  Acute on chronic combined systolic and diastolic CHF  Echo on 3/2019 showing EF of 30% and diastolic dysfunction.    Started on IV Lasix.  Continue Losartan.  Does not seem to be on any B blocker.  Monitor urine output.      PAF (paroxysmal atrial fibrillation)  Initially in SR  Converted to afib yesterday.  Continue amiodarone.  Does not seem to be on any B blocker.  Poor anticoagulation candidate secondary to recurrent falls.      Recurrent falls while walking  Weakness  PT/OT consult.      GERD (gastroesophageal reflux disease)        CKD Stage 3  Creat at baseline.  No acute issues.  Closely monitor while on diuresis.      Pulmonary hypertension        Essential hypertension  Resume home medications.  Low Na diet.  Add prn Hydralazine.      Depression          VTE Risk Mitigation (From admission, onward)        Ordered     heparin (porcine) injection 5,000 Units  Every 8 hours      05/04/19 0425     Place PHILL hose  Until discontinued      05/04/19 0511     IP VTE HIGH RISK PATIENT  Once      05/04/19 0108              Manuel Ordonez MD  Department of Hospital Medicine   [facility]   [NL (0)] : extension 0 degrees [4___] : quadriceps 4[unfilled]/5 [5___] : hamstring 5[unfilled]/5 [] : antalgic [Left] : left knee [Right] : right knee [Degenerative change] : Degenerative change [[id number]] : flexion 110 degrees

## 2025-04-16 NOTE — ED NOTES
Behavioral Health Belongings     Informed of Valuables Policy  Yes No         Item                  Qty   Clothing:     Home   Bin   Security Lock Up Room Returned   Date/Initials   2 Pants with strings    2    1 T shirt    1    1 Crewneck    1    3  Long Sleeve shirts    3    X  Pajama set with strings    X    2  Pair of socks    2    1 Jeans    1    3  Underwear  3      1  Bra without wire    1            Behavioral Health Belongings List                     [id number]                          Qty       Luggage / Bags:   Home   Bin   Security Lock Up Room Returned Date/Initials    Suitcases / Luggage with straps        1 Gym Bags / purses with long straps    1     Backpack         Plastic bags (including Ziploc)         Other                     Qty   Grooming / Hygiene Items:   Home   Bin   Security Lock Up Room Returned Date/Initials    Perfume / [place]         Mouthwash         Items in glass bottles (nail polish, makeup, etc.)         Aerosol cans (hair spray, mousse, etc         Electric razor         Disposable razor         Shaving cream         Dental floss         Metal files, nail clippers, tweezers, etc.         Hairdryer        2 Toothpaste     2    1 Deodorant     1    1 Comb    1    1 Toothbrush    1                                       Behavioral Health Belongings List                            [id number]                   Qty   Other Misc Items   Home   Bin   Security Lock Up Room Returned Date/Initials                                                                    Signature at Admission ___________________________________________Date: _________    Signature at Update ______________________________________________Date: _________    Signature at Discharge ____________________________________________Date: _________                                                                    Behavioral Health Belongings List                       [id number]             and house supervisor notified of bed request, MD Tee accepting, awaiting bed assignment